# Patient Record
Sex: MALE | Race: WHITE | NOT HISPANIC OR LATINO | Employment: OTHER | URBAN - NONMETROPOLITAN AREA
[De-identification: names, ages, dates, MRNs, and addresses within clinical notes are randomized per-mention and may not be internally consistent; named-entity substitution may affect disease eponyms.]

---

## 2017-01-27 ENCOUNTER — HOSPITAL ENCOUNTER (EMERGENCY)
Facility: HOSPITAL | Age: 77
Discharge: ANOTHER HOSPITAL | End: 2017-01-27
Attending: EMERGENCY MEDICINE | Admitting: EMERGENCY MEDICINE
Payer: OTHER GOVERNMENT

## 2017-01-27 ENCOUNTER — APPOINTMENT (EMERGENCY)
Dept: RADIOLOGY | Facility: HOSPITAL | Age: 77
End: 2017-01-27
Payer: OTHER GOVERNMENT

## 2017-01-27 VITALS
BODY MASS INDEX: 37.27 KG/M2 | RESPIRATION RATE: 20 BRPM | HEIGHT: 72 IN | WEIGHT: 275.13 LBS | SYSTOLIC BLOOD PRESSURE: 129 MMHG | TEMPERATURE: 97.8 F | HEART RATE: 74 BPM | DIASTOLIC BLOOD PRESSURE: 64 MMHG | OXYGEN SATURATION: 95 %

## 2017-01-27 DIAGNOSIS — G89.29 CHRONIC BACK PAIN: ICD-10-CM

## 2017-01-27 DIAGNOSIS — R06.00 DYSPNEA: Primary | ICD-10-CM

## 2017-01-27 DIAGNOSIS — Z95.1 HX OF CABG: ICD-10-CM

## 2017-01-27 DIAGNOSIS — M54.9 CHRONIC BACK PAIN: ICD-10-CM

## 2017-01-27 LAB
ANION GAP SERPL CALCULATED.3IONS-SCNC: 9 MMOL/L (ref 4–13)
BASOPHILS # BLD AUTO: 0.06 THOUSANDS/ΜL (ref 0–0.1)
BASOPHILS NFR BLD AUTO: 1 % (ref 0–1)
BUN SERPL-MCNC: 32 MG/DL (ref 5–25)
CALCIUM SERPL-MCNC: 8.6 MG/DL (ref 8.3–10.1)
CHLORIDE SERPL-SCNC: 101 MMOL/L (ref 100–108)
CO2 SERPL-SCNC: 32 MMOL/L (ref 21–32)
CREAT SERPL-MCNC: 1.25 MG/DL (ref 0.6–1.3)
EOSINOPHIL # BLD AUTO: 0.25 THOUSAND/ΜL (ref 0–0.61)
EOSINOPHIL NFR BLD AUTO: 2 % (ref 0–6)
ERYTHROCYTE [DISTWIDTH] IN BLOOD BY AUTOMATED COUNT: 14.4 % (ref 11.6–15.1)
GFR SERPL CREATININE-BSD FRML MDRD: 56.2 ML/MIN/1.73SQ M
GLUCOSE SERPL-MCNC: 131 MG/DL (ref 65–140)
HCT VFR BLD AUTO: 42.8 % (ref 36.5–49.3)
HGB BLD-MCNC: 13.7 G/DL (ref 12–17)
INR PPP: 1.11 (ref 0.86–1.16)
LYMPHOCYTES # BLD AUTO: 1.58 THOUSANDS/ΜL (ref 0.6–4.47)
LYMPHOCYTES NFR BLD AUTO: 13 % (ref 14–44)
MCH RBC QN AUTO: 28.5 PG (ref 26.8–34.3)
MCHC RBC AUTO-ENTMCNC: 32 G/DL (ref 31.4–37.4)
MCV RBC AUTO: 89 FL (ref 82–98)
MONOCYTES # BLD AUTO: 1.28 THOUSAND/ΜL (ref 0.17–1.22)
MONOCYTES NFR BLD AUTO: 11 % (ref 4–12)
NEUTROPHILS # BLD AUTO: 8.71 THOUSANDS/ΜL (ref 1.85–7.62)
NEUTS SEG NFR BLD AUTO: 73 % (ref 43–75)
PLATELET # BLD AUTO: 220 THOUSANDS/UL (ref 149–390)
PMV BLD AUTO: 11.5 FL (ref 8.9–12.7)
POTASSIUM SERPL-SCNC: 3.3 MMOL/L (ref 3.5–5.3)
PROTHROMBIN TIME: 14 SECONDS (ref 12–14.3)
RBC # BLD AUTO: 4.8 MILLION/UL (ref 3.88–5.62)
SODIUM SERPL-SCNC: 142 MMOL/L (ref 136–145)
TROPONIN I SERPL-MCNC: 0.03 NG/ML
WBC # BLD AUTO: 11.88 THOUSAND/UL (ref 4.31–10.16)

## 2017-01-27 PROCEDURE — 80048 BASIC METABOLIC PNL TOTAL CA: CPT | Performed by: EMERGENCY MEDICINE

## 2017-01-27 PROCEDURE — 71020 HB CHEST X-RAY 2VW FRONTAL&LATL: CPT

## 2017-01-27 PROCEDURE — 96374 THER/PROPH/DIAG INJ IV PUSH: CPT

## 2017-01-27 PROCEDURE — 85610 PROTHROMBIN TIME: CPT | Performed by: EMERGENCY MEDICINE

## 2017-01-27 PROCEDURE — 93005 ELECTROCARDIOGRAM TRACING: CPT | Performed by: EMERGENCY MEDICINE

## 2017-01-27 PROCEDURE — 36415 COLL VENOUS BLD VENIPUNCTURE: CPT | Performed by: EMERGENCY MEDICINE

## 2017-01-27 PROCEDURE — 85025 COMPLETE CBC W/AUTO DIFF WBC: CPT | Performed by: EMERGENCY MEDICINE

## 2017-01-27 PROCEDURE — 99285 EMERGENCY DEPT VISIT HI MDM: CPT

## 2017-01-27 PROCEDURE — 84484 ASSAY OF TROPONIN QUANT: CPT | Performed by: EMERGENCY MEDICINE

## 2017-01-27 PROCEDURE — 96375 TX/PRO/DX INJ NEW DRUG ADDON: CPT

## 2017-01-27 RX ORDER — FUROSEMIDE 10 MG/ML
40 INJECTION INTRAMUSCULAR; INTRAVENOUS ONCE
Status: COMPLETED | OUTPATIENT
Start: 2017-01-27 | End: 2017-01-27

## 2017-01-27 RX ORDER — INSULIN GLARGINE 100 [IU]/ML
40 INJECTION, SOLUTION SUBCUTANEOUS
COMMUNITY
End: 2017-08-18 | Stop reason: HOSPADM

## 2017-01-27 RX ORDER — CLOPIDOGREL BISULFATE 75 MG/1
75 TABLET ORAL DAILY
COMMUNITY
End: 2017-06-27 | Stop reason: ALTCHOICE

## 2017-01-27 RX ORDER — NITROGLYCERIN 0.4 MG/1
0.4 TABLET SUBLINGUAL ONCE
Status: COMPLETED | OUTPATIENT
Start: 2017-01-27 | End: 2017-01-27

## 2017-01-27 RX ADMIN — HYDROMORPHONE HYDROCHLORIDE 1 MG: 1 INJECTION, SOLUTION INTRAMUSCULAR; INTRAVENOUS; SUBCUTANEOUS at 15:20

## 2017-01-27 RX ADMIN — NITROGLYCERIN 0.4 MG: 0.4 TABLET SUBLINGUAL at 15:20

## 2017-01-27 RX ADMIN — HYDROMORPHONE HYDROCHLORIDE 1 MG: 1 INJECTION, SOLUTION INTRAMUSCULAR; INTRAVENOUS; SUBCUTANEOUS at 21:22

## 2017-01-27 RX ADMIN — FUROSEMIDE 40 MG: 10 INJECTION, SOLUTION INTRAMUSCULAR; INTRAVENOUS at 15:20

## 2017-01-30 LAB
ATRIAL RATE: 82 BPM
P AXIS: 59 DEGREES
PR INTERVAL: 176 MS
QRS AXIS: -59 DEGREES
QRSD INTERVAL: 120 MS
QT INTERVAL: 410 MS
QTC INTERVAL: 479 MS
T WAVE AXIS: 85 DEGREES
VENTRICULAR RATE: 82 BPM

## 2017-06-27 ENCOUNTER — HOSPITAL ENCOUNTER (INPATIENT)
Facility: HOSPITAL | Age: 77
LOS: 3 days | Discharge: NURSING, REHAB OR LONG-TERM CARE | DRG: 683 | End: 2017-07-01
Attending: EMERGENCY MEDICINE | Admitting: INTERNAL MEDICINE
Payer: MEDICARE

## 2017-06-27 DIAGNOSIS — N28.9 ACUTE RENAL INSUFFICIENCY: Primary | ICD-10-CM

## 2017-06-27 DIAGNOSIS — N17.9 ACUTE KIDNEY INJURY (HCC): ICD-10-CM

## 2017-06-27 DIAGNOSIS — R30.0 DYSURIA: ICD-10-CM

## 2017-06-27 DIAGNOSIS — R33.9 URINARY RETENTION: ICD-10-CM

## 2017-06-27 LAB
ACETONE SERPL-MCNC: NEGATIVE MG/DL
ALBUMIN SERPL BCP-MCNC: 3.1 G/DL (ref 3.5–5)
ALP SERPL-CCNC: 108 U/L (ref 46–116)
ALT SERPL W P-5'-P-CCNC: 19 U/L (ref 12–78)
ANION GAP SERPL CALCULATED.3IONS-SCNC: 8 MMOL/L (ref 4–13)
AST SERPL W P-5'-P-CCNC: 12 U/L (ref 5–45)
BILIRUB SERPL-MCNC: 0.3 MG/DL (ref 0.2–1)
BILIRUB UR QL STRIP: NEGATIVE
BUN SERPL-MCNC: 45 MG/DL (ref 5–25)
CALCIUM SERPL-MCNC: 8.8 MG/DL (ref 8.3–10.1)
CHLORIDE SERPL-SCNC: 101 MMOL/L (ref 100–108)
CLARITY UR: CLEAR
CO2 SERPL-SCNC: 29 MMOL/L (ref 21–32)
COLOR UR: YELLOW
CREAT SERPL-MCNC: 1.63 MG/DL (ref 0.6–1.3)
GFR SERPL CREATININE-BSD FRML MDRD: 41.2 ML/MIN/1.73SQ M
GLUCOSE SERPL-MCNC: 271 MG/DL (ref 65–140)
GLUCOSE UR STRIP-MCNC: ABNORMAL MG/DL
HGB UR QL STRIP.AUTO: NEGATIVE
KETONES UR STRIP-MCNC: NEGATIVE MG/DL
LEUKOCYTE ESTERASE UR QL STRIP: NEGATIVE
MAGNESIUM SERPL-MCNC: 2.1 MG/DL (ref 1.6–2.6)
NITRITE UR QL STRIP: NEGATIVE
PH BLDV: 7.34 [PH] (ref 7.3–7.4)
PH UR STRIP.AUTO: 5.5 [PH] (ref 4.5–8)
POTASSIUM SERPL-SCNC: 4.5 MMOL/L (ref 3.5–5.3)
PROT SERPL-MCNC: 6.8 G/DL (ref 6.4–8.2)
PROT UR STRIP-MCNC: NEGATIVE MG/DL
SODIUM SERPL-SCNC: 138 MMOL/L (ref 136–145)
SP GR UR STRIP.AUTO: 1.02 (ref 1–1.03)
UROBILINOGEN UR QL STRIP.AUTO: 0.2 E.U./DL

## 2017-06-27 PROCEDURE — 83735 ASSAY OF MAGNESIUM: CPT | Performed by: EMERGENCY MEDICINE

## 2017-06-27 PROCEDURE — 82009 KETONE BODYS QUAL: CPT | Performed by: EMERGENCY MEDICINE

## 2017-06-27 PROCEDURE — 85027 COMPLETE CBC AUTOMATED: CPT | Performed by: EMERGENCY MEDICINE

## 2017-06-27 PROCEDURE — 96360 HYDRATION IV INFUSION INIT: CPT

## 2017-06-27 PROCEDURE — 87086 URINE CULTURE/COLONY COUNT: CPT | Performed by: EMERGENCY MEDICINE

## 2017-06-27 PROCEDURE — 80053 COMPREHEN METABOLIC PANEL: CPT | Performed by: EMERGENCY MEDICINE

## 2017-06-27 PROCEDURE — 36415 COLL VENOUS BLD VENIPUNCTURE: CPT | Performed by: EMERGENCY MEDICINE

## 2017-06-27 PROCEDURE — 81003 URINALYSIS AUTO W/O SCOPE: CPT | Performed by: EMERGENCY MEDICINE

## 2017-06-27 PROCEDURE — 82800 BLOOD PH: CPT | Performed by: EMERGENCY MEDICINE

## 2017-06-27 PROCEDURE — 85007 BL SMEAR W/DIFF WBC COUNT: CPT | Performed by: EMERGENCY MEDICINE

## 2017-06-27 RX ORDER — HYDROCHLOROTHIAZIDE 12.5 MG/1
12.5 CAPSULE, GELATIN COATED ORAL DAILY
COMMUNITY
End: 2017-07-01 | Stop reason: HOSPADM

## 2017-06-27 RX ORDER — POTASSIUM CHLORIDE 750 MG/1
10 TABLET, EXTENDED RELEASE ORAL 2 TIMES DAILY
COMMUNITY
End: 2017-07-01 | Stop reason: HOSPADM

## 2017-06-27 RX ORDER — FERROUS SULFATE 325(65) MG
325 TABLET ORAL
COMMUNITY
End: 2017-07-01 | Stop reason: HOSPADM

## 2017-06-27 RX ORDER — SIMVASTATIN 10 MG
10 TABLET ORAL
Status: ON HOLD | COMMUNITY
End: 2017-08-09 | Stop reason: CLARIF

## 2017-06-27 RX ADMIN — SODIUM CHLORIDE 1000 ML: 0.9 INJECTION, SOLUTION INTRAVENOUS at 20:17

## 2017-06-28 ENCOUNTER — APPOINTMENT (INPATIENT)
Dept: PHYSICAL THERAPY | Facility: HOSPITAL | Age: 77
DRG: 683 | End: 2017-06-28
Payer: MEDICARE

## 2017-06-28 ENCOUNTER — APPOINTMENT (INPATIENT)
Dept: ULTRASOUND IMAGING | Facility: HOSPITAL | Age: 77
DRG: 683 | End: 2017-06-28
Payer: MEDICARE

## 2017-06-28 ENCOUNTER — APPOINTMENT (OUTPATIENT)
Dept: OCCUPATIONAL THERAPY | Facility: HOSPITAL | Age: 77
DRG: 683 | End: 2017-06-28
Payer: MEDICARE

## 2017-06-28 PROBLEM — J44.9 COPD (CHRONIC OBSTRUCTIVE PULMONARY DISEASE) (HCC): Status: ACTIVE | Noted: 2017-06-28

## 2017-06-28 PROBLEM — I10 HYPERTENSION: Status: ACTIVE | Noted: 2017-06-28

## 2017-06-28 PROBLEM — R30.9 PAINFUL URINATION: Status: ACTIVE | Noted: 2017-06-28

## 2017-06-28 PROBLEM — N28.9 ACUTE RENAL INSUFFICIENCY: Status: ACTIVE | Noted: 2017-06-28

## 2017-06-28 PROBLEM — R30.0 DYSURIA: Status: ACTIVE | Noted: 2017-06-28

## 2017-06-28 PROBLEM — N17.9 ACUTE KIDNEY INJURY (HCC): Status: ACTIVE | Noted: 2017-06-28

## 2017-06-28 PROBLEM — E11.65 TYPE 2 DIABETES MELLITUS WITH HYPERGLYCEMIA (HCC): Status: ACTIVE | Noted: 2017-06-28

## 2017-06-28 PROBLEM — Z87.438 HISTORY OF BPH: Status: ACTIVE | Noted: 2017-06-28

## 2017-06-28 PROBLEM — E78.5 HYPERLIPIDEMIA: Status: ACTIVE | Noted: 2017-06-28

## 2017-06-28 PROBLEM — Z86.19 HISTORY OF ESBL E. COLI INFECTION: Status: ACTIVE | Noted: 2017-06-28

## 2017-06-28 PROBLEM — E11.9 DIABETES MELLITUS (HCC): Status: ACTIVE | Noted: 2017-06-28

## 2017-06-28 PROBLEM — R33.9 URINARY RETENTION: Status: ACTIVE | Noted: 2017-06-28

## 2017-06-28 LAB
ANION GAP SERPL CALCULATED.3IONS-SCNC: 7 MMOL/L (ref 4–13)
BASOPHILS # BLD MANUAL: 0 THOUSAND/UL (ref 0–0.1)
BASOPHILS NFR MAR MANUAL: 0 % (ref 0–1)
BUN SERPL-MCNC: 38 MG/DL (ref 5–25)
CALCIUM SERPL-MCNC: 8.4 MG/DL (ref 8.3–10.1)
CHLORIDE SERPL-SCNC: 106 MMOL/L (ref 100–108)
CO2 SERPL-SCNC: 28 MMOL/L (ref 21–32)
CREAT SERPL-MCNC: 1.35 MG/DL (ref 0.6–1.3)
CREAT UR-MCNC: 43.2 MG/DL
EOSINOPHIL # BLD MANUAL: 0.37 THOUSAND/UL (ref 0–0.4)
EOSINOPHIL NFR BLD MANUAL: 3 % (ref 0–6)
ERYTHROCYTE [DISTWIDTH] IN BLOOD BY AUTOMATED COUNT: 13.3 % (ref 11.6–15.1)
ERYTHROCYTE [DISTWIDTH] IN BLOOD BY AUTOMATED COUNT: 13.4 % (ref 11.6–15.1)
EST. AVERAGE GLUCOSE BLD GHB EST-MCNC: 298 MG/DL
GFR SERPL CREATININE-BSD FRML MDRD: 51.2 ML/MIN/1.73SQ M
GLUCOSE SERPL-MCNC: 279 MG/DL (ref 65–140)
GLUCOSE SERPL-MCNC: 302 MG/DL (ref 65–140)
GLUCOSE SERPL-MCNC: 340 MG/DL (ref 65–140)
GLUCOSE SERPL-MCNC: 345 MG/DL (ref 65–140)
GLUCOSE SERPL-MCNC: 350 MG/DL (ref 65–140)
HBA1C MFR BLD: 12 % (ref 4.2–6.3)
HCT VFR BLD AUTO: 39.6 % (ref 36.5–49.3)
HCT VFR BLD AUTO: 40.2 % (ref 36.5–49.3)
HGB BLD-MCNC: 13.1 G/DL (ref 12–17)
HGB BLD-MCNC: 13.3 G/DL (ref 12–17)
LYMPHOCYTES # BLD AUTO: 2.93 THOUSAND/UL (ref 0.6–4.47)
LYMPHOCYTES # BLD AUTO: 24 % (ref 14–44)
MCH RBC QN AUTO: 28.7 PG (ref 26.8–34.3)
MCH RBC QN AUTO: 28.9 PG (ref 26.8–34.3)
MCHC RBC AUTO-ENTMCNC: 33.1 G/DL (ref 31.4–37.4)
MCHC RBC AUTO-ENTMCNC: 33.1 G/DL (ref 31.4–37.4)
MCV RBC AUTO: 87 FL (ref 82–98)
MCV RBC AUTO: 87 FL (ref 82–98)
MONOCYTES # BLD AUTO: 0.73 THOUSAND/UL (ref 0–1.22)
MONOCYTES NFR BLD: 6 % (ref 4–12)
NEUTROPHILS # BLD MANUAL: 8.17 THOUSAND/UL (ref 1.85–7.62)
NEUTS SEG NFR BLD AUTO: 67 % (ref 43–75)
PLATELET # BLD AUTO: 195 THOUSANDS/UL (ref 149–390)
PLATELET # BLD AUTO: 200 THOUSANDS/UL (ref 149–390)
PLATELET BLD QL SMEAR: ADEQUATE
PMV BLD AUTO: 11.5 FL (ref 8.9–12.7)
PMV BLD AUTO: 12 FL (ref 8.9–12.7)
POTASSIUM SERPL-SCNC: 5.2 MMOL/L (ref 3.5–5.3)
PROT UR-MCNC: 14 MG/DL
PROT/CREAT UR: 0.32 MG/G{CREAT} (ref 0–0.1)
RBC # BLD AUTO: 4.53 MILLION/UL (ref 3.88–5.62)
RBC # BLD AUTO: 4.63 MILLION/UL (ref 3.88–5.62)
SODIUM 24H UR-SCNC: 18 MOL/L
SODIUM SERPL-SCNC: 141 MMOL/L (ref 136–145)
TOTAL CELLS COUNTED SPEC: 100
WBC # BLD AUTO: 12.2 THOUSAND/UL (ref 4.31–10.16)
WBC # BLD AUTO: 9.96 THOUSAND/UL (ref 4.31–10.16)

## 2017-06-28 PROCEDURE — 83036 HEMOGLOBIN GLYCOSYLATED A1C: CPT | Performed by: PHYSICIAN ASSISTANT

## 2017-06-28 PROCEDURE — 80048 BASIC METABOLIC PNL TOTAL CA: CPT | Performed by: PHYSICIAN ASSISTANT

## 2017-06-28 PROCEDURE — 97530 THERAPEUTIC ACTIVITIES: CPT

## 2017-06-28 PROCEDURE — 94760 N-INVAS EAR/PLS OXIMETRY 1: CPT

## 2017-06-28 PROCEDURE — G8987 SELF CARE CURRENT STATUS: HCPCS

## 2017-06-28 PROCEDURE — G8978 MOBILITY CURRENT STATUS: HCPCS | Performed by: PHYSICAL THERAPIST

## 2017-06-28 PROCEDURE — 84300 ASSAY OF URINE SODIUM: CPT | Performed by: INTERNAL MEDICINE

## 2017-06-28 PROCEDURE — 82948 REAGENT STRIP/BLOOD GLUCOSE: CPT

## 2017-06-28 PROCEDURE — 99285 EMERGENCY DEPT VISIT HI MDM: CPT

## 2017-06-28 PROCEDURE — 84156 ASSAY OF PROTEIN URINE: CPT | Performed by: INTERNAL MEDICINE

## 2017-06-28 PROCEDURE — 85027 COMPLETE CBC AUTOMATED: CPT | Performed by: PHYSICIAN ASSISTANT

## 2017-06-28 PROCEDURE — 97116 GAIT TRAINING THERAPY: CPT | Performed by: PHYSICAL THERAPIST

## 2017-06-28 PROCEDURE — 97163 PT EVAL HIGH COMPLEX 45 MIN: CPT | Performed by: PHYSICAL THERAPIST

## 2017-06-28 PROCEDURE — G8988 SELF CARE GOAL STATUS: HCPCS

## 2017-06-28 PROCEDURE — 97167 OT EVAL HIGH COMPLEX 60 MIN: CPT

## 2017-06-28 PROCEDURE — 76770 US EXAM ABDO BACK WALL COMP: CPT

## 2017-06-28 PROCEDURE — 51798 US URINE CAPACITY MEASURE: CPT

## 2017-06-28 PROCEDURE — 84166 PROTEIN E-PHORESIS/URINE/CSF: CPT | Performed by: INTERNAL MEDICINE

## 2017-06-28 PROCEDURE — G8979 MOBILITY GOAL STATUS: HCPCS | Performed by: PHYSICAL THERAPIST

## 2017-06-28 PROCEDURE — 82570 ASSAY OF URINE CREATININE: CPT | Performed by: INTERNAL MEDICINE

## 2017-06-28 RX ORDER — CARVEDILOL 3.12 MG/1
6.25 TABLET ORAL 2 TIMES DAILY WITH MEALS
Status: DISCONTINUED | OUTPATIENT
Start: 2017-06-28 | End: 2017-07-01 | Stop reason: HOSPADM

## 2017-06-28 RX ORDER — ATORVASTATIN CALCIUM 40 MG/1
40 TABLET, FILM COATED ORAL
Status: DISCONTINUED | OUTPATIENT
Start: 2017-06-28 | End: 2017-07-01 | Stop reason: HOSPADM

## 2017-06-28 RX ORDER — ACETAMINOPHEN 325 MG/1
650 TABLET ORAL EVERY 6 HOURS PRN
Status: DISCONTINUED | OUTPATIENT
Start: 2017-06-28 | End: 2017-06-28

## 2017-06-28 RX ORDER — HEPARIN SODIUM 5000 [USP'U]/ML
5000 INJECTION, SOLUTION INTRAVENOUS; SUBCUTANEOUS EVERY 8 HOURS SCHEDULED
Status: DISCONTINUED | OUTPATIENT
Start: 2017-06-28 | End: 2017-07-01 | Stop reason: HOSPADM

## 2017-06-28 RX ORDER — MAGNESIUM HYDROXIDE/ALUMINUM HYDROXICE/SIMETHICONE 120; 1200; 1200 MG/30ML; MG/30ML; MG/30ML
30 SUSPENSION ORAL EVERY 6 HOURS PRN
Status: DISCONTINUED | OUTPATIENT
Start: 2017-06-28 | End: 2017-07-01 | Stop reason: HOSPADM

## 2017-06-28 RX ORDER — ASPIRIN 81 MG/1
81 TABLET, CHEWABLE ORAL DAILY
Status: DISCONTINUED | OUTPATIENT
Start: 2017-06-28 | End: 2017-07-01 | Stop reason: HOSPADM

## 2017-06-28 RX ORDER — SODIUM CHLORIDE 9 MG/ML
75 INJECTION, SOLUTION INTRAVENOUS CONTINUOUS
Status: DISCONTINUED | OUTPATIENT
Start: 2017-06-28 | End: 2017-06-29

## 2017-06-28 RX ORDER — INSULIN GLARGINE 100 [IU]/ML
20 INJECTION, SOLUTION SUBCUTANEOUS EVERY MORNING
Status: DISCONTINUED | OUTPATIENT
Start: 2017-06-28 | End: 2017-06-29

## 2017-06-28 RX ORDER — ONDANSETRON 2 MG/ML
4 INJECTION INTRAMUSCULAR; INTRAVENOUS EVERY 6 HOURS PRN
Status: DISCONTINUED | OUTPATIENT
Start: 2017-06-28 | End: 2017-07-01 | Stop reason: HOSPADM

## 2017-06-28 RX ORDER — TAMSULOSIN HYDROCHLORIDE 0.4 MG/1
0.4 CAPSULE ORAL
Status: DISCONTINUED | OUTPATIENT
Start: 2017-06-28 | End: 2017-07-01 | Stop reason: HOSPADM

## 2017-06-28 RX ORDER — ACETAMINOPHEN 325 MG/1
650 TABLET ORAL EVERY 6 HOURS PRN
Status: DISCONTINUED | OUTPATIENT
Start: 2017-06-28 | End: 2017-07-01 | Stop reason: HOSPADM

## 2017-06-28 RX ADMIN — HEPARIN SODIUM 5000 UNITS: 5000 INJECTION, SOLUTION INTRAVENOUS; SUBCUTANEOUS at 05:43

## 2017-06-28 RX ADMIN — INSULIN LISPRO 4 UNITS: 100 INJECTION, SOLUTION INTRAVENOUS; SUBCUTANEOUS at 21:33

## 2017-06-28 RX ADMIN — TAMSULOSIN HYDROCHLORIDE 0.4 MG: 0.4 CAPSULE ORAL at 15:32

## 2017-06-28 RX ADMIN — INSULIN LISPRO 16 UNITS: 100 INJECTION, SOLUTION INTRAVENOUS; SUBCUTANEOUS at 08:36

## 2017-06-28 RX ADMIN — INSULIN GLARGINE 20 UNITS: 100 INJECTION, SOLUTION SUBCUTANEOUS at 14:11

## 2017-06-28 RX ADMIN — INSULIN LISPRO 12 UNITS: 100 INJECTION, SOLUTION INTRAVENOUS; SUBCUTANEOUS at 11:48

## 2017-06-28 RX ADMIN — ATORVASTATIN CALCIUM 40 MG: 40 TABLET, FILM COATED ORAL at 15:32

## 2017-06-28 RX ADMIN — CARVEDILOL 6.25 MG: 3.12 TABLET, FILM COATED ORAL at 15:32

## 2017-06-28 RX ADMIN — SODIUM CHLORIDE 75 ML/HR: 0.9 INJECTION, SOLUTION INTRAVENOUS at 04:12

## 2017-06-28 RX ADMIN — HEPARIN SODIUM 5000 UNITS: 5000 INJECTION, SOLUTION INTRAVENOUS; SUBCUTANEOUS at 14:11

## 2017-06-28 RX ADMIN — SODIUM CHLORIDE 75 ML/HR: 0.9 INJECTION, SOLUTION INTRAVENOUS at 16:40

## 2017-06-28 RX ADMIN — ASPIRIN 81 MG 81 MG: 81 TABLET ORAL at 14:11

## 2017-06-28 RX ADMIN — HEPARIN SODIUM 5000 UNITS: 5000 INJECTION, SOLUTION INTRAVENOUS; SUBCUTANEOUS at 21:33

## 2017-06-28 RX ADMIN — INSULIN LISPRO 4 UNITS: 100 INJECTION, SOLUTION INTRAVENOUS; SUBCUTANEOUS at 15:32

## 2017-06-29 ENCOUNTER — APPOINTMENT (INPATIENT)
Dept: PHYSICAL THERAPY | Facility: HOSPITAL | Age: 77
DRG: 683 | End: 2017-06-29
Payer: MEDICARE

## 2017-06-29 PROBLEM — Q61.02 MULTIPLE RENAL CYSTS: Status: ACTIVE | Noted: 2017-06-29

## 2017-06-29 LAB
25(OH)D3 SERPL-MCNC: 23.4 NG/ML (ref 30–100)
ALBUMIN SERPL BCP-MCNC: 2.8 G/DL (ref 3.5–5)
ALP SERPL-CCNC: 124 U/L (ref 46–116)
ALT SERPL W P-5'-P-CCNC: 15 U/L (ref 12–78)
ANION GAP SERPL CALCULATED.3IONS-SCNC: 6 MMOL/L (ref 4–13)
AST SERPL W P-5'-P-CCNC: 14 U/L (ref 5–45)
BACTERIA UR CULT: NORMAL
BASOPHILS # BLD AUTO: 0.07 THOUSANDS/ΜL (ref 0–0.1)
BASOPHILS NFR BLD AUTO: 1 % (ref 0–1)
BILIRUB SERPL-MCNC: 0.2 MG/DL (ref 0.2–1)
BUN SERPL-MCNC: 21 MG/DL (ref 5–25)
CALCIUM SERPL-MCNC: 8.1 MG/DL (ref 8.3–10.1)
CHLORIDE SERPL-SCNC: 109 MMOL/L (ref 100–108)
CO2 SERPL-SCNC: 29 MMOL/L (ref 21–32)
CREAT SERPL-MCNC: 0.89 MG/DL (ref 0.6–1.3)
EOSINOPHIL # BLD AUTO: 0.51 THOUSAND/ΜL (ref 0–0.61)
EOSINOPHIL NFR BLD AUTO: 6 % (ref 0–6)
ERYTHROCYTE [DISTWIDTH] IN BLOOD BY AUTOMATED COUNT: 13.3 % (ref 11.6–15.1)
EST. AVERAGE GLUCOSE BLD GHB EST-MCNC: 298 MG/DL
GFR SERPL CREATININE-BSD FRML MDRD: >60 ML/MIN/1.73SQ M
GLUCOSE SERPL-MCNC: 245 MG/DL (ref 65–140)
GLUCOSE SERPL-MCNC: 281 MG/DL (ref 65–140)
GLUCOSE SERPL-MCNC: 282 MG/DL (ref 65–140)
GLUCOSE SERPL-MCNC: 351 MG/DL (ref 65–140)
GLUCOSE SERPL-MCNC: 363 MG/DL (ref 65–140)
HBA1C MFR BLD: 12 % (ref 4.2–6.3)
HCT VFR BLD AUTO: 40 % (ref 36.5–49.3)
HGB BLD-MCNC: 12.9 G/DL (ref 12–17)
LACTATE SERPL-SCNC: 0.9 MMOL/L (ref 0.5–2)
LYMPHOCYTES # BLD AUTO: 1.16 THOUSANDS/ΜL (ref 0.6–4.47)
LYMPHOCYTES NFR BLD AUTO: 13 % (ref 14–44)
MAGNESIUM SERPL-MCNC: 2.1 MG/DL (ref 1.6–2.6)
MCH RBC QN AUTO: 28.7 PG (ref 26.8–34.3)
MCHC RBC AUTO-ENTMCNC: 32.3 G/DL (ref 31.4–37.4)
MCV RBC AUTO: 89 FL (ref 82–98)
MONOCYTES # BLD AUTO: 0.91 THOUSAND/ΜL (ref 0.17–1.22)
MONOCYTES NFR BLD AUTO: 10 % (ref 4–12)
NEUTROPHILS # BLD AUTO: 6.45 THOUSANDS/ΜL (ref 1.85–7.62)
NEUTS SEG NFR BLD AUTO: 70 % (ref 43–75)
PHOSPHATE SERPL-MCNC: 3.4 MG/DL (ref 2.3–4.1)
PLATELET # BLD AUTO: 169 THOUSANDS/UL (ref 149–390)
PMV BLD AUTO: 11.2 FL (ref 8.9–12.7)
POTASSIUM SERPL-SCNC: 4.6 MMOL/L (ref 3.5–5.3)
PROT SERPL-MCNC: 6.5 G/DL (ref 6.4–8.2)
RBC # BLD AUTO: 4.49 MILLION/UL (ref 3.88–5.62)
SODIUM SERPL-SCNC: 144 MMOL/L (ref 136–145)
TROPONIN I SERPL-MCNC: 0.02 NG/ML
TSH SERPL DL<=0.05 MIU/L-ACNC: 1.15 UIU/ML (ref 0.36–3.74)
WBC # BLD AUTO: 9.1 THOUSAND/UL (ref 4.31–10.16)

## 2017-06-29 PROCEDURE — 85025 COMPLETE CBC W/AUTO DIFF WBC: CPT | Performed by: INTERNAL MEDICINE

## 2017-06-29 PROCEDURE — 82306 VITAMIN D 25 HYDROXY: CPT | Performed by: INTERNAL MEDICINE

## 2017-06-29 PROCEDURE — 83735 ASSAY OF MAGNESIUM: CPT | Performed by: INTERNAL MEDICINE

## 2017-06-29 PROCEDURE — 84484 ASSAY OF TROPONIN QUANT: CPT | Performed by: INTERNAL MEDICINE

## 2017-06-29 PROCEDURE — 80053 COMPREHEN METABOLIC PANEL: CPT | Performed by: INTERNAL MEDICINE

## 2017-06-29 PROCEDURE — 84165 PROTEIN E-PHORESIS SERUM: CPT | Performed by: INTERNAL MEDICINE

## 2017-06-29 PROCEDURE — 83605 ASSAY OF LACTIC ACID: CPT | Performed by: INTERNAL MEDICINE

## 2017-06-29 PROCEDURE — 84443 ASSAY THYROID STIM HORMONE: CPT | Performed by: INTERNAL MEDICINE

## 2017-06-29 PROCEDURE — 83036 HEMOGLOBIN GLYCOSYLATED A1C: CPT | Performed by: INTERNAL MEDICINE

## 2017-06-29 PROCEDURE — 97116 GAIT TRAINING THERAPY: CPT

## 2017-06-29 PROCEDURE — 82948 REAGENT STRIP/BLOOD GLUCOSE: CPT

## 2017-06-29 PROCEDURE — 94640 AIRWAY INHALATION TREATMENT: CPT

## 2017-06-29 PROCEDURE — 97110 THERAPEUTIC EXERCISES: CPT

## 2017-06-29 PROCEDURE — 94760 N-INVAS EAR/PLS OXIMETRY 1: CPT

## 2017-06-29 PROCEDURE — 84100 ASSAY OF PHOSPHORUS: CPT | Performed by: INTERNAL MEDICINE

## 2017-06-29 RX ORDER — ALBUTEROL SULFATE 2.5 MG/3ML
2.5 SOLUTION RESPIRATORY (INHALATION) EVERY 6 HOURS PRN
Status: DISCONTINUED | OUTPATIENT
Start: 2017-06-29 | End: 2017-06-29

## 2017-06-29 RX ORDER — INSULIN GLARGINE 100 [IU]/ML
30 INJECTION, SOLUTION SUBCUTANEOUS EVERY MORNING
Status: DISCONTINUED | OUTPATIENT
Start: 2017-06-30 | End: 2017-06-30

## 2017-06-29 RX ORDER — INSULIN GLARGINE 100 [IU]/ML
10 INJECTION, SOLUTION SUBCUTANEOUS ONCE
Status: COMPLETED | OUTPATIENT
Start: 2017-06-29 | End: 2017-06-29

## 2017-06-29 RX ORDER — SODIUM CHLORIDE FOR INHALATION 0.9 %
3 VIAL, NEBULIZER (ML) INHALATION EVERY 6 HOURS PRN
Status: DISCONTINUED | OUTPATIENT
Start: 2017-06-29 | End: 2017-07-01 | Stop reason: HOSPADM

## 2017-06-29 RX ORDER — LEVALBUTEROL 1.25 MG/.5ML
1.25 SOLUTION, CONCENTRATE RESPIRATORY (INHALATION) EVERY 6 HOURS PRN
Status: DISCONTINUED | OUTPATIENT
Start: 2017-06-29 | End: 2017-07-01 | Stop reason: HOSPADM

## 2017-06-29 RX ORDER — INSULIN GLARGINE 100 [IU]/ML
INJECTION, SOLUTION SUBCUTANEOUS
Status: COMPLETED
Start: 2017-06-29 | End: 2017-06-29

## 2017-06-29 RX ORDER — SODIUM CHLORIDE FOR INHALATION 0.9 %
3 VIAL, NEBULIZER (ML) INHALATION
Status: DISCONTINUED | OUTPATIENT
Start: 2017-06-29 | End: 2017-06-29

## 2017-06-29 RX ORDER — LEVALBUTEROL 1.25 MG/.5ML
1.25 SOLUTION, CONCENTRATE RESPIRATORY (INHALATION)
Status: DISCONTINUED | OUTPATIENT
Start: 2017-06-29 | End: 2017-06-29

## 2017-06-29 RX ADMIN — CARVEDILOL 6.25 MG: 3.12 TABLET, FILM COATED ORAL at 15:53

## 2017-06-29 RX ADMIN — INSULIN GLARGINE 20 UNITS: 100 INJECTION, SOLUTION SUBCUTANEOUS at 15:01

## 2017-06-29 RX ADMIN — ATORVASTATIN CALCIUM 40 MG: 40 TABLET, FILM COATED ORAL at 15:53

## 2017-06-29 RX ADMIN — HEPARIN SODIUM 5000 UNITS: 5000 INJECTION, SOLUTION INTRAVENOUS; SUBCUTANEOUS at 15:06

## 2017-06-29 RX ADMIN — INSULIN LISPRO 5 UNITS: 100 INJECTION, SOLUTION INTRAVENOUS; SUBCUTANEOUS at 12:31

## 2017-06-29 RX ADMIN — INSULIN LISPRO 4 UNITS: 100 INJECTION, SOLUTION INTRAVENOUS; SUBCUTANEOUS at 21:43

## 2017-06-29 RX ADMIN — ISODIUM CHLORIDE 3 ML: 0.03 SOLUTION RESPIRATORY (INHALATION) at 20:37

## 2017-06-29 RX ADMIN — INSULIN GLARGINE 10 UNITS: 100 INJECTION, SOLUTION SUBCUTANEOUS at 12:31

## 2017-06-29 RX ADMIN — INSULIN LISPRO 5 UNITS: 100 INJECTION, SOLUTION INTRAVENOUS; SUBCUTANEOUS at 15:53

## 2017-06-29 RX ADMIN — INSULIN GLARGINE 20 UNITS: 100 INJECTION, SOLUTION SUBCUTANEOUS at 09:44

## 2017-06-29 RX ADMIN — INSULIN LISPRO 4 UNITS: 100 INJECTION, SOLUTION INTRAVENOUS; SUBCUTANEOUS at 15:53

## 2017-06-29 RX ADMIN — CARVEDILOL 6.25 MG: 3.12 TABLET, FILM COATED ORAL at 09:44

## 2017-06-29 RX ADMIN — LEVALBUTEROL 1.25 MG: 1.25 SOLUTION, CONCENTRATE RESPIRATORY (INHALATION) at 20:37

## 2017-06-29 RX ADMIN — ISODIUM CHLORIDE 3 ML: 0.03 SOLUTION RESPIRATORY (INHALATION) at 08:36

## 2017-06-29 RX ADMIN — SODIUM CHLORIDE 75 ML/HR: 0.9 INJECTION, SOLUTION INTRAVENOUS at 03:30

## 2017-06-29 RX ADMIN — HEPARIN SODIUM 5000 UNITS: 5000 INJECTION, SOLUTION INTRAVENOUS; SUBCUTANEOUS at 06:08

## 2017-06-29 RX ADMIN — LEVALBUTEROL 1.25 MG: 1.25 SOLUTION, CONCENTRATE RESPIRATORY (INHALATION) at 08:36

## 2017-06-29 RX ADMIN — TAMSULOSIN HYDROCHLORIDE 0.4 MG: 0.4 CAPSULE ORAL at 15:53

## 2017-06-29 RX ADMIN — ISODIUM CHLORIDE 3 ML: 0.03 SOLUTION RESPIRATORY (INHALATION) at 14:21

## 2017-06-29 RX ADMIN — ASPIRIN 81 MG 81 MG: 81 TABLET ORAL at 09:44

## 2017-06-29 RX ADMIN — LEVALBUTEROL 1.25 MG: 1.25 SOLUTION, CONCENTRATE RESPIRATORY (INHALATION) at 14:21

## 2017-06-29 RX ADMIN — INSULIN LISPRO 6 UNITS: 100 INJECTION, SOLUTION INTRAVENOUS; SUBCUTANEOUS at 12:31

## 2017-06-29 RX ADMIN — HEPARIN SODIUM 5000 UNITS: 5000 INJECTION, SOLUTION INTRAVENOUS; SUBCUTANEOUS at 21:42

## 2017-06-29 RX ADMIN — INSULIN LISPRO 3 UNITS: 100 INJECTION, SOLUTION INTRAVENOUS; SUBCUTANEOUS at 09:40

## 2017-06-30 ENCOUNTER — APPOINTMENT (INPATIENT)
Dept: PHYSICAL THERAPY | Facility: HOSPITAL | Age: 77
DRG: 683 | End: 2017-06-30
Payer: MEDICARE

## 2017-06-30 ENCOUNTER — APPOINTMENT (INPATIENT)
Dept: OCCUPATIONAL THERAPY | Facility: HOSPITAL | Age: 77
DRG: 683 | End: 2017-06-30
Payer: MEDICARE

## 2017-06-30 PROBLEM — K59.00 CONSTIPATION: Status: ACTIVE | Noted: 2017-06-30

## 2017-06-30 PROBLEM — E55.9 VITAMIN D INSUFFICIENCY: Status: ACTIVE | Noted: 2017-06-30

## 2017-06-30 LAB
ALBUMIN SERPL ELPH-MCNC: 3.41 G/DL (ref 3.5–5)
ALBUMIN SERPL ELPH-MCNC: 52.5 % (ref 52–65)
ALBUMIN UR ELPH-MCNC: 28 %
ALPHA1 GLOB MFR UR ELPH: 15.5 %
ALPHA1 GLOB SERPL ELPH-MCNC: 0.25 G/DL (ref 0.1–0.4)
ALPHA1 GLOB SERPL ELPH-MCNC: 3.8 % (ref 2.5–5)
ALPHA2 GLOB MFR UR ELPH: 11 %
ALPHA2 GLOB SERPL ELPH-MCNC: 0.74 G/DL (ref 0.4–1.2)
ALPHA2 GLOB SERPL ELPH-MCNC: 11.4 % (ref 7–13)
ANION GAP SERPL CALCULATED.3IONS-SCNC: 4 MMOL/L (ref 4–13)
B-GLOBULIN MFR UR ELPH: 16.6 %
BACTERIA UR QL AUTO: ABNORMAL /HPF
BETA GLOB ABNORMAL SERPL ELPH-MCNC: 0.42 G/DL (ref 0.4–0.8)
BETA1 GLOB SERPL ELPH-MCNC: 6.4 % (ref 5–13)
BETA2 GLOB SERPL ELPH-MCNC: 6.7 % (ref 2–8)
BETA2+GAMMA GLOB SERPL ELPH-MCNC: 0.44 G/DL (ref 0.2–0.5)
BILIRUB UR QL STRIP: NEGATIVE
BUN SERPL-MCNC: 13 MG/DL (ref 5–25)
CALCIUM SERPL-MCNC: 8.5 MG/DL (ref 8.3–10.1)
CHLORIDE SERPL-SCNC: 106 MMOL/L (ref 100–108)
CLARITY UR: CLEAR
CO2 SERPL-SCNC: 30 MMOL/L (ref 21–32)
COLOR UR: YELLOW
CREAT SERPL-MCNC: 0.79 MG/DL (ref 0.6–1.3)
GAMMA GLOB ABNORMAL SERPL ELPH-MCNC: 1.25 G/DL (ref 0.5–1.6)
GAMMA GLOB MFR UR ELPH: 28.9 %
GAMMA GLOB SERPL ELPH-MCNC: 19.2 % (ref 12–22)
GFR SERPL CREATININE-BSD FRML MDRD: >60 ML/MIN/1.73SQ M
GLUCOSE SERPL-MCNC: 276 MG/DL (ref 65–140)
GLUCOSE SERPL-MCNC: 290 MG/DL (ref 65–140)
GLUCOSE SERPL-MCNC: 293 MG/DL (ref 65–140)
GLUCOSE SERPL-MCNC: 360 MG/DL (ref 65–140)
GLUCOSE SERPL-MCNC: 373 MG/DL (ref 65–140)
GLUCOSE UR STRIP-MCNC: ABNORMAL MG/DL
HGB UR QL STRIP.AUTO: ABNORMAL
IGG/ALB SER: 1.11 {RATIO} (ref 1.1–1.8)
KETONES UR STRIP-MCNC: NEGATIVE MG/DL
LEUKOCYTE ESTERASE UR QL STRIP: ABNORMAL
NITRITE UR QL STRIP: NEGATIVE
NON-SQ EPI CELLS URNS QL MICRO: ABNORMAL /HPF
PH UR STRIP.AUTO: 6 [PH] (ref 4.5–8)
POTASSIUM SERPL-SCNC: 4.3 MMOL/L (ref 3.5–5.3)
PROT PATTERN SERPL ELPH-IMP: ABNORMAL
PROT PATTERN UR ELPH-IMP: NORMAL
PROT SERPL-MCNC: 6.5 G/DL (ref 6.4–8.2)
PROT UR STRIP-MCNC: NEGATIVE MG/DL
PROT UR-MCNC: 13 MG/DL
RBC #/AREA URNS AUTO: ABNORMAL /HPF
SODIUM SERPL-SCNC: 140 MMOL/L (ref 136–145)
SP GR UR STRIP.AUTO: 1.01 (ref 1–1.03)
UROBILINOGEN UR QL STRIP.AUTO: 0.2 E.U./DL
WBC #/AREA URNS AUTO: ABNORMAL /HPF

## 2017-06-30 PROCEDURE — 87086 URINE CULTURE/COLONY COUNT: CPT | Performed by: INTERNAL MEDICINE

## 2017-06-30 PROCEDURE — 97110 THERAPEUTIC EXERCISES: CPT

## 2017-06-30 PROCEDURE — 82948 REAGENT STRIP/BLOOD GLUCOSE: CPT

## 2017-06-30 PROCEDURE — 81001 URINALYSIS AUTO W/SCOPE: CPT | Performed by: INTERNAL MEDICINE

## 2017-06-30 PROCEDURE — 97116 GAIT TRAINING THERAPY: CPT

## 2017-06-30 PROCEDURE — 87077 CULTURE AEROBIC IDENTIFY: CPT | Performed by: INTERNAL MEDICINE

## 2017-06-30 PROCEDURE — 80048 BASIC METABOLIC PNL TOTAL CA: CPT | Performed by: INTERNAL MEDICINE

## 2017-06-30 PROCEDURE — 87186 SC STD MICRODIL/AGAR DIL: CPT | Performed by: INTERNAL MEDICINE

## 2017-06-30 PROCEDURE — 97535 SELF CARE MNGMENT TRAINING: CPT

## 2017-06-30 PROCEDURE — 97530 THERAPEUTIC ACTIVITIES: CPT

## 2017-06-30 PROCEDURE — 87147 CULTURE TYPE IMMUNOLOGIC: CPT | Performed by: INTERNAL MEDICINE

## 2017-06-30 RX ORDER — DOCUSATE SODIUM 100 MG/1
100 CAPSULE, LIQUID FILLED ORAL 2 TIMES DAILY
Status: DISCONTINUED | OUTPATIENT
Start: 2017-06-30 | End: 2017-07-01 | Stop reason: HOSPADM

## 2017-06-30 RX ORDER — ERGOCALCIFEROL 1.25 MG/1
50000 CAPSULE ORAL WEEKLY
Status: DISCONTINUED | OUTPATIENT
Start: 2017-06-30 | End: 2017-07-01 | Stop reason: HOSPADM

## 2017-06-30 RX ORDER — INSULIN GLARGINE 100 [IU]/ML
40 INJECTION, SOLUTION SUBCUTANEOUS EVERY MORNING
Status: DISCONTINUED | OUTPATIENT
Start: 2017-07-01 | End: 2017-07-01 | Stop reason: HOSPADM

## 2017-06-30 RX ORDER — AMOXICILLIN 250 MG
1 CAPSULE ORAL
Status: DISCONTINUED | OUTPATIENT
Start: 2017-06-30 | End: 2017-06-30

## 2017-06-30 RX ORDER — NYSTATIN 100000 [USP'U]/G
1 POWDER TOPICAL 2 TIMES DAILY
Status: DISCONTINUED | OUTPATIENT
Start: 2017-06-30 | End: 2017-07-01 | Stop reason: HOSPADM

## 2017-06-30 RX ORDER — PHENAZOPYRIDINE HYDROCHLORIDE 100 MG/1
100 TABLET, FILM COATED ORAL
Status: DISCONTINUED | OUTPATIENT
Start: 2017-06-30 | End: 2017-07-01 | Stop reason: HOSPADM

## 2017-06-30 RX ORDER — POLYETHYLENE GLYCOL 3350 17 G/17G
17 POWDER, FOR SOLUTION ORAL ONCE
Status: COMPLETED | OUTPATIENT
Start: 2017-06-30 | End: 2017-06-30

## 2017-06-30 RX ORDER — INSULIN GLARGINE 100 [IU]/ML
10 INJECTION, SOLUTION SUBCUTANEOUS ONCE
Status: COMPLETED | OUTPATIENT
Start: 2017-06-30 | End: 2017-06-30

## 2017-06-30 RX ORDER — INSULIN GLARGINE 100 [IU]/ML
INJECTION, SOLUTION SUBCUTANEOUS
Status: COMPLETED
Start: 2017-06-30 | End: 2017-06-30

## 2017-06-30 RX ORDER — POLYETHYLENE GLYCOL 3350 17 G/17G
17 POWDER, FOR SOLUTION ORAL DAILY PRN
Status: DISCONTINUED | OUTPATIENT
Start: 2017-06-30 | End: 2017-07-01 | Stop reason: HOSPADM

## 2017-06-30 RX ADMIN — NYSTATIN 1 APPLICATION: 100000 POWDER TOPICAL at 12:46

## 2017-06-30 RX ADMIN — HEPARIN SODIUM 5000 UNITS: 5000 INJECTION, SOLUTION INTRAVENOUS; SUBCUTANEOUS at 21:34

## 2017-06-30 RX ADMIN — HEPARIN SODIUM 5000 UNITS: 5000 INJECTION, SOLUTION INTRAVENOUS; SUBCUTANEOUS at 05:23

## 2017-06-30 RX ADMIN — INSULIN LISPRO 8 UNITS: 100 INJECTION, SOLUTION INTRAVENOUS; SUBCUTANEOUS at 12:47

## 2017-06-30 RX ADMIN — ATORVASTATIN CALCIUM 40 MG: 40 TABLET, FILM COATED ORAL at 17:41

## 2017-06-30 RX ADMIN — CARVEDILOL 6.25 MG: 3.12 TABLET, FILM COATED ORAL at 08:57

## 2017-06-30 RX ADMIN — INSULIN GLARGINE 30 UNITS: 100 INJECTION, SOLUTION SUBCUTANEOUS at 08:55

## 2017-06-30 RX ADMIN — POLYETHYLENE GLYCOL 3350 17 G: 17 POWDER, FOR SOLUTION ORAL at 08:57

## 2017-06-30 RX ADMIN — INSULIN LISPRO 6 UNITS: 100 INJECTION, SOLUTION INTRAVENOUS; SUBCUTANEOUS at 08:47

## 2017-06-30 RX ADMIN — INSULIN LISPRO 8 UNITS: 100 INJECTION, SOLUTION INTRAVENOUS; SUBCUTANEOUS at 17:45

## 2017-06-30 RX ADMIN — INSULIN LISPRO 3 UNITS: 100 INJECTION, SOLUTION INTRAVENOUS; SUBCUTANEOUS at 21:39

## 2017-06-30 RX ADMIN — DOCUSATE SODIUM 100 MG: 100 CAPSULE, LIQUID FILLED ORAL at 08:57

## 2017-06-30 RX ADMIN — ERGOCALCIFEROL 50000 UNITS: 1.25 CAPSULE ORAL at 08:57

## 2017-06-30 RX ADMIN — INSULIN GLARGINE 10 UNITS: 100 INJECTION, SOLUTION SUBCUTANEOUS at 12:48

## 2017-06-30 RX ADMIN — TAMSULOSIN HYDROCHLORIDE 0.4 MG: 0.4 CAPSULE ORAL at 17:42

## 2017-06-30 RX ADMIN — INSULIN LISPRO 4 UNITS: 100 INJECTION, SOLUTION INTRAVENOUS; SUBCUTANEOUS at 17:45

## 2017-06-30 RX ADMIN — NYSTATIN 1 APPLICATION: 100000 POWDER TOPICAL at 17:43

## 2017-06-30 RX ADMIN — INSULIN GLARGINE 10 UNITS: 100 INJECTION, SOLUTION SUBCUTANEOUS at 12:52

## 2017-06-30 RX ADMIN — INSULIN LISPRO 5 UNITS: 100 INJECTION, SOLUTION INTRAVENOUS; SUBCUTANEOUS at 08:46

## 2017-06-30 RX ADMIN — ASPIRIN 81 MG 81 MG: 81 TABLET ORAL at 08:57

## 2017-06-30 RX ADMIN — HEPARIN SODIUM 5000 UNITS: 5000 INJECTION, SOLUTION INTRAVENOUS; SUBCUTANEOUS at 15:00

## 2017-06-30 RX ADMIN — DOCUSATE SODIUM 100 MG: 100 CAPSULE, LIQUID FILLED ORAL at 17:42

## 2017-06-30 RX ADMIN — ACETAMINOPHEN 650 MG: 325 TABLET, FILM COATED ORAL at 05:23

## 2017-06-30 RX ADMIN — PHENAZOPYRIDINE HYDROCHLORIDE 100 MG: 100 TABLET ORAL at 12:52

## 2017-06-30 RX ADMIN — PHENAZOPYRIDINE HYDROCHLORIDE 100 MG: 100 TABLET ORAL at 17:42

## 2017-06-30 RX ADMIN — INSULIN LISPRO 6 UNITS: 100 INJECTION, SOLUTION INTRAVENOUS; SUBCUTANEOUS at 12:47

## 2017-07-01 ENCOUNTER — HOSPITAL ENCOUNTER (INPATIENT)
Facility: HOSPITAL | Age: 77
LOS: 20 days | Discharge: HOME | DRG: 092 | End: 2017-07-21
Attending: INTERNAL MEDICINE | Admitting: INTERNAL MEDICINE
Payer: MEDICARE

## 2017-07-01 VITALS
SYSTOLIC BLOOD PRESSURE: 158 MMHG | WEIGHT: 250.44 LBS | HEART RATE: 66 BPM | RESPIRATION RATE: 18 BRPM | BODY MASS INDEX: 35.06 KG/M2 | TEMPERATURE: 99 F | DIASTOLIC BLOOD PRESSURE: 69 MMHG | HEIGHT: 71 IN | OXYGEN SATURATION: 94 %

## 2017-07-01 DIAGNOSIS — I50.42 SYSTOLIC AND DIASTOLIC CHF, CHRONIC (HCC): Primary | ICD-10-CM

## 2017-07-01 PROBLEM — Z86.19 HISTORY OF ESBL E. COLI INFECTION: Status: RESOLVED | Noted: 2017-06-28 | Resolved: 2017-07-01

## 2017-07-01 PROBLEM — K59.00 CONSTIPATION: Status: RESOLVED | Noted: 2017-06-30 | Resolved: 2017-07-01

## 2017-07-01 PROBLEM — R30.0 DYSURIA: Status: RESOLVED | Noted: 2017-06-28 | Resolved: 2017-07-01

## 2017-07-01 PROBLEM — R33.9 URINARY RETENTION: Status: RESOLVED | Noted: 2017-06-28 | Resolved: 2017-07-01

## 2017-07-01 PROBLEM — N17.9 ACUTE KIDNEY INJURY (HCC): Status: RESOLVED | Noted: 2017-06-28 | Resolved: 2017-07-01

## 2017-07-01 PROBLEM — Z87.438 HISTORY OF BPH: Status: RESOLVED | Noted: 2017-06-28 | Resolved: 2017-07-01

## 2017-07-01 LAB
ALBUMIN SERPL BCP-MCNC: 3 G/DL (ref 3.5–5)
ALP SERPL-CCNC: 112 U/L (ref 46–116)
ALT SERPL W P-5'-P-CCNC: 16 U/L (ref 12–78)
ANION GAP SERPL CALCULATED.3IONS-SCNC: 4 MMOL/L (ref 4–13)
AST SERPL W P-5'-P-CCNC: 16 U/L (ref 5–45)
BASOPHILS # BLD AUTO: 0.09 THOUSANDS/ΜL (ref 0–0.1)
BASOPHILS NFR BLD AUTO: 1 % (ref 0–1)
BILIRUB SERPL-MCNC: 0.3 MG/DL (ref 0.2–1)
BUN SERPL-MCNC: 11 MG/DL (ref 5–25)
CALCIUM SERPL-MCNC: 8.6 MG/DL (ref 8.3–10.1)
CHLORIDE SERPL-SCNC: 104 MMOL/L (ref 100–108)
CO2 SERPL-SCNC: 31 MMOL/L (ref 21–32)
CREAT SERPL-MCNC: 0.85 MG/DL (ref 0.6–1.3)
EOSINOPHIL # BLD AUTO: 0.5 THOUSAND/ΜL (ref 0–0.61)
EOSINOPHIL NFR BLD AUTO: 5 % (ref 0–6)
ERYTHROCYTE [DISTWIDTH] IN BLOOD BY AUTOMATED COUNT: 12.9 % (ref 11.6–15.1)
GFR SERPL CREATININE-BSD FRML MDRD: >60 ML/MIN/1.73SQ M
GLUCOSE SERPL-MCNC: 260 MG/DL (ref 65–140)
GLUCOSE SERPL-MCNC: 288 MG/DL (ref 65–140)
GLUCOSE SERPL-MCNC: 309 MG/DL (ref 65–140)
GLUCOSE SERPL-MCNC: 347 MG/DL (ref 65–140)
HCT VFR BLD AUTO: 39.5 % (ref 36.5–49.3)
HGB BLD-MCNC: 12.9 G/DL (ref 12–17)
LACTATE SERPL-SCNC: 1.4 MMOL/L (ref 0.5–2)
LYMPHOCYTES # BLD AUTO: 1.77 THOUSANDS/ΜL (ref 0.6–4.47)
LYMPHOCYTES NFR BLD AUTO: 17 % (ref 14–44)
MAGNESIUM SERPL-MCNC: 2 MG/DL (ref 1.6–2.6)
MCH RBC QN AUTO: 28.7 PG (ref 26.8–34.3)
MCHC RBC AUTO-ENTMCNC: 32.7 G/DL (ref 31.4–37.4)
MCV RBC AUTO: 88 FL (ref 82–98)
MONOCYTES # BLD AUTO: 0.91 THOUSAND/ΜL (ref 0.17–1.22)
MONOCYTES NFR BLD AUTO: 9 % (ref 4–12)
NEUTROPHILS # BLD AUTO: 7.1 THOUSANDS/ΜL (ref 1.85–7.62)
NEUTS SEG NFR BLD AUTO: 68 % (ref 43–75)
PHOSPHATE SERPL-MCNC: 3.2 MG/DL (ref 2.3–4.1)
PLATELET # BLD AUTO: 197 THOUSANDS/UL (ref 149–390)
PMV BLD AUTO: 11.6 FL (ref 8.9–12.7)
POTASSIUM SERPL-SCNC: 4.1 MMOL/L (ref 3.5–5.3)
PROT SERPL-MCNC: 7.1 G/DL (ref 6.4–8.2)
RBC # BLD AUTO: 4.5 MILLION/UL (ref 3.88–5.62)
SODIUM SERPL-SCNC: 139 MMOL/L (ref 136–145)
WBC # BLD AUTO: 10.37 THOUSAND/UL (ref 4.31–10.16)

## 2017-07-01 PROCEDURE — 83605 ASSAY OF LACTIC ACID: CPT | Performed by: INTERNAL MEDICINE

## 2017-07-01 PROCEDURE — 80053 COMPREHEN METABOLIC PANEL: CPT | Performed by: INTERNAL MEDICINE

## 2017-07-01 PROCEDURE — 82948 REAGENT STRIP/BLOOD GLUCOSE: CPT

## 2017-07-01 PROCEDURE — 84100 ASSAY OF PHOSPHORUS: CPT | Performed by: INTERNAL MEDICINE

## 2017-07-01 PROCEDURE — 83735 ASSAY OF MAGNESIUM: CPT | Performed by: INTERNAL MEDICINE

## 2017-07-01 PROCEDURE — 85025 COMPLETE CBC W/AUTO DIFF WBC: CPT | Performed by: INTERNAL MEDICINE

## 2017-07-01 RX ORDER — DOCUSATE SODIUM 100 MG/1
100 CAPSULE, LIQUID FILLED ORAL 2 TIMES DAILY
Qty: 10 CAPSULE | Refills: 0 | Status: ON HOLD | OUTPATIENT
Start: 2017-07-01 | End: 2019-01-01 | Stop reason: SDUPTHER

## 2017-07-01 RX ORDER — ERGOCALCIFEROL 1.25 MG/1
50000 CAPSULE ORAL WEEKLY
Qty: 4 CAPSULE | Refills: 0 | Status: ON HOLD | OUTPATIENT
Start: 2017-07-01 | End: 2017-08-09 | Stop reason: CLARIF

## 2017-07-01 RX ORDER — ASPIRIN 81 MG/1
81 TABLET, CHEWABLE ORAL DAILY
Qty: 30 TABLET | Refills: 0 | Status: ON HOLD | OUTPATIENT
Start: 2017-07-01 | End: 2017-08-09 | Stop reason: CLARIF

## 2017-07-01 RX ORDER — PHENAZOPYRIDINE HYDROCHLORIDE 100 MG/1
100 TABLET, FILM COATED ORAL
Qty: 10 TABLET | Refills: 0 | Status: ON HOLD | OUTPATIENT
Start: 2017-07-01 | End: 2017-08-09 | Stop reason: CLARIF

## 2017-07-01 RX ORDER — POLYETHYLENE GLYCOL 3350 17 G/17G
17 POWDER, FOR SOLUTION ORAL DAILY PRN
Qty: 14 EACH | Refills: 0 | Status: ON HOLD | OUTPATIENT
Start: 2017-07-01 | End: 2017-08-09 | Stop reason: CLARIF

## 2017-07-01 RX ORDER — ATORVASTATIN CALCIUM 40 MG/1
40 TABLET, FILM COATED ORAL
Qty: 30 TABLET | Refills: 0 | Status: SHIPPED | OUTPATIENT
Start: 2017-07-01 | End: 2018-01-01 | Stop reason: ALTCHOICE

## 2017-07-01 RX ORDER — CARVEDILOL 6.25 MG/1
6.25 TABLET ORAL 2 TIMES DAILY WITH MEALS
Qty: 60 TABLET | Refills: 0 | Status: ON HOLD | OUTPATIENT
Start: 2017-07-01 | End: 2017-08-09 | Stop reason: CLARIF

## 2017-07-01 RX ORDER — TAMSULOSIN HYDROCHLORIDE 0.4 MG/1
0.4 CAPSULE ORAL
Refills: 0
Start: 2017-07-01 | End: 2018-02-13 | Stop reason: SDUPTHER

## 2017-07-01 RX ORDER — NYSTATIN 100000 [USP'U]/G
1 POWDER TOPICAL 2 TIMES DAILY
Refills: 0 | Status: ON HOLD
Start: 2017-07-01 | End: 2017-08-09 | Stop reason: CLARIF

## 2017-07-01 RX ADMIN — INSULIN GLARGINE 40 UNITS: 100 INJECTION, SOLUTION SUBCUTANEOUS at 08:15

## 2017-07-01 RX ADMIN — CARVEDILOL 6.25 MG: 3.12 TABLET, FILM COATED ORAL at 08:10

## 2017-07-01 RX ADMIN — ASPIRIN 81 MG 81 MG: 81 TABLET ORAL at 08:10

## 2017-07-01 RX ADMIN — PHENAZOPYRIDINE HYDROCHLORIDE 100 MG: 100 TABLET ORAL at 08:16

## 2017-07-01 RX ADMIN — NYSTATIN 1 APPLICATION: 100000 POWDER TOPICAL at 08:16

## 2017-07-01 RX ADMIN — INSULIN LISPRO 3 UNITS: 100 INJECTION, SOLUTION INTRAVENOUS; SUBCUTANEOUS at 08:15

## 2017-07-01 RX ADMIN — INSULIN LISPRO 8 UNITS: 100 INJECTION, SOLUTION INTRAVENOUS; SUBCUTANEOUS at 08:15

## 2017-07-01 RX ADMIN — HEPARIN SODIUM 5000 UNITS: 5000 INJECTION, SOLUTION INTRAVENOUS; SUBCUTANEOUS at 06:10

## 2017-07-01 RX ADMIN — DOCUSATE SODIUM 100 MG: 100 CAPSULE, LIQUID FILLED ORAL at 08:15

## 2017-07-02 LAB
GLUCOSE SERPL-MCNC: 264 MG/DL (ref 65–140)
GLUCOSE SERPL-MCNC: 296 MG/DL (ref 65–140)
GLUCOSE SERPL-MCNC: 309 MG/DL (ref 65–140)
GLUCOSE SERPL-MCNC: 345 MG/DL (ref 65–140)

## 2017-07-02 PROCEDURE — 97530 THERAPEUTIC ACTIVITIES: CPT

## 2017-07-02 PROCEDURE — 97166 OT EVAL MOD COMPLEX 45 MIN: CPT

## 2017-07-02 PROCEDURE — 82948 REAGENT STRIP/BLOOD GLUCOSE: CPT

## 2017-07-02 PROCEDURE — 97110 THERAPEUTIC EXERCISES: CPT

## 2017-07-03 LAB
BACTERIA UR CULT: ABNORMAL
BACTERIA UR CULT: ABNORMAL
GLUCOSE SERPL-MCNC: 190 MG/DL (ref 65–140)
GLUCOSE SERPL-MCNC: 236 MG/DL (ref 65–140)
GLUCOSE SERPL-MCNC: 241 MG/DL (ref 65–140)
GLUCOSE SERPL-MCNC: 304 MG/DL (ref 65–140)

## 2017-07-03 PROCEDURE — 97530 THERAPEUTIC ACTIVITIES: CPT

## 2017-07-03 PROCEDURE — 97110 THERAPEUTIC EXERCISES: CPT

## 2017-07-03 PROCEDURE — 82948 REAGENT STRIP/BLOOD GLUCOSE: CPT

## 2017-07-04 LAB
GLUCOSE SERPL-MCNC: 176 MG/DL (ref 65–140)
GLUCOSE SERPL-MCNC: 217 MG/DL (ref 65–140)
GLUCOSE SERPL-MCNC: 259 MG/DL (ref 65–140)
GLUCOSE SERPL-MCNC: 355 MG/DL (ref 65–140)

## 2017-07-04 PROCEDURE — 97535 SELF CARE MNGMENT TRAINING: CPT

## 2017-07-04 PROCEDURE — 82948 REAGENT STRIP/BLOOD GLUCOSE: CPT

## 2017-07-04 PROCEDURE — 97530 THERAPEUTIC ACTIVITIES: CPT

## 2017-07-04 PROCEDURE — 97110 THERAPEUTIC EXERCISES: CPT

## 2017-07-05 LAB
GLUCOSE SERPL-MCNC: 224 MG/DL (ref 65–140)
GLUCOSE SERPL-MCNC: 251 MG/DL (ref 65–140)
GLUCOSE SERPL-MCNC: 267 MG/DL (ref 65–140)
GLUCOSE SERPL-MCNC: 351 MG/DL (ref 65–140)

## 2017-07-05 PROCEDURE — 97110 THERAPEUTIC EXERCISES: CPT

## 2017-07-05 PROCEDURE — 82948 REAGENT STRIP/BLOOD GLUCOSE: CPT

## 2017-07-06 LAB
GLUCOSE SERPL-MCNC: 238 MG/DL (ref 65–140)
GLUCOSE SERPL-MCNC: 313 MG/DL (ref 65–140)
GLUCOSE SERPL-MCNC: 315 MG/DL (ref 65–140)
GLUCOSE SERPL-MCNC: 342 MG/DL (ref 65–140)

## 2017-07-06 PROCEDURE — 82948 REAGENT STRIP/BLOOD GLUCOSE: CPT

## 2017-07-06 PROCEDURE — 97530 THERAPEUTIC ACTIVITIES: CPT

## 2017-07-06 PROCEDURE — 97110 THERAPEUTIC EXERCISES: CPT

## 2017-07-07 LAB
ANION GAP SERPL CALCULATED.3IONS-SCNC: 3 MMOL/L (ref 4–13)
BUN SERPL-MCNC: 14 MG/DL (ref 5–25)
CALCIUM SERPL-MCNC: 8.5 MG/DL (ref 8.3–10.1)
CHLORIDE SERPL-SCNC: 103 MMOL/L (ref 100–108)
CO2 SERPL-SCNC: 31 MMOL/L (ref 21–32)
CREAT SERPL-MCNC: 0.85 MG/DL (ref 0.6–1.3)
ERYTHROCYTE [DISTWIDTH] IN BLOOD BY AUTOMATED COUNT: 13.3 % (ref 11.6–15.1)
GFR SERPL CREATININE-BSD FRML MDRD: >60 ML/MIN/1.73SQ M
GLUCOSE SERPL-MCNC: 217 MG/DL (ref 65–140)
GLUCOSE SERPL-MCNC: 247 MG/DL (ref 65–140)
GLUCOSE SERPL-MCNC: 275 MG/DL (ref 65–140)
GLUCOSE SERPL-MCNC: 282 MG/DL (ref 65–140)
GLUCOSE SERPL-MCNC: 301 MG/DL (ref 65–140)
HCT VFR BLD AUTO: 41.2 % (ref 36.5–49.3)
HGB BLD-MCNC: 12.8 G/DL (ref 12–17)
MCH RBC QN AUTO: 28.3 PG (ref 26.8–34.3)
MCHC RBC AUTO-ENTMCNC: 31.1 G/DL (ref 31.4–37.4)
MCV RBC AUTO: 91 FL (ref 82–98)
PLATELET # BLD AUTO: 183 THOUSANDS/UL (ref 149–390)
PMV BLD AUTO: 11.8 FL (ref 8.9–12.7)
POTASSIUM SERPL-SCNC: 4.1 MMOL/L (ref 3.5–5.3)
RBC # BLD AUTO: 4.53 MILLION/UL (ref 3.88–5.62)
SODIUM SERPL-SCNC: 137 MMOL/L (ref 136–145)
WBC # BLD AUTO: 8.78 THOUSAND/UL (ref 4.31–10.16)

## 2017-07-07 PROCEDURE — 80048 BASIC METABOLIC PNL TOTAL CA: CPT | Performed by: INTERNAL MEDICINE

## 2017-07-07 PROCEDURE — 97530 THERAPEUTIC ACTIVITIES: CPT

## 2017-07-07 PROCEDURE — 97110 THERAPEUTIC EXERCISES: CPT

## 2017-07-07 PROCEDURE — 97535 SELF CARE MNGMENT TRAINING: CPT

## 2017-07-07 PROCEDURE — 85027 COMPLETE CBC AUTOMATED: CPT | Performed by: INTERNAL MEDICINE

## 2017-07-07 PROCEDURE — 82948 REAGENT STRIP/BLOOD GLUCOSE: CPT

## 2017-07-08 LAB
GLUCOSE SERPL-MCNC: 166 MG/DL (ref 65–140)
GLUCOSE SERPL-MCNC: 236 MG/DL (ref 65–140)
GLUCOSE SERPL-MCNC: 260 MG/DL (ref 65–140)
GLUCOSE SERPL-MCNC: 274 MG/DL (ref 65–140)

## 2017-07-08 PROCEDURE — 82948 REAGENT STRIP/BLOOD GLUCOSE: CPT

## 2017-07-08 PROCEDURE — 97530 THERAPEUTIC ACTIVITIES: CPT

## 2017-07-08 PROCEDURE — 97140 MANUAL THERAPY 1/> REGIONS: CPT

## 2017-07-08 PROCEDURE — 97162 PT EVAL MOD COMPLEX 30 MIN: CPT

## 2017-07-09 LAB
GLUCOSE SERPL-MCNC: 243 MG/DL (ref 65–140)
GLUCOSE SERPL-MCNC: 246 MG/DL (ref 65–140)
GLUCOSE SERPL-MCNC: 287 MG/DL (ref 65–140)
GLUCOSE SERPL-MCNC: 299 MG/DL (ref 65–140)

## 2017-07-09 PROCEDURE — 82948 REAGENT STRIP/BLOOD GLUCOSE: CPT

## 2017-07-10 LAB
GLUCOSE SERPL-MCNC: 136 MG/DL (ref 65–140)
GLUCOSE SERPL-MCNC: 175 MG/DL (ref 65–140)
GLUCOSE SERPL-MCNC: 243 MG/DL (ref 65–140)
GLUCOSE SERPL-MCNC: 304 MG/DL (ref 65–140)

## 2017-07-10 PROCEDURE — 97110 THERAPEUTIC EXERCISES: CPT

## 2017-07-10 PROCEDURE — 82948 REAGENT STRIP/BLOOD GLUCOSE: CPT

## 2017-07-10 PROCEDURE — 97116 GAIT TRAINING THERAPY: CPT

## 2017-07-11 LAB
GLUCOSE SERPL-MCNC: 185 MG/DL (ref 65–140)
GLUCOSE SERPL-MCNC: 202 MG/DL (ref 65–140)
GLUCOSE SERPL-MCNC: 264 MG/DL (ref 65–140)

## 2017-07-11 PROCEDURE — 97112 NEUROMUSCULAR REEDUCATION: CPT

## 2017-07-11 PROCEDURE — 82948 REAGENT STRIP/BLOOD GLUCOSE: CPT

## 2017-07-11 PROCEDURE — 97116 GAIT TRAINING THERAPY: CPT

## 2017-07-11 PROCEDURE — 97110 THERAPEUTIC EXERCISES: CPT

## 2017-07-12 LAB
GLUCOSE SERPL-MCNC: 218 MG/DL (ref 65–140)
GLUCOSE SERPL-MCNC: 233 MG/DL (ref 65–140)
GLUCOSE SERPL-MCNC: 329 MG/DL (ref 65–140)
GLUCOSE SERPL-MCNC: 341 MG/DL (ref 65–140)

## 2017-07-12 PROCEDURE — 82948 REAGENT STRIP/BLOOD GLUCOSE: CPT

## 2017-07-12 PROCEDURE — 97535 SELF CARE MNGMENT TRAINING: CPT

## 2017-07-12 PROCEDURE — 97116 GAIT TRAINING THERAPY: CPT

## 2017-07-12 PROCEDURE — 97110 THERAPEUTIC EXERCISES: CPT

## 2017-07-13 LAB
GLUCOSE SERPL-MCNC: 204 MG/DL (ref 65–140)
GLUCOSE SERPL-MCNC: 218 MG/DL (ref 65–140)
GLUCOSE SERPL-MCNC: 280 MG/DL (ref 65–140)
GLUCOSE SERPL-MCNC: 301 MG/DL (ref 65–140)

## 2017-07-13 PROCEDURE — 82948 REAGENT STRIP/BLOOD GLUCOSE: CPT

## 2017-07-13 PROCEDURE — 97116 GAIT TRAINING THERAPY: CPT

## 2017-07-13 PROCEDURE — 97110 THERAPEUTIC EXERCISES: CPT

## 2017-07-13 PROCEDURE — 97535 SELF CARE MNGMENT TRAINING: CPT

## 2017-07-14 LAB
GLUCOSE SERPL-MCNC: 151 MG/DL (ref 65–140)
GLUCOSE SERPL-MCNC: 204 MG/DL (ref 65–140)
GLUCOSE SERPL-MCNC: 276 MG/DL (ref 65–140)
GLUCOSE SERPL-MCNC: 305 MG/DL (ref 65–140)

## 2017-07-14 PROCEDURE — 97530 THERAPEUTIC ACTIVITIES: CPT

## 2017-07-14 PROCEDURE — 97110 THERAPEUTIC EXERCISES: CPT

## 2017-07-14 PROCEDURE — 97535 SELF CARE MNGMENT TRAINING: CPT

## 2017-07-14 PROCEDURE — 97116 GAIT TRAINING THERAPY: CPT

## 2017-07-14 PROCEDURE — 82948 REAGENT STRIP/BLOOD GLUCOSE: CPT

## 2017-07-15 LAB
GLUCOSE SERPL-MCNC: 120 MG/DL (ref 65–140)
GLUCOSE SERPL-MCNC: 198 MG/DL (ref 65–140)
GLUCOSE SERPL-MCNC: 212 MG/DL (ref 65–140)
GLUCOSE SERPL-MCNC: 86 MG/DL (ref 65–140)

## 2017-07-15 PROCEDURE — 82948 REAGENT STRIP/BLOOD GLUCOSE: CPT

## 2017-07-16 LAB
GLUCOSE SERPL-MCNC: 178 MG/DL (ref 65–140)
GLUCOSE SERPL-MCNC: 190 MG/DL (ref 65–140)
GLUCOSE SERPL-MCNC: 206 MG/DL (ref 65–140)

## 2017-07-16 PROCEDURE — 82948 REAGENT STRIP/BLOOD GLUCOSE: CPT

## 2017-07-16 PROCEDURE — 97110 THERAPEUTIC EXERCISES: CPT

## 2017-07-17 LAB
GLUCOSE SERPL-MCNC: 161 MG/DL (ref 65–140)
GLUCOSE SERPL-MCNC: 236 MG/DL (ref 65–140)
GLUCOSE SERPL-MCNC: 279 MG/DL (ref 65–140)
GLUCOSE SERPL-MCNC: 333 MG/DL (ref 65–140)

## 2017-07-17 PROCEDURE — 97110 THERAPEUTIC EXERCISES: CPT

## 2017-07-17 PROCEDURE — 97116 GAIT TRAINING THERAPY: CPT

## 2017-07-17 PROCEDURE — 82948 REAGENT STRIP/BLOOD GLUCOSE: CPT

## 2017-07-18 LAB
GLUCOSE SERPL-MCNC: 161 MG/DL (ref 65–140)
GLUCOSE SERPL-MCNC: 165 MG/DL (ref 65–140)
GLUCOSE SERPL-MCNC: 185 MG/DL (ref 65–140)
GLUCOSE SERPL-MCNC: 246 MG/DL (ref 65–140)

## 2017-07-18 PROCEDURE — 97110 THERAPEUTIC EXERCISES: CPT

## 2017-07-18 PROCEDURE — 82948 REAGENT STRIP/BLOOD GLUCOSE: CPT

## 2017-07-18 PROCEDURE — 97535 SELF CARE MNGMENT TRAINING: CPT

## 2017-07-18 PROCEDURE — 97116 GAIT TRAINING THERAPY: CPT

## 2017-07-19 LAB
GLUCOSE SERPL-MCNC: 175 MG/DL (ref 65–140)
GLUCOSE SERPL-MCNC: 207 MG/DL (ref 65–140)
GLUCOSE SERPL-MCNC: 244 MG/DL (ref 65–140)
GLUCOSE SERPL-MCNC: 97 MG/DL (ref 65–140)
GLUCOSE SERPL-MCNC: 99 MG/DL (ref 65–140)

## 2017-07-19 PROCEDURE — 82948 REAGENT STRIP/BLOOD GLUCOSE: CPT

## 2017-07-19 PROCEDURE — 97110 THERAPEUTIC EXERCISES: CPT

## 2017-07-19 PROCEDURE — 97535 SELF CARE MNGMENT TRAINING: CPT

## 2017-07-19 PROCEDURE — 97116 GAIT TRAINING THERAPY: CPT

## 2017-07-20 LAB
GLUCOSE SERPL-MCNC: 119 MG/DL (ref 65–140)
GLUCOSE SERPL-MCNC: 119 MG/DL (ref 65–140)
GLUCOSE SERPL-MCNC: 166 MG/DL (ref 65–140)
GLUCOSE SERPL-MCNC: 176 MG/DL (ref 65–140)
GLUCOSE SERPL-MCNC: 95 MG/DL (ref 65–140)

## 2017-07-20 PROCEDURE — 82948 REAGENT STRIP/BLOOD GLUCOSE: CPT

## 2017-07-20 PROCEDURE — 97110 THERAPEUTIC EXERCISES: CPT

## 2017-07-20 PROCEDURE — 97116 GAIT TRAINING THERAPY: CPT

## 2017-07-20 PROCEDURE — 97530 THERAPEUTIC ACTIVITIES: CPT

## 2017-07-21 LAB — GLUCOSE SERPL-MCNC: 98 MG/DL (ref 65–140)

## 2017-07-21 PROCEDURE — 82948 REAGENT STRIP/BLOOD GLUCOSE: CPT

## 2017-08-03 ENCOUNTER — HOSPITAL ENCOUNTER (EMERGENCY)
Facility: HOSPITAL | Age: 77
Discharge: HOME | End: 2017-08-03
Attending: EMERGENCY MEDICINE | Admitting: EMERGENCY MEDICINE
Payer: OTHER GOVERNMENT

## 2017-08-03 VITALS
OXYGEN SATURATION: 94 % | RESPIRATION RATE: 18 BRPM | TEMPERATURE: 99.6 F | WEIGHT: 263.01 LBS | BODY MASS INDEX: 36.68 KG/M2 | DIASTOLIC BLOOD PRESSURE: 71 MMHG | SYSTOLIC BLOOD PRESSURE: 160 MMHG | HEART RATE: 72 BPM

## 2017-08-03 DIAGNOSIS — B35.6 FUNGAL INFECTION OF THE GROIN: ICD-10-CM

## 2017-08-03 DIAGNOSIS — R30.0 DYSURIA: ICD-10-CM

## 2017-08-03 DIAGNOSIS — N39.0 CHRONIC URINARY TRACT INFECTION: Primary | ICD-10-CM

## 2017-08-03 LAB
ANION GAP SERPL CALCULATED.3IONS-SCNC: 6 MMOL/L (ref 4–13)
BACTERIA UR QL AUTO: ABNORMAL /HPF
BASOPHILS # BLD AUTO: 0.07 THOUSANDS/ΜL (ref 0–0.1)
BASOPHILS NFR BLD AUTO: 1 % (ref 0–1)
BILIRUB UR QL STRIP: NEGATIVE
BUN SERPL-MCNC: 16 MG/DL (ref 5–25)
CALCIUM SERPL-MCNC: 8.5 MG/DL (ref 8.3–10.1)
CHLORIDE SERPL-SCNC: 102 MMOL/L (ref 100–108)
CLARITY UR: ABNORMAL
CO2 SERPL-SCNC: 31 MMOL/L (ref 21–32)
COLOR UR: YELLOW
CREAT SERPL-MCNC: 1.09 MG/DL (ref 0.6–1.3)
EOSINOPHIL # BLD AUTO: 0.47 THOUSAND/ΜL (ref 0–0.61)
EOSINOPHIL NFR BLD AUTO: 5 % (ref 0–6)
ERYTHROCYTE [DISTWIDTH] IN BLOOD BY AUTOMATED COUNT: 13.5 % (ref 11.6–15.1)
GFR SERPL CREATININE-BSD FRML MDRD: 65 ML/MIN/1.73SQ M
GLUCOSE SERPL-MCNC: 258 MG/DL (ref 65–140)
GLUCOSE UR STRIP-MCNC: ABNORMAL MG/DL
HCT VFR BLD AUTO: 41.7 % (ref 36.5–49.3)
HGB BLD-MCNC: 13.4 G/DL (ref 12–17)
HGB UR QL STRIP.AUTO: ABNORMAL
KETONES UR STRIP-MCNC: NEGATIVE MG/DL
LEUKOCYTE ESTERASE UR QL STRIP: ABNORMAL
LYMPHOCYTES # BLD AUTO: 1.39 THOUSANDS/ΜL (ref 0.6–4.47)
LYMPHOCYTES NFR BLD AUTO: 13 % (ref 14–44)
MCH RBC QN AUTO: 29 PG (ref 26.8–34.3)
MCHC RBC AUTO-ENTMCNC: 32.1 G/DL (ref 31.4–37.4)
MCV RBC AUTO: 90 FL (ref 82–98)
MONOCYTES # BLD AUTO: 1.17 THOUSAND/ΜL (ref 0.17–1.22)
MONOCYTES NFR BLD AUTO: 11 % (ref 4–12)
NEUTROPHILS # BLD AUTO: 7.28 THOUSANDS/ΜL (ref 1.85–7.62)
NEUTS SEG NFR BLD AUTO: 70 % (ref 43–75)
NITRITE UR QL STRIP: NEGATIVE
NON-SQ EPI CELLS URNS QL MICRO: ABNORMAL /HPF
OTHER STN SPEC: ABNORMAL
PH UR STRIP.AUTO: 6 [PH] (ref 4.5–8)
PLATELET # BLD AUTO: 209 THOUSANDS/UL (ref 149–390)
PMV BLD AUTO: 11.6 FL (ref 8.9–12.7)
POTASSIUM SERPL-SCNC: 4.1 MMOL/L (ref 3.5–5.3)
PROT UR STRIP-MCNC: ABNORMAL MG/DL
RBC # BLD AUTO: 4.62 MILLION/UL (ref 3.88–5.62)
RBC #/AREA URNS AUTO: ABNORMAL /HPF
SODIUM SERPL-SCNC: 139 MMOL/L (ref 136–145)
SP GR UR STRIP.AUTO: 1.01 (ref 1–1.03)
UROBILINOGEN UR QL STRIP.AUTO: 0.2 E.U./DL
WBC # BLD AUTO: 10.38 THOUSAND/UL (ref 4.31–10.16)
WBC #/AREA URNS AUTO: ABNORMAL /HPF

## 2017-08-03 PROCEDURE — 85025 COMPLETE CBC W/AUTO DIFF WBC: CPT | Performed by: EMERGENCY MEDICINE

## 2017-08-03 PROCEDURE — 87077 CULTURE AEROBIC IDENTIFY: CPT | Performed by: EMERGENCY MEDICINE

## 2017-08-03 PROCEDURE — 80048 BASIC METABOLIC PNL TOTAL CA: CPT | Performed by: EMERGENCY MEDICINE

## 2017-08-03 PROCEDURE — 81001 URINALYSIS AUTO W/SCOPE: CPT | Performed by: EMERGENCY MEDICINE

## 2017-08-03 PROCEDURE — 87186 SC STD MICRODIL/AGAR DIL: CPT | Performed by: EMERGENCY MEDICINE

## 2017-08-03 PROCEDURE — 99284 EMERGENCY DEPT VISIT MOD MDM: CPT

## 2017-08-03 PROCEDURE — 87086 URINE CULTURE/COLONY COUNT: CPT | Performed by: EMERGENCY MEDICINE

## 2017-08-03 RX ORDER — CHLORHEXIDINE GLUCONATE 4 G/100ML
1 SOLUTION TOPICAL DAILY
Qty: 120 ML | Refills: 0 | Status: ON HOLD | OUTPATIENT
Start: 2017-08-03 | End: 2017-08-09 | Stop reason: CLARIF

## 2017-08-03 RX ORDER — CEPHALEXIN 500 MG/1
500 CAPSULE ORAL 2 TIMES DAILY
Qty: 14 CAPSULE | Refills: 0 | Status: SHIPPED | OUTPATIENT
Start: 2017-08-03 | End: 2017-08-18 | Stop reason: HOSPADM

## 2017-08-03 RX ORDER — CEPHALEXIN 250 MG/1
500 CAPSULE ORAL ONCE
Status: COMPLETED | OUTPATIENT
Start: 2017-08-03 | End: 2017-08-03

## 2017-08-03 RX ORDER — NYSTATIN 100000 [USP'U]/G
POWDER TOPICAL 3 TIMES DAILY
Qty: 15 G | Refills: 0 | Status: ON HOLD | OUTPATIENT
Start: 2017-08-03 | End: 2017-08-09 | Stop reason: CLARIF

## 2017-08-03 RX ADMIN — CEPHALEXIN 500 MG: 250 CAPSULE ORAL at 15:58

## 2017-08-05 LAB — BACTERIA UR CULT: NORMAL

## 2017-08-08 ENCOUNTER — HOSPITAL ENCOUNTER (INPATIENT)
Facility: HOSPITAL | Age: 77
LOS: 10 days | Discharge: NURSING, REHAB OR LONG-TERM CARE | DRG: 690 | End: 2017-08-18
Attending: INTERNAL MEDICINE | Admitting: INTERNAL MEDICINE
Payer: MEDICARE

## 2017-08-08 ENCOUNTER — APPOINTMENT (INPATIENT)
Dept: CT IMAGING | Facility: HOSPITAL | Age: 77
DRG: 690 | End: 2017-08-08
Payer: MEDICARE

## 2017-08-08 ENCOUNTER — APPOINTMENT (INPATIENT)
Dept: ULTRASOUND IMAGING | Facility: HOSPITAL | Age: 77
DRG: 690 | End: 2017-08-08
Payer: MEDICARE

## 2017-08-08 DIAGNOSIS — N32.89 BLADDER MASS: ICD-10-CM

## 2017-08-08 DIAGNOSIS — N39.0 URINARY TRACT INFECTION: Primary | ICD-10-CM

## 2017-08-08 PROBLEM — R79.89 ELEVATED LACTIC ACID LEVEL: Status: ACTIVE | Noted: 2017-08-08

## 2017-08-08 PROBLEM — N18.30 KIDNEY DISEASE, CHRONIC, STAGE III (GFR 30-59 ML/MIN) (HCC): Chronic | Status: ACTIVE | Noted: 2017-08-08

## 2017-08-08 LAB
ALBUMIN SERPL BCP-MCNC: 3.2 G/DL (ref 3.5–5)
ALP SERPL-CCNC: 86 U/L (ref 46–116)
ALT SERPL W P-5'-P-CCNC: 18 U/L (ref 12–78)
ANION GAP SERPL CALCULATED.3IONS-SCNC: 9 MMOL/L (ref 4–13)
AST SERPL W P-5'-P-CCNC: 17 U/L (ref 5–45)
BACTERIA UR QL AUTO: ABNORMAL /HPF
BASOPHILS # BLD AUTO: 0.06 THOUSANDS/ΜL (ref 0–0.1)
BASOPHILS NFR BLD AUTO: 1 % (ref 0–1)
BILIRUB SERPL-MCNC: 0.4 MG/DL (ref 0.2–1)
BILIRUB UR QL STRIP: NEGATIVE
BUN SERPL-MCNC: 16 MG/DL (ref 5–25)
CALCIUM SERPL-MCNC: 8.5 MG/DL (ref 8.3–10.1)
CHLORIDE SERPL-SCNC: 99 MMOL/L (ref 100–108)
CLARITY UR: ABNORMAL
CO2 SERPL-SCNC: 28 MMOL/L (ref 21–32)
COLOR UR: YELLOW
CREAT SERPL-MCNC: 1.3 MG/DL (ref 0.6–1.3)
EOSINOPHIL # BLD AUTO: 0.32 THOUSAND/ΜL (ref 0–0.61)
EOSINOPHIL NFR BLD AUTO: 3 % (ref 0–6)
ERYTHROCYTE [DISTWIDTH] IN BLOOD BY AUTOMATED COUNT: 13.4 % (ref 11.6–15.1)
GFR SERPL CREATININE-BSD FRML MDRD: 53 ML/MIN/1.73SQ M
GLUCOSE SERPL-MCNC: 254 MG/DL (ref 65–140)
GLUCOSE SERPL-MCNC: 263 MG/DL (ref 65–140)
GLUCOSE SERPL-MCNC: 302 MG/DL (ref 65–140)
GLUCOSE UR STRIP-MCNC: ABNORMAL MG/DL
HCT VFR BLD AUTO: 43.1 % (ref 36.5–49.3)
HGB BLD-MCNC: 13.9 G/DL (ref 12–17)
HGB UR QL STRIP.AUTO: ABNORMAL
KETONES UR STRIP-MCNC: ABNORMAL MG/DL
LACTATE SERPL-SCNC: 1.2 MMOL/L (ref 0.5–2)
LACTATE SERPL-SCNC: 2.3 MMOL/L (ref 0.5–2)
LEUKOCYTE ESTERASE UR QL STRIP: ABNORMAL
LYMPHOCYTES # BLD AUTO: 1.08 THOUSANDS/ΜL (ref 0.6–4.47)
LYMPHOCYTES NFR BLD AUTO: 11 % (ref 14–44)
MCH RBC QN AUTO: 28.6 PG (ref 26.8–34.3)
MCHC RBC AUTO-ENTMCNC: 32.3 G/DL (ref 31.4–37.4)
MCV RBC AUTO: 89 FL (ref 82–98)
MONOCYTES # BLD AUTO: 0.82 THOUSAND/ΜL (ref 0.17–1.22)
MONOCYTES NFR BLD AUTO: 8 % (ref 4–12)
NEUTROPHILS # BLD AUTO: 7.58 THOUSANDS/ΜL (ref 1.85–7.62)
NEUTS SEG NFR BLD AUTO: 77 % (ref 43–75)
NITRITE UR QL STRIP: NEGATIVE
NON-SQ EPI CELLS URNS QL MICRO: ABNORMAL /HPF
OTHER STN SPEC: ABNORMAL
PH UR STRIP.AUTO: 5.5 [PH] (ref 4.5–8)
PLATELET # BLD AUTO: 199 THOUSANDS/UL (ref 149–390)
PMV BLD AUTO: 12.1 FL (ref 8.9–12.7)
POTASSIUM SERPL-SCNC: 4 MMOL/L (ref 3.5–5.3)
PROT SERPL-MCNC: 7.4 G/DL (ref 6.4–8.2)
PROT UR STRIP-MCNC: ABNORMAL MG/DL
RBC # BLD AUTO: 4.86 MILLION/UL (ref 3.88–5.62)
RBC #/AREA URNS AUTO: ABNORMAL /HPF
SODIUM SERPL-SCNC: 136 MMOL/L (ref 136–145)
SP GR UR STRIP.AUTO: >=1.03 (ref 1–1.03)
URINE COMMENT: ABNORMAL
UROBILINOGEN UR QL STRIP.AUTO: 1 E.U./DL
WBC # BLD AUTO: 9.86 THOUSAND/UL (ref 4.31–10.16)
WBC #/AREA URNS AUTO: ABNORMAL /HPF

## 2017-08-08 PROCEDURE — 99284 EMERGENCY DEPT VISIT MOD MDM: CPT

## 2017-08-08 PROCEDURE — 96361 HYDRATE IV INFUSION ADD-ON: CPT

## 2017-08-08 PROCEDURE — 87040 BLOOD CULTURE FOR BACTERIA: CPT | Performed by: PHYSICIAN ASSISTANT

## 2017-08-08 PROCEDURE — 51798 US URINE CAPACITY MEASURE: CPT

## 2017-08-08 PROCEDURE — 83605 ASSAY OF LACTIC ACID: CPT | Performed by: PHYSICIAN ASSISTANT

## 2017-08-08 PROCEDURE — 83605 ASSAY OF LACTIC ACID: CPT | Performed by: INTERNAL MEDICINE

## 2017-08-08 PROCEDURE — 74176 CT ABD & PELVIS W/O CONTRAST: CPT

## 2017-08-08 PROCEDURE — 96365 THER/PROPH/DIAG IV INF INIT: CPT

## 2017-08-08 PROCEDURE — 87086 URINE CULTURE/COLONY COUNT: CPT | Performed by: PHYSICIAN ASSISTANT

## 2017-08-08 PROCEDURE — 83036 HEMOGLOBIN GLYCOSYLATED A1C: CPT | Performed by: INTERNAL MEDICINE

## 2017-08-08 PROCEDURE — 81001 URINALYSIS AUTO W/SCOPE: CPT | Performed by: PHYSICIAN ASSISTANT

## 2017-08-08 PROCEDURE — 82948 REAGENT STRIP/BLOOD GLUCOSE: CPT

## 2017-08-08 PROCEDURE — 94640 AIRWAY INHALATION TREATMENT: CPT

## 2017-08-08 PROCEDURE — 94760 N-INVAS EAR/PLS OXIMETRY 1: CPT

## 2017-08-08 PROCEDURE — 36415 COLL VENOUS BLD VENIPUNCTURE: CPT | Performed by: PHYSICIAN ASSISTANT

## 2017-08-08 PROCEDURE — 85025 COMPLETE CBC W/AUTO DIFF WBC: CPT | Performed by: PHYSICIAN ASSISTANT

## 2017-08-08 PROCEDURE — 80053 COMPREHEN METABOLIC PANEL: CPT | Performed by: PHYSICIAN ASSISTANT

## 2017-08-08 RX ORDER — POTASSIUM CHLORIDE 20 MEQ/1
20 TABLET, EXTENDED RELEASE ORAL DAILY
COMMUNITY
End: 2017-09-28 | Stop reason: HOSPADM

## 2017-08-08 RX ORDER — ATORVASTATIN CALCIUM 40 MG/1
40 TABLET, FILM COATED ORAL
Status: DISCONTINUED | OUTPATIENT
Start: 2017-08-08 | End: 2017-08-18 | Stop reason: HOSPADM

## 2017-08-08 RX ORDER — ASPIRIN 81 MG/1
81 TABLET, CHEWABLE ORAL DAILY
Status: DISCONTINUED | OUTPATIENT
Start: 2017-08-09 | End: 2017-08-18 | Stop reason: HOSPADM

## 2017-08-08 RX ORDER — AMLODIPINE BESYLATE 10 MG/1
10 TABLET ORAL DAILY
COMMUNITY
End: 2017-08-18 | Stop reason: HOSPADM

## 2017-08-08 RX ORDER — BUMETANIDE 1 MG/1
1 TABLET ORAL 2 TIMES DAILY
Status: ON HOLD | COMMUNITY
End: 2017-08-09 | Stop reason: CLARIF

## 2017-08-08 RX ORDER — FLUCONAZOLE 200 MG/1
200 TABLET ORAL DAILY
COMMUNITY
End: 2017-08-18 | Stop reason: HOSPADM

## 2017-08-08 RX ORDER — TAMSULOSIN HYDROCHLORIDE 0.4 MG/1
0.4 CAPSULE ORAL
Status: DISCONTINUED | OUTPATIENT
Start: 2017-08-08 | End: 2017-08-18 | Stop reason: HOSPADM

## 2017-08-08 RX ORDER — MELOXICAM 15 MG/1
15 TABLET ORAL DAILY
Status: ON HOLD | COMMUNITY
End: 2017-08-09 | Stop reason: CLARIF

## 2017-08-08 RX ORDER — LEVALBUTEROL 1.25 MG/.5ML
1.25 SOLUTION, CONCENTRATE RESPIRATORY (INHALATION)
Status: DISCONTINUED | OUTPATIENT
Start: 2017-08-08 | End: 2017-08-13

## 2017-08-08 RX ORDER — MULTIVITAMIN
1 TABLET ORAL DAILY
Status: ON HOLD | COMMUNITY
End: 2017-08-09 | Stop reason: CLARIF

## 2017-08-08 RX ORDER — LEVALBUTEROL 1.25 MG/.5ML
1.25 SOLUTION, CONCENTRATE RESPIRATORY (INHALATION) EVERY 6 HOURS PRN
Status: DISCONTINUED | OUTPATIENT
Start: 2017-08-08 | End: 2017-08-13

## 2017-08-08 RX ORDER — POLYETHYLENE GLYCOL 3350 17 G/17G
17 POWDER, FOR SOLUTION ORAL DAILY PRN
Status: DISCONTINUED | OUTPATIENT
Start: 2017-08-08 | End: 2017-08-18 | Stop reason: HOSPADM

## 2017-08-08 RX ORDER — FAMOTIDINE 20 MG/1
20 TABLET, FILM COATED ORAL DAILY
COMMUNITY
End: 2018-01-05 | Stop reason: ALTCHOICE

## 2017-08-08 RX ORDER — PHENAZOPYRIDINE HYDROCHLORIDE 100 MG/1
100 TABLET, FILM COATED ORAL
Status: DISCONTINUED | OUTPATIENT
Start: 2017-08-08 | End: 2017-08-13

## 2017-08-08 RX ORDER — OXYCODONE HYDROCHLORIDE 5 MG/1
5 TABLET ORAL EVERY 6 HOURS PRN
Status: DISCONTINUED | OUTPATIENT
Start: 2017-08-08 | End: 2017-08-18 | Stop reason: HOSPADM

## 2017-08-08 RX ORDER — DOCUSATE SODIUM 100 MG/1
100 CAPSULE, LIQUID FILLED ORAL 2 TIMES DAILY
Status: DISCONTINUED | OUTPATIENT
Start: 2017-08-08 | End: 2017-08-18 | Stop reason: HOSPADM

## 2017-08-08 RX ORDER — ERGOCALCIFEROL 1.25 MG/1
50000 CAPSULE ORAL WEEKLY
Status: DISCONTINUED | OUTPATIENT
Start: 2017-08-11 | End: 2017-08-18 | Stop reason: HOSPADM

## 2017-08-08 RX ORDER — BUDESONIDE AND FORMOTEROL FUMARATE DIHYDRATE 160; 4.5 UG/1; UG/1
2 AEROSOL RESPIRATORY (INHALATION) 2 TIMES DAILY
Status: ON HOLD | COMMUNITY
End: 2017-08-09 | Stop reason: CLARIF

## 2017-08-08 RX ORDER — FERROUS SULFATE 325(65) MG
325 TABLET ORAL
Status: ON HOLD | COMMUNITY
End: 2019-01-01

## 2017-08-08 RX ORDER — METOPROLOL TARTRATE 50 MG/1
50 TABLET, FILM COATED ORAL EVERY 12 HOURS SCHEDULED
COMMUNITY
End: 2017-09-28 | Stop reason: HOSPADM

## 2017-08-08 RX ORDER — SULFAMETHOXAZOLE AND TRIMETHOPRIM 800; 160 MG/1; MG/1
1 TABLET ORAL EVERY 12 HOURS SCHEDULED
COMMUNITY
End: 2017-08-18 | Stop reason: HOSPADM

## 2017-08-08 RX ORDER — INSULIN GLARGINE 100 [IU]/ML
60 INJECTION, SOLUTION SUBCUTANEOUS
Status: DISCONTINUED | OUTPATIENT
Start: 2017-08-08 | End: 2017-08-10

## 2017-08-08 RX ORDER — SODIUM CHLORIDE 9 MG/ML
125 INJECTION, SOLUTION INTRAVENOUS CONTINUOUS
Status: DISCONTINUED | OUTPATIENT
Start: 2017-08-08 | End: 2017-08-10

## 2017-08-08 RX ORDER — CARVEDILOL 3.12 MG/1
6.25 TABLET ORAL 2 TIMES DAILY WITH MEALS
Status: DISCONTINUED | OUTPATIENT
Start: 2017-08-08 | End: 2017-08-18 | Stop reason: HOSPADM

## 2017-08-08 RX ADMIN — DOCUSATE SODIUM 100 MG: 100 CAPSULE, LIQUID FILLED ORAL at 18:57

## 2017-08-08 RX ADMIN — SODIUM CHLORIDE 125 ML/HR: 0.9 INJECTION, SOLUTION INTRAVENOUS at 19:03

## 2017-08-08 RX ADMIN — AZTREONAM 1000 MG: 1 INJECTION, POWDER, LYOPHILIZED, FOR SOLUTION INTRAMUSCULAR; INTRAVENOUS at 20:33

## 2017-08-08 RX ADMIN — DESMOPRESSIN ACETATE 40 MG: 0.2 TABLET ORAL at 18:56

## 2017-08-08 RX ADMIN — INSULIN LISPRO 8 UNITS: 100 INJECTION, SOLUTION INTRAVENOUS; SUBCUTANEOUS at 18:57

## 2017-08-08 RX ADMIN — INSULIN LISPRO 2 UNITS: 100 INJECTION, SOLUTION INTRAVENOUS; SUBCUTANEOUS at 22:13

## 2017-08-08 RX ADMIN — AZTREONAM 1000 MG: 1 INJECTION, POWDER, LYOPHILIZED, FOR SOLUTION INTRAMUSCULAR; INTRAVENOUS at 12:59

## 2017-08-08 RX ADMIN — IPRATROPIUM BROMIDE 0.5 MG: 0.5 SOLUTION RESPIRATORY (INHALATION) at 22:54

## 2017-08-08 RX ADMIN — LEVALBUTEROL HYDROCHLORIDE 1.25 MG: 1.25 SOLUTION, CONCENTRATE RESPIRATORY (INHALATION) at 22:54

## 2017-08-08 RX ADMIN — INSULIN LISPRO 12 UNITS: 100 INJECTION, SOLUTION INTRAVENOUS; SUBCUTANEOUS at 18:57

## 2017-08-08 RX ADMIN — SODIUM CHLORIDE 125 ML/HR: 0.9 INJECTION, SOLUTION INTRAVENOUS at 12:50

## 2017-08-08 RX ADMIN — INSULIN GLARGINE 60 UNITS: 100 INJECTION, SOLUTION SUBCUTANEOUS at 22:12

## 2017-08-08 RX ADMIN — CARVEDILOL 6.25 MG: 3.12 TABLET, FILM COATED ORAL at 18:56

## 2017-08-08 RX ADMIN — PHENAZOPYRIDINE HYDROCHLORIDE 100 MG: 100 TABLET ORAL at 18:56

## 2017-08-08 RX ADMIN — TAMSULOSIN HYDROCHLORIDE 0.4 MG: 0.4 CAPSULE ORAL at 18:55

## 2017-08-09 ENCOUNTER — APPOINTMENT (INPATIENT)
Dept: PHYSICAL THERAPY | Facility: HOSPITAL | Age: 77
DRG: 690 | End: 2017-08-09
Payer: MEDICARE

## 2017-08-09 ENCOUNTER — APPOINTMENT (INPATIENT)
Dept: OCCUPATIONAL THERAPY | Facility: HOSPITAL | Age: 77
DRG: 690 | End: 2017-08-09
Payer: MEDICARE

## 2017-08-09 LAB
ALBUMIN SERPL BCP-MCNC: 3 G/DL (ref 3.5–5)
ALP SERPL-CCNC: 92 U/L (ref 46–116)
ALT SERPL W P-5'-P-CCNC: 15 U/L (ref 12–78)
ANION GAP SERPL CALCULATED.3IONS-SCNC: 6 MMOL/L (ref 4–13)
AST SERPL W P-5'-P-CCNC: 10 U/L (ref 5–45)
BILIRUB SERPL-MCNC: 0.3 MG/DL (ref 0.2–1)
BUN SERPL-MCNC: 17 MG/DL (ref 5–25)
CALCIUM SERPL-MCNC: 8.3 MG/DL (ref 8.3–10.1)
CHLORIDE SERPL-SCNC: 102 MMOL/L (ref 100–108)
CO2 SERPL-SCNC: 30 MMOL/L (ref 21–32)
CREAT SERPL-MCNC: 1 MG/DL (ref 0.6–1.3)
ERYTHROCYTE [DISTWIDTH] IN BLOOD BY AUTOMATED COUNT: 13.3 % (ref 11.6–15.1)
EST. AVERAGE GLUCOSE BLD GHB EST-MCNC: 229 MG/DL
GFR SERPL CREATININE-BSD FRML MDRD: 72 ML/MIN/1.73SQ M
GLUCOSE SERPL-MCNC: 189 MG/DL (ref 65–140)
GLUCOSE SERPL-MCNC: 200 MG/DL (ref 65–140)
GLUCOSE SERPL-MCNC: 227 MG/DL (ref 65–140)
GLUCOSE SERPL-MCNC: 240 MG/DL (ref 65–140)
GLUCOSE SERPL-MCNC: 254 MG/DL (ref 65–140)
HBA1C MFR BLD: 9.6 % (ref 4.2–6.3)
HCT VFR BLD AUTO: 40.8 % (ref 36.5–49.3)
HGB BLD-MCNC: 13.2 G/DL (ref 12–17)
INR PPP: 0.95 (ref 0.86–1.16)
MAGNESIUM SERPL-MCNC: 2.1 MG/DL (ref 1.6–2.6)
MCH RBC QN AUTO: 28.8 PG (ref 26.8–34.3)
MCHC RBC AUTO-ENTMCNC: 32.4 G/DL (ref 31.4–37.4)
MCV RBC AUTO: 89 FL (ref 82–98)
PHOSPHATE SERPL-MCNC: 3.6 MG/DL (ref 2.3–4.1)
PLATELET # BLD AUTO: 195 THOUSANDS/UL (ref 149–390)
PMV BLD AUTO: 12 FL (ref 8.9–12.7)
POTASSIUM SERPL-SCNC: 4.2 MMOL/L (ref 3.5–5.3)
PROT SERPL-MCNC: 6.9 G/DL (ref 6.4–8.2)
PROTHROMBIN TIME: 12.6 SECONDS (ref 12.1–14.4)
RBC # BLD AUTO: 4.58 MILLION/UL (ref 3.88–5.62)
SODIUM SERPL-SCNC: 138 MMOL/L (ref 136–145)
WBC # BLD AUTO: 9.83 THOUSAND/UL (ref 4.31–10.16)

## 2017-08-09 PROCEDURE — G8979 MOBILITY GOAL STATUS: HCPCS | Performed by: PHYSICAL THERAPIST

## 2017-08-09 PROCEDURE — 85027 COMPLETE CBC AUTOMATED: CPT | Performed by: INTERNAL MEDICINE

## 2017-08-09 PROCEDURE — 97163 PT EVAL HIGH COMPLEX 45 MIN: CPT | Performed by: PHYSICAL THERAPIST

## 2017-08-09 PROCEDURE — 97530 THERAPEUTIC ACTIVITIES: CPT

## 2017-08-09 PROCEDURE — 97167 OT EVAL HIGH COMPLEX 60 MIN: CPT

## 2017-08-09 PROCEDURE — 94760 N-INVAS EAR/PLS OXIMETRY 1: CPT

## 2017-08-09 PROCEDURE — 94640 AIRWAY INHALATION TREATMENT: CPT

## 2017-08-09 PROCEDURE — 97116 GAIT TRAINING THERAPY: CPT | Performed by: PHYSICAL THERAPIST

## 2017-08-09 PROCEDURE — 94664 DEMO&/EVAL PT USE INHALER: CPT

## 2017-08-09 PROCEDURE — G8987 SELF CARE CURRENT STATUS: HCPCS

## 2017-08-09 PROCEDURE — 82948 REAGENT STRIP/BLOOD GLUCOSE: CPT

## 2017-08-09 PROCEDURE — G8978 MOBILITY CURRENT STATUS: HCPCS | Performed by: PHYSICAL THERAPIST

## 2017-08-09 PROCEDURE — G8988 SELF CARE GOAL STATUS: HCPCS

## 2017-08-09 PROCEDURE — 83735 ASSAY OF MAGNESIUM: CPT | Performed by: INTERNAL MEDICINE

## 2017-08-09 PROCEDURE — 80053 COMPREHEN METABOLIC PANEL: CPT | Performed by: INTERNAL MEDICINE

## 2017-08-09 PROCEDURE — 85610 PROTHROMBIN TIME: CPT | Performed by: FAMILY MEDICINE

## 2017-08-09 PROCEDURE — 84100 ASSAY OF PHOSPHORUS: CPT | Performed by: INTERNAL MEDICINE

## 2017-08-09 RX ORDER — TIZANIDINE 2 MG/1
2 TABLET ORAL EVERY 8 HOURS
COMMUNITY
End: 2017-08-18 | Stop reason: HOSPADM

## 2017-08-09 RX ORDER — FLUCONAZOLE 2 MG/ML
400 INJECTION, SOLUTION INTRAVENOUS EVERY 24 HOURS
Status: DISCONTINUED | OUTPATIENT
Start: 2017-08-09 | End: 2017-08-13

## 2017-08-09 RX ORDER — ACETAMINOPHEN 325 MG/1
650 TABLET ORAL EVERY 6 HOURS PRN
Status: DISCONTINUED | OUTPATIENT
Start: 2017-08-09 | End: 2017-08-18 | Stop reason: HOSPADM

## 2017-08-09 RX ORDER — FINASTERIDE 5 MG/1
5 TABLET, FILM COATED ORAL DAILY
Status: DISCONTINUED | OUTPATIENT
Start: 2017-08-09 | End: 2017-08-18 | Stop reason: HOSPADM

## 2017-08-09 RX ADMIN — CARVEDILOL 6.25 MG: 3.12 TABLET, FILM COATED ORAL at 17:21

## 2017-08-09 RX ADMIN — INSULIN GLARGINE 60 UNITS: 100 INJECTION, SOLUTION SUBCUTANEOUS at 21:33

## 2017-08-09 RX ADMIN — INSULIN LISPRO 6 UNITS: 100 INJECTION, SOLUTION INTRAVENOUS; SUBCUTANEOUS at 17:20

## 2017-08-09 RX ADMIN — IPRATROPIUM BROMIDE 0.5 MG: 0.5 SOLUTION RESPIRATORY (INHALATION) at 23:17

## 2017-08-09 RX ADMIN — PHENAZOPYRIDINE HYDROCHLORIDE 100 MG: 100 TABLET ORAL at 17:21

## 2017-08-09 RX ADMIN — DOCUSATE SODIUM 100 MG: 100 CAPSULE, LIQUID FILLED ORAL at 17:21

## 2017-08-09 RX ADMIN — LEVALBUTEROL HYDROCHLORIDE 1.25 MG: 1.25 SOLUTION, CONCENTRATE RESPIRATORY (INHALATION) at 23:18

## 2017-08-09 RX ADMIN — INSULIN LISPRO 2 UNITS: 100 INJECTION, SOLUTION INTRAVENOUS; SUBCUTANEOUS at 12:58

## 2017-08-09 RX ADMIN — TAMSULOSIN HYDROCHLORIDE 0.4 MG: 0.4 CAPSULE ORAL at 17:21

## 2017-08-09 RX ADMIN — DESMOPRESSIN ACETATE 40 MG: 0.2 TABLET ORAL at 17:21

## 2017-08-09 RX ADMIN — IPRATROPIUM BROMIDE 0.5 MG: 0.5 SOLUTION RESPIRATORY (INHALATION) at 08:09

## 2017-08-09 RX ADMIN — DOCUSATE SODIUM 100 MG: 100 CAPSULE, LIQUID FILLED ORAL at 08:32

## 2017-08-09 RX ADMIN — ENOXAPARIN SODIUM 40 MG: 40 INJECTION SUBCUTANEOUS at 08:33

## 2017-08-09 RX ADMIN — INSULIN LISPRO 12 UNITS: 100 INJECTION, SOLUTION INTRAVENOUS; SUBCUTANEOUS at 12:58

## 2017-08-09 RX ADMIN — INSULIN LISPRO 2 UNITS: 100 INJECTION, SOLUTION INTRAVENOUS; SUBCUTANEOUS at 21:34

## 2017-08-09 RX ADMIN — SODIUM CHLORIDE 125 ML/HR: 0.9 INJECTION, SOLUTION INTRAVENOUS at 12:53

## 2017-08-09 RX ADMIN — AZTREONAM 1000 MG: 1 INJECTION, POWDER, LYOPHILIZED, FOR SOLUTION INTRAMUSCULAR; INTRAVENOUS at 21:34

## 2017-08-09 RX ADMIN — AZTREONAM 1000 MG: 1 INJECTION, POWDER, LYOPHILIZED, FOR SOLUTION INTRAMUSCULAR; INTRAVENOUS at 05:08

## 2017-08-09 RX ADMIN — FINASTERIDE 5 MG: 5 TABLET, FILM COATED ORAL at 12:53

## 2017-08-09 RX ADMIN — INSULIN LISPRO 12 UNITS: 100 INJECTION, SOLUTION INTRAVENOUS; SUBCUTANEOUS at 07:55

## 2017-08-09 RX ADMIN — PHENAZOPYRIDINE HYDROCHLORIDE 100 MG: 100 TABLET ORAL at 12:53

## 2017-08-09 RX ADMIN — FLUCONAZOLE 400 MG: 2 INJECTION INTRAVENOUS at 14:56

## 2017-08-09 RX ADMIN — SODIUM CHLORIDE 125 ML/HR: 0.9 INJECTION, SOLUTION INTRAVENOUS at 04:00

## 2017-08-09 RX ADMIN — INSULIN LISPRO 12 UNITS: 100 INJECTION, SOLUTION INTRAVENOUS; SUBCUTANEOUS at 17:20

## 2017-08-09 RX ADMIN — LEVALBUTEROL HYDROCHLORIDE 1.25 MG: 1.25 SOLUTION, CONCENTRATE RESPIRATORY (INHALATION) at 08:09

## 2017-08-09 RX ADMIN — CARVEDILOL 6.25 MG: 3.12 TABLET, FILM COATED ORAL at 08:32

## 2017-08-09 RX ADMIN — ACETAMINOPHEN 650 MG: 325 TABLET, FILM COATED ORAL at 00:27

## 2017-08-09 RX ADMIN — PHENAZOPYRIDINE HYDROCHLORIDE 100 MG: 100 TABLET ORAL at 08:33

## 2017-08-09 RX ADMIN — AZTREONAM 1000 MG: 1 INJECTION, POWDER, LYOPHILIZED, FOR SOLUTION INTRAMUSCULAR; INTRAVENOUS at 13:02

## 2017-08-09 RX ADMIN — ASPIRIN 81 MG 81 MG: 81 TABLET ORAL at 08:32

## 2017-08-09 RX ADMIN — INSULIN LISPRO 4 UNITS: 100 INJECTION, SOLUTION INTRAVENOUS; SUBCUTANEOUS at 07:54

## 2017-08-10 ENCOUNTER — APPOINTMENT (INPATIENT)
Dept: PHYSICAL THERAPY | Facility: HOSPITAL | Age: 77
DRG: 690 | End: 2017-08-10
Payer: MEDICARE

## 2017-08-10 LAB
GLUCOSE SERPL-MCNC: 143 MG/DL (ref 65–140)
GLUCOSE SERPL-MCNC: 167 MG/DL (ref 65–140)
GLUCOSE SERPL-MCNC: 172 MG/DL (ref 65–140)
GLUCOSE SERPL-MCNC: 189 MG/DL (ref 65–140)

## 2017-08-10 PROCEDURE — 97110 THERAPEUTIC EXERCISES: CPT

## 2017-08-10 PROCEDURE — 94760 N-INVAS EAR/PLS OXIMETRY 1: CPT

## 2017-08-10 PROCEDURE — 94640 AIRWAY INHALATION TREATMENT: CPT

## 2017-08-10 PROCEDURE — 97116 GAIT TRAINING THERAPY: CPT

## 2017-08-10 PROCEDURE — 82948 REAGENT STRIP/BLOOD GLUCOSE: CPT

## 2017-08-10 RX ORDER — INSULIN GLARGINE 100 [IU]/ML
35 INJECTION, SOLUTION SUBCUTANEOUS EVERY 12 HOURS SCHEDULED
Status: DISCONTINUED | OUTPATIENT
Start: 2017-08-10 | End: 2017-08-18 | Stop reason: HOSPADM

## 2017-08-10 RX ORDER — AMLODIPINE BESYLATE 5 MG/1
5 TABLET ORAL DAILY
Status: DISCONTINUED | OUTPATIENT
Start: 2017-08-11 | End: 2017-08-18 | Stop reason: HOSPADM

## 2017-08-10 RX ADMIN — DOCUSATE SODIUM 100 MG: 100 CAPSULE, LIQUID FILLED ORAL at 17:02

## 2017-08-10 RX ADMIN — ACETAMINOPHEN 650 MG: 325 TABLET, FILM COATED ORAL at 21:19

## 2017-08-10 RX ADMIN — PHENAZOPYRIDINE HYDROCHLORIDE 100 MG: 100 TABLET ORAL at 12:36

## 2017-08-10 RX ADMIN — INSULIN LISPRO 12 UNITS: 100 INJECTION, SOLUTION INTRAVENOUS; SUBCUTANEOUS at 12:40

## 2017-08-10 RX ADMIN — AZTREONAM 1000 MG: 1 INJECTION, POWDER, LYOPHILIZED, FOR SOLUTION INTRAMUSCULAR; INTRAVENOUS at 05:58

## 2017-08-10 RX ADMIN — PHENAZOPYRIDINE HYDROCHLORIDE 100 MG: 100 TABLET ORAL at 08:06

## 2017-08-10 RX ADMIN — INSULIN LISPRO 2 UNITS: 100 INJECTION, SOLUTION INTRAVENOUS; SUBCUTANEOUS at 12:39

## 2017-08-10 RX ADMIN — AZTREONAM 1000 MG: 1 INJECTION, POWDER, LYOPHILIZED, FOR SOLUTION INTRAMUSCULAR; INTRAVENOUS at 21:23

## 2017-08-10 RX ADMIN — CARVEDILOL 6.25 MG: 3.12 TABLET, FILM COATED ORAL at 16:54

## 2017-08-10 RX ADMIN — AZTREONAM 1000 MG: 1 INJECTION, POWDER, LYOPHILIZED, FOR SOLUTION INTRAMUSCULAR; INTRAVENOUS at 12:36

## 2017-08-10 RX ADMIN — ACETAMINOPHEN 650 MG: 325 TABLET, FILM COATED ORAL at 11:55

## 2017-08-10 RX ADMIN — CARVEDILOL 6.25 MG: 3.12 TABLET, FILM COATED ORAL at 08:06

## 2017-08-10 RX ADMIN — TAMSULOSIN HYDROCHLORIDE 0.4 MG: 0.4 CAPSULE ORAL at 16:55

## 2017-08-10 RX ADMIN — INSULIN LISPRO 12 UNITS: 100 INJECTION, SOLUTION INTRAVENOUS; SUBCUTANEOUS at 08:05

## 2017-08-10 RX ADMIN — SODIUM CHLORIDE 125 ML/HR: 0.9 INJECTION, SOLUTION INTRAVENOUS at 14:59

## 2017-08-10 RX ADMIN — IPRATROPIUM BROMIDE 0.5 MG: 0.5 SOLUTION RESPIRATORY (INHALATION) at 20:45

## 2017-08-10 RX ADMIN — ASPIRIN 81 MG 81 MG: 81 TABLET ORAL at 08:51

## 2017-08-10 RX ADMIN — INSULIN GLARGINE 35 UNITS: 100 INJECTION, SOLUTION SUBCUTANEOUS at 21:19

## 2017-08-10 RX ADMIN — ENOXAPARIN SODIUM 40 MG: 40 INJECTION SUBCUTANEOUS at 08:51

## 2017-08-10 RX ADMIN — PHENAZOPYRIDINE HYDROCHLORIDE 100 MG: 100 TABLET ORAL at 16:55

## 2017-08-10 RX ADMIN — DOCUSATE SODIUM 100 MG: 100 CAPSULE, LIQUID FILLED ORAL at 08:51

## 2017-08-10 RX ADMIN — DESMOPRESSIN ACETATE 40 MG: 0.2 TABLET ORAL at 16:54

## 2017-08-10 RX ADMIN — LEVALBUTEROL HYDROCHLORIDE 1.25 MG: 1.25 SOLUTION, CONCENTRATE RESPIRATORY (INHALATION) at 08:40

## 2017-08-10 RX ADMIN — IPRATROPIUM BROMIDE 0.5 MG: 0.5 SOLUTION RESPIRATORY (INHALATION) at 08:40

## 2017-08-10 RX ADMIN — INSULIN LISPRO 2 UNITS: 100 INJECTION, SOLUTION INTRAVENOUS; SUBCUTANEOUS at 08:04

## 2017-08-10 RX ADMIN — LEVALBUTEROL HYDROCHLORIDE 1.25 MG: 1.25 SOLUTION, CONCENTRATE RESPIRATORY (INHALATION) at 20:45

## 2017-08-10 RX ADMIN — FLUCONAZOLE 400 MG: 2 INJECTION INTRAVENOUS at 15:00

## 2017-08-10 RX ADMIN — INSULIN LISPRO 1 UNITS: 100 INJECTION, SOLUTION INTRAVENOUS; SUBCUTANEOUS at 21:21

## 2017-08-10 RX ADMIN — FINASTERIDE 5 MG: 5 TABLET, FILM COATED ORAL at 08:52

## 2017-08-11 ENCOUNTER — APPOINTMENT (INPATIENT)
Dept: PHYSICAL THERAPY | Facility: HOSPITAL | Age: 77
DRG: 690 | End: 2017-08-11
Payer: MEDICARE

## 2017-08-11 ENCOUNTER — APPOINTMENT (INPATIENT)
Dept: OCCUPATIONAL THERAPY | Facility: HOSPITAL | Age: 77
DRG: 690 | End: 2017-08-11
Payer: MEDICARE

## 2017-08-11 LAB
ANION GAP SERPL CALCULATED.3IONS-SCNC: 5 MMOL/L (ref 4–13)
BACTERIA UR CULT: NORMAL
BACTERIA UR CULT: NORMAL
BASOPHILS # BLD AUTO: 0.1 THOUSANDS/ΜL (ref 0–0.1)
BASOPHILS NFR BLD AUTO: 1 % (ref 0–1)
BUN SERPL-MCNC: 15 MG/DL (ref 5–25)
CALCIUM SERPL-MCNC: 8.3 MG/DL (ref 8.3–10.1)
CHLORIDE SERPL-SCNC: 104 MMOL/L (ref 100–108)
CO2 SERPL-SCNC: 31 MMOL/L (ref 21–32)
CREAT SERPL-MCNC: 0.91 MG/DL (ref 0.6–1.3)
EOSINOPHIL # BLD AUTO: 1.05 THOUSAND/ΜL (ref 0–0.61)
EOSINOPHIL NFR BLD AUTO: 10 % (ref 0–6)
ERYTHROCYTE [DISTWIDTH] IN BLOOD BY AUTOMATED COUNT: 13.5 % (ref 11.6–15.1)
GFR SERPL CREATININE-BSD FRML MDRD: 81 ML/MIN/1.73SQ M
GLUCOSE SERPL-MCNC: 111 MG/DL (ref 65–140)
GLUCOSE SERPL-MCNC: 122 MG/DL (ref 65–140)
GLUCOSE SERPL-MCNC: 156 MG/DL (ref 65–140)
GLUCOSE SERPL-MCNC: 172 MG/DL (ref 65–140)
GLUCOSE SERPL-MCNC: 286 MG/DL (ref 65–140)
HCT VFR BLD AUTO: 40.3 % (ref 36.5–49.3)
HGB BLD-MCNC: 13 G/DL (ref 12–17)
LYMPHOCYTES # BLD AUTO: 1.92 THOUSANDS/ΜL (ref 0.6–4.47)
LYMPHOCYTES NFR BLD AUTO: 19 % (ref 14–44)
MCH RBC QN AUTO: 29.1 PG (ref 26.8–34.3)
MCHC RBC AUTO-ENTMCNC: 32.3 G/DL (ref 31.4–37.4)
MCV RBC AUTO: 90 FL (ref 82–98)
MONOCYTES # BLD AUTO: 1.03 THOUSAND/ΜL (ref 0.17–1.22)
MONOCYTES NFR BLD AUTO: 10 % (ref 4–12)
NEUTROPHILS # BLD AUTO: 6.01 THOUSANDS/ΜL (ref 1.85–7.62)
NEUTS SEG NFR BLD AUTO: 60 % (ref 43–75)
PLATELET # BLD AUTO: 193 THOUSANDS/UL (ref 149–390)
PMV BLD AUTO: 11.8 FL (ref 8.9–12.7)
POTASSIUM SERPL-SCNC: 4.2 MMOL/L (ref 3.5–5.3)
RBC # BLD AUTO: 4.46 MILLION/UL (ref 3.88–5.62)
SODIUM SERPL-SCNC: 140 MMOL/L (ref 136–145)
WBC # BLD AUTO: 10.11 THOUSAND/UL (ref 4.31–10.16)

## 2017-08-11 PROCEDURE — 94760 N-INVAS EAR/PLS OXIMETRY 1: CPT

## 2017-08-11 PROCEDURE — 80048 BASIC METABOLIC PNL TOTAL CA: CPT | Performed by: PHYSICIAN ASSISTANT

## 2017-08-11 PROCEDURE — 97116 GAIT TRAINING THERAPY: CPT

## 2017-08-11 PROCEDURE — 85025 COMPLETE CBC W/AUTO DIFF WBC: CPT | Performed by: PHYSICIAN ASSISTANT

## 2017-08-11 PROCEDURE — 97110 THERAPEUTIC EXERCISES: CPT

## 2017-08-11 PROCEDURE — 82948 REAGENT STRIP/BLOOD GLUCOSE: CPT

## 2017-08-11 PROCEDURE — 94640 AIRWAY INHALATION TREATMENT: CPT

## 2017-08-11 PROCEDURE — 97530 THERAPEUTIC ACTIVITIES: CPT

## 2017-08-11 PROCEDURE — 97535 SELF CARE MNGMENT TRAINING: CPT

## 2017-08-11 RX ADMIN — AZTREONAM 1000 MG: 1 INJECTION, POWDER, LYOPHILIZED, FOR SOLUTION INTRAMUSCULAR; INTRAVENOUS at 12:11

## 2017-08-11 RX ADMIN — DOCUSATE SODIUM 100 MG: 100 CAPSULE, LIQUID FILLED ORAL at 08:20

## 2017-08-11 RX ADMIN — DESMOPRESSIN ACETATE 40 MG: 0.2 TABLET ORAL at 17:13

## 2017-08-11 RX ADMIN — AMLODIPINE BESYLATE 5 MG: 5 TABLET ORAL at 08:20

## 2017-08-11 RX ADMIN — ENOXAPARIN SODIUM 40 MG: 40 INJECTION SUBCUTANEOUS at 08:20

## 2017-08-11 RX ADMIN — IPRATROPIUM BROMIDE 0.5 MG: 0.5 SOLUTION RESPIRATORY (INHALATION) at 08:39

## 2017-08-11 RX ADMIN — PHENAZOPYRIDINE HYDROCHLORIDE 100 MG: 100 TABLET ORAL at 08:20

## 2017-08-11 RX ADMIN — CARVEDILOL 6.25 MG: 3.12 TABLET, FILM COATED ORAL at 08:20

## 2017-08-11 RX ADMIN — ASPIRIN 81 MG 81 MG: 81 TABLET ORAL at 08:19

## 2017-08-11 RX ADMIN — INSULIN GLARGINE 35 UNITS: 100 INJECTION, SOLUTION SUBCUTANEOUS at 08:18

## 2017-08-11 RX ADMIN — INSULIN LISPRO 3 UNITS: 100 INJECTION, SOLUTION INTRAVENOUS; SUBCUTANEOUS at 21:45

## 2017-08-11 RX ADMIN — LEVALBUTEROL HYDROCHLORIDE 1.25 MG: 1.25 SOLUTION, CONCENTRATE RESPIRATORY (INHALATION) at 08:39

## 2017-08-11 RX ADMIN — FLUCONAZOLE 400 MG: 2 INJECTION INTRAVENOUS at 13:09

## 2017-08-11 RX ADMIN — PHENAZOPYRIDINE HYDROCHLORIDE 100 MG: 100 TABLET ORAL at 17:13

## 2017-08-11 RX ADMIN — PHENAZOPYRIDINE HYDROCHLORIDE 100 MG: 100 TABLET ORAL at 12:11

## 2017-08-11 RX ADMIN — IPRATROPIUM BROMIDE 0.5 MG: 0.5 SOLUTION RESPIRATORY (INHALATION) at 19:56

## 2017-08-11 RX ADMIN — DOCUSATE SODIUM 100 MG: 100 CAPSULE, LIQUID FILLED ORAL at 17:13

## 2017-08-11 RX ADMIN — FINASTERIDE 5 MG: 5 TABLET, FILM COATED ORAL at 08:20

## 2017-08-11 RX ADMIN — INSULIN LISPRO 2 UNITS: 100 INJECTION, SOLUTION INTRAVENOUS; SUBCUTANEOUS at 08:18

## 2017-08-11 RX ADMIN — AZTREONAM 1000 MG: 1 INJECTION, POWDER, LYOPHILIZED, FOR SOLUTION INTRAMUSCULAR; INTRAVENOUS at 05:34

## 2017-08-11 RX ADMIN — LEVALBUTEROL HYDROCHLORIDE 1.25 MG: 1.25 SOLUTION, CONCENTRATE RESPIRATORY (INHALATION) at 19:56

## 2017-08-11 RX ADMIN — ERGOCALCIFEROL 50000 UNITS: 1.25 CAPSULE ORAL at 08:20

## 2017-08-11 RX ADMIN — TAMSULOSIN HYDROCHLORIDE 0.4 MG: 0.4 CAPSULE ORAL at 17:13

## 2017-08-11 RX ADMIN — AZTREONAM 1000 MG: 1 INJECTION, POWDER, LYOPHILIZED, FOR SOLUTION INTRAMUSCULAR; INTRAVENOUS at 21:46

## 2017-08-11 RX ADMIN — INSULIN GLARGINE 35 UNITS: 100 INJECTION, SOLUTION SUBCUTANEOUS at 21:45

## 2017-08-11 RX ADMIN — CARVEDILOL 6.25 MG: 3.12 TABLET, FILM COATED ORAL at 17:13

## 2017-08-12 LAB
GLUCOSE SERPL-MCNC: 100 MG/DL (ref 65–140)
GLUCOSE SERPL-MCNC: 153 MG/DL (ref 65–140)
GLUCOSE SERPL-MCNC: 165 MG/DL (ref 65–140)
GLUCOSE SERPL-MCNC: 237 MG/DL (ref 65–140)
GLUCOSE SERPL-MCNC: 73 MG/DL (ref 65–140)

## 2017-08-12 PROCEDURE — 97110 THERAPEUTIC EXERCISES: CPT

## 2017-08-12 PROCEDURE — 82948 REAGENT STRIP/BLOOD GLUCOSE: CPT

## 2017-08-12 PROCEDURE — 94640 AIRWAY INHALATION TREATMENT: CPT

## 2017-08-12 PROCEDURE — 97116 GAIT TRAINING THERAPY: CPT

## 2017-08-12 PROCEDURE — 94760 N-INVAS EAR/PLS OXIMETRY 1: CPT

## 2017-08-12 RX ADMIN — AZTREONAM 1000 MG: 1 INJECTION, POWDER, LYOPHILIZED, FOR SOLUTION INTRAMUSCULAR; INTRAVENOUS at 04:50

## 2017-08-12 RX ADMIN — PHENAZOPYRIDINE HYDROCHLORIDE 100 MG: 100 TABLET ORAL at 11:24

## 2017-08-12 RX ADMIN — AMLODIPINE BESYLATE 5 MG: 5 TABLET ORAL at 08:32

## 2017-08-12 RX ADMIN — LEVALBUTEROL HYDROCHLORIDE 1.25 MG: 1.25 SOLUTION, CONCENTRATE RESPIRATORY (INHALATION) at 21:38

## 2017-08-12 RX ADMIN — CARVEDILOL 6.25 MG: 3.12 TABLET, FILM COATED ORAL at 08:32

## 2017-08-12 RX ADMIN — CARVEDILOL 6.25 MG: 3.12 TABLET, FILM COATED ORAL at 17:10

## 2017-08-12 RX ADMIN — ENOXAPARIN SODIUM 40 MG: 40 INJECTION SUBCUTANEOUS at 08:31

## 2017-08-12 RX ADMIN — DESMOPRESSIN ACETATE 40 MG: 0.2 TABLET ORAL at 17:10

## 2017-08-12 RX ADMIN — AZTREONAM 1000 MG: 1 INJECTION, POWDER, LYOPHILIZED, FOR SOLUTION INTRAMUSCULAR; INTRAVENOUS at 12:41

## 2017-08-12 RX ADMIN — ASPIRIN 81 MG 81 MG: 81 TABLET ORAL at 08:31

## 2017-08-12 RX ADMIN — INSULIN GLARGINE 35 UNITS: 100 INJECTION, SOLUTION SUBCUTANEOUS at 08:31

## 2017-08-12 RX ADMIN — INSULIN LISPRO 2 UNITS: 100 INJECTION, SOLUTION INTRAVENOUS; SUBCUTANEOUS at 21:49

## 2017-08-12 RX ADMIN — PHENAZOPYRIDINE HYDROCHLORIDE 100 MG: 100 TABLET ORAL at 17:10

## 2017-08-12 RX ADMIN — INSULIN LISPRO 2 UNITS: 100 INJECTION, SOLUTION INTRAVENOUS; SUBCUTANEOUS at 08:32

## 2017-08-12 RX ADMIN — FLUCONAZOLE 400 MG: 2 INJECTION INTRAVENOUS at 13:38

## 2017-08-12 RX ADMIN — FINASTERIDE 5 MG: 5 TABLET, FILM COATED ORAL at 08:31

## 2017-08-12 RX ADMIN — INSULIN LISPRO 2 UNITS: 100 INJECTION, SOLUTION INTRAVENOUS; SUBCUTANEOUS at 11:24

## 2017-08-12 RX ADMIN — INSULIN GLARGINE 35 UNITS: 100 INJECTION, SOLUTION SUBCUTANEOUS at 21:49

## 2017-08-12 RX ADMIN — IPRATROPIUM BROMIDE 0.5 MG: 0.5 SOLUTION RESPIRATORY (INHALATION) at 21:38

## 2017-08-12 RX ADMIN — PHENAZOPYRIDINE HYDROCHLORIDE 100 MG: 100 TABLET ORAL at 08:31

## 2017-08-12 RX ADMIN — DOCUSATE SODIUM 100 MG: 100 CAPSULE, LIQUID FILLED ORAL at 17:10

## 2017-08-12 RX ADMIN — AZTREONAM 1000 MG: 1 INJECTION, POWDER, LYOPHILIZED, FOR SOLUTION INTRAMUSCULAR; INTRAVENOUS at 21:49

## 2017-08-12 RX ADMIN — DOCUSATE SODIUM 100 MG: 100 CAPSULE, LIQUID FILLED ORAL at 08:31

## 2017-08-12 RX ADMIN — TAMSULOSIN HYDROCHLORIDE 0.4 MG: 0.4 CAPSULE ORAL at 17:10

## 2017-08-13 LAB
ALBUMIN SERPL BCP-MCNC: 2.8 G/DL (ref 3.5–5)
ALP SERPL-CCNC: 92 U/L (ref 46–116)
ALT SERPL W P-5'-P-CCNC: 15 U/L (ref 12–78)
ANION GAP SERPL CALCULATED.3IONS-SCNC: 6 MMOL/L (ref 4–13)
AST SERPL W P-5'-P-CCNC: 14 U/L (ref 5–45)
BACTERIA BLD CULT: NORMAL
BASOPHILS # BLD AUTO: 0.11 THOUSANDS/ΜL (ref 0–0.1)
BASOPHILS NFR BLD AUTO: 1 % (ref 0–1)
BILIRUB SERPL-MCNC: 0.2 MG/DL (ref 0.2–1)
BUN SERPL-MCNC: 17 MG/DL (ref 5–25)
CALCIUM SERPL-MCNC: 8.5 MG/DL (ref 8.3–10.1)
CHLORIDE SERPL-SCNC: 103 MMOL/L (ref 100–108)
CO2 SERPL-SCNC: 32 MMOL/L (ref 21–32)
CREAT SERPL-MCNC: 0.76 MG/DL (ref 0.6–1.3)
EOSINOPHIL # BLD AUTO: 0.9 THOUSAND/ΜL (ref 0–0.61)
EOSINOPHIL NFR BLD AUTO: 9 % (ref 0–6)
ERYTHROCYTE [DISTWIDTH] IN BLOOD BY AUTOMATED COUNT: 13.5 % (ref 11.6–15.1)
GFR SERPL CREATININE-BSD FRML MDRD: 88 ML/MIN/1.73SQ M
GLUCOSE SERPL-MCNC: 119 MG/DL (ref 65–140)
GLUCOSE SERPL-MCNC: 158 MG/DL (ref 65–140)
GLUCOSE SERPL-MCNC: 240 MG/DL (ref 65–140)
GLUCOSE SERPL-MCNC: 64 MG/DL (ref 65–140)
HCT VFR BLD AUTO: 41.1 % (ref 36.5–49.3)
HGB BLD-MCNC: 13 G/DL (ref 12–17)
LYMPHOCYTES # BLD AUTO: 1.91 THOUSANDS/ΜL (ref 0.6–4.47)
LYMPHOCYTES NFR BLD AUTO: 20 % (ref 14–44)
MAGNESIUM SERPL-MCNC: 2.2 MG/DL (ref 1.6–2.6)
MCH RBC QN AUTO: 28.7 PG (ref 26.8–34.3)
MCHC RBC AUTO-ENTMCNC: 31.6 G/DL (ref 31.4–37.4)
MCV RBC AUTO: 91 FL (ref 82–98)
MONOCYTES # BLD AUTO: 1.16 THOUSAND/ΜL (ref 0.17–1.22)
MONOCYTES NFR BLD AUTO: 12 % (ref 4–12)
NEUTROPHILS # BLD AUTO: 5.49 THOUSANDS/ΜL (ref 1.85–7.62)
NEUTS SEG NFR BLD AUTO: 58 % (ref 43–75)
PLATELET # BLD AUTO: 195 THOUSANDS/UL (ref 149–390)
PMV BLD AUTO: 11.7 FL (ref 8.9–12.7)
POTASSIUM SERPL-SCNC: 4 MMOL/L (ref 3.5–5.3)
PROT SERPL-MCNC: 6.8 G/DL (ref 6.4–8.2)
RBC # BLD AUTO: 4.53 MILLION/UL (ref 3.88–5.62)
SODIUM SERPL-SCNC: 141 MMOL/L (ref 136–145)
WBC # BLD AUTO: 9.57 THOUSAND/UL (ref 4.31–10.16)

## 2017-08-13 PROCEDURE — 82948 REAGENT STRIP/BLOOD GLUCOSE: CPT

## 2017-08-13 PROCEDURE — 83735 ASSAY OF MAGNESIUM: CPT | Performed by: INTERNAL MEDICINE

## 2017-08-13 PROCEDURE — 94640 AIRWAY INHALATION TREATMENT: CPT

## 2017-08-13 PROCEDURE — 85025 COMPLETE CBC W/AUTO DIFF WBC: CPT | Performed by: INTERNAL MEDICINE

## 2017-08-13 PROCEDURE — 80053 COMPREHEN METABOLIC PANEL: CPT | Performed by: INTERNAL MEDICINE

## 2017-08-13 PROCEDURE — 94760 N-INVAS EAR/PLS OXIMETRY 1: CPT

## 2017-08-13 RX ORDER — LEVALBUTEROL 1.25 MG/.5ML
1.25 SOLUTION, CONCENTRATE RESPIRATORY (INHALATION) EVERY 8 HOURS PRN
Status: DISCONTINUED | OUTPATIENT
Start: 2017-08-14 | End: 2017-08-18 | Stop reason: HOSPADM

## 2017-08-13 RX ORDER — FLUCONAZOLE 100 MG/1
200 TABLET ORAL DAILY
Status: DISCONTINUED | OUTPATIENT
Start: 2017-08-13 | End: 2017-08-18 | Stop reason: HOSPADM

## 2017-08-13 RX ADMIN — ENOXAPARIN SODIUM 40 MG: 40 INJECTION SUBCUTANEOUS at 08:13

## 2017-08-13 RX ADMIN — IPRATROPIUM BROMIDE 0.5 MG: 0.5 SOLUTION RESPIRATORY (INHALATION) at 20:48

## 2017-08-13 RX ADMIN — INSULIN GLARGINE 35 UNITS: 100 INJECTION, SOLUTION SUBCUTANEOUS at 21:54

## 2017-08-13 RX ADMIN — FLUCONAZOLE 200 MG: 100 TABLET ORAL at 12:51

## 2017-08-13 RX ADMIN — IPRATROPIUM BROMIDE 0.5 MG: 0.5 SOLUTION RESPIRATORY (INHALATION) at 08:48

## 2017-08-13 RX ADMIN — DOCUSATE SODIUM 100 MG: 100 CAPSULE, LIQUID FILLED ORAL at 08:14

## 2017-08-13 RX ADMIN — CARVEDILOL 6.25 MG: 3.12 TABLET, FILM COATED ORAL at 17:27

## 2017-08-13 RX ADMIN — ASPIRIN 81 MG 81 MG: 81 TABLET ORAL at 08:14

## 2017-08-13 RX ADMIN — DOCUSATE SODIUM 100 MG: 100 CAPSULE, LIQUID FILLED ORAL at 17:27

## 2017-08-13 RX ADMIN — LEVALBUTEROL HYDROCHLORIDE 1.25 MG: 1.25 SOLUTION, CONCENTRATE RESPIRATORY (INHALATION) at 08:48

## 2017-08-13 RX ADMIN — CARVEDILOL 6.25 MG: 3.12 TABLET, FILM COATED ORAL at 08:14

## 2017-08-13 RX ADMIN — TAMSULOSIN HYDROCHLORIDE 0.4 MG: 0.4 CAPSULE ORAL at 17:27

## 2017-08-13 RX ADMIN — AMLODIPINE BESYLATE 5 MG: 5 TABLET ORAL at 08:13

## 2017-08-13 RX ADMIN — INSULIN LISPRO 4 UNITS: 100 INJECTION, SOLUTION INTRAVENOUS; SUBCUTANEOUS at 16:25

## 2017-08-13 RX ADMIN — AZTREONAM 1000 MG: 1 INJECTION, POWDER, LYOPHILIZED, FOR SOLUTION INTRAMUSCULAR; INTRAVENOUS at 12:54

## 2017-08-13 RX ADMIN — FINASTERIDE 5 MG: 5 TABLET, FILM COATED ORAL at 08:14

## 2017-08-13 RX ADMIN — AZTREONAM 1000 MG: 1 INJECTION, POWDER, LYOPHILIZED, FOR SOLUTION INTRAMUSCULAR; INTRAVENOUS at 05:27

## 2017-08-13 RX ADMIN — DESMOPRESSIN ACETATE 40 MG: 0.2 TABLET ORAL at 17:27

## 2017-08-13 RX ADMIN — INSULIN GLARGINE 35 UNITS: 100 INJECTION, SOLUTION SUBCUTANEOUS at 08:13

## 2017-08-13 RX ADMIN — AZTREONAM 1000 MG: 1 INJECTION, POWDER, LYOPHILIZED, FOR SOLUTION INTRAMUSCULAR; INTRAVENOUS at 21:54

## 2017-08-13 RX ADMIN — INSULIN LISPRO 1 UNITS: 100 INJECTION, SOLUTION INTRAVENOUS; SUBCUTANEOUS at 21:54

## 2017-08-13 RX ADMIN — LEVALBUTEROL HYDROCHLORIDE 1.25 MG: 1.25 SOLUTION, CONCENTRATE RESPIRATORY (INHALATION) at 20:48

## 2017-08-14 ENCOUNTER — APPOINTMENT (INPATIENT)
Dept: PHYSICAL THERAPY | Facility: HOSPITAL | Age: 77
DRG: 690 | End: 2017-08-14
Payer: MEDICARE

## 2017-08-14 PROBLEM — R78.81 GRAM-NEGATIVE BACTEREMIA: Status: ACTIVE | Noted: 2017-08-14

## 2017-08-14 LAB
GLUCOSE SERPL-MCNC: 121 MG/DL (ref 65–140)
GLUCOSE SERPL-MCNC: 165 MG/DL (ref 65–140)
GLUCOSE SERPL-MCNC: 207 MG/DL (ref 65–140)
GLUCOSE SERPL-MCNC: 255 MG/DL (ref 65–140)
GLUCOSE SERPL-MCNC: 326 MG/DL (ref 65–140)
GLUCOSE SERPL-MCNC: 88 MG/DL (ref 65–140)

## 2017-08-14 PROCEDURE — 97116 GAIT TRAINING THERAPY: CPT

## 2017-08-14 PROCEDURE — 97110 THERAPEUTIC EXERCISES: CPT

## 2017-08-14 PROCEDURE — 97530 THERAPEUTIC ACTIVITIES: CPT

## 2017-08-14 PROCEDURE — 82948 REAGENT STRIP/BLOOD GLUCOSE: CPT

## 2017-08-14 RX ADMIN — INSULIN LISPRO 2 UNITS: 100 INJECTION, SOLUTION INTRAVENOUS; SUBCUTANEOUS at 15:59

## 2017-08-14 RX ADMIN — CARVEDILOL 6.25 MG: 3.12 TABLET, FILM COATED ORAL at 08:16

## 2017-08-14 RX ADMIN — DOCUSATE SODIUM 100 MG: 100 CAPSULE, LIQUID FILLED ORAL at 18:11

## 2017-08-14 RX ADMIN — AZTREONAM 1000 MG: 1 INJECTION, POWDER, LYOPHILIZED, FOR SOLUTION INTRAMUSCULAR; INTRAVENOUS at 05:11

## 2017-08-14 RX ADMIN — CARVEDILOL 6.25 MG: 3.12 TABLET, FILM COATED ORAL at 15:58

## 2017-08-14 RX ADMIN — DOCUSATE SODIUM 100 MG: 100 CAPSULE, LIQUID FILLED ORAL at 10:49

## 2017-08-14 RX ADMIN — OXYCODONE HYDROCHLORIDE 5 MG: 5 TABLET ORAL at 08:15

## 2017-08-14 RX ADMIN — AZTREONAM 1000 MG: 1 INJECTION, POWDER, LYOPHILIZED, FOR SOLUTION INTRAMUSCULAR; INTRAVENOUS at 12:13

## 2017-08-14 RX ADMIN — ENOXAPARIN SODIUM 40 MG: 40 INJECTION SUBCUTANEOUS at 10:48

## 2017-08-14 RX ADMIN — FINASTERIDE 5 MG: 5 TABLET, FILM COATED ORAL at 10:49

## 2017-08-14 RX ADMIN — ASPIRIN 81 MG 81 MG: 81 TABLET ORAL at 10:48

## 2017-08-14 RX ADMIN — AZTREONAM 1000 MG: 1 INJECTION, POWDER, LYOPHILIZED, FOR SOLUTION INTRAMUSCULAR; INTRAVENOUS at 21:54

## 2017-08-14 RX ADMIN — INSULIN GLARGINE 35 UNITS: 100 INJECTION, SOLUTION SUBCUTANEOUS at 21:55

## 2017-08-14 RX ADMIN — INSULIN GLARGINE 35 UNITS: 100 INJECTION, SOLUTION SUBCUTANEOUS at 10:47

## 2017-08-14 RX ADMIN — INSULIN LISPRO 3 UNITS: 100 INJECTION, SOLUTION INTRAVENOUS; SUBCUTANEOUS at 21:56

## 2017-08-14 RX ADMIN — OXYCODONE HYDROCHLORIDE 5 MG: 5 TABLET ORAL at 14:32

## 2017-08-14 RX ADMIN — FLUCONAZOLE 200 MG: 100 TABLET ORAL at 10:49

## 2017-08-14 RX ADMIN — DESMOPRESSIN ACETATE 40 MG: 0.2 TABLET ORAL at 15:58

## 2017-08-14 RX ADMIN — AMLODIPINE BESYLATE 5 MG: 5 TABLET ORAL at 10:49

## 2017-08-14 RX ADMIN — TAMSULOSIN HYDROCHLORIDE 0.4 MG: 0.4 CAPSULE ORAL at 15:58

## 2017-08-15 ENCOUNTER — APPOINTMENT (INPATIENT)
Dept: PHYSICAL THERAPY | Facility: HOSPITAL | Age: 77
DRG: 690 | End: 2017-08-15
Payer: MEDICARE

## 2017-08-15 ENCOUNTER — APPOINTMENT (INPATIENT)
Dept: OCCUPATIONAL THERAPY | Facility: HOSPITAL | Age: 77
DRG: 690 | End: 2017-08-15
Payer: MEDICARE

## 2017-08-15 LAB
GLUCOSE SERPL-MCNC: 124 MG/DL (ref 65–140)
GLUCOSE SERPL-MCNC: 152 MG/DL (ref 65–140)
GLUCOSE SERPL-MCNC: 172 MG/DL (ref 65–140)
GLUCOSE SERPL-MCNC: 243 MG/DL (ref 65–140)

## 2017-08-15 PROCEDURE — 97116 GAIT TRAINING THERAPY: CPT

## 2017-08-15 PROCEDURE — 97110 THERAPEUTIC EXERCISES: CPT

## 2017-08-15 PROCEDURE — 82948 REAGENT STRIP/BLOOD GLUCOSE: CPT

## 2017-08-15 PROCEDURE — 87040 BLOOD CULTURE FOR BACTERIA: CPT | Performed by: INTERNAL MEDICINE

## 2017-08-15 PROCEDURE — 97530 THERAPEUTIC ACTIVITIES: CPT

## 2017-08-15 PROCEDURE — 97535 SELF CARE MNGMENT TRAINING: CPT

## 2017-08-15 RX ORDER — SULFAMETHOXAZOLE AND TRIMETHOPRIM 800; 160 MG/1; MG/1
1 TABLET ORAL EVERY 12 HOURS SCHEDULED
Status: DISCONTINUED | OUTPATIENT
Start: 2017-08-15 | End: 2017-08-18 | Stop reason: HOSPADM

## 2017-08-15 RX ADMIN — ASPIRIN 81 MG 81 MG: 81 TABLET ORAL at 08:25

## 2017-08-15 RX ADMIN — FLUCONAZOLE 200 MG: 100 TABLET ORAL at 08:25

## 2017-08-15 RX ADMIN — FINASTERIDE 5 MG: 5 TABLET, FILM COATED ORAL at 08:25

## 2017-08-15 RX ADMIN — INSULIN GLARGINE 35 UNITS: 100 INJECTION, SOLUTION SUBCUTANEOUS at 21:43

## 2017-08-15 RX ADMIN — INSULIN LISPRO 2 UNITS: 100 INJECTION, SOLUTION INTRAVENOUS; SUBCUTANEOUS at 21:43

## 2017-08-15 RX ADMIN — DOCUSATE SODIUM 100 MG: 100 CAPSULE, LIQUID FILLED ORAL at 08:25

## 2017-08-15 RX ADMIN — OXYCODONE HYDROCHLORIDE 5 MG: 5 TABLET ORAL at 14:37

## 2017-08-15 RX ADMIN — INSULIN LISPRO 2 UNITS: 100 INJECTION, SOLUTION INTRAVENOUS; SUBCUTANEOUS at 08:31

## 2017-08-15 RX ADMIN — INSULIN GLARGINE 35 UNITS: 100 INJECTION, SOLUTION SUBCUTANEOUS at 08:24

## 2017-08-15 RX ADMIN — ENOXAPARIN SODIUM 40 MG: 40 INJECTION SUBCUTANEOUS at 08:25

## 2017-08-15 RX ADMIN — SULFAMETHOXAZOLE AND TRIMETHOPRIM 1 TABLET: 800; 160 TABLET ORAL at 21:43

## 2017-08-15 RX ADMIN — OXYCODONE HYDROCHLORIDE 5 MG: 5 TABLET ORAL at 08:26

## 2017-08-15 RX ADMIN — DESMOPRESSIN ACETATE 40 MG: 0.2 TABLET ORAL at 15:58

## 2017-08-15 RX ADMIN — CARVEDILOL 6.25 MG: 3.12 TABLET, FILM COATED ORAL at 08:25

## 2017-08-15 RX ADMIN — INSULIN LISPRO 2 UNITS: 100 INJECTION, SOLUTION INTRAVENOUS; SUBCUTANEOUS at 11:58

## 2017-08-15 RX ADMIN — SULFAMETHOXAZOLE AND TRIMETHOPRIM 1 TABLET: 800; 160 TABLET ORAL at 14:37

## 2017-08-15 RX ADMIN — AZTREONAM 1000 MG: 1 INJECTION, POWDER, LYOPHILIZED, FOR SOLUTION INTRAMUSCULAR; INTRAVENOUS at 05:38

## 2017-08-15 RX ADMIN — TAMSULOSIN HYDROCHLORIDE 0.4 MG: 0.4 CAPSULE ORAL at 15:57

## 2017-08-15 RX ADMIN — AMLODIPINE BESYLATE 5 MG: 5 TABLET ORAL at 08:25

## 2017-08-15 RX ADMIN — CARVEDILOL 6.25 MG: 3.12 TABLET, FILM COATED ORAL at 15:57

## 2017-08-16 ENCOUNTER — APPOINTMENT (INPATIENT)
Dept: OCCUPATIONAL THERAPY | Facility: HOSPITAL | Age: 77
DRG: 690 | End: 2017-08-16
Payer: MEDICARE

## 2017-08-16 ENCOUNTER — APPOINTMENT (INPATIENT)
Dept: PHYSICAL THERAPY | Facility: HOSPITAL | Age: 77
DRG: 690 | End: 2017-08-16
Payer: MEDICARE

## 2017-08-16 LAB
ALBUMIN SERPL BCP-MCNC: 2.9 G/DL (ref 3.5–5)
ALP SERPL-CCNC: 89 U/L (ref 46–116)
ALT SERPL W P-5'-P-CCNC: 25 U/L (ref 12–78)
ANION GAP SERPL CALCULATED.3IONS-SCNC: 5 MMOL/L (ref 4–13)
AST SERPL W P-5'-P-CCNC: 24 U/L (ref 5–45)
BASOPHILS # BLD AUTO: 0.09 THOUSANDS/ΜL (ref 0–0.1)
BASOPHILS NFR BLD AUTO: 1 % (ref 0–1)
BILIRUB SERPL-MCNC: 0.2 MG/DL (ref 0.2–1)
BUN SERPL-MCNC: 19 MG/DL (ref 5–25)
CALCIUM SERPL-MCNC: 8.4 MG/DL (ref 8.3–10.1)
CHLORIDE SERPL-SCNC: 102 MMOL/L (ref 100–108)
CO2 SERPL-SCNC: 32 MMOL/L (ref 21–32)
CREAT SERPL-MCNC: 0.88 MG/DL (ref 0.6–1.3)
EOSINOPHIL # BLD AUTO: 0.77 THOUSAND/ΜL (ref 0–0.61)
EOSINOPHIL NFR BLD AUTO: 9 % (ref 0–6)
ERYTHROCYTE [DISTWIDTH] IN BLOOD BY AUTOMATED COUNT: 13.5 % (ref 11.6–15.1)
GFR SERPL CREATININE-BSD FRML MDRD: 83 ML/MIN/1.73SQ M
GLUCOSE SERPL-MCNC: 131 MG/DL (ref 65–140)
GLUCOSE SERPL-MCNC: 158 MG/DL (ref 65–140)
GLUCOSE SERPL-MCNC: 238 MG/DL (ref 65–140)
GLUCOSE SERPL-MCNC: 97 MG/DL (ref 65–140)
GLUCOSE SERPL-MCNC: 97 MG/DL (ref 65–140)
HCT VFR BLD AUTO: 42.4 % (ref 36.5–49.3)
HGB BLD-MCNC: 13.4 G/DL (ref 12–17)
LYMPHOCYTES # BLD AUTO: 1.83 THOUSANDS/ΜL (ref 0.6–4.47)
LYMPHOCYTES NFR BLD AUTO: 21 % (ref 14–44)
MAGNESIUM SERPL-MCNC: 2.3 MG/DL (ref 1.6–2.6)
MCH RBC QN AUTO: 28.9 PG (ref 26.8–34.3)
MCHC RBC AUTO-ENTMCNC: 31.6 G/DL (ref 31.4–37.4)
MCV RBC AUTO: 91 FL (ref 82–98)
MONOCYTES # BLD AUTO: 1.03 THOUSAND/ΜL (ref 0.17–1.22)
MONOCYTES NFR BLD AUTO: 12 % (ref 4–12)
NEUTROPHILS # BLD AUTO: 5.05 THOUSANDS/ΜL (ref 1.85–7.62)
NEUTS SEG NFR BLD AUTO: 57 % (ref 43–75)
PLATELET # BLD AUTO: 227 THOUSANDS/UL (ref 149–390)
PMV BLD AUTO: 11.7 FL (ref 8.9–12.7)
POTASSIUM SERPL-SCNC: 4.2 MMOL/L (ref 3.5–5.3)
PROT SERPL-MCNC: 7.1 G/DL (ref 6.4–8.2)
RBC # BLD AUTO: 4.64 MILLION/UL (ref 3.88–5.62)
SODIUM SERPL-SCNC: 139 MMOL/L (ref 136–145)
WBC # BLD AUTO: 8.77 THOUSAND/UL (ref 4.31–10.16)

## 2017-08-16 PROCEDURE — 97110 THERAPEUTIC EXERCISES: CPT

## 2017-08-16 PROCEDURE — 83735 ASSAY OF MAGNESIUM: CPT | Performed by: INTERNAL MEDICINE

## 2017-08-16 PROCEDURE — 85025 COMPLETE CBC W/AUTO DIFF WBC: CPT | Performed by: INTERNAL MEDICINE

## 2017-08-16 PROCEDURE — 80053 COMPREHEN METABOLIC PANEL: CPT | Performed by: INTERNAL MEDICINE

## 2017-08-16 PROCEDURE — 82948 REAGENT STRIP/BLOOD GLUCOSE: CPT

## 2017-08-16 PROCEDURE — 97530 THERAPEUTIC ACTIVITIES: CPT

## 2017-08-16 RX ADMIN — ASPIRIN 81 MG 81 MG: 81 TABLET ORAL at 10:17

## 2017-08-16 RX ADMIN — SULFAMETHOXAZOLE AND TRIMETHOPRIM 1 TABLET: 800; 160 TABLET ORAL at 21:16

## 2017-08-16 RX ADMIN — FLUCONAZOLE 200 MG: 100 TABLET ORAL at 10:16

## 2017-08-16 RX ADMIN — SULFAMETHOXAZOLE AND TRIMETHOPRIM 1 TABLET: 800; 160 TABLET ORAL at 10:17

## 2017-08-16 RX ADMIN — CARVEDILOL 6.25 MG: 3.12 TABLET, FILM COATED ORAL at 10:16

## 2017-08-16 RX ADMIN — INSULIN LISPRO 2 UNITS: 100 INJECTION, SOLUTION INTRAVENOUS; SUBCUTANEOUS at 21:16

## 2017-08-16 RX ADMIN — TAMSULOSIN HYDROCHLORIDE 0.4 MG: 0.4 CAPSULE ORAL at 15:44

## 2017-08-16 RX ADMIN — DOCUSATE SODIUM 100 MG: 100 CAPSULE, LIQUID FILLED ORAL at 10:17

## 2017-08-16 RX ADMIN — FINASTERIDE 5 MG: 5 TABLET, FILM COATED ORAL at 10:16

## 2017-08-16 RX ADMIN — ACETAMINOPHEN 650 MG: 325 TABLET, FILM COATED ORAL at 14:29

## 2017-08-16 RX ADMIN — AMLODIPINE BESYLATE 5 MG: 5 TABLET ORAL at 10:16

## 2017-08-16 RX ADMIN — DESMOPRESSIN ACETATE 40 MG: 0.2 TABLET ORAL at 15:45

## 2017-08-16 RX ADMIN — INSULIN GLARGINE 35 UNITS: 100 INJECTION, SOLUTION SUBCUTANEOUS at 10:14

## 2017-08-16 RX ADMIN — INSULIN GLARGINE 35 UNITS: 100 INJECTION, SOLUTION SUBCUTANEOUS at 21:16

## 2017-08-16 RX ADMIN — ENOXAPARIN SODIUM 40 MG: 40 INJECTION SUBCUTANEOUS at 10:14

## 2017-08-16 RX ADMIN — CARVEDILOL 6.25 MG: 3.12 TABLET, FILM COATED ORAL at 15:44

## 2017-08-16 RX ADMIN — OXYCODONE HYDROCHLORIDE 5 MG: 5 TABLET ORAL at 07:22

## 2017-08-17 ENCOUNTER — APPOINTMENT (INPATIENT)
Dept: OCCUPATIONAL THERAPY | Facility: HOSPITAL | Age: 77
DRG: 690 | End: 2017-08-17
Payer: MEDICARE

## 2017-08-17 ENCOUNTER — APPOINTMENT (INPATIENT)
Dept: PHYSICAL THERAPY | Facility: HOSPITAL | Age: 77
DRG: 690 | End: 2017-08-17
Payer: MEDICARE

## 2017-08-17 LAB
ALBUMIN SERPL BCP-MCNC: 2.9 G/DL (ref 3.5–5)
ALP SERPL-CCNC: 80 U/L (ref 46–116)
ALT SERPL W P-5'-P-CCNC: 35 U/L (ref 12–78)
ANION GAP SERPL CALCULATED.3IONS-SCNC: 3 MMOL/L (ref 4–13)
AST SERPL W P-5'-P-CCNC: 18 U/L (ref 5–45)
BASOPHILS # BLD AUTO: 0.11 THOUSANDS/ΜL (ref 0–0.1)
BASOPHILS NFR BLD AUTO: 1 % (ref 0–1)
BILIRUB SERPL-MCNC: 0.2 MG/DL (ref 0.2–1)
BUN SERPL-MCNC: 19 MG/DL (ref 5–25)
CALCIUM SERPL-MCNC: 8.3 MG/DL (ref 8.3–10.1)
CHLORIDE SERPL-SCNC: 103 MMOL/L (ref 100–108)
CO2 SERPL-SCNC: 34 MMOL/L (ref 21–32)
CREAT SERPL-MCNC: 1 MG/DL (ref 0.6–1.3)
EOSINOPHIL # BLD AUTO: 0.8 THOUSAND/ΜL (ref 0–0.61)
EOSINOPHIL NFR BLD AUTO: 9 % (ref 0–6)
ERYTHROCYTE [DISTWIDTH] IN BLOOD BY AUTOMATED COUNT: 13.4 % (ref 11.6–15.1)
GFR SERPL CREATININE-BSD FRML MDRD: 72 ML/MIN/1.73SQ M
GLUCOSE SERPL-MCNC: 107 MG/DL (ref 65–140)
GLUCOSE SERPL-MCNC: 110 MG/DL (ref 65–140)
GLUCOSE SERPL-MCNC: 213 MG/DL (ref 65–140)
GLUCOSE SERPL-MCNC: 236 MG/DL (ref 65–140)
GLUCOSE SERPL-MCNC: 89 MG/DL (ref 65–140)
HCT VFR BLD AUTO: 40.6 % (ref 36.5–49.3)
HGB BLD-MCNC: 12.8 G/DL (ref 12–17)
INR PPP: 0.97 (ref 0.86–1.16)
LYMPHOCYTES # BLD AUTO: 1.72 THOUSANDS/ΜL (ref 0.6–4.47)
LYMPHOCYTES NFR BLD AUTO: 20 % (ref 14–44)
MAGNESIUM SERPL-MCNC: 2.2 MG/DL (ref 1.6–2.6)
MCH RBC QN AUTO: 28.8 PG (ref 26.8–34.3)
MCHC RBC AUTO-ENTMCNC: 31.5 G/DL (ref 31.4–37.4)
MCV RBC AUTO: 91 FL (ref 82–98)
MONOCYTES # BLD AUTO: 1.03 THOUSAND/ΜL (ref 0.17–1.22)
MONOCYTES NFR BLD AUTO: 12 % (ref 4–12)
NEUTROPHILS # BLD AUTO: 5 THOUSANDS/ΜL (ref 1.85–7.62)
NEUTS SEG NFR BLD AUTO: 58 % (ref 43–75)
PLATELET # BLD AUTO: 217 THOUSANDS/UL (ref 149–390)
PMV BLD AUTO: 11.1 FL (ref 8.9–12.7)
POTASSIUM SERPL-SCNC: 4.3 MMOL/L (ref 3.5–5.3)
PROT SERPL-MCNC: 6.6 G/DL (ref 6.4–8.2)
PROTHROMBIN TIME: 12.8 SECONDS (ref 12.1–14.4)
RBC # BLD AUTO: 4.45 MILLION/UL (ref 3.88–5.62)
SODIUM SERPL-SCNC: 140 MMOL/L (ref 136–145)
WBC # BLD AUTO: 8.66 THOUSAND/UL (ref 4.31–10.16)

## 2017-08-17 PROCEDURE — 82948 REAGENT STRIP/BLOOD GLUCOSE: CPT

## 2017-08-17 PROCEDURE — 80053 COMPREHEN METABOLIC PANEL: CPT | Performed by: INTERNAL MEDICINE

## 2017-08-17 PROCEDURE — 97116 GAIT TRAINING THERAPY: CPT

## 2017-08-17 PROCEDURE — 97110 THERAPEUTIC EXERCISES: CPT

## 2017-08-17 PROCEDURE — 97535 SELF CARE MNGMENT TRAINING: CPT

## 2017-08-17 PROCEDURE — 83735 ASSAY OF MAGNESIUM: CPT | Performed by: INTERNAL MEDICINE

## 2017-08-17 PROCEDURE — 97530 THERAPEUTIC ACTIVITIES: CPT

## 2017-08-17 PROCEDURE — 85610 PROTHROMBIN TIME: CPT | Performed by: INTERNAL MEDICINE

## 2017-08-17 PROCEDURE — 85025 COMPLETE CBC W/AUTO DIFF WBC: CPT | Performed by: INTERNAL MEDICINE

## 2017-08-17 RX ADMIN — DOCUSATE SODIUM 100 MG: 100 CAPSULE, LIQUID FILLED ORAL at 18:15

## 2017-08-17 RX ADMIN — SULFAMETHOXAZOLE AND TRIMETHOPRIM 1 TABLET: 800; 160 TABLET ORAL at 21:51

## 2017-08-17 RX ADMIN — INSULIN LISPRO 2 UNITS: 100 INJECTION, SOLUTION INTRAVENOUS; SUBCUTANEOUS at 21:50

## 2017-08-17 RX ADMIN — SULFAMETHOXAZOLE AND TRIMETHOPRIM 1 TABLET: 800; 160 TABLET ORAL at 08:12

## 2017-08-17 RX ADMIN — ACETAMINOPHEN 650 MG: 325 TABLET, FILM COATED ORAL at 08:12

## 2017-08-17 RX ADMIN — INSULIN GLARGINE 35 UNITS: 100 INJECTION, SOLUTION SUBCUTANEOUS at 08:12

## 2017-08-17 RX ADMIN — DOCUSATE SODIUM 100 MG: 100 CAPSULE, LIQUID FILLED ORAL at 08:12

## 2017-08-17 RX ADMIN — INSULIN LISPRO 4 UNITS: 100 INJECTION, SOLUTION INTRAVENOUS; SUBCUTANEOUS at 16:12

## 2017-08-17 RX ADMIN — CARVEDILOL 6.25 MG: 3.12 TABLET, FILM COATED ORAL at 08:02

## 2017-08-17 RX ADMIN — TAMSULOSIN HYDROCHLORIDE 0.4 MG: 0.4 CAPSULE ORAL at 16:00

## 2017-08-17 RX ADMIN — DESMOPRESSIN ACETATE 40 MG: 0.2 TABLET ORAL at 16:00

## 2017-08-17 RX ADMIN — CARVEDILOL 6.25 MG: 3.12 TABLET, FILM COATED ORAL at 16:00

## 2017-08-17 RX ADMIN — FINASTERIDE 5 MG: 5 TABLET, FILM COATED ORAL at 08:12

## 2017-08-17 RX ADMIN — INSULIN GLARGINE 35 UNITS: 100 INJECTION, SOLUTION SUBCUTANEOUS at 21:51

## 2017-08-17 RX ADMIN — ASPIRIN 81 MG 81 MG: 81 TABLET ORAL at 08:12

## 2017-08-17 RX ADMIN — FLUCONAZOLE 200 MG: 100 TABLET ORAL at 08:12

## 2017-08-17 RX ADMIN — ENOXAPARIN SODIUM 40 MG: 40 INJECTION SUBCUTANEOUS at 08:12

## 2017-08-17 RX ADMIN — AMLODIPINE BESYLATE 5 MG: 5 TABLET ORAL at 08:12

## 2017-08-18 ENCOUNTER — APPOINTMENT (INPATIENT)
Dept: PHYSICAL THERAPY | Facility: HOSPITAL | Age: 77
DRG: 690 | End: 2017-08-18
Payer: MEDICARE

## 2017-08-18 ENCOUNTER — APPOINTMENT (INPATIENT)
Dept: OCCUPATIONAL THERAPY | Facility: HOSPITAL | Age: 77
DRG: 690 | End: 2017-08-18
Payer: MEDICARE

## 2017-08-18 VITALS
WEIGHT: 256.84 LBS | SYSTOLIC BLOOD PRESSURE: 173 MMHG | DIASTOLIC BLOOD PRESSURE: 74 MMHG | HEART RATE: 76 BPM | TEMPERATURE: 98.3 F | HEIGHT: 71 IN | OXYGEN SATURATION: 93 % | RESPIRATION RATE: 18 BRPM | BODY MASS INDEX: 35.96 KG/M2

## 2017-08-18 LAB
ALBUMIN SERPL BCP-MCNC: 2.7 G/DL (ref 3.5–5)
ALP SERPL-CCNC: 84 U/L (ref 46–116)
ALT SERPL W P-5'-P-CCNC: 26 U/L (ref 12–78)
ANION GAP SERPL CALCULATED.3IONS-SCNC: 5 MMOL/L (ref 4–13)
AST SERPL W P-5'-P-CCNC: 16 U/L (ref 5–45)
BASOPHILS # BLD AUTO: 0.1 THOUSANDS/ΜL (ref 0–0.1)
BASOPHILS NFR BLD AUTO: 1 % (ref 0–1)
BILIRUB SERPL-MCNC: 0.2 MG/DL (ref 0.2–1)
BUN SERPL-MCNC: 25 MG/DL (ref 5–25)
CALCIUM SERPL-MCNC: 8.5 MG/DL (ref 8.3–10.1)
CHLORIDE SERPL-SCNC: 103 MMOL/L (ref 100–108)
CO2 SERPL-SCNC: 31 MMOL/L (ref 21–32)
CREAT SERPL-MCNC: 0.99 MG/DL (ref 0.6–1.3)
EOSINOPHIL # BLD AUTO: 0.69 THOUSAND/ΜL (ref 0–0.61)
EOSINOPHIL NFR BLD AUTO: 8 % (ref 0–6)
ERYTHROCYTE [DISTWIDTH] IN BLOOD BY AUTOMATED COUNT: 13.4 % (ref 11.6–15.1)
GFR SERPL CREATININE-BSD FRML MDRD: 73 ML/MIN/1.73SQ M
GLUCOSE SERPL-MCNC: 145 MG/DL (ref 65–140)
GLUCOSE SERPL-MCNC: 146 MG/DL (ref 65–140)
GLUCOSE SERPL-MCNC: 159 MG/DL (ref 65–140)
HCT VFR BLD AUTO: 40 % (ref 36.5–49.3)
HGB BLD-MCNC: 12.4 G/DL (ref 12–17)
LYMPHOCYTES # BLD AUTO: 1.8 THOUSANDS/ΜL (ref 0.6–4.47)
LYMPHOCYTES NFR BLD AUTO: 21 % (ref 14–44)
MAGNESIUM SERPL-MCNC: 2.4 MG/DL (ref 1.6–2.6)
MCH RBC QN AUTO: 28.4 PG (ref 26.8–34.3)
MCHC RBC AUTO-ENTMCNC: 31 G/DL (ref 31.4–37.4)
MCV RBC AUTO: 92 FL (ref 82–98)
MONOCYTES # BLD AUTO: 1.09 THOUSAND/ΜL (ref 0.17–1.22)
MONOCYTES NFR BLD AUTO: 13 % (ref 4–12)
NEUTROPHILS # BLD AUTO: 4.87 THOUSANDS/ΜL (ref 1.85–7.62)
NEUTS SEG NFR BLD AUTO: 57 % (ref 43–75)
PLATELET # BLD AUTO: 208 THOUSANDS/UL (ref 149–390)
PMV BLD AUTO: 11.5 FL (ref 8.9–12.7)
POTASSIUM SERPL-SCNC: 4.7 MMOL/L (ref 3.5–5.3)
PROT SERPL-MCNC: 6.7 G/DL (ref 6.4–8.2)
RBC # BLD AUTO: 4.36 MILLION/UL (ref 3.88–5.62)
SODIUM SERPL-SCNC: 139 MMOL/L (ref 136–145)
WBC # BLD AUTO: 8.55 THOUSAND/UL (ref 4.31–10.16)

## 2017-08-18 PROCEDURE — 82948 REAGENT STRIP/BLOOD GLUCOSE: CPT

## 2017-08-18 PROCEDURE — 97110 THERAPEUTIC EXERCISES: CPT

## 2017-08-18 PROCEDURE — 80053 COMPREHEN METABOLIC PANEL: CPT | Performed by: INTERNAL MEDICINE

## 2017-08-18 PROCEDURE — 97116 GAIT TRAINING THERAPY: CPT

## 2017-08-18 PROCEDURE — 85025 COMPLETE CBC W/AUTO DIFF WBC: CPT | Performed by: INTERNAL MEDICINE

## 2017-08-18 PROCEDURE — 83735 ASSAY OF MAGNESIUM: CPT | Performed by: INTERNAL MEDICINE

## 2017-08-18 RX ORDER — CARVEDILOL 6.25 MG/1
6.25 TABLET ORAL 2 TIMES DAILY WITH MEALS
Qty: 60 TABLET | Refills: 0 | Status: SHIPPED | OUTPATIENT
Start: 2017-08-18 | End: 2018-06-06

## 2017-08-18 RX ORDER — POLYETHYLENE GLYCOL 3350 17 G/17G
17 POWDER, FOR SOLUTION ORAL DAILY PRN
Qty: 14 EACH | Refills: 0
Start: 2017-08-18 | End: 2018-01-05 | Stop reason: ALTCHOICE

## 2017-08-18 RX ORDER — ASPIRIN 81 MG/1
81 TABLET, CHEWABLE ORAL DAILY
Refills: 0
Start: 2017-08-18 | End: 2018-01-05 | Stop reason: DRUGHIGH

## 2017-08-18 RX ORDER — SULFAMETHOXAZOLE AND TRIMETHOPRIM 800; 160 MG/1; MG/1
1 TABLET ORAL EVERY 12 HOURS SCHEDULED
Qty: 6 TABLET | Refills: 0 | Status: SHIPPED | OUTPATIENT
Start: 2017-08-18 | End: 2017-08-21

## 2017-08-18 RX ORDER — FINASTERIDE 5 MG/1
5 TABLET, FILM COATED ORAL DAILY
Refills: 0
Start: 2017-08-18 | End: 2018-02-13 | Stop reason: SDUPTHER

## 2017-08-18 RX ORDER — ACETAMINOPHEN 500 MG
TABLET ORAL
Start: 2017-08-18 | End: 2018-06-06

## 2017-08-18 RX ORDER — INSULIN GLARGINE 100 [IU]/ML
35 INJECTION, SOLUTION SUBCUTANEOUS EVERY 12 HOURS SCHEDULED
Qty: 10 ML | Refills: 0 | Status: SHIPPED | OUTPATIENT
Start: 2017-08-18 | End: 2017-09-28 | Stop reason: HOSPADM

## 2017-08-18 RX ORDER — AMLODIPINE BESYLATE 5 MG/1
5 TABLET ORAL DAILY
Refills: 0
Start: 2017-08-18 | End: 2018-06-06

## 2017-08-18 RX ADMIN — ENOXAPARIN SODIUM 40 MG: 40 INJECTION SUBCUTANEOUS at 08:58

## 2017-08-18 RX ADMIN — INSULIN GLARGINE 35 UNITS: 100 INJECTION, SOLUTION SUBCUTANEOUS at 08:56

## 2017-08-18 RX ADMIN — DOCUSATE SODIUM 100 MG: 100 CAPSULE, LIQUID FILLED ORAL at 09:01

## 2017-08-18 RX ADMIN — FINASTERIDE 5 MG: 5 TABLET, FILM COATED ORAL at 09:01

## 2017-08-18 RX ADMIN — FLUCONAZOLE 200 MG: 100 TABLET ORAL at 09:01

## 2017-08-18 RX ADMIN — AMLODIPINE BESYLATE 5 MG: 5 TABLET ORAL at 09:01

## 2017-08-18 RX ADMIN — ERGOCALCIFEROL 50000 UNITS: 1.25 CAPSULE ORAL at 09:09

## 2017-08-18 RX ADMIN — ASPIRIN 81 MG 81 MG: 81 TABLET ORAL at 09:01

## 2017-08-18 RX ADMIN — CARVEDILOL 6.25 MG: 3.12 TABLET, FILM COATED ORAL at 09:01

## 2017-08-18 RX ADMIN — ACETAMINOPHEN 650 MG: 325 TABLET, FILM COATED ORAL at 05:55

## 2017-08-18 RX ADMIN — SULFAMETHOXAZOLE AND TRIMETHOPRIM 1 TABLET: 800; 160 TABLET ORAL at 09:01

## 2017-08-20 LAB
BACTERIA BLD CULT: NORMAL
BACTERIA BLD CULT: NORMAL

## 2017-09-12 ENCOUNTER — ALLSCRIPTS OFFICE VISIT (OUTPATIENT)
Dept: OTHER | Facility: OTHER | Age: 77
End: 2017-09-12

## 2017-09-12 LAB
BACTERIA BLD CULT: NORMAL
GRAM STN SPEC: NORMAL

## 2017-09-21 ENCOUNTER — APPOINTMENT (EMERGENCY)
Dept: CT IMAGING | Facility: HOSPITAL | Age: 77
End: 2017-09-21
Payer: OTHER GOVERNMENT

## 2017-09-21 ENCOUNTER — HOSPITAL ENCOUNTER (OUTPATIENT)
Facility: HOSPITAL | Age: 77
Setting detail: OBSERVATION
End: 2017-09-22
Attending: FAMILY MEDICINE | Admitting: FAMILY MEDICINE
Payer: OTHER GOVERNMENT

## 2017-09-21 DIAGNOSIS — R33.9 URINARY RETENTION: ICD-10-CM

## 2017-09-21 DIAGNOSIS — D72.829 LEUKOCYTOSIS: Primary | ICD-10-CM

## 2017-09-21 LAB
ALBUMIN SERPL BCP-MCNC: 2.7 G/DL (ref 3.5–5)
ALP SERPL-CCNC: 93 U/L (ref 46–116)
ALT SERPL W P-5'-P-CCNC: 29 U/L (ref 12–78)
ANION GAP SERPL CALCULATED.3IONS-SCNC: 4 MMOL/L (ref 4–13)
ANISOCYTOSIS BLD QL SMEAR: PRESENT
AST SERPL W P-5'-P-CCNC: 27 U/L (ref 5–45)
BASOPHILS # BLD MANUAL: 0 THOUSAND/UL (ref 0–0.1)
BASOPHILS NFR MAR MANUAL: 0 % (ref 0–1)
BILIRUB SERPL-MCNC: 0.6 MG/DL (ref 0.2–1)
BILIRUB UR QL STRIP: NEGATIVE
BUN SERPL-MCNC: 30 MG/DL (ref 5–25)
CALCIUM SERPL-MCNC: 7.5 MG/DL (ref 8.3–10.1)
CHLORIDE SERPL-SCNC: 100 MMOL/L (ref 100–108)
CLARITY UR: CLEAR
CO2 SERPL-SCNC: 35 MMOL/L (ref 21–32)
COLOR UR: ABNORMAL
CREAT SERPL-MCNC: 0.92 MG/DL (ref 0.6–1.3)
EOSINOPHIL # BLD MANUAL: 0.61 THOUSAND/UL (ref 0–0.4)
EOSINOPHIL NFR BLD MANUAL: 3 % (ref 0–6)
ERYTHROCYTE [DISTWIDTH] IN BLOOD BY AUTOMATED COUNT: 13.7 % (ref 11.6–15.1)
GFR SERPL CREATININE-BSD FRML MDRD: 80 ML/MIN/1.73SQ M
GLUCOSE SERPL-MCNC: 286 MG/DL (ref 65–140)
GLUCOSE UR STRIP-MCNC: ABNORMAL MG/DL
HCT VFR BLD AUTO: 41.5 % (ref 36.5–49.3)
HGB BLD-MCNC: 13.3 G/DL (ref 12–17)
HGB UR QL STRIP.AUTO: NEGATIVE
KETONES UR STRIP-MCNC: NEGATIVE MG/DL
LACTATE SERPL-SCNC: 2.2 MMOL/L (ref 0.5–2)
LEUKOCYTE ESTERASE UR QL STRIP: NEGATIVE
LYMPHOCYTES # BLD AUTO: 1.41 THOUSAND/UL (ref 0.6–4.47)
LYMPHOCYTES # BLD AUTO: 7 % (ref 14–44)
MCH RBC QN AUTO: 28.5 PG (ref 26.8–34.3)
MCHC RBC AUTO-ENTMCNC: 32 G/DL (ref 31.4–37.4)
MCV RBC AUTO: 89 FL (ref 82–98)
MONOCYTES # BLD AUTO: 2.62 THOUSAND/UL (ref 0–1.22)
MONOCYTES NFR BLD: 13 % (ref 4–12)
NEUTROPHILS # BLD MANUAL: 15.53 THOUSAND/UL (ref 1.85–7.62)
NEUTS SEG NFR BLD AUTO: 77 % (ref 43–75)
NITRITE UR QL STRIP: NEGATIVE
PH UR STRIP.AUTO: 7 [PH] (ref 4.5–8)
PLATELET # BLD AUTO: 205 THOUSANDS/UL (ref 149–390)
PLATELET BLD QL SMEAR: ADEQUATE
PMV BLD AUTO: 11.6 FL (ref 8.9–12.7)
POTASSIUM SERPL-SCNC: 4.5 MMOL/L (ref 3.5–5.3)
PROT SERPL-MCNC: 6.2 G/DL (ref 6.4–8.2)
PROT UR STRIP-MCNC: NEGATIVE MG/DL
RBC # BLD AUTO: 4.67 MILLION/UL (ref 3.88–5.62)
SODIUM SERPL-SCNC: 139 MMOL/L (ref 136–145)
SP GR UR STRIP.AUTO: 1.01 (ref 1–1.03)
TOTAL CELLS COUNTED SPEC: 100
UROBILINOGEN UR QL STRIP.AUTO: 0.2 E.U./DL
WBC # BLD AUTO: 20.17 THOUSAND/UL (ref 4.31–10.16)

## 2017-09-21 PROCEDURE — 74177 CT ABD & PELVIS W/CONTRAST: CPT

## 2017-09-21 PROCEDURE — 83605 ASSAY OF LACTIC ACID: CPT | Performed by: PHYSICIAN ASSISTANT

## 2017-09-21 PROCEDURE — 36415 COLL VENOUS BLD VENIPUNCTURE: CPT | Performed by: PHYSICIAN ASSISTANT

## 2017-09-21 PROCEDURE — 93005 ELECTROCARDIOGRAM TRACING: CPT | Performed by: PHYSICIAN ASSISTANT

## 2017-09-21 PROCEDURE — 81003 URINALYSIS AUTO W/O SCOPE: CPT | Performed by: PHYSICIAN ASSISTANT

## 2017-09-21 PROCEDURE — 85027 COMPLETE CBC AUTOMATED: CPT | Performed by: PHYSICIAN ASSISTANT

## 2017-09-21 PROCEDURE — 85007 BL SMEAR W/DIFF WBC COUNT: CPT | Performed by: PHYSICIAN ASSISTANT

## 2017-09-21 PROCEDURE — 80053 COMPREHEN METABOLIC PANEL: CPT | Performed by: PHYSICIAN ASSISTANT

## 2017-09-21 RX ADMIN — IOHEXOL 100 ML: 350 INJECTION, SOLUTION INTRAVENOUS at 18:34

## 2017-09-21 RX ADMIN — SODIUM CHLORIDE 1000 ML: 0.9 INJECTION, SOLUTION INTRAVENOUS at 21:30

## 2017-09-22 ENCOUNTER — HOSPITAL ENCOUNTER (INPATIENT)
Facility: HOSPITAL | Age: 77
LOS: 6 days | Discharge: NURSING, REHAB OR LONG-TERM CARE | DRG: 726 | End: 2017-09-28
Attending: INTERNAL MEDICINE | Admitting: INTERNAL MEDICINE
Payer: MEDICARE

## 2017-09-22 VITALS
HEIGHT: 71 IN | TEMPERATURE: 98.4 F | SYSTOLIC BLOOD PRESSURE: 160 MMHG | OXYGEN SATURATION: 95 % | HEART RATE: 60 BPM | RESPIRATION RATE: 19 BRPM | DIASTOLIC BLOOD PRESSURE: 75 MMHG | BODY MASS INDEX: 37.9 KG/M2 | WEIGHT: 270.73 LBS

## 2017-09-22 DIAGNOSIS — T83.511A URINARY TRACT INFECTION ASSOCIATED WITH INDWELLING URETHRAL CATHETER, INITIAL ENCOUNTER (HCC): Primary | ICD-10-CM

## 2017-09-22 DIAGNOSIS — N39.0 URINARY TRACT INFECTION ASSOCIATED WITH INDWELLING URETHRAL CATHETER, INITIAL ENCOUNTER (HCC): Primary | ICD-10-CM

## 2017-09-22 DIAGNOSIS — R33.9 URINARY RETENTION: ICD-10-CM

## 2017-09-22 PROBLEM — R33.8 ACUTE URINARY RETENTION: Status: ACTIVE | Noted: 2017-06-28

## 2017-09-22 PROBLEM — R79.89 ELEVATED LACTIC ACID LEVEL: Status: RESOLVED | Noted: 2017-08-08 | Resolved: 2017-09-22

## 2017-09-22 PROBLEM — R26.2 AMBULATORY DYSFUNCTION: Status: ACTIVE | Noted: 2017-09-22

## 2017-09-22 PROBLEM — N40.1 URINARY RETENTION DUE TO BENIGN PROSTATIC HYPERPLASIA: Status: ACTIVE | Noted: 2017-06-28

## 2017-09-22 PROBLEM — R78.81 GRAM-NEGATIVE BACTEREMIA: Status: RESOLVED | Noted: 2017-08-14 | Resolved: 2017-09-22

## 2017-09-22 LAB
ANION GAP SERPL CALCULATED.3IONS-SCNC: 3 MMOL/L (ref 4–13)
ATRIAL RATE: 76 BPM
BUN SERPL-MCNC: 29 MG/DL (ref 5–25)
CALCIUM SERPL-MCNC: 7.6 MG/DL (ref 8.3–10.1)
CHLORIDE SERPL-SCNC: 104 MMOL/L (ref 100–108)
CO2 SERPL-SCNC: 34 MMOL/L (ref 21–32)
CREAT SERPL-MCNC: 0.74 MG/DL (ref 0.6–1.3)
ERYTHROCYTE [DISTWIDTH] IN BLOOD BY AUTOMATED COUNT: 13.7 % (ref 11.6–15.1)
GFR SERPL CREATININE-BSD FRML MDRD: 89 ML/MIN/1.73SQ M
GLUCOSE SERPL-MCNC: 223 MG/DL (ref 65–140)
GLUCOSE SERPL-MCNC: 241 MG/DL (ref 65–140)
GLUCOSE SERPL-MCNC: 251 MG/DL (ref 65–140)
GLUCOSE SERPL-MCNC: 253 MG/DL (ref 65–140)
GLUCOSE SERPL-MCNC: 283 MG/DL (ref 65–140)
HCT VFR BLD AUTO: 39.7 % (ref 36.5–49.3)
HGB BLD-MCNC: 12.8 G/DL (ref 12–17)
LACTATE SERPL-SCNC: 1.4 MMOL/L (ref 0.5–2)
MAGNESIUM SERPL-MCNC: 2.2 MG/DL (ref 1.6–2.6)
MCH RBC QN AUTO: 28.8 PG (ref 26.8–34.3)
MCHC RBC AUTO-ENTMCNC: 32.2 G/DL (ref 31.4–37.4)
MCV RBC AUTO: 89 FL (ref 82–98)
P AXIS: 71 DEGREES
PHOSPHATE SERPL-MCNC: 3.2 MG/DL (ref 2.3–4.1)
PLATELET # BLD AUTO: 169 THOUSANDS/UL (ref 149–390)
PMV BLD AUTO: 11.8 FL (ref 8.9–12.7)
POTASSIUM SERPL-SCNC: 3.8 MMOL/L (ref 3.5–5.3)
PR INTERVAL: 164 MS
QRS AXIS: -70 DEGREES
QRSD INTERVAL: 124 MS
QT INTERVAL: 444 MS
QTC INTERVAL: 499 MS
RBC # BLD AUTO: 4.44 MILLION/UL (ref 3.88–5.62)
SODIUM SERPL-SCNC: 141 MMOL/L (ref 136–145)
T WAVE AXIS: 47 DEGREES
VENTRICULAR RATE: 76 BPM
WBC # BLD AUTO: 15.22 THOUSAND/UL (ref 4.31–10.16)

## 2017-09-22 PROCEDURE — 80048 BASIC METABOLIC PNL TOTAL CA: CPT | Performed by: FAMILY MEDICINE

## 2017-09-22 PROCEDURE — 97162 PT EVAL MOD COMPLEX 30 MIN: CPT

## 2017-09-22 PROCEDURE — 82948 REAGENT STRIP/BLOOD GLUCOSE: CPT

## 2017-09-22 PROCEDURE — G8988 SELF CARE GOAL STATUS: HCPCS

## 2017-09-22 PROCEDURE — 99285 EMERGENCY DEPT VISIT HI MDM: CPT

## 2017-09-22 PROCEDURE — G8987 SELF CARE CURRENT STATUS: HCPCS

## 2017-09-22 PROCEDURE — 85027 COMPLETE CBC AUTOMATED: CPT | Performed by: FAMILY MEDICINE

## 2017-09-22 PROCEDURE — 87086 URINE CULTURE/COLONY COUNT: CPT | Performed by: INTERNAL MEDICINE

## 2017-09-22 PROCEDURE — 97110 THERAPEUTIC EXERCISES: CPT

## 2017-09-22 PROCEDURE — G8978 MOBILITY CURRENT STATUS: HCPCS

## 2017-09-22 PROCEDURE — 97167 OT EVAL HIGH COMPLEX 60 MIN: CPT

## 2017-09-22 PROCEDURE — 84100 ASSAY OF PHOSPHORUS: CPT | Performed by: FAMILY MEDICINE

## 2017-09-22 PROCEDURE — 83605 ASSAY OF LACTIC ACID: CPT | Performed by: FAMILY MEDICINE

## 2017-09-22 PROCEDURE — G8979 MOBILITY GOAL STATUS: HCPCS

## 2017-09-22 PROCEDURE — 83735 ASSAY OF MAGNESIUM: CPT | Performed by: FAMILY MEDICINE

## 2017-09-22 RX ORDER — ATORVASTATIN CALCIUM 40 MG/1
40 TABLET, FILM COATED ORAL
Status: DISCONTINUED | OUTPATIENT
Start: 2017-09-22 | End: 2017-09-22 | Stop reason: HOSPADM

## 2017-09-22 RX ORDER — FAMOTIDINE 20 MG/1
20 TABLET, FILM COATED ORAL DAILY
Status: CANCELLED | OUTPATIENT
Start: 2017-09-23

## 2017-09-22 RX ORDER — TAMSULOSIN HYDROCHLORIDE 0.4 MG/1
0.4 CAPSULE ORAL
Status: DISCONTINUED | OUTPATIENT
Start: 2017-09-22 | End: 2017-09-28 | Stop reason: HOSPADM

## 2017-09-22 RX ORDER — INSULIN GLARGINE 100 [IU]/ML
20 INJECTION, SOLUTION SUBCUTANEOUS EVERY 12 HOURS SCHEDULED
Status: DISCONTINUED | OUTPATIENT
Start: 2017-09-22 | End: 2017-09-22 | Stop reason: HOSPADM

## 2017-09-22 RX ORDER — ATORVASTATIN CALCIUM 40 MG/1
40 TABLET, FILM COATED ORAL
Status: CANCELLED | OUTPATIENT
Start: 2017-09-22

## 2017-09-22 RX ORDER — POLYETHYLENE GLYCOL 3350 17 G/17G
17 POWDER, FOR SOLUTION ORAL DAILY PRN
Status: DISCONTINUED | OUTPATIENT
Start: 2017-09-22 | End: 2017-09-28 | Stop reason: HOSPADM

## 2017-09-22 RX ORDER — AMLODIPINE BESYLATE 5 MG/1
5 TABLET ORAL DAILY
Status: DISCONTINUED | OUTPATIENT
Start: 2017-09-22 | End: 2017-09-22 | Stop reason: HOSPADM

## 2017-09-22 RX ORDER — INSULIN GLARGINE 100 [IU]/ML
20 INJECTION, SOLUTION SUBCUTANEOUS EVERY 12 HOURS SCHEDULED
Status: DISCONTINUED | OUTPATIENT
Start: 2017-09-22 | End: 2017-09-22

## 2017-09-22 RX ORDER — SODIUM CHLORIDE 450 MG/100ML
50 INJECTION, SOLUTION INTRAVENOUS CONTINUOUS
Status: DISCONTINUED | OUTPATIENT
Start: 2017-09-22 | End: 2017-09-23

## 2017-09-22 RX ORDER — FINASTERIDE 5 MG/1
5 TABLET, FILM COATED ORAL DAILY
Status: CANCELLED | OUTPATIENT
Start: 2017-09-23

## 2017-09-22 RX ORDER — TAMSULOSIN HYDROCHLORIDE 0.4 MG/1
0.4 CAPSULE ORAL
Status: CANCELLED | OUTPATIENT
Start: 2017-09-22

## 2017-09-22 RX ORDER — CARVEDILOL 3.12 MG/1
6.25 TABLET ORAL 2 TIMES DAILY WITH MEALS
Status: DISCONTINUED | OUTPATIENT
Start: 2017-09-22 | End: 2017-09-22 | Stop reason: HOSPADM

## 2017-09-22 RX ORDER — FINASTERIDE 5 MG/1
5 TABLET, FILM COATED ORAL DAILY
Status: DISCONTINUED | OUTPATIENT
Start: 2017-09-22 | End: 2017-09-22 | Stop reason: HOSPADM

## 2017-09-22 RX ORDER — TAMSULOSIN HYDROCHLORIDE 0.4 MG/1
0.4 CAPSULE ORAL
Status: DISCONTINUED | OUTPATIENT
Start: 2017-09-22 | End: 2017-09-22 | Stop reason: HOSPADM

## 2017-09-22 RX ORDER — FINASTERIDE 5 MG/1
5 TABLET, FILM COATED ORAL DAILY
Status: DISCONTINUED | OUTPATIENT
Start: 2017-09-23 | End: 2017-09-28 | Stop reason: HOSPADM

## 2017-09-22 RX ORDER — HYDROCODONE BITARTRATE AND ACETAMINOPHEN 5; 325 MG/1; MG/1
2 TABLET ORAL EVERY 6 HOURS PRN
Qty: 40 TABLET | Refills: 0 | Status: SHIPPED | OUTPATIENT
Start: 2017-09-22 | End: 2017-09-22 | Stop reason: HOSPADM

## 2017-09-22 RX ORDER — FAMOTIDINE 20 MG/1
20 TABLET, FILM COATED ORAL DAILY
Status: DISCONTINUED | OUTPATIENT
Start: 2017-09-23 | End: 2017-09-28 | Stop reason: HOSPADM

## 2017-09-22 RX ORDER — CARVEDILOL 3.12 MG/1
6.25 TABLET ORAL 2 TIMES DAILY WITH MEALS
Status: CANCELLED | OUTPATIENT
Start: 2017-09-22

## 2017-09-22 RX ORDER — FAMOTIDINE 20 MG/1
20 TABLET, FILM COATED ORAL DAILY
Status: DISCONTINUED | OUTPATIENT
Start: 2017-09-22 | End: 2017-09-22 | Stop reason: HOSPADM

## 2017-09-22 RX ORDER — ATORVASTATIN CALCIUM 40 MG/1
40 TABLET, FILM COATED ORAL
Status: DISCONTINUED | OUTPATIENT
Start: 2017-09-22 | End: 2017-09-28 | Stop reason: HOSPADM

## 2017-09-22 RX ORDER — AMLODIPINE BESYLATE 5 MG/1
5 TABLET ORAL DAILY
Status: CANCELLED | OUTPATIENT
Start: 2017-09-23

## 2017-09-22 RX ORDER — POLYETHYLENE GLYCOL 3350 17 G/17G
17 POWDER, FOR SOLUTION ORAL DAILY PRN
Status: CANCELLED | OUTPATIENT
Start: 2017-09-22

## 2017-09-22 RX ORDER — POLYETHYLENE GLYCOL 3350 17 G/17G
17 POWDER, FOR SOLUTION ORAL DAILY PRN
Status: DISCONTINUED | OUTPATIENT
Start: 2017-09-22 | End: 2017-09-22 | Stop reason: HOSPADM

## 2017-09-22 RX ORDER — AMLODIPINE BESYLATE 5 MG/1
5 TABLET ORAL DAILY
Status: DISCONTINUED | OUTPATIENT
Start: 2017-09-23 | End: 2017-09-28 | Stop reason: HOSPADM

## 2017-09-22 RX ORDER — ASPIRIN 81 MG/1
81 TABLET, CHEWABLE ORAL DAILY
Status: DISCONTINUED | OUTPATIENT
Start: 2017-09-22 | End: 2017-09-22 | Stop reason: HOSPADM

## 2017-09-22 RX ORDER — HYDROCODONE BITARTRATE AND ACETAMINOPHEN 5; 325 MG/1; MG/1
2 TABLET ORAL EVERY 6 HOURS PRN
Status: CANCELLED | OUTPATIENT
Start: 2017-09-22

## 2017-09-22 RX ORDER — ASPIRIN 81 MG/1
81 TABLET, CHEWABLE ORAL DAILY
Status: DISCONTINUED | OUTPATIENT
Start: 2017-09-23 | End: 2017-09-28 | Stop reason: HOSPADM

## 2017-09-22 RX ORDER — INSULIN GLARGINE 100 [IU]/ML
10 INJECTION, SOLUTION SUBCUTANEOUS EVERY 12 HOURS SCHEDULED
Status: DISCONTINUED | OUTPATIENT
Start: 2017-09-22 | End: 2017-09-25

## 2017-09-22 RX ORDER — HYDROCODONE BITARTRATE AND ACETAMINOPHEN 5; 325 MG/1; MG/1
2 TABLET ORAL EVERY 6 HOURS PRN
Status: DISCONTINUED | OUTPATIENT
Start: 2017-09-22 | End: 2017-09-28 | Stop reason: HOSPADM

## 2017-09-22 RX ORDER — ASPIRIN 81 MG/1
81 TABLET, CHEWABLE ORAL DAILY
Status: CANCELLED | OUTPATIENT
Start: 2017-09-23

## 2017-09-22 RX ORDER — SODIUM CHLORIDE 9 MG/ML
100 INJECTION, SOLUTION INTRAVENOUS ONCE
Status: COMPLETED | OUTPATIENT
Start: 2017-09-22 | End: 2017-09-22

## 2017-09-22 RX ORDER — CIPROFLOXACIN 2 MG/ML
400 INJECTION, SOLUTION INTRAVENOUS EVERY 12 HOURS
Status: DISCONTINUED | OUTPATIENT
Start: 2017-09-22 | End: 2017-09-22

## 2017-09-22 RX ORDER — INSULIN GLARGINE 100 [IU]/ML
20 INJECTION, SOLUTION SUBCUTANEOUS EVERY 12 HOURS SCHEDULED
Status: CANCELLED | OUTPATIENT
Start: 2017-09-22

## 2017-09-22 RX ORDER — ACETAMINOPHEN 325 MG/1
650 TABLET ORAL EVERY 6 HOURS PRN
Status: DISCONTINUED | OUTPATIENT
Start: 2017-09-22 | End: 2017-09-28 | Stop reason: HOSPADM

## 2017-09-22 RX ORDER — HYDROCODONE BITARTRATE AND ACETAMINOPHEN 5; 325 MG/1; MG/1
2 TABLET ORAL EVERY 6 HOURS PRN
Status: DISCONTINUED | OUTPATIENT
Start: 2017-09-22 | End: 2017-09-22 | Stop reason: HOSPADM

## 2017-09-22 RX ORDER — CARVEDILOL 6.25 MG/1
6.25 TABLET ORAL 2 TIMES DAILY WITH MEALS
Status: DISCONTINUED | OUTPATIENT
Start: 2017-09-22 | End: 2017-09-28 | Stop reason: HOSPADM

## 2017-09-22 RX ADMIN — TAMSULOSIN HYDROCHLORIDE 0.4 MG: 0.4 CAPSULE ORAL at 17:56

## 2017-09-22 RX ADMIN — SODIUM CHLORIDE 50 ML/HR: 0.45 INJECTION, SOLUTION INTRAVENOUS at 17:57

## 2017-09-22 RX ADMIN — CEFTRIAXONE SODIUM 1000 MG: 10 INJECTION, POWDER, FOR SOLUTION INTRAVENOUS at 20:16

## 2017-09-22 RX ADMIN — INSULIN LISPRO 2 UNITS: 100 INJECTION, SOLUTION INTRAVENOUS; SUBCUTANEOUS at 10:20

## 2017-09-22 RX ADMIN — ATORVASTATIN CALCIUM 40 MG: 40 TABLET, FILM COATED ORAL at 17:56

## 2017-09-22 RX ADMIN — AZTREONAM 1000 MG: 1 INJECTION, POWDER, LYOPHILIZED, FOR SOLUTION INTRAMUSCULAR; INTRAVENOUS at 18:13

## 2017-09-22 RX ADMIN — INSULIN GLARGINE 20 UNITS: 100 INJECTION, SOLUTION SUBCUTANEOUS at 10:17

## 2017-09-22 RX ADMIN — FAMOTIDINE 20 MG: 20 TABLET ORAL at 10:18

## 2017-09-22 RX ADMIN — INSULIN GLARGINE 20 UNITS: 100 INJECTION, SOLUTION SUBCUTANEOUS at 02:29

## 2017-09-22 RX ADMIN — ENOXAPARIN SODIUM 40 MG: 40 INJECTION SUBCUTANEOUS at 10:19

## 2017-09-22 RX ADMIN — SODIUM CHLORIDE 100 ML/HR: 0.9 INJECTION, SOLUTION INTRAVENOUS at 02:29

## 2017-09-22 RX ADMIN — INSULIN LISPRO 4 UNITS: 100 INJECTION, SOLUTION INTRAVENOUS; SUBCUTANEOUS at 11:49

## 2017-09-22 RX ADMIN — CARVEDILOL 6.25 MG: 3.12 TABLET, FILM COATED ORAL at 10:17

## 2017-09-22 RX ADMIN — FINASTERIDE 5 MG: 5 TABLET, FILM COATED ORAL at 10:19

## 2017-09-22 RX ADMIN — AMLODIPINE BESYLATE 5 MG: 5 TABLET ORAL at 10:19

## 2017-09-22 RX ADMIN — CARVEDILOL 6.25 MG: 6.25 TABLET, FILM COATED ORAL at 17:56

## 2017-09-22 RX ADMIN — INSULIN LISPRO 3 UNITS: 100 INJECTION, SOLUTION INTRAVENOUS; SUBCUTANEOUS at 18:02

## 2017-09-22 RX ADMIN — ASPIRIN 81 MG 81 MG: 81 TABLET ORAL at 10:18

## 2017-09-22 NOTE — H&P
History and Physical - [facility] Internal Medicine    Patient Information: Fe Calero 68 y o  male MRN: [id number]  Unit/Bed#: [facility] 834-01 Encounter: [id number]  Admitting Physician: Yuliya Tripathi MD  PCP: Harriet Soto MD  Date of Admission:  09/22/17    Assessment/Plan:    Hospital Problem List:     Principal Problem:    Urinary retention due to benign prostatic hyperplasia  Active Problems:    Spinal stenosis of lumbar region    Essential hypertension    Hyperlipidemia    Type 2 diabetes mellitus with hyperglycemia    COPD (chronic obstructive pulmonary disease)    Kidney disease, chronic, stage III (GFR 30-59 ml/min)    Ambulatory dysfunction    Morbid obesity    #1 UTI recurrent/urine retention  Consultation was urology will be obtained  Patient does have a history of ESBL infections  Continue aztreonam for now  He is afebrile hemodynamically stable and asymptomatic  Continue Ozuna catheter    #2History of HTN:       We will continue patient home medications  Although initially his blood pressure was higher in emergency department, it later stabilized  Well also initiate IV hydralazine and IV metoprolol for SBP greater than 1 60 mmHg  We will advise patient to follow-up with next PCP in regards to further better management of ongoing HTN  #3DM Type II:    Begin no concentrated carbohydrate diet  Cover with Low dose Aspart SSI as needed   Will order HgbA1C to assess status of recent glycemic control  Well initiate home medications once Medication Reconciliation is completed and confirmed by pharmacy  #4Hyperlipidemia:    Currently on statin therapy for elevated lipids  Previously not at goal of LDL < 100 as indicated in PMHx  Will order fasting lipid panel to assess status of hyperlipidemia  Will restart atorvastatin 40 mg po QHS  Patient was counseled in regards to Appropriate nutritional and lifestyle modifications      #5 ambulatory dysfunction  PT/OT eval and treatment    #6 history of COPD  Continue all current management  No acute exacerbation    #7 chronic kidney disease  Creatinine at baseline             Plan for the Primary Problem(s):    VTE Prophylaxis: Heparin  / sequential compression device   Code Status: full code  POLST: There is no POLST form on file for this patient (pre-hospital)    Anticipated Length of Stay:  Patient will be admitted on an Inpatient basis with an anticipated length of stay of  Greater  2 midnights  Justification for Hospital Stay:      Total Time for Visit, including Counseling / Coordination of Care: 30 minutes  Greater than 50% of this total time spent on direct patient counseling and coordination of care  Chief Complaint:        History of Present Illness: This is a very pleasant 42-year-old male with extensive past medical history that includes BPH , history of PE, history of urinary retention,systolic and diastolic chronic congestive heart failure, peripheral vascular disease, essential hypertension, diabetes mellitus, COPD, multiple recent urinary tract infections, multiple renal cysts who initially was admitted with multiple bouts of urinary retention not amenable to medical management; as well as multiple urinary catheterizations and failed trials  Patient presented to the emergency room at [facility] with refractory urinary retention  Patient has required recent admissions for ESBL producing UTI  Patient has had difficulty adhering to follow-up  Urologic consultation had requested patient to be transferred to [place] for suprapubic catheter placement  Patient has remained metabolically stable throughout his hospital course  Currently has no complaints including no fever no chills no dysuria no polyuria or any other complaints        Review of Systems:    Review of Systems  As per HPI  Past Medical and Surgical History:     Past Medical History:   Diagnosis Date    CHF (congestive heart failure)     COPD (chronic obstructive pulmonary disease)     Diabetes mellitus     ESBL (extended spectrum beta-lactamase) producing bacteria infection     History of BPH     Hyperlipidemia     Hypertension     PE (pulmonary thromboembolism)     PVD (peripheral vascular disease)     Spinal stenosis of lumbar region     Stroke     Systolic and diastolic CHF, chronic     Urinary retention     UTI due to extended-spectrum beta lactamase (ESBL) producing Escherichia coli        Past Surgical History:   Procedure Laterality Date    CARDIAC SURGERY      cabg    HIP SURGERY Right     plate and pin       Meds/Allergies:    Prior to Admission medications    Medication Sig Start Date End Date Taking?  Authorizing Provider   acetaminophen (TYLENOL) 500 mg tablet One tablet every 6 hours as needed for moderate pain 8/18/17 Catarino Shears, DO   amLODIPine (NORVASC) 5 mg tablet Take 1 tablet by mouth daily 8/18/17 Catarino Shears, DO   aspirin 81 mg chewable tablet Chew 1 tablet daily 8/18/17 Catarino Shears, DO   atorvastatin (LIPITOR) 40 mg tablet Take 1 tablet by mouth daily with dinner 7/1/17 Catarino Shears, DO   carvedilol (COREG) 6 25 mg tablet Take 1 tablet by mouth 2 (two) times a day with meals 8/18/17 Catarino Shears, DO   docusate sodium (COLACE) 100 mg capsule Take 1 capsule by mouth 2 (two) times a day  Patient taking differently: Take 100 mg by mouth 2 (two) times a day as needed   7/1/17 Catarino Shears, DO   famotidine (PEPCID) 20 mg tablet Take 20 mg by mouth daily    Historical Provider, MD   ferrous sulfate 325 (65 Fe) mg tablet Take 325 mg by mouth daily with breakfast Take one hour before meal       Historical Provider, MD   finasteride (PROSCAR) 5 mg tablet Take 1 tablet by mouth daily 8/18/17 Catarino Shears, DO   insulin glargine (LANTUS) 100 units/mL subcutaneous injection Inject 35 Units under the skin every 12 (twelve) hours 8/18/17 Catarino Shears, DO   insulin lispro (HumaLOG) 100 units/mL injection Inject 1-6 Units under the skin 3 (three) times a day before meals  Patient taking differently: Inject 1 Units under the skin 3 (three) times a day before meals 1 unit for every 5 gm carbohydrates per endocrine instructions  7/1/17   Maryjane Dawson, DO   insulin lispro (HumaLOG) 100 units/mL injection Inject 15 Units under the skin 3 (three) times a day with meals 8/18/17   Maryjane Dawson, DO   LACTOBACILLUS RHAMNOSUS, GG, PO Take 1 capsule by mouth 2 (two) times a day    Historical Provider, MD   metoprolol tartrate (LOPRESSOR) 50 mg tablet Take 50 mg by mouth every 12 (twelve) hours Take one and one-half tablets twice a day  Hold for SBP <110 or HR <55  Historical Provider, MD   polyethylene glycol (MIRALAX) 17 g packet Take 17 g by mouth daily as needed (constipation) 8/18/17   Maryjane Dawson, DO   potassium chloride (K-DUR,KLOR-CON) 20 mEq tablet Take 20 mEq by mouth daily Take 2 tablets daily  Historical Provider, MD   tamsulosin [facility]) 0 4 mg Take 1 capsule by mouth daily with dinner 7/1/17   Maryjane Dawson, DO   HYDROcodone-acetaminophen [facility]) 5-325 mg per tablet Take 2 tablets by mouth every 6 (six) hours as needed for pain for up to 10 days Max Daily Amount: 8 tablets 9/22/17 9/22/17  ALAYNA Bautista     I have reviewed home medications with patient personally  Allergies:    Allergies   Allergen Reactions    Penicillins      States he is unsure, does not believe he is allergic anymore       Social History:     Marital Status: /Civil Union   Occupation: retired  Patient Pre-hospital Living Situation: home  Patient Pre-hospital Level of Mobility: Baseline bladder dysfunction  Patient Pre-hospital Diet Restrictions: diabetic diet  Substance Use History:   History   Alcohol Use No     History   Smoking Status    Former Smoker   Smokeless Tobacco    Never Used     History   Drug Use No       Family History:    non-contributory    Physical Exam:     Vitals:   Blood Pressure: 162/77 (09/22/17 1400)  Pulse: 71 (09/22/17 1400)  Temperature: 98 8 °F (37 1 °C) (09/22/17 1400)  Temp Source: Oral (09/22/17 1400)  Respirations: 18 (09/22/17 1400)  Height: 5' 11 5" (181 6 cm) (09/22/17 1400)  Weight - Scale: 122 kg (269 lb 9 6 oz) (09/22/17 1400)  SpO2: 97 % (09/22/17 1400)    Physical Exam    GENERAL APPEARANCE: WD/WN in NAD pleasant, morbidly obese  SKIN: no rash  HEENT: NC/AT, PERRLA (B), moist MM, no epistaxis  NECK: Supple, no JVD    LUNGS: CTA (B) mildly prolonged expiratory phase,   no use of accessory muscles  HEART:          S1S 2, RRR  , PMI is not displaced  ABDOMEN: Soft, nontender, nondistended, +BS  Rectal exam:Ozuna catheter in place  EXTREMITIES: no edema   PERIPHERAL VASCULAR: palpable pulses   NEURO:  AAO x 3, CN 2-12: non focal  MUSCLE STRENGHT: 5/5 (B), SENSATION: nonfocal  DTR: ++, CEREBELLAR: non focal  Additional Data:     Lab Results: I have personally reviewed pertinent reports  Results from last 7 days  Lab Units 09/22/17  0649 09/21/17  1604   WBC Thousand/uL 15 22* 20 17*   HEMOGLOBIN g/dL 12 8 13 3   HEMATOCRIT % 39 7 41 5   PLATELETS Thousands/uL 169 205   LYMPHO PCT %  --  7*   MONO PCT MAN %  --  13*   EOSINO PCT MANUAL %  --  3       Results from last 7 days  Lab Units 09/22/17  0649 09/21/17  1604   SODIUM mmol/L 141 139   POTASSIUM mmol/L 3 8 4 5   CHLORIDE mmol/L 104 100   CO2 mmol/L 34* 35*   BUN mg/dL 29* 30*   CREATININE mg/dL 0 74 0 92   CALCIUM mg/dL 7 6* 7 5*   TOTAL PROTEIN g/dL  --  6 2*   BILIRUBIN TOTAL mg/dL  --  0 60   ALK PHOS U/L  --  93   ALT U/L  --  29   AST U/L  --  27   GLUCOSE RANDOM mg/dL 241* 286*           Imaging: I have personally reviewed pertinent reports  Ct Abdomen Pelvis With Contrast    Result Date: 9/21/2017  Narrative: CT ABDOMEN AND PELVIS WITH IV CONTRAST INDICATION:  Abdominal pain  Leukocytosis  COMPARISON: 8/8/2017 TECHNIQUE:  CT examination of the abdomen and pelvis was performed       Reformatted images were created in axial, sagittal, and coronal planes  Radiation dose length product (DLP) for this visit:  1881 18 mGy-cm   This examination, like all CT scans performed in the [facility], was performed utilizing techniques to minimize radiation dose exposure, including the use of iterative reconstruction and automated exposure control  IV Contrast:  100 mL of iohexol (OMNIPAQUE)     Enteric Contrast:  Enteric contrast was not administered  FINDINGS: ABDOMEN LOWER CHEST:  Stable left pleural effusion  Lungs are clear  LIVER/BILIARY TREE:  Unremarkable  GALLBLADDER:  Stable layering gallstones/sludge  SPLEEN:  Unremarkable  PANCREAS:  Unremarkable  ADRENAL GLANDS:  Unremarkable  KIDNEYS/URETERS:  Stable bilateral cysts  Stable parenchymal calcification right lower pole    No hydronephrosis  STOMACH AND BOWEL:  Unremarkable  APPENDIX:  No findings to suggest appendicitis  ABDOMINOPELVIC CAVITY:  No ascites or free intraperitoneal air  No lymphadenopathy  VESSELS:  Unremarkable for patient's age  PELVIS REPRODUCTIVE ORGANS:  Unremarkable for patient's age  URINARY BLADDER:  Ozuna catheter in bladder  ABDOMINAL WALL/INGUINAL REGIONS:  Unremarkable  OSSEOUS STRUCTURES:  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  No acute fracture or destructive osseous lesion  Impression: No acute inflammatory process  Small left pleural effusion  Stable layering gallstones/sludge  No surrounding inflammation  Workstation performed: [id number]       EKG, Pathology, and Other Studies Reviewed on Admission:   · EKG:  noncontributory    Allscripts / Epic Records Reviewed: Yes     ** Please Note: This note has been constructed using a voice recognition system   **

## 2017-09-22 NOTE — CONSULTS
Consultation - Infectious Disease   Frankie Alonso 68 y o  male MRN: [id number]  Unit/Bed#: [facility] 834-01 Encounter: [id number]      IMPRESSION & RECOMMENDATIONS:   Impression/Recommendations: This is a 68 y o  male, with history of urine retention, presented to [address] yesterday with lower abdominal pain  Pain resolve with insertion of Ozuna catheterization  Patient was transferred here for suprapubic catheterization  He has no fever but has leukocytosis  1   Possible UTI  Patient has very little clinical symptoms consistent of UTI  However, he does have unexplained leukocytosis  Pending urine culture, it is reasonable to treat him with a course of antibiotic through his anticipated suprapubic catheterization early next week  Most recently, patient had a urine culture in August which grew Providencia  Back in June of this year, he had a urine culture growing ESBL producing Proteus and Enterococcus  With patient clinically and systemically well, I would treat him based on the most urine culture grown Providencia  Will follow up on urine culture and adjust antibiotic accordingly  Change antibiotic to IV ceftriaxone  Follow-up on urine culture results  Monitor temperature/WBC  Monitor for recurrent abdominal pain  2   Urinary retention  Patient has Ozuna catheter in place  Agree with Urology plan for suprapubic catheterization long-term  3   Leukocytosis  On H&P, it was stated the patient had outpatient rx for prednisone  Patient does not recall taking this  If patient does indeed take prednisone, this would explain his leukocytosis  However, if he does not take prednisone, leukocytosis may be a site of UTI, as in above  Antibiotic will be started, as in above  Will monitor WBC  Monitor WBC with treatment of UTI above  4   PCN allergy  This is unclear  Regardless, reaction was not severe  Patient should be able to tolerate cephalosporin    Monitor for reaction on ceftriaxone  [address] [place] [address] hospitalization records reviewed in detail  Discussed with the patient in detail regarding the above plan  Thank you for this consultation  We will follow along with you  HISTORY OF PRESENT ILLNESS:  Reason for Consult: UTI     HPI: Sandy Rubalcava is a 68 y o  male, with multiple medical problems including DM, urinary tension, came to the ER at [address] [place] [address] yesterday with lower abdominal pain  Patient has history of chronic and intermittent Ozuna catheterization Patient had a recent hospitalization at [facility] where his Ozuna catheter was removed  Patient was noted to have distended urinary bladder  Ozuna catheter was inserted with resolution of abdominal pain  Urology was consulted  Patient was transferred to [facility] for treatment of UTI and plan for placement of suprapubic catheter  Aztreonam was started for presumptive UTI  For these reasons, we asked to evaluate the patient  At present, patient states that he feels comfortable  He has no further abdominal pain  He has no chills or sweats  Ozuna catheter is in place  Patient has PCN allergy listed  Patient does not recall reaction  However, he states that it was not severe  In fact, patient is not even sure whether he has PCN allergy  REVIEW OF SYSTEMS:  A complete 12 point system-based review of systems is otherwise negative      PAST MEDICAL HISTORY:  Past Medical History:   Diagnosis Date    CHF (congestive heart failure)     COPD (chronic obstructive pulmonary disease)     Diabetes mellitus     ESBL (extended spectrum beta-lactamase) producing bacteria infection     History of BPH     Hyperlipidemia     Hypertension     PE (pulmonary thromboembolism)     PVD (peripheral vascular disease)     Spinal stenosis of lumbar region     Stroke     Systolic and diastolic CHF, chronic     Urinary retention     UTI due to extended-spectrum beta lactamase (ESBL) producing Escherichia coli      Past Surgical History:   Procedure Laterality Date    CARDIAC SURGERY      cabg    HIP SURGERY Right     plate and pin     Problem list reviewed  FAMILY HISTORY:  Non-contributory    SOCIAL HISTORY:  History   Alcohol Use No     History   Drug Use No     History   Smoking Status    Former Smoker   Smokeless Tobacco    Never Used       ALLERGIES:  Allergies   Allergen Reactions    Penicillins      States he is unsure, does not believe he is allergic anymore       MEDICATIONS:  All current active medications have been reviewed  Patient is currently on IV aztreonam     PHYSICAL EXAM:  Vitals:  HR:  [49-78] 55  Resp:  [16-27] 20  BP: (138-188)/(63-77) 147/67  SpO2:  [94 %-99 %] 98 %  Temp (24hrs), Av 2 °F (36 8 °C), Min:97 5 °F (36 4 °C), Max:98 8 °F (37 1 °C)  Current: Temperature: 97 5 °F (36 4 °C)     Physical Exam:  General:  Chronically ill-appearing, nontoxic, in no acute distress  Awake, alert and oriented x 3  Eyes:  Conjunctive clear with no hemorrhages or effusions  Oropharynx:  No ulcers, no lesions, pharynx benign, no tonsillitis  Neck:  Supple, no lymphadenopathy, no mass, nontender  Lungs:  Expansion symmetric, no rales, no wheezing, no accessory muscle use  Cardiac:  Regular rate and rhythm, normal S1, normal S2, no murmurs  Abdomen:  Soft, nondistended, non-tender, no HSM  Extremities:  Trace edema, no erythema, nontender  No ulcers  Skin:  No rashes, no ulcers  Neurological:  Moves all four extremities spontaneously, sensation grossly intact    LABS, IMAGING, & OTHER STUDIES:  Lab Results:  I have personally reviewed pertinent labs      Results from last 7 days  Lab Units 17  0649 17  1604   SODIUM mmol/L 141 139   POTASSIUM mmol/L 3 8 4 5   CHLORIDE mmol/L 104 100   CO2 mmol/L 34* 35*   ANION GAP mmol/L 3* 4   BUN mg/dL 29* 30*   CREATININE mg/dL 0 74 0 92   EGFR ml/min/1 73sq m 89 80   GLUCOSE RANDOM mg/dL 241* 286*   CALCIUM mg/dL 7 6* 7 5* AST U/L  --  27   ALT U/L  --  29   ALK PHOS U/L  --  93   TOTAL PROTEIN g/dL  --  6 2*   ALBUMIN g/dL  --  2 7*   BILIRUBIN TOTAL mg/dL  --  0 60       Results from last 7 days  Lab Units 09/22/17  0649 09/21/17  1604   WBC Thousand/uL 15 22* 20 17*   HEMOGLOBIN g/dL 12 8 13 3   PLATELETS Thousands/uL 169 205           Imaging Studies:   I have personally reviewed pertinent imaging study reports and images in PACS  Abdomen/pelvis CT reviewed personally  No acute pathologies  EKG, Pathology, and Other Studies:   I have personally reviewed pertinent reports

## 2017-09-22 NOTE — CONSULTS
CONSULT    Patient Name: Gin Aguirre  Patient MRN: [id number]  Date of Service: 9/22/2017   Date / Time Note Created: 9/22/2017 3:38 PM   Referring Provider: Dr Matt Gill   Provider Creating Note: ALAYNA Sidhu    PCP: Cara Bowen  Attending Provider:  Charlie Casiano MD    Reason for Consult: Bladder Outlet Obstruction & Hydronephrosis     HPI--Mr Thalia Alford some medically complex 51-year-old male with multiple bouts of urinary retention not amenable to medical management; as well as multiple urinary catheterizations and failed trials  Patient presented to the emergency room at [facility] with refractory urinary retention  Patient has required recent admissions for ESBL producing UTI  Patient has had difficulty adhering to follow-up  Mr Thalai Alford is admitted by the medical team to the [address] [place] [address],[address]  Urologic consultation is requested for possible surgical management  Source:the patient & chart       Active Problems:    Patient Active Problem List   Diagnosis    Systolic and diastolic CHF, chronic    Spinal stenosis of lumbar region    PVD (peripheral vascular disease)    Essential hypertension    Hyperlipidemia    Type 2 diabetes mellitus with hyperglycemia    COPD (chronic obstructive pulmonary disease)    Urinary retention due to benign prostatic hyperplasia    Multiple renal cysts    Vitamin D insufficiency    Kidney disease, chronic, stage III (GFR 30-59 ml/min)    Ambulatory dysfunction    Gait difficulty    Morbid obesity    S/P CABG x 3             Impressions  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  Severe Urinary Retention secondary to above  Recurrent ESBL UTI      Recommendations  Continue medical optimization of sterilization  Will plan for IR placement of suprapubic catheter later during admission  In the interim maintain Ozuna catheter to straight drainage  Do not remove        Past Medical History:   Diagnosis Date    CHF (congestive heart failure)     COPD (chronic obstructive pulmonary disease)     Diabetes mellitus     ESBL (extended spectrum beta-lactamase) producing bacteria infection     History of BPH     Hyperlipidemia     Hypertension     PE (pulmonary thromboembolism)     PVD (peripheral vascular disease)     Spinal stenosis of lumbar region     Stroke     Systolic and diastolic CHF, chronic     Urinary retention     UTI due to extended-spectrum beta lactamase (ESBL) producing Escherichia coli        Past Surgical History:   Procedure Laterality Date    CARDIAC SURGERY      cabg    HIP SURGERY Right     plate and pin       No family history on file  Social History     Social History    Marital status: /Civil Union     Spouse name: N/A    Number of children: N/A    Years of education: N/A     Occupational History    Not on file       Social History Main Topics    Smoking status: Former Smoker    Smokeless tobacco: Never Used    Alcohol use No    Drug use: No    Sexual activity: No     Other Topics Concern    Not on file     Social History Narrative    No narrative on file       Allergies   Allergen Reactions    Penicillins      States he is unsure, does not believe he is allergic anymore       Review of Systems  10 point review of systems negative except as noted in HPI  Constitutional:   negative for - chills or fever  Hematological and Lymphatic:   negative  Cardiovascular:   no chest pain or dyspnea on exertion  Gastrointestinal:   no abdominal pain, change in bowel habits, or black or bloody stools  Genito-Urinary:   no dysuria, trouble voiding, or hematuria  Neurological:   no TIA or stroke symptoms     Chart Review   Allergies, current medications, history, problem list    Vital Signs  /77   Pulse 71   Temp 98 8 °F (37 1 °C) (Oral)   Resp 18   Ht 5' 11 5" (1 816 m)   Wt 122 kg (269 lb 9 6 oz)   SpO2 97%   BMI 37 08 kg/m²     Physical Exam  General appearance: alert, appears stated age, cooperative and no distress  Head: Normocephalic, without obvious abnormality, atraumatic  Neck: no adenopathy, no carotid bruit, no JVD, supple, symmetrical, trachea midline and thyroid not enlarged, symmetric, no tenderness/mass/nodules  Lungs: clear to auscultation bilaterally  Heart: regular rate and rhythm, S1, S2 normal, no murmur, click, rub or gallop  Abdomen: soft, non-tender; bowel sounds normal; no masses,  no organomegaly  Extremities: extremities normal, atraumatic, no cyanosis or edema  Pulses: 2+ and symmetric  Neurologic: Grossly normal  Ozuna catheter draining meg cloudy urine     Laboratory Studies  Lab Results   Component Value Date    HGBA1C 9 6 (H) 08/08/2017     09/22/2017    K 3 8 09/22/2017     09/22/2017    CO2 34 (H) 09/22/2017    GLUCOSE 241 (H) 09/22/2017    CREATININE 0 74 09/22/2017    BUN 29 (H) 09/22/2017    MG 2 2 09/22/2017    PHOS 3 2 09/22/2017     Lab Results   Component Value Date    WBC 15 22 (H) 09/22/2017    RBC 4 44 09/22/2017    HGB 12 8 09/22/2017    HCT 39 7 09/22/2017    MCV 89 09/22/2017    MCH 28 8 09/22/2017    RDW 13 7 09/22/2017     09/22/2017       Imaging and Other Studies  )  Ct Abdomen Pelvis With Contrast    Result Date: 9/21/2017  Narrative: CT ABDOMEN AND PELVIS WITH IV CONTRAST INDICATION:  Abdominal pain  Leukocytosis  COMPARISON: 8/8/2017 TECHNIQUE:  CT examination of the abdomen and pelvis was performed  Reformatted images were created in axial, sagittal, and coronal planes  Radiation dose length product (DLP) for this visit:  1881 18 mGy-cm   This examination, like all CT scans performed in the [facility], was performed utilizing techniques to minimize radiation dose exposure, including the use of iterative reconstruction and automated exposure control  IV Contrast:  100 mL of iohexol (OMNIPAQUE)     Enteric Contrast:  Enteric contrast was not administered  FINDINGS: ABDOMEN LOWER CHEST:  Stable left pleural effusion    Lungs are clear  LIVER/BILIARY TREE:  Unremarkable  GALLBLADDER:  Stable layering gallstones/sludge  SPLEEN:  Unremarkable  PANCREAS:  Unremarkable  ADRENAL GLANDS:  Unremarkable  KIDNEYS/URETERS:  Stable bilateral cysts  Stable parenchymal calcification right lower pole    No hydronephrosis  STOMACH AND BOWEL:  Unremarkable  APPENDIX:  No findings to suggest appendicitis  ABDOMINOPELVIC CAVITY:  No ascites or free intraperitoneal air  No lymphadenopathy  VESSELS:  Unremarkable for patient's age  PELVIS REPRODUCTIVE ORGANS:  Unremarkable for patient's age  URINARY BLADDER:  Ozuna catheter in bladder  ABDOMINAL WALL/INGUINAL REGIONS:  Unremarkable  OSSEOUS STRUCTURES:  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  No acute fracture or destructive osseous lesion  Impression: No acute inflammatory process  Small left pleural effusion  Stable layering gallstones/sludge  No surrounding inflammation  Workstation performed: [id number]       Medications   Scheduled Meds:  [START ON 9/23/2017] amLODIPine 5 mg Oral Daily   [START ON 9/23/2017] aspirin 81 mg Oral Daily   atorvastatin 40 mg Oral Daily With Dinner   carvedilol 6 25 mg Oral BID With Meals   [START ON 9/23/2017] enoxaparin 40 mg Subcutaneous Daily   [START ON 9/23/2017] famotidine 20 mg Oral Daily   [START ON 9/23/2017] finasteride 5 mg Oral Daily   insulin glargine 20 Units Subcutaneous Q12H [facility]   insulin lispro 1-6 Units Subcutaneous TID AC   tamsulosin 0 4 mg Oral Daily With Dinner     Continuous Infusions:   PRN Meds:  HYDROcodone-acetaminophen    polyethylene glycol      Total time spent with patient 30 minutes, >50% spent counseling and/or coordination of care           ALAYNA Orta

## 2017-09-22 NOTE — PLAN OF CARE
Problem: DISCHARGE PLANNING - CARE MANAGEMENT  Goal: Discharge to post-acute care or home with appropriate resources  INTERVENTIONS:  - Conduct assessment to determine patient/family and health care team treatment goals, and need for post-acute services based on payer coverage, community resources, and patient preferences, and barriers to discharge  - Address psychosocial, clinical, and financial barriers to discharge as identified in assessment in conjunction with the patient/family and health care team  - Arrange appropriate level of post-acute services according to patient's   needs and preference and payer coverage in collaboration with the physician and health care team  - Communicate with and update the patient/family, physician, and health care team regarding progress on the discharge plan  - Arrange appropriate transportation to post-acute venues  - When medically stable for d/c, plan for home with aides through [facility] vs SNF    Outcome: Progressing

## 2017-09-22 NOTE — SOCIAL WORK
Pt is a new admit to the floor  Transfer from [facility] this morning  As per [facility] documentation from this morning, Pt resides at home with his wife  Recent hx orf SNF at [facility] from 8/28- until 9/13, where he was sent to [facility]  Pt was in his copay days at 20% and reportedly refuses to apply for Ma  According to documentation, when [facility] tried to have pt go back to [facility] from there, the SNF declined due to pt's refusal to cooperate with the medical assistance process  Pt has aides through the [place] 5 days a week for 4 hours a day 9am to 1pm   Pt has home 02, BSC; tub bench and a Rw  Wife drives  [place]  As per [facility] Ad Labs PT/OT, SNF recommended  CM to f/u for choices through the [place]

## 2017-09-22 NOTE — PLAN OF CARE
DISCHARGE PLANNING     Discharge to home or other facility with appropriate resources Progressing        DISCHARGE PLANNING - CARE MANAGEMENT     Discharge to post-acute care or home with appropriate resources Progressing        INFECTION - ADULT     Absence or prevention of progression during hospitalization Progressing        Knowledge Deficit     Patient/family/caregiver demonstrates understanding of disease process, treatment plan, medications, and discharge instructions Progressing        PAIN - ADULT     Verbalizes/displays adequate comfort level or baseline comfort level Progressing        Potential for Falls     Patient will remain free of falls Progressing        Prexisting or High Potential for Compromised Skin Integrity     Skin integrity is maintained or improved Progressing        SAFETY ADULT     Maintain or return to baseline ADL function Progressing     Maintain or return mobility status to optimal level Progressing

## 2017-09-23 LAB
ALBUMIN SERPL BCP-MCNC: 2.2 G/DL (ref 3.5–5)
ALP SERPL-CCNC: 87 U/L (ref 46–116)
ALT SERPL W P-5'-P-CCNC: 22 U/L (ref 12–78)
ANION GAP SERPL CALCULATED.3IONS-SCNC: 3 MMOL/L (ref 4–13)
AST SERPL W P-5'-P-CCNC: 11 U/L (ref 5–45)
BACTERIA UR CULT: NORMAL
BASOPHILS # BLD AUTO: 0 THOUSANDS/ΜL (ref 0–0.1)
BASOPHILS NFR BLD AUTO: 0 % (ref 0–1)
BILIRUB SERPL-MCNC: 0.51 MG/DL (ref 0.2–1)
BUN SERPL-MCNC: 27 MG/DL (ref 5–25)
CALCIUM SERPL-MCNC: 7.5 MG/DL (ref 8.3–10.1)
CHLORIDE SERPL-SCNC: 105 MMOL/L (ref 100–108)
CO2 SERPL-SCNC: 32 MMOL/L (ref 21–32)
CREAT SERPL-MCNC: 0.64 MG/DL (ref 0.6–1.3)
EOSINOPHIL # BLD AUTO: 1.23 THOUSAND/ΜL (ref 0–0.61)
EOSINOPHIL NFR BLD AUTO: 10 % (ref 0–6)
ERYTHROCYTE [DISTWIDTH] IN BLOOD BY AUTOMATED COUNT: 13.9 % (ref 11.6–15.1)
GFR SERPL CREATININE-BSD FRML MDRD: 94 ML/MIN/1.73SQ M
GLUCOSE SERPL-MCNC: 170 MG/DL (ref 65–140)
GLUCOSE SERPL-MCNC: 181 MG/DL (ref 65–140)
GLUCOSE SERPL-MCNC: 229 MG/DL (ref 65–140)
GLUCOSE SERPL-MCNC: 245 MG/DL (ref 65–140)
GLUCOSE SERPL-MCNC: 272 MG/DL (ref 65–140)
HCT VFR BLD AUTO: 36.1 % (ref 36.5–49.3)
HGB BLD-MCNC: 11.5 G/DL (ref 12–17)
LYMPHOCYTES # BLD AUTO: 1.47 THOUSANDS/ΜL (ref 0.6–4.47)
LYMPHOCYTES NFR BLD AUTO: 11 % (ref 14–44)
MAGNESIUM SERPL-MCNC: 2.6 MG/DL (ref 1.6–2.6)
MCH RBC QN AUTO: 28.4 PG (ref 26.8–34.3)
MCHC RBC AUTO-ENTMCNC: 31.9 G/DL (ref 31.4–37.4)
MCV RBC AUTO: 89 FL (ref 82–98)
MONOCYTES # BLD AUTO: 1.98 THOUSAND/ΜL (ref 0.17–1.22)
MONOCYTES NFR BLD AUTO: 15 % (ref 4–12)
NEUTROPHILS # BLD AUTO: 8.29 THOUSANDS/ΜL (ref 1.85–7.62)
NEUTS SEG NFR BLD AUTO: 64 % (ref 43–75)
NRBC BLD AUTO-RTO: 0 /100 WBCS
PHOSPHATE SERPL-MCNC: 3.2 MG/DL (ref 2.3–4.1)
PLATELET # BLD AUTO: 162 THOUSANDS/UL (ref 149–390)
PMV BLD AUTO: 11.8 FL (ref 8.9–12.7)
POTASSIUM SERPL-SCNC: 4 MMOL/L (ref 3.5–5.3)
PROT SERPL-MCNC: 5.2 G/DL (ref 6.4–8.2)
RBC # BLD AUTO: 4.05 MILLION/UL (ref 3.88–5.62)
SODIUM SERPL-SCNC: 140 MMOL/L (ref 136–145)
WBC # BLD AUTO: 13 THOUSAND/UL (ref 4.31–10.16)

## 2017-09-23 PROCEDURE — 84100 ASSAY OF PHOSPHORUS: CPT | Performed by: INTERNAL MEDICINE

## 2017-09-23 PROCEDURE — 82948 REAGENT STRIP/BLOOD GLUCOSE: CPT

## 2017-09-23 PROCEDURE — 83735 ASSAY OF MAGNESIUM: CPT | Performed by: INTERNAL MEDICINE

## 2017-09-23 PROCEDURE — 80053 COMPREHEN METABOLIC PANEL: CPT | Performed by: INTERNAL MEDICINE

## 2017-09-23 PROCEDURE — 85025 COMPLETE CBC W/AUTO DIFF WBC: CPT | Performed by: INTERNAL MEDICINE

## 2017-09-23 RX ADMIN — INSULIN LISPRO 3 UNITS: 100 INJECTION, SOLUTION INTRAVENOUS; SUBCUTANEOUS at 00:36

## 2017-09-23 RX ADMIN — FINASTERIDE 5 MG: 5 TABLET, FILM COATED ORAL at 09:20

## 2017-09-23 RX ADMIN — TAMSULOSIN HYDROCHLORIDE 0.4 MG: 0.4 CAPSULE ORAL at 17:42

## 2017-09-23 RX ADMIN — ATORVASTATIN CALCIUM 40 MG: 40 TABLET, FILM COATED ORAL at 17:42

## 2017-09-23 RX ADMIN — INSULIN LISPRO 1 UNITS: 100 INJECTION, SOLUTION INTRAVENOUS; SUBCUTANEOUS at 09:17

## 2017-09-23 RX ADMIN — CARVEDILOL 6.25 MG: 6.25 TABLET, FILM COATED ORAL at 09:20

## 2017-09-23 RX ADMIN — INSULIN GLARGINE 10 UNITS: 100 INJECTION, SOLUTION SUBCUTANEOUS at 00:36

## 2017-09-23 RX ADMIN — CARVEDILOL 6.25 MG: 6.25 TABLET, FILM COATED ORAL at 17:42

## 2017-09-23 RX ADMIN — INSULIN GLARGINE 10 UNITS: 100 INJECTION, SOLUTION SUBCUTANEOUS at 09:19

## 2017-09-23 RX ADMIN — CEFTRIAXONE SODIUM 1000 MG: 10 INJECTION, POWDER, FOR SOLUTION INTRAVENOUS at 18:25

## 2017-09-23 RX ADMIN — ENOXAPARIN SODIUM 40 MG: 40 INJECTION SUBCUTANEOUS at 09:19

## 2017-09-23 RX ADMIN — AMLODIPINE BESYLATE 5 MG: 5 TABLET ORAL at 09:20

## 2017-09-23 RX ADMIN — INSULIN LISPRO 4 UNITS: 100 INJECTION, SOLUTION INTRAVENOUS; SUBCUTANEOUS at 12:09

## 2017-09-23 RX ADMIN — INSULIN LISPRO 3 UNITS: 100 INJECTION, SOLUTION INTRAVENOUS; SUBCUTANEOUS at 17:43

## 2017-09-23 RX ADMIN — FAMOTIDINE 20 MG: 20 TABLET ORAL at 09:21

## 2017-09-23 RX ADMIN — ASPIRIN 81 MG 81 MG: 81 TABLET ORAL at 09:20

## 2017-09-23 NOTE — OCCUPATIONAL THERAPY NOTE
Occupational Therapy Cancellation Notice     OT orders received and chart review completed-Attempted to see pt for OT evaluation today however pt currently declining OOB activity/ mobility at this time   Will continue to follow and attempt to see pt at another time    Iglesia Heaton MS, OTR/L

## 2017-09-23 NOTE — PROGRESS NOTES
UROLOGY PROGRESS NOTE   Patient Identifiers: Felisha Downs (MRN [id number])  Date of Service: 9/23/2017        Assessment:   67 yo male with bladder outlet obstruction and chronic resistant urinary tract infections    Plan:     Continue to monitor and await admission urine cultures  Will continue antibiotics, on ceftriaxone currently  Plan for IR suprapubic tube placement on Monday if antibiotic coverage appropriate  Discussed plan with patient at bedside and wife, Jeffery Patle, by phone  Subjective:     24 HR EVENTS: No acute events overnight       Objective:     VITALS:    Vitals:    09/23/17 0700   BP: 110/64   Pulse: 77   Resp: 20   Temp: 98 °F (36 7 °C)   SpO2: 97%       INS & OUTS:  [unfilled]    LABS:  Lab Results   Component Value Date    HGB 11 5 (L) 09/23/2017    HCT 36 1 (L) 09/23/2017    WBC 13 00 (H) 09/23/2017     09/23/2017   ]    Lab Results   Component Value Date     09/23/2017    K 4 0 09/23/2017     09/23/2017    CO2 32 09/23/2017    BUN 27 (H) 09/23/2017    CREATININE 0 64 09/23/2017    CALCIUM 7 5 (L) 09/23/2017    GLUCOSE 170 (H) 09/23/2017   ]    INPATIENT MEDS:    Current Facility-Administered Medications:     acetaminophen (TYLENOL) tablet 650 mg, 650 mg, Oral, Q6H PRN, Sonia Thompson MD    amLODIPine (NORVASC) tablet 5 mg, 5 mg, Oral, Daily, Jeramy Salinas MD, 5 mg at 09/23/17 0920    aspirin chewable tablet 81 mg, 81 mg, Oral, Daily, Jeramy Salinas MD, 81 mg at 09/23/17 0920    atorvastatin (LIPITOR) tablet 40 mg, 40 mg, Oral, Daily With Keke Salmeron MD, 40 mg at 09/22/17 1756    carvedilol (COREG) tablet 6 25 mg, 6 25 mg, Oral, BID With Meals, Jeramy Salinas MD, 6 25 mg at 09/23/17 0920    cefTRIAXone (ROCEPHIN) 1,000 mg in dextrose 5 % 50 mL IVPB, 1,000 mg, Intravenous, Q24H, Denice Winn MD, Stopped at 09/22/17 2115    enoxaparin (LOVENOX) subcutaneous injection 40 mg, 40 mg, Subcutaneous, Daily, Jeramy Salinas MD, 40 mg at 09/23/17 0919   famotidine (PEPCID) tablet 20 mg, 20 mg, Oral, Daily, Charlie Sanchez MD, 20 mg at 09/23/17 1209    finasteride (PROSCAR) tablet 5 mg, 5 mg, Oral, Daily, Charlie Sanchez MD, 5 mg at 09/23/17 0920    HYDROcodone-acetaminophen (NORCO) 5-325 mg per tablet 2 tablet, 2 tablet, Oral, Q6H PRN, Charlie Sanchez MD    insulin glargine (LANTUS) subcutaneous injection 10 Units, 10 Units, Subcutaneous, Q12H [facility], Nadeen Lagunas MD, 10 Units at 09/23/17 0919    insulin lispro (HumaLOG) 100 units/mL subcutaneous injection 1-6 Units, 1-6 Units, Subcutaneous, TID AC, 1 Units at 09/23/17 0917 **AND** Fingerstick Glucose (POCT), , , TID AC, Charlie Sanchez MD    insulin lispro (HumaLOG) 100 units/mL subcutaneous injection 1-6 Units, 1-6 Units, Subcutaneous, HS, Allyn Mercado PA-C, 3 Units at 09/23/17 0036    polyethylene glycol (MIRALAX) packet 17 g, 17 g, Oral, Daily PRN, Charlie Sanchez MD    [facility]) capsule 0 4 mg, 0 4 mg, Oral, Daily With Dinner, Charlie Sanchez MD, 0 4 mg at 09/22/17 1756      Physical Exam:   /64   Pulse 77   Temp 98 °F (36 7 °C) (Oral)   Resp 20   Ht 5' 11 5" (1 816 m)   Wt 122 kg (269 lb 9 6 oz)   SpO2 97%   BMI 37 08 kg/m²   GEN: no acute distress    RESP: breathing comfortably with no accessory muscle use    ABD: soft, non-tender, non-distended   EXT: bilateral peripheral edema   KUMAR: in place draining clear yellow urine

## 2017-09-23 NOTE — PROGRESS NOTES
[facility] Internal Medicine Progress Note  Patient: Sharee Grant 68 y o  male   MRN: [id number]  PCP: Donato Moreno MD  Unit/Bed#: [facility] 834-01 Encounter: [id number]  Date Of Visit: 17    Assessment/Plan:  Urinary retention  Suspected UTI in patient with indwelling siddiqui catheter  - c/w rocephin, ID following  - for suprapubic cath via IR on Monday  - urology following  - siddiqui functioning  - patient with failed voiding trial as OP    COPD w/o exacerbation  - c/w current care and monitoring    HTN  - fair readings  - c/w current care    DM 2  - POC and ISS    VTE Pharmacologic Prophylaxis: yes  Pharmacologic: Heparin  Mechanical VTE Prophylaxis in Place: Yes    Patient Centered Rounds: I have performed bedside rounds with nursing staff today  Discussions with Specialists or Other Care Team Provider: none    Education and Discussions with Family / Patient: patient, declined call home    Time Spent for Care: 45 minutes  More than 50% of total time spent on counseling and coordination of care as described above  Current Length of Stay: 1 day(s)    Current Patient Status: Inpatient   Certification Statement: The patient will continue to require additional inpatient hospital stay due to plaese see plan as above    Discharge Plan: TBD    Code Status: Level 1 - Full Code      Subjective:   Patient denies complains    Objective:     Vitals:   Temp (24hrs), Av °F (36 7 °C), Min:97 6 °F (36 4 °C), Max:98 4 °F (36 9 °C)    HR:  [58-77] 58  Resp:  [18-20] 18  BP: (110-141)/(63-69) 141/63  SpO2:  [97 %] 97 %  Body mass index is 37 08 kg/m²  Input and Output Summary (last 24 hours): Intake/Output Summary (Last 24 hours) at 17 1737  Last data filed at 17 1605   Gross per 24 hour   Intake          1679 17 ml   Output             2550 ml   Net          -870 83 ml       Physical Exam:     Physical Exam   Constitutional: He is oriented to person, place, and time  He appears well-developed  Cardiovascular: Normal rate, regular rhythm and normal heart sounds  Exam reveals no friction rub  No murmur heard  Pulmonary/Chest: Effort normal and breath sounds normal  No respiratory distress  He has no wheezes  He has no rales  Abdominal: Soft  Bowel sounds are normal  He exhibits no distension  There is no tenderness  There is no rebound  Musculoskeletal: He exhibits no edema  Neurological: He is alert and oriented to person, place, and time  Additional Data:     Labs:      Results from last 7 days  Lab Units 09/23/17  0558   WBC Thousand/uL 13 00*   HEMOGLOBIN g/dL 11 5*   HEMATOCRIT % 36 1*   PLATELETS Thousands/uL 162   NEUTROS PCT % 64   LYMPHS PCT % 11*   MONOS PCT % 15*   EOS PCT % 10*       Results from last 7 days  Lab Units 09/23/17  0558   SODIUM mmol/L 140   POTASSIUM mmol/L 4 0   CHLORIDE mmol/L 105   CO2 mmol/L 32   BUN mg/dL 27*   CREATININE mg/dL 0 64   CALCIUM mg/dL 7 5*   TOTAL PROTEIN g/dL 5 2*   BILIRUBIN TOTAL mg/dL 0 51   ALK PHOS U/L 87   ALT U/L 22   AST U/L 11   GLUCOSE RANDOM mg/dL 170*           * I Have Reviewed All Lab Data Listed Above  * Additional Pertinent Lab Tests Reviewed:  All Labs Within Last 24 Hours Reviewed    Imaging:    Imaging Reports Reviewed Today Include: none  Imaging Personally Reviewed by Myself Includes:  none    Recent Cultures (last 7 days):       Results from last 7 days  Lab Units 09/22/17  0845   URINE CULTURE  No Growth <1000 cfu/mL       Last 24 Hours Medication List:     amLODIPine 5 mg Oral Daily   aspirin 81 mg Oral Daily   atorvastatin 40 mg Oral Daily With Dinner   carvedilol 6 25 mg Oral BID With Meals   cefTRIAXone 1,000 mg Intravenous Q24H   enoxaparin 40 mg Subcutaneous Daily   famotidine 20 mg Oral Daily   finasteride 5 mg Oral Daily   insulin glargine 10 Units Subcutaneous Q12H [address]   insulin lispro 1-6 Units Subcutaneous TID AC   insulin lispro 1-6 Units Subcutaneous HS   tamsulosin 0 4 mg Oral Daily With Keldelice Today, Patient Was Seen By: Katherin Warren MD    ** Please Note: Dragon 360 Dictation voice to text software may have been used in the creation of this document   **

## 2017-09-23 NOTE — PROGRESS NOTES
Progress Note - Infectious Disease   Clifford Mike 68 y o  male MRN: [id number]  Unit/Bed#: [facility] 834-01 Encounter: [id number]      Impression/Recommendations:  1  Possible UTI  Patient has very little clinical symptoms consistent of UTI  However, he does have unexplained leukocytosis  Pending urine culture, it is reasonable to treat him with a course of antibiotic through his anticipated suprapubic catheterization early next week  Patient remains clinically stable on IV ceftriaxone  Will follow up on urine culture and adjust antibiotic accordingly  Continue IV ceftriaxone  Follow-up on urine culture results  Monitor temperature/WBC  Monitor for recurrent abdominal pain      2   Urinary retention  Patient has Ozuna catheter in place  Agree with Urology plan for suprapubic catheterization long-term      3   Leukocytosis  WBC decreasing  Monitor WBC with treatment of UTI above      4  PCN allergy  This is unclear  Regardless, reaction was not severe  patient tolerated ceftriaxone well  Monitor for reaction on ceftriaxone      Discussed with the patient in detail regarding the above plan  Antibiotics:  Ceftriaxone  Antibiotic # 2    Subjective:  Patient is comfortable  No abdominal or flank pain  Temperature stays down  No chills  He is tolerating antibiotic well  No nausea, vomiting or diarrhea  No pruritus or rash  Objective:  Vitals:  HR:  [55-77] 77  Resp:  [18-20] 20  BP: (110-162)/(64-77) 110/64  SpO2:  [97 %-98 %] 97 %  Temp (24hrs), Av °F (36 7 °C), Min:97 5 °F (36 4 °C), Max:98 8 °F (37 1 °C)  Current: Temperature: 98 °F (36 7 °C)    Physical Exam:     General: Awake, alert, cooperative, no distress  Lungs: Expansion symmetric, no rales, no wheezing, respirations unlabored  Heart[de-identified]  Regular rate and rhythm, S1 and S2 normal, no murmur  Abdomen: Soft, nondistended, non-tender, bowel sounds active all four quadrants,        no masses, no organomegaly     Extremities: Stable mild edema  No erythema/warmth  No ulcer  Nontender to palpation  Skin:  No rash  Invasive Devices     Peripheral Intravenous Line            Peripheral IV 09/21/17 Left Antecubital 1 day          Drain            Urethral Catheter Non-latex 1 day                Labs studies:   I have personally reviewed pertinent labs  Results from last 7 days  Lab Units 09/23/17  0558 09/22/17  0649 09/21/17  1604   SODIUM mmol/L 140 141 139   POTASSIUM mmol/L 4 0 3 8 4 5   CHLORIDE mmol/L 105 104 100   CO2 mmol/L 32 34* 35*   ANION GAP mmol/L 3* 3* 4   BUN mg/dL 27* 29* 30*   CREATININE mg/dL 0 64 0 74 0 92   EGFR ml/min/1 73sq m 94 89 80   GLUCOSE RANDOM mg/dL 170* 241* 286*   CALCIUM mg/dL 7 5* 7 6* 7 5*   AST U/L 11  --  27   ALT U/L 22  --  29   ALK PHOS U/L 87  --  93   TOTAL PROTEIN g/dL 5 2*  --  6 2*   ALBUMIN g/dL 2 2*  --  2 7*   BILIRUBIN TOTAL mg/dL 0 51  --  0 60       Results from last 7 days  Lab Units 09/23/17  0558 09/22/17  0649 09/21/17  1604   WBC Thousand/uL 13 00* 15 22* 20 17*   HEMOGLOBIN g/dL 11 5* 12 8 13 3   PLATELETS Thousands/uL 162 169 205           Imaging Studies:   I have personally reviewed pertinent imaging study reports and images in PACS  EKG, Pathology, and Other Studies:   I have personally reviewed pertinent reports

## 2017-09-24 LAB
ANION GAP SERPL CALCULATED.3IONS-SCNC: 5 MMOL/L (ref 4–13)
BASOPHILS # BLD AUTO: 0.01 THOUSANDS/ΜL (ref 0–0.1)
BASOPHILS NFR BLD AUTO: 0 % (ref 0–1)
BUN SERPL-MCNC: 23 MG/DL (ref 5–25)
CALCIUM SERPL-MCNC: 7.6 MG/DL (ref 8.3–10.1)
CHLORIDE SERPL-SCNC: 102 MMOL/L (ref 100–108)
CO2 SERPL-SCNC: 31 MMOL/L (ref 21–32)
CREAT SERPL-MCNC: 0.6 MG/DL (ref 0.6–1.3)
EOSINOPHIL # BLD AUTO: 1.12 THOUSAND/ΜL (ref 0–0.61)
EOSINOPHIL NFR BLD AUTO: 9 % (ref 0–6)
ERYTHROCYTE [DISTWIDTH] IN BLOOD BY AUTOMATED COUNT: 13.7 % (ref 11.6–15.1)
GFR SERPL CREATININE-BSD FRML MDRD: 97 ML/MIN/1.73SQ M
GLUCOSE SERPL-MCNC: 202 MG/DL (ref 65–140)
GLUCOSE SERPL-MCNC: 218 MG/DL (ref 65–140)
GLUCOSE SERPL-MCNC: 262 MG/DL (ref 65–140)
GLUCOSE SERPL-MCNC: 262 MG/DL (ref 65–140)
GLUCOSE SERPL-MCNC: 284 MG/DL (ref 65–140)
HCT VFR BLD AUTO: 36.9 % (ref 36.5–49.3)
HGB BLD-MCNC: 11.9 G/DL (ref 12–17)
LYMPHOCYTES # BLD AUTO: 1.54 THOUSANDS/ΜL (ref 0.6–4.47)
LYMPHOCYTES NFR BLD AUTO: 12 % (ref 14–44)
MAGNESIUM SERPL-MCNC: 2.5 MG/DL (ref 1.6–2.6)
MCH RBC QN AUTO: 28.7 PG (ref 26.8–34.3)
MCHC RBC AUTO-ENTMCNC: 32.2 G/DL (ref 31.4–37.4)
MCV RBC AUTO: 89 FL (ref 82–98)
MONOCYTES # BLD AUTO: 1.46 THOUSAND/ΜL (ref 0.17–1.22)
MONOCYTES NFR BLD AUTO: 12 % (ref 4–12)
NEUTROPHILS # BLD AUTO: 8.24 THOUSANDS/ΜL (ref 1.85–7.62)
NEUTS SEG NFR BLD AUTO: 67 % (ref 43–75)
NRBC BLD AUTO-RTO: 0 /100 WBCS
PHOSPHATE SERPL-MCNC: 3.1 MG/DL (ref 2.3–4.1)
PLATELET # BLD AUTO: 165 THOUSANDS/UL (ref 149–390)
PMV BLD AUTO: 11.8 FL (ref 8.9–12.7)
POTASSIUM SERPL-SCNC: 3.9 MMOL/L (ref 3.5–5.3)
RBC # BLD AUTO: 4.14 MILLION/UL (ref 3.88–5.62)
SODIUM SERPL-SCNC: 138 MMOL/L (ref 136–145)
WBC # BLD AUTO: 12.42 THOUSAND/UL (ref 4.31–10.16)

## 2017-09-24 PROCEDURE — 85025 COMPLETE CBC W/AUTO DIFF WBC: CPT | Performed by: INTERNAL MEDICINE

## 2017-09-24 PROCEDURE — 97167 OT EVAL HIGH COMPLEX 60 MIN: CPT

## 2017-09-24 PROCEDURE — 80048 BASIC METABOLIC PNL TOTAL CA: CPT | Performed by: INTERNAL MEDICINE

## 2017-09-24 PROCEDURE — 84100 ASSAY OF PHOSPHORUS: CPT | Performed by: INTERNAL MEDICINE

## 2017-09-24 PROCEDURE — G8987 SELF CARE CURRENT STATUS: HCPCS

## 2017-09-24 PROCEDURE — 82948 REAGENT STRIP/BLOOD GLUCOSE: CPT

## 2017-09-24 PROCEDURE — 83735 ASSAY OF MAGNESIUM: CPT | Performed by: INTERNAL MEDICINE

## 2017-09-24 PROCEDURE — G8988 SELF CARE GOAL STATUS: HCPCS

## 2017-09-24 RX ORDER — ALPRAZOLAM 0.25 MG/1
0.25 TABLET ORAL 3 TIMES DAILY PRN
Status: DISCONTINUED | OUTPATIENT
Start: 2017-09-24 | End: 2017-09-28 | Stop reason: HOSPADM

## 2017-09-24 RX ADMIN — CARVEDILOL 6.25 MG: 6.25 TABLET, FILM COATED ORAL at 09:09

## 2017-09-24 RX ADMIN — FINASTERIDE 5 MG: 5 TABLET, FILM COATED ORAL at 09:10

## 2017-09-24 RX ADMIN — INSULIN LISPRO 4 UNITS: 100 INJECTION, SOLUTION INTRAVENOUS; SUBCUTANEOUS at 23:00

## 2017-09-24 RX ADMIN — ASPIRIN 81 MG 81 MG: 81 TABLET ORAL at 09:09

## 2017-09-24 RX ADMIN — INSULIN GLARGINE 10 UNITS: 100 INJECTION, SOLUTION SUBCUTANEOUS at 00:10

## 2017-09-24 RX ADMIN — INSULIN LISPRO 3 UNITS: 100 INJECTION, SOLUTION INTRAVENOUS; SUBCUTANEOUS at 12:03

## 2017-09-24 RX ADMIN — ENOXAPARIN SODIUM 40 MG: 40 INJECTION SUBCUTANEOUS at 09:09

## 2017-09-24 RX ADMIN — INSULIN GLARGINE 10 UNITS: 100 INJECTION, SOLUTION SUBCUTANEOUS at 23:01

## 2017-09-24 RX ADMIN — CARVEDILOL 6.25 MG: 6.25 TABLET, FILM COATED ORAL at 17:41

## 2017-09-24 RX ADMIN — INSULIN LISPRO 2 UNITS: 100 INJECTION, SOLUTION INTRAVENOUS; SUBCUTANEOUS at 00:10

## 2017-09-24 RX ADMIN — ALPRAZOLAM 0.25 MG: 0.25 TABLET ORAL at 10:53

## 2017-09-24 RX ADMIN — FAMOTIDINE 20 MG: 20 TABLET ORAL at 09:10

## 2017-09-24 RX ADMIN — ATORVASTATIN CALCIUM 40 MG: 40 TABLET, FILM COATED ORAL at 17:41

## 2017-09-24 RX ADMIN — AMLODIPINE BESYLATE 5 MG: 5 TABLET ORAL at 09:10

## 2017-09-24 RX ADMIN — INSULIN LISPRO 2 UNITS: 100 INJECTION, SOLUTION INTRAVENOUS; SUBCUTANEOUS at 09:10

## 2017-09-24 RX ADMIN — TAMSULOSIN HYDROCHLORIDE 0.4 MG: 0.4 CAPSULE ORAL at 17:41

## 2017-09-24 RX ADMIN — INSULIN LISPRO 3 UNITS: 100 INJECTION, SOLUTION INTRAVENOUS; SUBCUTANEOUS at 17:42

## 2017-09-24 RX ADMIN — INSULIN GLARGINE 10 UNITS: 100 INJECTION, SOLUTION SUBCUTANEOUS at 09:10

## 2017-09-24 RX ADMIN — CEFTRIAXONE SODIUM 1000 MG: 10 INJECTION, POWDER, FOR SOLUTION INTRAVENOUS at 18:53

## 2017-09-24 NOTE — PROGRESS NOTES
[facility] Internal [facility] Progress Note  Patient: Alice Edgar 68 y o  male   MRN: [id number]  PCP: Danis Bryant MD  Unit/Bed#: [facility] 834-01 Encounter: [id number]  Date Of Visit: 17    Assessment/Plan:  Urinary retention  Suspected UTI in patient with indwelling siddiqui catheter  - c/w rocephin, ID following  - for suprapubic cath via IR tomorrow----> will follow  - urology following  - siddiqui functioning  - patient with failed voiding trial as OP     COPD w/o exacerbation  - c/w current care and monitoring     HTN  - fair readings  - c/w current care     DM 2  - POC and ISS  - GLC fair     VTE Pharmacologic Prophylaxis: yes  Pharmacologic: Heparin  Mechanical VTE Prophylaxis in Place: Yes     Patient Centered Rounds: I have performed bedside rounds with nursing staff today      Discussions with Specialists or Other Care Team Provider: none     Education and Discussions with Family / Patient: patient, declined call home     Time Spent for Care: 45 minutes  More than 50% of total time spent on counseling and coordination of care as described above      Current Length of Stay: 2 day(s)     Current Patient Status: Inpatient   Certification Statement: The patient will continue to require additional inpatient hospital stay due to plaese see plan as above     Discharge Plan: post-acute rehab as per OT     Code Status: Level 1 - Full Code    Subjective:   Patient feeling anxious for procedure in am    Objective:     Vitals:   Temp (24hrs), Av 3 °F (36 8 °C), Min:98 °F (36 7 °C), Max:98 5 °F (36 9 °C)    HR:  [56-71] 56  Resp:  [16-18] 18  BP: (128-141)/(63-84) 128/74  SpO2:  [97 %-98 %] 98 %  Body mass index is 37 08 kg/m²  Input and Output Summary (last 24 hours):        Intake/Output Summary (Last 24 hours) at 17 1600  Last data filed at 17 1400   Gross per 24 hour   Intake             1220 ml   Output             3475 ml   Net            -2255 ml       Physical Exam:     Physical Exam Constitutional: He is oriented to person, place, and time  He appears well-developed  Cardiovascular: Normal rate, regular rhythm and normal heart sounds  Exam reveals no friction rub  No murmur heard  Pulmonary/Chest: Effort normal and breath sounds normal  No respiratory distress  He has no wheezes  He has no rales  Abdominal: Soft  Bowel sounds are normal  He exhibits no distension  There is no tenderness  There is no rebound  Musculoskeletal: He exhibits no edema  Neurological: He is alert and oriented to person, place, and time  He exhibits normal muscle tone  Skin: Skin is warm  Psychiatric: He has a normal mood and affect  Additional Data:     Labs:      Results from last 7 days  Lab Units 09/24/17  0505   WBC Thousand/uL 12 42*   HEMOGLOBIN g/dL 11 9*   HEMATOCRIT % 36 9   PLATELETS Thousands/uL 165   NEUTROS PCT % 67   LYMPHS PCT % 12*   MONOS PCT % 12   EOS PCT % 9*       Results from last 7 days  Lab Units 09/24/17  0505 09/23/17  0558   SODIUM mmol/L 138 140   POTASSIUM mmol/L 3 9 4 0   CHLORIDE mmol/L 102 105   CO2 mmol/L 31 32   BUN mg/dL 23 27*   CREATININE mg/dL 0 60 0 64   CALCIUM mg/dL 7 6* 7 5*   TOTAL PROTEIN g/dL  --  5 2*   BILIRUBIN TOTAL mg/dL  --  0 51   ALK PHOS U/L  --  87   ALT U/L  --  22   AST U/L  --  11   GLUCOSE RANDOM mg/dL 218* 170*           * I Have Reviewed All Lab Data Listed Above  * Additional Pertinent Lab Tests Reviewed:  All Labs Within Last 24 Hours Reviewed    Imaging:    Imaging Reports Reviewed Today Include: none  Imaging Personally Reviewed by Myself Includes:  none    Recent Cultures (last 7 days):       Results from last 7 days  Lab Units 09/22/17  0845   URINE CULTURE  No Growth <1000 cfu/mL       Last 24 Hours Medication List:     amLODIPine 5 mg Oral Daily   aspirin 81 mg Oral Daily   atorvastatin 40 mg Oral Daily With Dinner   carvedilol 6 25 mg Oral BID With Meals   cefTRIAXone 1,000 mg Intravenous Q24H   [START ON 9/26/2017] enoxaparin 40 mg Subcutaneous Daily   famotidine 20 mg Oral Daily   finasteride 5 mg Oral Daily   insulin glargine 10 Units Subcutaneous Q12H DOMO   insulin lispro 1-6 Units Subcutaneous TID AC   insulin lispro 1-6 Units Subcutaneous HS   tamsulosin 0 4 mg Oral Daily With Dinner        Today, Patient Was Seen By: Bertha Paredes MD    ** Please Note: Dragon 360 Dictation voice to text software may have been used in the creation of this document   **

## 2017-09-24 NOTE — PROGRESS NOTES
Patient doing well  Admission urine culture shows no growth  Prior culture demonstrated susceptibility to ceftriaxone, which the patient remains on  Patient will be kept NPO after midnight  PT/PTT to be ordered  Prophylactic lovenox to be held tomorrow AM  IR suprapubic tube placement is ordered  Urology will follow

## 2017-09-24 NOTE — OCCUPATIONAL THERAPY NOTE
[address] [facility] Evaluation     Patient Name: Fe Calero  Today's Date: 9/24/2017  Problem List  Patient Active Problem List   Diagnosis    Systolic and diastolic CHF, chronic    Spinal stenosis of lumbar region    PVD (peripheral vascular disease)    Essential hypertension    Hyperlipidemia    Type 2 diabetes mellitus with hyperglycemia    COPD (chronic obstructive pulmonary disease)    Urinary retention due to benign prostatic hyperplasia    Multiple renal cysts    Vitamin D insufficiency    Kidney disease, chronic, stage III (GFR 30-59 ml/min)    Ambulatory dysfunction    Gait difficulty    Morbid obesity    S/P CABG x 3     Past Medical History  Past Medical History:   Diagnosis Date    CHF (congestive heart failure)     COPD (chronic obstructive pulmonary disease)     Diabetes mellitus     ESBL (extended spectrum beta-lactamase) producing bacteria infection     History of BPH     Hyperlipidemia     Hypertension     PE (pulmonary thromboembolism)     PVD (peripheral vascular disease)     Spinal stenosis of lumbar region     Stroke     Systolic and diastolic CHF, chronic     Urinary retention     UTI due to extended-spectrum beta lactamase (ESBL) producing Escherichia coli      Past Surgical History  Past Surgical History:   Procedure Laterality Date    CARDIAC SURGERY      cabg    HIP SURGERY Right     plate and pin         09/24/17 0910   Note Type   Note type Eval/Treat   Restrictions/Precautions   Weight Bearing Precautions Per Order No   Other Precautions Chair Alarm; Bed Alarm; Fall Risk;O2  (Chair alarm on at end of therapy session )   Pain Assessment   Pain Assessment No/denies pain   Pain Score No Pain   Home Living   Type of [address] [place] [address] One level   Bathroom Shower/Tub Tub/shower unit   Bathroom Toilet Standard   Bathroom Equipment Tub transfer bench   [address] [facility]  (home O2)   Additional Comments Pt lives with his wife in a single story home with 1+1 CHARLES  Prior Function   Level of [place] Needs assistance with ADLs and functional mobility; Needs assistance with IADLs   Lives With Spouse   Receives Help From Home health   ADL Assistance Needs assistance   IADLs Needs assistance   Falls in the last 6 months 0   Vocational Retired   Comments Pt required A for ADLS and IADLS, pt does not drive & required use of rw PTA  Pt reports an aid who comes to perform ADLS with him 5x a week  Lifestyle   Autonomy Pt required A with ADLS/IADLS PTA    Reciprocal Relationships Pt lives with his wife and receives A from [facility] 5x a week    Service to Others Pt is a retired    Intrinsic Gratification Pt anjoys watching tv  Pt mostly sedentary PTA    Psychosocial   Psychosocial (WDL) X   Patient Behaviors/Mood Anxious   Needs Expressed Physical   Ability to Express Feelings Able to express   Ability to Express Needs Able to express   Ability to Express Thoughts Able to express   Ability to Understand Others Understands   ADL   Eating Assistance 5  Supervision/Setup   Grooming Assistance 5  Supervision/Setup   UB Bathing Assistance 5  Supervision/Setup   LB Bathing Assistance 2  Maximal Assistance   UB Dressing Assistance 4  Minimal Assistance   LB Dressing Assistance 2  Maximal [address]  4  Minimal Assistance   Additional Comments Overall pt requires S-min A for UB ADLS and max A for LB ADLS   Bed Mobility   Supine to Sit 5  Supervision   Additional items HOB elevated; Increased time required; Bedrails   Sit to Supine Unable to assess  (Pt OOB to chair at end of session w/ call bell in reach )   Transfers   Sit to Stand 4  Minimal assistance  (CGA)   Additional items Assist x 1   Stand to Sit 4  Minimal assistance  (CGA)   Additional items Assist x 1   Additional Comments Pt performs functional transfers with CGA for steadying/balance     Functional Mobility   Functional Mobility 4  Minimal assistance Additional Comments Pt performs short functional mobility (from bed to bedside recliner ~3 feet) using rw with min A for steadying/balance  Pt with significant anxiety and declining to perform further mobility at this time    Additional items Rolling walker   Balance   Static Sitting Fair +   Dynamic Sitting Fair   Static Standing Fair   Dynamic Standing Fair -   Ambulatory Fair -   Activity Tolerance   Activity Tolerance Patient limited by fatigue; Other (Comment)  (anxiety)   Nurse Made Aware DEL Germain    RUE Assessment   RUE Assessment WFL   LUE Assessment   LUE Assessment WFL   Hand Function   Gross Motor Coordination Functional   Fine Motor Coordination Functional   Cognition   Arousal/Participation Alert; Responsive; Cooperative   Attention Attends with cues to redirect   Orientation Level Oriented to place;Oriented to situation;Oriented to person   Memory Within functional limits   Following Commands Follows one step commands with increased time or repetition   Comments Pt presents with significant anxiety and requires increased time to complete tasks and follow commands  Pt easily distacted and responds well to 1 step directions  Assessment   Limitation Decreased ADL status; Decreased UE strength;Decreased endurance;Decreased self-care trans;Decreased high-level ADLs   Prognosis Fair   Assessment Pt is a 69 y/o male seen for OT eval s/p adm to [facility] w/ urinary retention transferred to [facility] for suprapubic catheter placement  dx'd w/  Comorbidities include a h/o BPH, PE, urinary retention, systolic and diastolic congestive heart failure, PVD, HTN, DM, COPD, UTIs, renal cysts ESBL, spinal stenosis, stroke, and anxiety  Pertinent past sx include CABG and R hip plate and pin  Pt with active OT orders and contact precautions  Pt is on 2L O2 at home  Pt lives with his wife in a single story home with 1+1 CHARLES  Pt required A for ADLS and IADLS, pt does not drive & required use of rw PTA   Pt reports an aid who comes to perform ADLS with him 5x a week  Pt is currently demonstrating the following occupational deficits: min A UB ADLS, max A LB ADLS, min A functional transfers and min A functional mobility using rw for short distances  Pt is limited 2* significant anxiety, discomfort with catheter, decreased endurance/activity tolerance, self-limiting behaviors, decreased functional forward reach, decreased ADL status, decreased transfer status and decreased mobility status  The following Occupational Performance Areas to address include: bathing/shower, toilet hygiene, dressing, functional mobility, community mobility and clothing management  Pt scored overall 45/100 on the Barthel Index  Based on the aforementioned OT evaluation, functional performance deficits, and assessments, pt has been identified as a high complexity evaluation  Recommend short term rehab upon D/C to increase functional skills  Pt to continue to benefit from acute immediate OT services to address the following goals 3-5x/wk to  w/in 7-10 days:   Goals   Patient Goals to have his procedure done    Plan   Treatment Interventions ADL retraining;Functional transfer training;UE strengthening/ROM; Endurance training;Patient/family training;Equipment evaluation/education; Compensatory technique education;Continued evaluation; Energy conservation; Activityengagement   Goal Expiration Date 10/04/17   OT Frequency 3-5x/wk   Recommendation   Discharge Recommendation Short Term Rehab   Barthel Index   Feeding 10   Bathing 0   Grooming Score 5   Dressing Score 5   Bladder Score 0  (siddiqui)   Bowels Score 10   Toilet Use Score 5   Transfers (Bed/Chair) Score 10   Mobility (Level Surface) Score 0   Stairs Score 0   Barthel Index Score 45       GOALS:  1) Pt will improve activity tolerance to G for min 30 min txment sessions  2) Pt will complete ADLs/self care w/ S using adaptive equipment as needed w/ G hyiene/thoroughness w/ min cues fro cog support  3) Pt will complete toileting w/ mod I w/ G hygiene/thoroughness using DME as needed  4) Pt will improve functional transfers on/off all surfaces using DME as needed w/ G balance/safety including toileting w/ mod I  5) Pt will improve functional mobility during ADL/IADL/leisure tasks using DME as needed w/ g balance/safety w/ mod I  6) Pt will engage in ongoing cognitive assessment w/ G participation w/ mod I to assist w/ safe d/c planning/recommendations  7) Pt will demonstrate G carryover of pt/caregiver education and training as appropriate w/ mod I w/o cues w/ G tolerance  8) Pt will demonstrate 100% carryover of learned E C  techniques s/p skilled education w/o cues t/o functional ADL/ IADL/leisure interest tasks w/ mod I  9) Pt will engage in depression screen/leisure interest checklist w/ mod I and G participation to monitor s/s depression and ID 3 positive coping strategies to A w/ emotional regulation and management      Willem Hayes MS, OTR/L

## 2017-09-24 NOTE — PLAN OF CARE
Problem: OCCUPATIONAL THERAPY ADULT  Goal: Performs self-care activities at highest level of function for planned discharge setting  See evaluation for individualized goals  Treatment Interventions: ADL retraining, Functional transfer training, UE strengthening/ROM, Endurance training, Patient/family training, Equipment evaluation/education, Compensatory technique education, Continued evaluation, Energy conservation, Activityengagement          See flowsheet documentation for full assessment, interventions and recommendations  Limitation: Decreased ADL status, Decreased UE strength, Decreased endurance, Decreased self-care trans, Decreased high-level ADLs  Prognosis: Fair  Assessment: Pt is a 67 y/o male seen for OT eval s/p adm to [facility] w/ urinary retention transferred [facility] for suprapubic catheter placement  dx'd w/  Comorbidities include a h/o BPH, PE, urinary retention, systolic and diastolic congestive heart failure, PVD, HTN, DM, COPD, UTIs, renal cysts ESBL, spinal stenosis, stroke, and anxiety  Pertinent past sx include CABG and R hip plate and pin  Pt with active OT orders and contact precautions  Pt is on 2L O2 at home  Pt lives with his wife in a single story home with 1+1 CHARLES  Pt required A for ADLS and IADLS, pt does not drive & required use of rw PTA  Pt reports an aid who comes to perform ADLS with him 5x a week  Pt is currently demonstrating the following occupational deficits: min A UB ADLS, max A LB ADLS, min A functional transfers and min A functional mobility using rw for short distances  Pt is limited 2* significant anxiety, discomfort with catheter, decreased endurance/activity tolerance, self-limiting behaviors, decreased functional forward reach, decreased ADL status, decreased transfer status and decreased mobility status   The following Occupational Performance Areas to address include: bathing/shower, toilet hygiene, dressing, functional mobility, community mobility and clothing management  Pt scored overall 45/100 on the Barthel Index  Based on the aforementioned OT evaluation, functional performance deficits, and assessments, pt has been identified as a high complexity evaluation  Recommend short term rehab upon D/C to increase functional skills   Pt to continue to benefit from acute immediate OT services to address the following goals 3-5x/wk to  w/in 7-10 days:     Discharge Recommendation: Short Term Rehab     Nile Reason, MS, OTR/L

## 2017-09-24 NOTE — PROGRESS NOTES
Progress Note - Infectious Disease   Lj Busch 68 y o  male MRN: [id number]  Unit/Bed#: [facility] 834-01 Encounter: [id number]      Impression/Recommendations:  1   Possible UTI   Patient has very little clinical symptoms consistent of UTI   However, he does have unexplained leukocytosis  Patient is clinically stable on IV ceftriaxone  Urine culture this admission is negative  Will continue antibiotic through [facility] insertion, then stop  Continue IV ceftriaxone  Treat through [facility] insertion  Monitor temperature/WBC  Monitor for recurrent abdominal pain      2  Diane Klinefelter retention  Dorlene Postin has Ozuna catheter in place   Patient should be fine for suprapubic catheterization insertion tomorrow      3   Leukocytosis    WBC decreasing  Monitor WBC with treatment of UTI above      4   PCN allergy   This is unclear   Regardless, reaction was not severe    patient tolerated ceftriaxone well  Monitor for reaction on ceftriaxone      Discussed with the patient in detail regarding the above plan      Antibiotics:  Ceftriaxone  Antibiotic # 3     Subjective:  Patient is comfortable  No abdominal or flank pain  Temperature stays down  No chills  He is tolerating antibiotic well  No nausea, vomiting or diarrhea  No pruritus or rash  Objective:  Vitals:  HR:  [56-71] 71  Resp:  [16-18] 18  BP: (128-138)/(61-84) 130/61  SpO2:  [97 %-98 %] 97 %  Temp (24hrs), Av 3 °F (36 8 °C), Min:98 °F (36 7 °C), Max:98 5 °F (36 9 °C)  Current: Temperature: 98 5 °F (36 9 °C)    Physical Exam:     General: Awake, alert, cooperative, no distress  Lungs: Expansion symmetric, no rales, no wheezing, respirations unlabored  Heart[de-identified]  Regular rate and rhythm, S1 and S2 normal, no murmur  Abdomen: Soft, nondistended, non-tender, bowel sounds active all four quadrants,        no masses, no organomegaly  Extremities: No edema  No erythema/warmth  No ulcer  Nontender to palpation  Skin:  No rash       Invasive Devices     Peripheral Intravenous Line            Peripheral IV 09/21/17 Left Antecubital 3 days          Drain            Urethral Catheter Non-latex 3 days                Labs studies:   I have personally reviewed pertinent labs  Results from last 7 days  Lab Units 09/24/17  0505 09/23/17  0558 09/22/17  0649 09/21/17  1604   SODIUM mmol/L 138 140 141 139   POTASSIUM mmol/L 3 9 4 0 3 8 4 5   CHLORIDE mmol/L 102 105 104 100   CO2 mmol/L 31 32 34* 35*   ANION GAP mmol/L 5 3* 3* 4   BUN mg/dL 23 27* 29* 30*   CREATININE mg/dL 0 60 0 64 0 74 0 92   EGFR ml/min/1 73sq m 97 94 89 80   GLUCOSE RANDOM mg/dL 218* 170* 241* 286*   CALCIUM mg/dL 7 6* 7 5* 7 6* 7 5*   AST U/L  --  11  --  27   ALT U/L  --  22  --  29   ALK PHOS U/L  --  87  --  93   TOTAL PROTEIN g/dL  --  5 2*  --  6 2*   ALBUMIN g/dL  --  2 2*  --  2 7*   BILIRUBIN TOTAL mg/dL  --  0 51  --  0 60       Results from last 7 days  Lab Units 09/24/17  0505 09/23/17  0558 09/22/17  0649   WBC Thousand/uL 12 42* 13 00* 15 22*   HEMOGLOBIN g/dL 11 9* 11 5* 12 8   PLATELETS Thousands/uL 165 162 169       Results from last 7 days  Lab Units 09/22/17  0845   URINE CULTURE  No Growth <1000 cfu/mL       Imaging Studies:   I have personally reviewed pertinent imaging study reports and images in PACS  EKG, Pathology, and Other Studies:   I have personally reviewed pertinent reports

## 2017-09-25 LAB
GLUCOSE SERPL-MCNC: 176 MG/DL (ref 65–140)
GLUCOSE SERPL-MCNC: 195 MG/DL (ref 65–140)
GLUCOSE SERPL-MCNC: 264 MG/DL (ref 65–140)
GLUCOSE SERPL-MCNC: 297 MG/DL (ref 65–140)

## 2017-09-25 PROCEDURE — 82948 REAGENT STRIP/BLOOD GLUCOSE: CPT

## 2017-09-25 PROCEDURE — 97163 PT EVAL HIGH COMPLEX 45 MIN: CPT

## 2017-09-25 PROCEDURE — G8978 MOBILITY CURRENT STATUS: HCPCS

## 2017-09-25 PROCEDURE — G8979 MOBILITY GOAL STATUS: HCPCS

## 2017-09-25 RX ORDER — INSULIN GLARGINE 100 [IU]/ML
6 INJECTION, SOLUTION SUBCUTANEOUS ONCE
Status: COMPLETED | OUTPATIENT
Start: 2017-09-25 | End: 2017-09-25

## 2017-09-25 RX ORDER — INSULIN GLARGINE 100 [IU]/ML
14 INJECTION, SOLUTION SUBCUTANEOUS EVERY MORNING
Status: DISCONTINUED | OUTPATIENT
Start: 2017-09-26 | End: 2017-09-25

## 2017-09-25 RX ORDER — INSULIN GLARGINE 100 [IU]/ML
7 INJECTION, SOLUTION SUBCUTANEOUS ONCE
Status: COMPLETED | OUTPATIENT
Start: 2017-09-26 | End: 2017-09-26

## 2017-09-25 RX ORDER — INSULIN GLARGINE 100 [IU]/ML
10 INJECTION, SOLUTION SUBCUTANEOUS
Status: DISCONTINUED | OUTPATIENT
Start: 2017-09-25 | End: 2017-09-25

## 2017-09-25 RX ORDER — INSULIN GLARGINE 100 [IU]/ML
10 INJECTION, SOLUTION SUBCUTANEOUS
Status: DISCONTINUED | OUTPATIENT
Start: 2017-09-26 | End: 2017-09-28 | Stop reason: HOSPADM

## 2017-09-25 RX ORDER — INSULIN GLARGINE 100 [IU]/ML
14 INJECTION, SOLUTION SUBCUTANEOUS EVERY MORNING
Status: DISCONTINUED | OUTPATIENT
Start: 2017-09-27 | End: 2017-09-28 | Stop reason: HOSPADM

## 2017-09-25 RX ORDER — INSULIN GLARGINE 100 [IU]/ML
14 INJECTION, SOLUTION SUBCUTANEOUS EVERY MORNING
Status: DISCONTINUED | OUTPATIENT
Start: 2017-09-25 | End: 2017-09-25

## 2017-09-25 RX ORDER — INSULIN GLARGINE 100 [IU]/ML
7 INJECTION, SOLUTION SUBCUTANEOUS ONCE
Status: COMPLETED | OUTPATIENT
Start: 2017-09-25 | End: 2017-09-25

## 2017-09-25 RX ADMIN — TAMSULOSIN HYDROCHLORIDE 0.4 MG: 0.4 CAPSULE ORAL at 16:40

## 2017-09-25 RX ADMIN — INSULIN LISPRO 4 UNITS: 100 INJECTION, SOLUTION INTRAVENOUS; SUBCUTANEOUS at 16:41

## 2017-09-25 RX ADMIN — FINASTERIDE 5 MG: 5 TABLET, FILM COATED ORAL at 11:55

## 2017-09-25 RX ADMIN — ASPIRIN 81 MG 81 MG: 81 TABLET ORAL at 11:55

## 2017-09-25 RX ADMIN — FAMOTIDINE 20 MG: 20 TABLET ORAL at 11:55

## 2017-09-25 RX ADMIN — INSULIN GLARGINE 6 UNITS: 100 INJECTION, SOLUTION SUBCUTANEOUS at 22:20

## 2017-09-25 RX ADMIN — ACETAMINOPHEN 650 MG: 325 TABLET, FILM COATED ORAL at 09:10

## 2017-09-25 RX ADMIN — INSULIN LISPRO 2 UNITS: 100 INJECTION, SOLUTION INTRAVENOUS; SUBCUTANEOUS at 11:45

## 2017-09-25 RX ADMIN — CARVEDILOL 6.25 MG: 6.25 TABLET, FILM COATED ORAL at 09:10

## 2017-09-25 RX ADMIN — CEFTRIAXONE SODIUM 1000 MG: 10 INJECTION, POWDER, FOR SOLUTION INTRAVENOUS at 18:48

## 2017-09-25 RX ADMIN — AMLODIPINE BESYLATE 5 MG: 5 TABLET ORAL at 09:10

## 2017-09-25 RX ADMIN — INSULIN GLARGINE 7 UNITS: 100 INJECTION, SOLUTION SUBCUTANEOUS at 09:14

## 2017-09-25 RX ADMIN — INSULIN LISPRO 5 UNITS: 100 INJECTION, SOLUTION INTRAVENOUS; SUBCUTANEOUS at 16:41

## 2017-09-25 RX ADMIN — CARVEDILOL 6.25 MG: 6.25 TABLET, FILM COATED ORAL at 16:40

## 2017-09-25 RX ADMIN — ATORVASTATIN CALCIUM 40 MG: 40 TABLET, FILM COATED ORAL at 16:40

## 2017-09-25 RX ADMIN — INSULIN LISPRO 3 UNITS: 100 INJECTION, SOLUTION INTRAVENOUS; SUBCUTANEOUS at 22:21

## 2017-09-25 NOTE — PHYSICAL THERAPY NOTE
Physical Therapy Evaluation    Patient's Name: Alice Edgar    Admitting Diagnosis  Urinary retention [R33 9]    Problem List  Patient Active Problem List   Diagnosis    Systolic and diastolic CHF, chronic    Spinal stenosis of lumbar region    PVD (peripheral vascular disease)    Essential hypertension    Hyperlipidemia    Type 2 diabetes mellitus with hyperglycemia    COPD (chronic obstructive pulmonary disease)    Urinary retention due to benign prostatic hyperplasia    Multiple renal cysts    Vitamin D insufficiency    Kidney disease, chronic, stage III (GFR 30-59 ml/min)    Ambulatory dysfunction    Gait difficulty    Morbid obesity    S/P CABG x 3       Past Medical History  Past Medical History:   Diagnosis Date    CHF (congestive heart failure)     COPD (chronic obstructive pulmonary disease)     Diabetes mellitus     ESBL (extended spectrum beta-lactamase) producing bacteria infection     History of BPH     Hyperlipidemia     Hypertension     PE (pulmonary thromboembolism)     PVD (peripheral vascular disease)     Spinal stenosis of lumbar region     Stroke     Systolic and diastolic CHF, chronic     Urinary retention     UTI due to extended-spectrum beta lactamase (ESBL) producing Escherichia coli        Past Surgical History  Past Surgical History:   Procedure Laterality Date    CARDIAC SURGERY      cabg    HIP SURGERY Right     plate and pin          09/25/17 0840   Note Type   Note type Eval only   Pain Assessment   Pain Assessment 0-10   Pain Score 6   Pain Type Chronic pain  (per pt back pain)   Pain Location Back   Pain Orientation Lower;Bilateral   Hospital Pain Intervention(s) Repositioned; Ambulation/increased activity; Emotional support; Environmental changes; Rest  (NSG aware and pt requesting pain meds)   Home Living   Type of [address] [place] [address] One level;Stairs to enter without rails  (1 +1 CHARLES)   Home Equipment (use of Rw for mobility PTA,owns SPC) Additional Comments pt reports receiving rehab services at [facility] and returned home recently;pt reports having home health aides 5xs/week unable to recall number of hours per day,pt lives with wife and uses personal DME PTA   Prior Function   Level of [place] Needs assistance with ADLs and functional mobility; Needs assistance with IADLs   Lives With Spouse   Receives Help From Family;Home health  (home health aide 5xs/week)   ADL Assistance Needs assistance   IADLs Needs assistance   Falls in the last 6 months 0  (pt reports no recent falls)   Restrictions/Precautions   Other Precautions Chair Alarm;Pain; Fall Risk;O2;Multiple lines  (use of 2 L NC O2 PTA at home)   General   Additional Pertinent History transfer from [facility] for unine retention and s/p suprapubic cath placement   Family/Caregiver Present No   Cognition   Overall Cognitive Status Impaired   Arousal/Participation Alert   Attention Attends with cues to redirect   Orientation Level Oriented to person;Oriented to place   Following Commands Follows one step commands with increased time or repetition  (2* slow mobility and pain)   RLE Assessment   RLE Assessment (3+/5 grossly throughout)   LLE Assessment   LLE Assessment (3+/5 grossly throughout)   Coordination   Movements are Fluid and Coordinated 0   Coordination and Movement Description ataxic,slow,unsteady,pain   Sensation WFL   Light Touch   RLE Light Touch Grossly intact   LLE Light Touch Grossly intact   Bed Mobility   Supine to Sit 5  Supervision   Additional items Assist x 1;HOB elevated; Bedrails; Increased time required;Verbal cues  (pt reports use of regular standard bed at home PTA)   Transfers   Sit to Stand 4  Minimal assistance   Additional items Assist x 1;Bedrails; Increased time required;Verbal cues   Stand to Sit 4  Minimal assistance   Additional items Assist x 1; Armrests; Increased time required;Verbal cues  (for safety,education and control descent)   Ambulation/Elevation Gait pattern Poor UE support; Improper Weight shift; Antalgic;Narrow WERNER; Decreased foot clearance; Inconsistent patrice; Short stride; Ataxia; Excessively slow   Gait Assistance 4  Minimal assist   Additional items Assist x 1;Verbal cues; Tactile cues   Assistive Device Rolling walker  (wide RW)   Distance 10 feet with use of wide RW on tile surface;pt declined further ambulation distance 2* inc back pain and fatigue;pt reports SOB during and following mobility;O2 NC line on floor upon arrival->education and instruction for pt to use O2 at all times->pt agreed and independently donned NC O2 in nostrils   Balance   Static Sitting (chair alarm intact prior to leaving pt;s room;fair EOB)   Dynamic Sitting Poor +   Static Standing Poor +   Dynamic Standing Poor +   Ambulatory Poor +   Endurance Deficit   Endurance Deficit Yes   Endurance Deficit Description use of 2 L NC O2,fatigue,reports of SOB,hospital deconditioning   Activity Tolerance   Activity Tolerance Patient limited by fatigue;Patient limited by pain;Treatment limited secondary to medical complications (Comment)  (fair)   Nurse Made Aware yes Bruce Song)   Assessment   Prognosis Guarded   Problem List Decreased strength;Decreased endurance; Impaired balance;Decreased mobility; Decreased cognition;Decreased safety awareness; Impaired hearing;Obesity; Decreased skin integrity;Pain   Assessment Pt is a 69 y/o male admitted to [facility] 2* urine retention and transferred from [facility] to [facility] for s/p suprapubic cath placement  Pt lives with spouse in 1 story home,2 CHARLES (1+1) and use of personal DME PTA  Pt reports recently receiving inpt rehab services at [facility]  Pt currently uses 2 L NC O2 in SLB and at home  Pt demonstrates minimal deficits during functional mobility and gait including dec endurance,dec balance,inc chronic low to mid back pain,cont use of DME and needs minAx1 for transfers and gait with wide RW and S for BM   Pt reports 5xs/weel home health aides to A with needs upon D/C  Pt currently is not at functional mobility baseline with inc fatigue and SOB during and following mobility,dec safety awareness,limited mobility 2* pain and fatigue and needs Ax1 for all modes of mobility  Pt would cont to benefit from skilled inpt PT services to maximize functional independence   Barriers to Discharge Inaccessible home environment  (CHARLES)   Goals   Patient Goals to go home   STG Expiration Date 10/05/17   Short Term Goal #1 in 10 days:pt will be able to ambulate short household distances on various surfaces with use of RW S->mod I,activity tolerance:45mins/45mins,inc balance 1 grade,BM and transfers I to and from various surfaces consistently,perform stair training 1 +1 steps with use of RW minAx1   Treatment Day 0   Plan   Treatment/Interventions Functional transfer training; Endurance training;Patient/family training;Equipment eval/education; Bed mobility;Gait training;Spoke to nursing   PT Frequency 5x/wk   Recommendation   Recommendation (inpt rhb vs home with family support and inc home services)   Equipment Recommended Walker  (cont use of RW for mobility)   Barthel Index   Feeding 10   Bathing 0   Grooming Score 0   Dressing Score 5   Bladder Score 0   Bowels Score 10   Toilet Use Score 5   Transfers (Bed/Chair) Score 10   Mobility (Level Surface) Score 0   Stairs Score 0   Barthel Index Score 40     Skilled PT recommended while in hospital and upon DC to progress pt toward treatment goals     Marito Espinosa PT

## 2017-09-25 NOTE — SOCIAL WORK
CM met with pt and aware PT recommendation if for [facility]   Pt aware and agreeable pt requested referral to [facility] and [place] referrals in [facility] as such   Pt also agreeable to start MA process  Cm left voice mail for liba to see pt   Cm will follow

## 2017-09-25 NOTE — PROGRESS NOTES
Progress Note - Infectious Disease   Gin Aguirre 68 y o  male MRN: [id number]  Unit/Bed#: [facility] 834-01 Encounter: [id number]      Impression/Recommendations:  1   Possible UTI   Patient has very little clinical symptoms consistent of UTI   However, he does have unexplained leukocytosis  Patient is clinically stable on IV ceftriaxone  Urine culture this admission is negative  Will continue antibiotic through [facility] insertion, then transition to p o  Continue IV ceftriaxone  Monitor temperature/WBC  Monitor for recurrent abdominal pain  Transition to p  o  cefdinir after SPC placement  Treat x 7 days total, another 3 days      2  Erling Lion retention  Mirian Benjamin has Ozuna catheter in place   Patient should be fine for suprapubic catheterization insertion tomorrow      3   Leukocytosis    WBC decreasing  Monitor WBC with treatment of UTI above      4   PCN allergy   This is unclear   Regardless, reaction was not severe    patient tolerated ceftriaxone well  Monitor for reaction on ceftriaxone      Discussed with the patient in detail regarding the above plan      Antibiotics:  Ceftriaxone  Antibiotic # 4     Subjective:  Patient is comfortable   No abdominal or flank pain  Temperature stays down   No chills  He is tolerating antibiotic well   No nausea, vomiting or diarrhea   No pruritus or rash  Objective:  Vitals:  HR:  [58-71] 63  Resp:  [18] 18  BP: (130-144)/(61-64) 144/64  SpO2:  [93 %-97 %] 93 %  Temp (24hrs), Av 3 °F (36 8 °C), Min:98 1 °F (36 7 °C), Max:98 5 °F (36 9 °C)  Current: Temperature: 98 1 °F (36 7 °C)    Physical Exam:     General: Awake, alert, cooperative, no distress  Lungs: Expansion symmetric, no rales, no wheezing, respirations unlabored  Heart[de-identified]  Regular rate and rhythm, S1 and S2 normal, no murmur  Abdomen: Soft, nondistended, non-tender, bowel sounds active all four quadrants,        no masses, no organomegaly  Extremities: Trace edema  No erythema/warmth  No ulcer  Nontender to palpation  Skin:  No rash  Invasive Devices     Peripheral Intravenous Line            Peripheral IV 09/21/17 Left Antecubital 3 days          Drain            Urethral Catheter Non-latex 3 days                Labs studies:   I have personally reviewed pertinent labs  Results from last 7 days  Lab Units 09/24/17  0505 09/23/17  0558 09/22/17  0649 09/21/17  1604   SODIUM mmol/L 138 140 141 139   POTASSIUM mmol/L 3 9 4 0 3 8 4 5   CHLORIDE mmol/L 102 105 104 100   CO2 mmol/L 31 32 34* 35*   ANION GAP mmol/L 5 3* 3* 4   BUN mg/dL 23 27* 29* 30*   CREATININE mg/dL 0 60 0 64 0 74 0 92   EGFR ml/min/1 73sq m 97 94 89 80   GLUCOSE RANDOM mg/dL 218* 170* 241* 286*   CALCIUM mg/dL 7 6* 7 5* 7 6* 7 5*   AST U/L  --  11  --  27   ALT U/L  --  22  --  29   ALK PHOS U/L  --  87  --  93   TOTAL PROTEIN g/dL  --  5 2*  --  6 2*   ALBUMIN g/dL  --  2 2*  --  2 7*   BILIRUBIN TOTAL mg/dL  --  0 51  --  0 60       Results from last 7 days  Lab Units 09/24/17  0505 09/23/17  0558 09/22/17  0649   WBC Thousand/uL 12 42* 13 00* 15 22*   HEMOGLOBIN g/dL 11 9* 11 5* 12 8   PLATELETS Thousands/uL 165 162 169       Results from last 7 days  Lab Units 09/22/17  0845   URINE CULTURE  No Growth <1000 cfu/mL       Imaging Studies:   I have personally reviewed pertinent imaging study reports and images in PACS  EKG, Pathology, and Other Studies:   I have personally reviewed pertinent reports

## 2017-09-25 NOTE — PROGRESS NOTES
Progress Note - Mariposa Boucher 68 y o  male MRN: [id number]    Unit/Bed#: [facility] 834-01 Encounter: [id number]      Assessment:  Mr Martha Bernal is a 51-year-old medically complex male with a history of chronic urinary retention not amenable to medical management; as well as, multiple multidrug resistant UTI  Patient is admitted for preprocedure sterilization  Patient is NPO after midnight for IR placement of suprapubic catheter to today  Plan:    Although patient is afebrile, recommend continuation of antimicrobials secondary to  surgical instrumentation  May remove urinary catheter per once suprapubic is placed  Will arrange for follow-up with Dr Anu Alamnzar in several weeks to discuss upsizing and/or catheter exchange   Subjective:   Denies fever, chills, flank, testicular quite pain  Objective:     Vitals: Blood pressure 144/64, pulse 63, temperature 98 1 °F (36 7 °C), temperature source Oral, resp  rate 18, height 5' 11 5" (1 816 m), weight 122 kg (269 lb 9 6 oz), SpO2 93 %  ,Body mass index is 37 08 kg/m²        Intake/Output Summary (Last 24 hours) at 09/25/17 1130  Last data filed at 09/25/17 0910   Gross per 24 hour   Intake              540 ml   Output              800 ml   Net             -260 ml       Physical Exam: /64   Pulse 63   Temp 98 1 °F (36 7 °C) (Oral)   Resp 18   Ht 5' 11 5" (1 816 m)   Wt 122 kg (269 lb 9 6 oz)   SpO2 93%   BMI 37 08 kg/m²     General Appearance:    Alert, cooperative, no distress, appears stated age   Head:    Normocephalic, without obvious abnormality, atraumatic   Eyes:    PERRL, conjunctiva/corneas clear, EOM's intact, fundi     benign, both eyes        Ears:    Normal TM's and external ear canals, both ears   Nose:   Nares normal, septum midline, mucosa normal, no drainage    or sinus tenderness   Throat:   Lips, mucosa, and tongue normal; teeth and gums normal   Neck:   Supple, symmetrical, trachea midline, no adenopathy;        thyroid:  No enlargement/tenderness/nodules; no carotid    bruit or JVD   Back:     Symmetric, no curvature, ROM normal, no CVA tenderness   Lungs:     Clear to auscultation bilaterally, respirations unlabored   Chest wall:    No tenderness or deformity   Heart:    Regular rate and rhythm, S1 and S2 normal, no murmur, rub   or gallop   Abdomen:     Soft, non-tender, bowel sounds active all four quadrants,     no masses, no organomegaly   Genitalia:    Normal male without lesion, discharge or tenderness   Rectal:    Normal tone, normal prostate, no masses or tenderness;    guaiac negative stool   Extremities:   Extremities normal, atraumatic, no cyanosis or edema   Pulses:   2+ and symmetric all extremities   Skin:   Skin color, texture, turgor normal, no rashes or lesions   Lymph nodes:   Cervical, supraclavicular, and axillary nodes normal   Neurologic:   CNII-XII intact  Normal strength, sensation and reflexes       throughout      Ozuna secure and patent for clear yellow urine  Invasive Devices     Peripheral Intravenous Line            Peripheral IV 09/21/17 Left Antecubital 3 days          Drain            Urethral Catheter Non-latex 3 days                Lab, Imaging and other studies: I have personally reviewed pertinent reports      VTE Pharmacologic Prophylaxis: Enoxaparin (Lovenox)  VTE Mechanical Prophylaxis: sequential compression device

## 2017-09-25 NOTE — CASE MANAGEMENT
Initial Clinical Review    Admission: Date/Time/Statement: 9/22/17 @ 1411     Orders Placed This Encounter   Procedures    Inpatient Admission     Standing Status:   Standing     Number of Occurrences:   1     Order Specific Question:   Admitting Physician     Answer:   Claudia Mccloud [[id number]]     Order Specific Question:   Level of Care     Answer:   Med Surg [16]     Order Specific Question:   Estimated length of stay     Answer:   More than 2 Midnights     Order Specific Question:   Certification     Answer:   I certify that inpatient services are medically necessary for this patient for a duration of greater than two midnights  See H&P and MD Progress Notes for additional information about the patient's course of treatment  ED: Date/Time/Mode of Arrival:   ED Arrival Information     Patient not seen in ED                       Chief Complaint: No chief complaint on file  History of Illness: 70-year-old male with extensive past medical history that includes BPH , history of PE, history of urinary retention,systolic and diastolic chronic congestive heart failure, peripheral vascular disease, essential hypertension, diabetes mellitus, COPD, multiple recent urinary tract infections, multiple renal cysts who initially was admitted with multiple bouts of urinary retention not amenable to medical management; as well as multiple urinary catheterizations and failed trials  Patient presented to the emergency room at [facility] with refractory urinary retention  Patient has required recent admissions for ESBL producing UTI  Patient has had difficulty adhering to follow-up  Urologic consultation had requested patient to be transferred to [place] for suprapubic catheter placement  Patient has remained metabolically stable throughout his hospital course  Currently has no complaints including no fever no chills no dysuria no polyuria or any other complaints      ED Vital Signs:   ED Triage Vitals Temperature Pulse Respirations Blood Pressure SpO2   09/22/17 1400 09/22/17 1400 09/22/17 1400 09/22/17 1400 09/22/17 1400   98 8 °F (37 1 °C) 71 18 162/77 97 %      Temp Source Heart Rate Source Patient Position - Orthostatic VS BP Location FiO2 (%)   09/22/17 1400 09/23/17 1601 09/22/17 1400 09/22/17 1400 --   Oral Monitor Lying Right arm       Pain Score       09/22/17 1430       No Pain        Wt Readings from Last 1 Encounters:   09/22/17 122 kg (269 lb 9 6 oz)       Vital Signs (abnormal): Abnormal Labs/Diagnostic Test Results: wbc  Wbc 13 00-- 12 42---hgb 11 9--bs 218--ca 7 6    ED Treatment:   Medication Administration - No Administrations Displayed (No Start Event Found)     None          Past Medical/Surgical History:    Active Ambulatory Problems     Diagnosis Date Noted    Systolic and diastolic CHF, chronic     Spinal stenosis of lumbar region     PVD (peripheral vascular disease)     Essential hypertension 06/28/2017    Hyperlipidemia 06/28/2017    Type 2 diabetes mellitus with hyperglycemia 06/28/2017    COPD (chronic obstructive pulmonary disease) 06/28/2017    Urinary retention due to benign prostatic hyperplasia 06/28/2017    Multiple renal cysts 06/29/2017    Vitamin D insufficiency 06/30/2017    Kidney disease, chronic, stage III (GFR 30-59 ml/min) 08/08/2017    Ambulatory dysfunction 09/22/2017    Gait difficulty 10/13/2016    Morbid obesity 10/13/2016    S/P CABG x 3 11/24/2016     Resolved Ambulatory Problems     Diagnosis Date Noted    UTI due to extended-spectrum beta lactamase (ESBL) producing Escherichia coli     Dysuria 06/28/2017    History of ESBL E  coli infection 06/28/2017    History of BPH 06/28/2017    Constipation 06/30/2017    Elevated lactic acid level 08/08/2017    Gram-negative bacteremia 08/14/2017     Past Medical History:   Diagnosis Date    CHF (congestive heart failure)     COPD (chronic obstructive pulmonary disease)     Diabetes mellitus  ESBL (extended spectrum beta-lactamase) producing bacteria infection     History of BPH     Hyperlipidemia     Hypertension     PE (pulmonary thromboembolism)     PVD (peripheral vascular disease)     Spinal stenosis of lumbar region     Stroke     Systolic and diastolic CHF, chronic     Urinary retention     UTI due to extended-spectrum beta lactamase (ESBL) producing Escherichia coli        Admitting Diagnosis: Urinary retention [R33 9]    Age/Sex: 68 y o  male    Assessment/Plan: Principal Problem:    Urinary retention due to benign prostatic hyperplasia  Active Problems:    Spinal stenosis of lumbar region    Essential hypertension    Hyperlipidemia    Type 2 diabetes mellitus with hyperglycemia    COPD (chronic obstructive pulmonary disease)    Kidney disease, chronic, stage III (GFR 30-59 ml/min)    Ambulatory dysfunction    Morbid obesity     #1 UTI recurrent/urine retention  Consultation was urology will be obtained  Patient does have a history of ESBL infections  Continue aztreonam for now  He is afebrile hemodynamically stable and asymptomatic  Continue Ozuna catheter     #2History of HTN:       We will continue patient home medications  Although initially his blood pressure was higher in emergency department, it later stabilized  Well also initiate IV hydralazine and IV metoprolol for SBP greater than 1 60 mmHg  We will advise patient to follow-up with next PCP in regards to further better management of ongoing HTN       #3DM Type II:    Begin no concentrated carbohydrate diet  Cover with Low dose Aspart SSI as needed   Will order HgbA1C to assess status of recent glycemic control  Well initiate home medications once Medication Reconciliation is completed and confirmed by pharmacy      #4Hyperlipidemia:    Currently on statin therapy for elevated lipids  Previously not at goal of LDL < 100 as indicated in PMHx  Will order fasting lipid panel to assess status of hyperlipidemia  Will restart atorvastatin 40 mg po QHS  Patient was counseled in regards to Appropriate nutritional and lifestyle modifications      #5 ambulatory dysfunction  PT/OT eval and treatment     #6 history of COPD  Continue all current management  No acute exacerbation     #7 chronic kidney disease  Creatinine at baseline              Admission Orders:  Scheduled Meds:   amLODIPine 5 mg Oral Daily   aspirin 81 mg Oral Daily   atorvastatin 40 mg Oral Daily With Dinner   carvedilol 6 25 mg Oral BID With Meals   cefTRIAXone 1,000 mg Intravenous Q24H   [START ON 9/26/2017] enoxaparin 40 mg Subcutaneous Daily   famotidine 20 mg Oral Daily   finasteride 5 mg Oral Daily   [START ON 9/26/2017] insulin glargine 10 Units Subcutaneous HS   [START ON 9/27/2017] insulin glargine 14 Units Subcutaneous QAM   insulin glargine 6 Units Subcutaneous Once   [START ON 9/26/2017] insulin glargine 7 Units Subcutaneous Once   insulin lispro 1-6 Units Subcutaneous TID AC   insulin lispro 1-6 Units Subcutaneous HS   insulin lispro 5 Units Subcutaneous TID With Meals   tamsulosin 0 4 mg Oral Daily With Dinner     Continuous Infusions:    PRN Meds:   acetaminophenx1    ALPRAZolamx1    HYDROcodone-acetaminophen    polyethylene glycol     Consult id--Impression/Recommendations: This is a 68 y o  male, with history of urine retention, presented to [facility] yesterday with lower abdominal pain  Pain resolve with insertion of Ozuna catheterization  Patient was transferred here for suprapubic catheterization  He has no fever but has leukocytosis      1  Possible UTI  Patient has very little clinical symptoms consistent of UTI  However, he does have unexplained leukocytosis  Pending urine culture, it is reasonable to treat him with a course of antibiotic through his anticipated suprapubic catheterization early next week  Most recently, patient had a urine culture in August which grew Providencia    Back in June of this year, he had a urine culture growing ESBL producing Proteus and Enterococcus  With patient clinically and systemically well, I would treat him based on the most urine culture grown Providencia  Will follow up on urine culture and adjust antibiotic accordingly  Change antibiotic to IV ceftriaxone  Follow-up on urine culture results  Monitor temperature/WBC  Monitor for recurrent abdominal pain      2   Urinary retention  Patient has Ozuna catheter in place  Agree with Urology plan for suprapubic catheterization long-term      3   Leukocytosis  On H&P, it was stated the patient had outpatient rx for prednisone  Patient does not recall taking this  If patient does indeed take prednisone, this would explain his leukocytosis  However, if he does not take prednisone, leukocytosis may be a site of UTI, as in above  Antibiotic will be started, as in above  Will monitor WBC  Monitor WBC with treatment of UTI above      4  PCN allergy  This is unclear  Regardless, reaction was not severe  Patient should be able to tolerate cephalosporin  Monitor for reaction on ceftriaxone      [facility] hospitalization records reviewed in detail  Discussed with the patient in detail regarding the above plan      Fingerstick bg 4x daily    IR-- place suprapubic cath    Consult urologypt/ot

## 2017-09-25 NOTE — PLAN OF CARE
Problem: PHYSICAL THERAPY ADULT  Goal: Performs mobility at highest level of function for planned discharge setting  See evaluation for individualized goals  Treatment/Interventions: Functional transfer training, Endurance training, Patient/family training, Equipment eval/education, Bed mobility, Gait training, Spoke to nursing  Equipment Recommended: Walker (cont use of RW for mobility)       See flowsheet documentation for full assessment, interventions and recommendations  Prognosis: Guarded  Problem List: Decreased strength, Decreased endurance, Impaired balance, Decreased mobility, Decreased cognition, Decreased safety awareness, Impaired hearing, Obesity, Decreased skin integrity, Pain  Assessment: Pt is a 69 y/o male admitted to [facility] 2* urine retention and transferred from [facility] to [facility] for s/p suprapubic cath placement  Pt lives with spouse in 1 story home,2 CHARLES (1+1) and use of personal DME PTA  Pt reports recently receiving inpt rehab services at [facility]  Pt currently uses 2 L NC O2 in B and at home  Pt demonstrates minimal deficits during functional mobility and gait including dec endurance,dec balance,inc chronic low to mid back pain,cont use of DME and needs minAx1 for transfers and gait with wide RW and S for BM  Pt reports 5xs/weel home health aides to A with needs upon D/C  Pt currently is not at functional mobility baseline with inc fatigue and SOB during and following mobility,dec safety awareness,limited mobility 2* pain and fatigue and needs Ax1 for all modes of mobility  Pt would cont to benefit from skilled inpt PT services to maximize functional independence  Barriers to Discharge: Inaccessible home environment (CHARLES)     Recommendation:  (inpt rhb vs home with family support and inc home services)          See flowsheet documentation for full assessment

## 2017-09-25 NOTE — PROGRESS NOTES
Anat [facility] Internal Medicine Progress Note  Patient: Juan Jonas 68 y o  male   MRN: [id number]  PCP: Phyllis Espana MD  Unit/Bed#: [facility] 834-01 Encounter: [id number]  Date Of Visit: 17    Assessment/Plan:  Urinary retention  Suspected UTI in patient with indwelling siddiqui catheter  - patient with failed voiding trial as OP  - c/w rocephin until IR procedure completed, ID following  - for suprapubic cath via IR tomorrow----> cancelled today, follow outcome  - urology following  - siddiqui functioning w/o issues so far     COPD w/o exacerbation  - c/w current care and monitoring     HTN  - fair readings  - c/w current care     DM 2  - increase lantus am dose to 14 to start on    - on  am as he is NPO tonight give 7  - lantus PM at 10 units   - tonight as NPO lantus 6 units  - humalog 5 TIDWM when diet resumes  - POC and ISS       VTE Pharmacologic Prophylaxis: yes  Pharmacologic: Heparin  Mechanical VTE Prophylaxis in Place: Yes     Patient Centered Rounds: I have performed bedside rounds with nursing staff today      Discussions with Specialists or Other Care Team Provider: none     Education and Discussions with Family / Vanesa Saucedo, sharda call home     Time Spent for Care: 45 minutes   More than 50% of total time spent on counseling and coordination of care as described above      Current Length of Stay: 3 day(s)     Current Patient Status: Inpatient   Certification Statement: The patient will continue to require additional inpatient hospital stay due to plaese see plan as above     Discharge Plan: post-acute rehab as per OT     Code Status: Level 1 - Full Code    Subjective:   Patient is feeling well today    Objective:     Vitals:   Temp (24hrs), Av °F (36 7 °C), Min:97 6 °F (36 4 °C), Max:98 4 °F (36 9 °C)    HR:  [58-90] 90  Resp:  [16-18] 16  BP: (132-144)/(63-78) 132/78  SpO2:  [93 %] 93 %  Body mass index is 37 08 kg/m²       Input and Output Summary (last 24 hours): Intake/Output Summary (Last 24 hours) at 09/25/17 1729  Last data filed at 09/25/17 1445   Gross per 24 hour   Intake              480 ml   Output             2600 ml   Net            -2120 ml       Physical Exam:     Physical Exam   Constitutional: He is oriented to person, place, and time  He appears well-developed  Cardiovascular: Normal rate, regular rhythm and normal heart sounds  Exam reveals no friction rub  No murmur heard  Pulmonary/Chest: Effort normal and breath sounds normal  No respiratory distress  He has no wheezes  He has no rales  Abdominal: Soft  Bowel sounds are normal  He exhibits no distension  There is no tenderness  There is no rebound  Musculoskeletal: He exhibits no edema  Neurological: He is alert and oriented to person, place, and time  He exhibits normal muscle tone  Skin: Skin is warm  Psychiatric: He has a normal mood and affect  Additional Data:     Labs:      Results from last 7 days  Lab Units 09/24/17  0505   WBC Thousand/uL 12 42*   HEMOGLOBIN g/dL 11 9*   HEMATOCRIT % 36 9   PLATELETS Thousands/uL 165   NEUTROS PCT % 67   LYMPHS PCT % 12*   MONOS PCT % 12   EOS PCT % 9*       Results from last 7 days  Lab Units 09/24/17  0505 09/23/17  0558   SODIUM mmol/L 138 140   POTASSIUM mmol/L 3 9 4 0   CHLORIDE mmol/L 102 105   CO2 mmol/L 31 32   BUN mg/dL 23 27*   CREATININE mg/dL 0 60 0 64   CALCIUM mg/dL 7 6* 7 5*   TOTAL PROTEIN g/dL  --  5 2*   BILIRUBIN TOTAL mg/dL  --  0 51   ALK PHOS U/L  --  87   ALT U/L  --  22   AST U/L  --  11   GLUCOSE RANDOM mg/dL 218* 170*           * I Have Reviewed All Lab Data Listed Above  * Additional Pertinent Lab Tests Reviewed:  All Labs Within Last 24 Hours Reviewed    Imaging:    Imaging Reports Reviewed Today Include:none  Imaging Personally Reviewed by Myself Includes:  none    Recent Cultures (last 7 days):       Results from last 7 days  Lab Units 09/22/17  0845   URINE CULTURE  No Growth <1000 cfu/mL       Last 24 Hours Medication List:     amLODIPine 5 mg Oral Daily   aspirin 81 mg Oral Daily   atorvastatin 40 mg Oral Daily With Dinner   carvedilol 6 25 mg Oral BID With Meals   cefTRIAXone 1,000 mg Intravenous Q24H   [START ON 9/26/2017] enoxaparin 40 mg Subcutaneous Daily   famotidine 20 mg Oral Daily   finasteride 5 mg Oral Daily   [START ON 9/26/2017] insulin glargine 10 Units Subcutaneous HS   [START ON 9/27/2017] insulin glargine 14 Units Subcutaneous QAM   insulin glargine 6 Units Subcutaneous Once   [START ON 9/26/2017] insulin glargine 7 Units Subcutaneous Once   insulin lispro 1-6 Units Subcutaneous TID AC   insulin lispro 1-6 Units Subcutaneous HS   insulin lispro 5 Units Subcutaneous TID With Meals   tamsulosin 0 4 mg Oral Daily With Dinner        Today, Patient Was Seen By: Sonia Thompson MD    ** Please Note: Dragon 360 Dictation voice to text software may have been used in the creation of this document   **

## 2017-09-26 ENCOUNTER — ANESTHESIA (INPATIENT)
Dept: RADIOLOGY | Facility: HOSPITAL | Age: 77
DRG: 726 | End: 2017-09-26
Payer: MEDICARE

## 2017-09-26 ENCOUNTER — APPOINTMENT (INPATIENT)
Dept: RADIOLOGY | Facility: HOSPITAL | Age: 77
DRG: 726 | End: 2017-09-26
Attending: UROLOGY
Payer: MEDICARE

## 2017-09-26 ENCOUNTER — ANESTHESIA EVENT (INPATIENT)
Dept: RADIOLOGY | Facility: HOSPITAL | Age: 77
DRG: 726 | End: 2017-09-26
Payer: MEDICARE

## 2017-09-26 LAB
ANION GAP SERPL CALCULATED.3IONS-SCNC: 3 MMOL/L (ref 4–13)
APTT PPP: 28 SECONDS (ref 23–35)
BASOPHILS # BLD AUTO: 0.01 THOUSANDS/ΜL (ref 0–0.1)
BASOPHILS NFR BLD AUTO: 0 % (ref 0–1)
BUN SERPL-MCNC: 20 MG/DL (ref 5–25)
CALCIUM SERPL-MCNC: 7.5 MG/DL (ref 8.3–10.1)
CHLORIDE SERPL-SCNC: 103 MMOL/L (ref 100–108)
CO2 SERPL-SCNC: 33 MMOL/L (ref 21–32)
CREAT SERPL-MCNC: 0.69 MG/DL (ref 0.6–1.3)
EOSINOPHIL # BLD AUTO: 0.58 THOUSAND/ΜL (ref 0–0.61)
EOSINOPHIL NFR BLD AUTO: 5 % (ref 0–6)
ERYTHROCYTE [DISTWIDTH] IN BLOOD BY AUTOMATED COUNT: 13.8 % (ref 11.6–15.1)
GFR SERPL CREATININE-BSD FRML MDRD: 92 ML/MIN/1.73SQ M
GLUCOSE SERPL-MCNC: 147 MG/DL (ref 65–140)
GLUCOSE SERPL-MCNC: 189 MG/DL (ref 65–140)
GLUCOSE SERPL-MCNC: 189 MG/DL (ref 65–140)
GLUCOSE SERPL-MCNC: 203 MG/DL (ref 65–140)
GLUCOSE SERPL-MCNC: 218 MG/DL (ref 65–140)
HCT VFR BLD AUTO: 36.1 % (ref 36.5–49.3)
HGB BLD-MCNC: 11.7 G/DL (ref 12–17)
INR PPP: 1.07 (ref 0.86–1.16)
LYMPHOCYTES # BLD AUTO: 1.61 THOUSANDS/ΜL (ref 0.6–4.47)
LYMPHOCYTES NFR BLD AUTO: 13 % (ref 14–44)
MAGNESIUM SERPL-MCNC: 2.4 MG/DL (ref 1.6–2.6)
MCH RBC QN AUTO: 28.9 PG (ref 26.8–34.3)
MCHC RBC AUTO-ENTMCNC: 32.4 G/DL (ref 31.4–37.4)
MCV RBC AUTO: 89 FL (ref 82–98)
MONOCYTES # BLD AUTO: 1.15 THOUSAND/ΜL (ref 0.17–1.22)
MONOCYTES NFR BLD AUTO: 9 % (ref 4–12)
NEUTROPHILS # BLD AUTO: 8.83 THOUSANDS/ΜL (ref 1.85–7.62)
NEUTS SEG NFR BLD AUTO: 73 % (ref 43–75)
NRBC BLD AUTO-RTO: 0 /100 WBCS
PHOSPHATE SERPL-MCNC: 3.1 MG/DL (ref 2.3–4.1)
PLATELET # BLD AUTO: 177 THOUSANDS/UL (ref 149–390)
PMV BLD AUTO: 11.2 FL (ref 8.9–12.7)
POTASSIUM SERPL-SCNC: 4.1 MMOL/L (ref 3.5–5.3)
PROTHROMBIN TIME: 13.9 SECONDS (ref 12.1–14.4)
RBC # BLD AUTO: 4.05 MILLION/UL (ref 3.88–5.62)
SODIUM SERPL-SCNC: 139 MMOL/L (ref 136–145)
WBC # BLD AUTO: 12.22 THOUSAND/UL (ref 4.31–10.16)

## 2017-09-26 PROCEDURE — 82948 REAGENT STRIP/BLOOD GLUCOSE: CPT

## 2017-09-26 PROCEDURE — C1894 INTRO/SHEATH, NON-LASER: HCPCS

## 2017-09-26 PROCEDURE — 85610 PROTHROMBIN TIME: CPT | Performed by: INTERNAL MEDICINE

## 2017-09-26 PROCEDURE — 84100 ASSAY OF PHOSPHORUS: CPT | Performed by: INTERNAL MEDICINE

## 2017-09-26 PROCEDURE — 0T9B70Z DRAINAGE OF BLADDER WITH DRAINAGE DEVICE, VIA NATURAL OR ARTIFICIAL OPENING: ICD-10-PCS | Performed by: RADIOLOGY

## 2017-09-26 PROCEDURE — 51102 DRAIN BL W/CATH INSERTION: CPT

## 2017-09-26 PROCEDURE — C1758 CATHETER, URETERAL: HCPCS

## 2017-09-26 PROCEDURE — 85025 COMPLETE CBC W/AUTO DIFF WBC: CPT | Performed by: INTERNAL MEDICINE

## 2017-09-26 PROCEDURE — 80048 BASIC METABOLIC PNL TOTAL CA: CPT | Performed by: INTERNAL MEDICINE

## 2017-09-26 PROCEDURE — C1769 GUIDE WIRE: HCPCS

## 2017-09-26 PROCEDURE — 83735 ASSAY OF MAGNESIUM: CPT | Performed by: INTERNAL MEDICINE

## 2017-09-26 PROCEDURE — 85730 THROMBOPLASTIN TIME PARTIAL: CPT | Performed by: INTERNAL MEDICINE

## 2017-09-26 RX ORDER — FENTANYL CITRATE/PF 50 MCG/ML
25 SYRINGE (ML) INJECTION
Status: DISCONTINUED | OUTPATIENT
Start: 2017-09-26 | End: 2017-09-26 | Stop reason: HOSPADM

## 2017-09-26 RX ORDER — ONDANSETRON 2 MG/ML
4 INJECTION INTRAMUSCULAR; INTRAVENOUS ONCE AS NEEDED
Status: DISCONTINUED | OUTPATIENT
Start: 2017-09-26 | End: 2017-09-26 | Stop reason: HOSPADM

## 2017-09-26 RX ORDER — SODIUM CHLORIDE 9 MG/ML
INJECTION, SOLUTION INTRAVENOUS CONTINUOUS PRN
Status: DISCONTINUED | OUTPATIENT
Start: 2017-09-26 | End: 2017-09-26 | Stop reason: SURG

## 2017-09-26 RX ORDER — FENTANYL CITRATE 50 UG/ML
INJECTION, SOLUTION INTRAMUSCULAR; INTRAVENOUS AS NEEDED
Status: DISCONTINUED | OUTPATIENT
Start: 2017-09-26 | End: 2017-09-26 | Stop reason: SURG

## 2017-09-26 RX ORDER — MEPERIDINE HYDROCHLORIDE 25 MG/ML
12.5 INJECTION INTRAMUSCULAR; INTRAVENOUS; SUBCUTANEOUS ONCE AS NEEDED
Status: DISCONTINUED | OUTPATIENT
Start: 2017-09-26 | End: 2017-09-26 | Stop reason: HOSPADM

## 2017-09-26 RX ORDER — LIDOCAINE HYDROCHLORIDE 10 MG/ML
INJECTION, SOLUTION INFILTRATION; PERINEURAL AS NEEDED
Status: DISCONTINUED | OUTPATIENT
Start: 2017-09-26 | End: 2017-09-26 | Stop reason: SURG

## 2017-09-26 RX ORDER — METOCLOPRAMIDE HYDROCHLORIDE 5 MG/ML
10 INJECTION INTRAMUSCULAR; INTRAVENOUS ONCE AS NEEDED
Status: DISCONTINUED | OUTPATIENT
Start: 2017-09-26 | End: 2017-09-26 | Stop reason: HOSPADM

## 2017-09-26 RX ORDER — PROPOFOL 10 MG/ML
INJECTION, EMULSION INTRAVENOUS AS NEEDED
Status: DISCONTINUED | OUTPATIENT
Start: 2017-09-26 | End: 2017-09-26 | Stop reason: SURG

## 2017-09-26 RX ORDER — ONDANSETRON 2 MG/ML
INJECTION INTRAMUSCULAR; INTRAVENOUS AS NEEDED
Status: DISCONTINUED | OUTPATIENT
Start: 2017-09-26 | End: 2017-09-26 | Stop reason: SURG

## 2017-09-26 RX ORDER — SODIUM CHLORIDE, SODIUM LACTATE, POTASSIUM CHLORIDE, CALCIUM CHLORIDE 600; 310; 30; 20 MG/100ML; MG/100ML; MG/100ML; MG/100ML
50 INJECTION, SOLUTION INTRAVENOUS CONTINUOUS
Status: DISCONTINUED | OUTPATIENT
Start: 2017-09-26 | End: 2017-09-28

## 2017-09-26 RX ADMIN — INSULIN LISPRO 1 UNITS: 100 INJECTION, SOLUTION INTRAVENOUS; SUBCUTANEOUS at 12:14

## 2017-09-26 RX ADMIN — ATORVASTATIN CALCIUM 40 MG: 40 TABLET, FILM COATED ORAL at 16:50

## 2017-09-26 RX ADMIN — ASPIRIN 81 MG 81 MG: 81 TABLET ORAL at 09:19

## 2017-09-26 RX ADMIN — FAMOTIDINE 20 MG: 20 TABLET ORAL at 09:19

## 2017-09-26 RX ADMIN — PROPOFOL 200 MG: 10 INJECTION, EMULSION INTRAVENOUS at 10:44

## 2017-09-26 RX ADMIN — TAMSULOSIN HYDROCHLORIDE 0.4 MG: 0.4 CAPSULE ORAL at 16:50

## 2017-09-26 RX ADMIN — IOHEXOL 30 ML: 300 INJECTION, SOLUTION INTRAVENOUS at 12:10

## 2017-09-26 RX ADMIN — FENTANYL CITRATE 50 MCG: 50 INJECTION, SOLUTION INTRAMUSCULAR; INTRAVENOUS at 10:44

## 2017-09-26 RX ADMIN — AMLODIPINE BESYLATE 5 MG: 5 TABLET ORAL at 09:19

## 2017-09-26 RX ADMIN — FINASTERIDE 5 MG: 5 TABLET, FILM COATED ORAL at 09:19

## 2017-09-26 RX ADMIN — ONDANSETRON 4 MG: 2 INJECTION INTRAMUSCULAR; INTRAVENOUS at 11:03

## 2017-09-26 RX ADMIN — ENOXAPARIN SODIUM 40 MG: 40 INJECTION SUBCUTANEOUS at 15:47

## 2017-09-26 RX ADMIN — LIDOCAINE HYDROCHLORIDE 40 MG: 10 INJECTION, SOLUTION INFILTRATION; PERINEURAL at 10:44

## 2017-09-26 RX ADMIN — CEFTRIAXONE SODIUM 1000 MG: 10 INJECTION, POWDER, FOR SOLUTION INTRAVENOUS at 19:39

## 2017-09-26 RX ADMIN — INSULIN LISPRO 2 UNITS: 100 INJECTION, SOLUTION INTRAVENOUS; SUBCUTANEOUS at 22:48

## 2017-09-26 RX ADMIN — INSULIN GLARGINE 10 UNITS: 100 INJECTION, SOLUTION SUBCUTANEOUS at 22:48

## 2017-09-26 RX ADMIN — INSULIN LISPRO 5 UNITS: 100 INJECTION, SOLUTION INTRAVENOUS; SUBCUTANEOUS at 18:08

## 2017-09-26 RX ADMIN — CARVEDILOL 6.25 MG: 6.25 TABLET, FILM COATED ORAL at 09:20

## 2017-09-26 RX ADMIN — SODIUM CHLORIDE: 0.9 INJECTION, SOLUTION INTRAVENOUS at 10:37

## 2017-09-26 RX ADMIN — INSULIN GLARGINE 7 UNITS: 100 INJECTION, SOLUTION SUBCUTANEOUS at 09:22

## 2017-09-26 RX ADMIN — CARVEDILOL 6.25 MG: 6.25 TABLET, FILM COATED ORAL at 16:50

## 2017-09-26 RX ADMIN — FENTANYL CITRATE 50 MCG: 50 INJECTION, SOLUTION INTRAMUSCULAR; INTRAVENOUS at 11:31

## 2017-09-26 NOTE — BRIEF OP NOTE (RAD/CATH)
IR SUPRAPUBIC TUBE  Procedure Note    PATIENT NAME: Michelle Saenz  : 1940  MRN: [id number]     Pre-op Diagnosis:   1  Urinary tract infection associated with indwelling urethral catheter, initial encounter    2  Urinary retention      Post-op Diagnosis:   1  Urinary tract infection associated with indwelling urethral catheter, initial encounter    2   Urinary retention        Surgeon:   Karen Jaime MD    Estimated Blood Loss:  Minimal  Findings:  18 [place] suprapubic [place]una tube placed using image guidance    Specimens:  None    Complications:  None    Anesthesia: Conscious sedation    Karen Jaime MD     Date: 2017  Time: 11:44 AM

## 2017-09-26 NOTE — PHYSICAL THERAPY NOTE
Physical Therapy Cancellation Note    PT ATTEMPTED TO SEE PATIENT FOR TREATMENT SESSION  PATIENT OFF FLOOR AT IR  PT WILL CONTINUE TO FOLLOW AS APPROPRIATE       Preeti Mhoan, PT

## 2017-09-26 NOTE — PROGRESS NOTES
H and P from admission reviewed  There have been no interval changes  Prior imaging was reviewed  72-year-old man with recurrent urinary tract infections, urinary retention with chronic Siddiqui  Plan for percutaneous suprapubic cystostomy tube placement with anesthesia with plan for standard Siddiqui placement  If siddqiui cannot be placed, 15 [place] APD will be placed with plan for future conversion which was discussed with the patient  Informed written consent was obtained  Questions answered      Lanette Burger MD  09/26/17  10:51 AM

## 2017-09-26 NOTE — SOCIAL WORK
CM received a call from [address] from [place] and they have a  Bed for Domenic Roldan but he owes them $ [phone]   If he is willing to pay bill they can accept him   Pt off unit cm will discuss when pt returns to the floor

## 2017-09-26 NOTE — PROGRESS NOTES
Progress Note - Lj Busch 68 y o  male MRN: [id number]    Unit/Bed#: [facility] 834-01 Encounter: [id number]      Assessment:  Mr Adarsh Smith is a 51-year-old comorbid male with chronic bladder outlet obstruction, urinary retention and recurrent UTI  Postop day 0 IR placed suprapubic catheter  Plan:  Remove chronic indwelling urethral catheter  Maintain suprapubic catheter to straight drainage  Will arrange for office follow-up with Dr  prescribed also be in 3-4 weeks  No other  intervention is warranted during this hospital stay  Continue antibiotics for full course per Infectious Disease recommendation; appreciated  Will sign off  Please do not hesitate to contact our office with any questions or new  concerns  Subjective:   Denies fever, chills, flank, testicular or groin pain  Denies hematuria  Objective:     Vitals: Blood pressure 144/70, pulse 64, temperature 98 °F (36 7 °C), temperature source Oral, resp  rate 20, height 5' 11 5" (1 816 m), weight 121 kg (267 lb), SpO2 95 %  ,Body mass index is 36 72 kg/m²  Intake/Output Summary (Last 24 hours) at 09/26/17 1432  Last data filed at 09/26/17 1405   Gross per 24 hour   Intake              860 ml   Output             3530 ml   Net            -2670 ml       Physical Exam: General appearance: alert, appears stated age, cooperative, no distress, morbidly obese and slowed mentation  Head: Normocephalic, without obvious abnormality, atraumatic  Neck: no adenopathy, no carotid bruit, no JVD, supple, symmetrical, trachea midline and thyroid not enlarged, symmetric, no tenderness/mass/nodules  Lungs: clear to auscultation bilaterally  Heart: regular rate and rhythm, S1, S2 normal, no murmur, click, rub or gallop  Abdomen: soft, non-tender; bowel sounds normal; no masses,  no organomegaly  Extremities: edema Bilateral lower extremity  Pulses: 2+ and symmetric  Neurologic: Grossly normal  Suprapubic catheter clean dry and intact    Secure and patent for grossly clear urine  Invasive Devices     Peripheral Intravenous Line            Peripheral IV 09/25/17 Left Hand less than 1 day          Drain            Suprapubic Catheter Latex 18 Fr  less than 1 day              Lab Results   Component Value Date    WBC 12 22 (H) 09/26/2017    HGB 11 7 (L) 09/26/2017    HCT 36 1 (L) 09/26/2017    MCV 89 09/26/2017     09/26/2017     Lab Results   Component Value Date    GLUCOSE 189 (H) 09/26/2017    CALCIUM 7 5 (L) 09/26/2017     09/26/2017    K 4 1 09/26/2017    CO2 33 (H) 09/26/2017     09/26/2017    BUN 20 09/26/2017    CREATININE 0 69 09/26/2017       Lab, Imaging and other studies: I have personally reviewed pertinent reports

## 2017-09-26 NOTE — SOCIAL WORK
Cm met with pt and pt aware [facility] has a bed and that he owes them money   Pt not agreeable to pay the money   CM provided snf list and requested referral to [facility] , referrals in in as such   Cm will follow

## 2017-09-26 NOTE — PROGRESS NOTES
[facility] Internal Medicine Progress Note  Patient: Matheus Torrez 68 y o  male   MRN: [id number]  PCP: Bao Harkins MD  Unit/Bed#: [facility] 834-01 Encounter: [id number]  Date Of Visit: 17    Assessment:    Principal Problem:    Urinary retention due to benign prostatic hyperplasia  Active Problems:    Spinal stenosis of lumbar region    Essential hypertension    Hyperlipidemia    Type 2 diabetes mellitus with hyperglycemia    COPD (chronic obstructive pulmonary disease)    Kidney disease, chronic, stage III (GFR 30-59 ml/min)    Ambulatory dysfunction    Morbid obesity      Plan:    · Urinary retention w/ UTI in indwelling siddiqui catheter: pt had suprapubic cath placed today  Will need antibiotics through   Can be swtiched to [address] [place] [address] when d/c  · COPD: stable  C/w current tx  · HTN: stable  C/w current tx  · DM2: stable on ISS    VTE Pharmacologic Prophylaxis:   Pharmacologic: Heparin  Mechanical VTE Prophylaxis in Place: Yes    Patient Centered Rounds: I have performed bedside rounds with nursing staff today  Discussions with Specialists or Other Care Team Provider: not today    Education and Discussions with Family / Patient: pt    Time Spent for Care: 30 minutes  More than 50% of total time spent on counseling and coordination of care as described above  Current Length of Stay: 4 day(s)    Current Patient Status: Inpatient   Certification Statement: The patient will continue to require additional inpatient hospital stay due to need for STR    Discharge Plan / Estimated Discharge Date: d/c tomorrow to STR    Code Status: Level 1 - Full Code      Subjective:   Pt seen and examined  No acute complaints  Objective:     Vitals:   Temp (24hrs), Av 4 °F (36 3 °C), Min:96 5 °F (35 8 °C), Max:98 °F (36 7 °C)    HR:  [57-90] 66  Resp:  [16-22] 20  BP: (130-151)/(63-78) 130/74  SpO2:  [93 %-100 %] 96 %  Body mass index is 36 72 kg/m²  Input and Output Summary (last 24 hours):        Intake/Output Summary (Last 24 hours) at 09/26/17 1320  Last data filed at 09/26/17 1234   Gross per 24 hour   Intake              860 ml   Output             3380 ml   Net            -2520 ml       Physical Exam:     Physical Exam   Constitutional: He is oriented to person, place, and time  He appears well-developed and well-nourished  HENT:   Head: Normocephalic and atraumatic  Eyes: Conjunctivae and EOM are normal    Neck: Normal range of motion  Neck supple  Cardiovascular: Normal rate and regular rhythm  Pulmonary/Chest: Effort normal and breath sounds normal  No respiratory distress  He has no wheezes  He has no rales  Abdominal: Soft  Bowel sounds are normal  He exhibits no distension  There is no tenderness  Genitourinary:   Genitourinary Comments: suprapubic cath in place   Musculoskeletal: Normal range of motion  He exhibits no edema  Neurological: He is alert and oriented to person, place, and time  Skin: Skin is warm and dry  Additional Data:     Labs:      Results from last 7 days  Lab Units 09/26/17  0610   WBC Thousand/uL 12 22*   HEMOGLOBIN g/dL 11 7*   HEMATOCRIT % 36 1*   PLATELETS Thousands/uL 177   NEUTROS PCT % 73   LYMPHS PCT % 13*   MONOS PCT % 9   EOS PCT % 5       Results from last 7 days  Lab Units 09/26/17  0610  09/23/17  0558   SODIUM mmol/L 139  < > 140   POTASSIUM mmol/L 4 1  < > 4 0   CHLORIDE mmol/L 103  < > 105   CO2 mmol/L 33*  < > 32   BUN mg/dL 20  < > 27*   CREATININE mg/dL 0 69  < > 0 64   CALCIUM mg/dL 7 5*  < > 7 5*   TOTAL PROTEIN g/dL  --   --  5 2*   BILIRUBIN TOTAL mg/dL  --   --  0 51   ALK PHOS U/L  --   --  87   ALT U/L  --   --  22   AST U/L  --   --  11   GLUCOSE RANDOM mg/dL 189*  < > 170*   < > = values in this interval not displayed  Results from last 7 days  Lab Units 09/26/17  0610   INR  1 07       * I Have Reviewed All Lab Data Listed Above  * Additional Pertinent Lab Tests Reviewed:  [facility] Admission Reviewed        Recent Cultures (last 7 days):       Results from last 7 days  Lab Units 09/22/17  0845   URINE CULTURE  No Growth <1000 cfu/mL       Last 24 Hours Medication List:     amLODIPine 5 mg Oral Daily   aspirin 81 mg Oral Daily   atorvastatin 40 mg Oral Daily With Dinner   carvedilol 6 25 mg Oral BID With Meals   cefTRIAXone 1,000 mg Intravenous Q24H   enoxaparin 40 mg Subcutaneous Daily   famotidine 20 mg Oral Daily   finasteride 5 mg Oral Daily   insulin glargine 10 Units Subcutaneous HS   [START ON 9/27/2017] insulin glargine 14 Units Subcutaneous QAM   insulin lispro 1-6 Units Subcutaneous TID AC   insulin lispro 1-6 Units Subcutaneous HS   insulin lispro 5 Units Subcutaneous TID With Meals   tamsulosin 0 4 mg Oral Daily With Dinner        Today, Patient Was Seen By: Colonel Oliver DO    ** Please Note: This note has been constructed using a voice recognition system   **

## 2017-09-27 LAB
GLUCOSE SERPL-MCNC: 108 MG/DL (ref 65–140)
GLUCOSE SERPL-MCNC: 154 MG/DL (ref 65–140)
GLUCOSE SERPL-MCNC: 163 MG/DL (ref 65–140)
GLUCOSE SERPL-MCNC: 177 MG/DL (ref 65–140)

## 2017-09-27 PROCEDURE — 82948 REAGENT STRIP/BLOOD GLUCOSE: CPT

## 2017-09-27 RX ORDER — CEFDINIR 300 MG/1
300 CAPSULE ORAL EVERY 12 HOURS SCHEDULED
Status: DISCONTINUED | OUTPATIENT
Start: 2017-09-27 | End: 2017-09-28 | Stop reason: HOSPADM

## 2017-09-27 RX ORDER — CEFDINIR 300 MG/1
300 CAPSULE ORAL EVERY 12 HOURS SCHEDULED
Qty: 4 CAPSULE | Refills: 0
Start: 2017-09-27 | End: 2017-09-28

## 2017-09-27 RX ORDER — INSULIN GLARGINE 100 [IU]/ML
10 INJECTION, SOLUTION SUBCUTANEOUS
Qty: 10 ML | Refills: 0
Start: 2017-09-27 | End: 2018-01-14 | Stop reason: HOSPADM

## 2017-09-27 RX ORDER — INSULIN GLARGINE 100 [IU]/ML
14 INJECTION, SOLUTION SUBCUTANEOUS EVERY MORNING
Qty: 10 ML | Refills: 0
Start: 2017-09-27 | End: 2017-12-31

## 2017-09-27 RX ORDER — DOCUSATE SODIUM 100 MG/1
100 CAPSULE, LIQUID FILLED ORAL 2 TIMES DAILY
Status: DISCONTINUED | OUTPATIENT
Start: 2017-09-27 | End: 2017-09-28 | Stop reason: HOSPADM

## 2017-09-27 RX ADMIN — TAMSULOSIN HYDROCHLORIDE 0.4 MG: 0.4 CAPSULE ORAL at 17:04

## 2017-09-27 RX ADMIN — ATORVASTATIN CALCIUM 40 MG: 40 TABLET, FILM COATED ORAL at 17:04

## 2017-09-27 RX ADMIN — DOCUSATE SODIUM 100 MG: 100 CAPSULE, LIQUID FILLED ORAL at 13:03

## 2017-09-27 RX ADMIN — INSULIN LISPRO 1 UNITS: 100 INJECTION, SOLUTION INTRAVENOUS; SUBCUTANEOUS at 22:36

## 2017-09-27 RX ADMIN — INSULIN LISPRO 1 UNITS: 100 INJECTION, SOLUTION INTRAVENOUS; SUBCUTANEOUS at 09:14

## 2017-09-27 RX ADMIN — CARVEDILOL 6.25 MG: 6.25 TABLET, FILM COATED ORAL at 17:04

## 2017-09-27 RX ADMIN — INSULIN LISPRO 1 UNITS: 100 INJECTION, SOLUTION INTRAVENOUS; SUBCUTANEOUS at 13:02

## 2017-09-27 RX ADMIN — FAMOTIDINE 20 MG: 20 TABLET ORAL at 09:20

## 2017-09-27 RX ADMIN — ASPIRIN 81 MG 81 MG: 81 TABLET ORAL at 09:14

## 2017-09-27 RX ADMIN — AMLODIPINE BESYLATE 5 MG: 5 TABLET ORAL at 09:15

## 2017-09-27 RX ADMIN — BISACODYL 5 MG: 5 TABLET, COATED ORAL at 13:01

## 2017-09-27 RX ADMIN — INSULIN LISPRO 5 UNITS: 100 INJECTION, SOLUTION INTRAVENOUS; SUBCUTANEOUS at 13:03

## 2017-09-27 RX ADMIN — FINASTERIDE 5 MG: 5 TABLET, FILM COATED ORAL at 09:14

## 2017-09-27 RX ADMIN — INSULIN GLARGINE 10 UNITS: 100 INJECTION, SOLUTION SUBCUTANEOUS at 22:35

## 2017-09-27 RX ADMIN — INSULIN LISPRO 5 UNITS: 100 INJECTION, SOLUTION INTRAVENOUS; SUBCUTANEOUS at 09:16

## 2017-09-27 RX ADMIN — CEFDINIR 300 MG: 300 CAPSULE ORAL at 13:02

## 2017-09-27 RX ADMIN — INSULIN GLARGINE 14 UNITS: 100 INJECTION, SOLUTION SUBCUTANEOUS at 09:14

## 2017-09-27 RX ADMIN — CEFDINIR 300 MG: 300 CAPSULE ORAL at 22:35

## 2017-09-27 RX ADMIN — CARVEDILOL 6.25 MG: 6.25 TABLET, FILM COATED ORAL at 09:14

## 2017-09-27 RX ADMIN — ENOXAPARIN SODIUM 40 MG: 40 INJECTION SUBCUTANEOUS at 09:16

## 2017-09-27 NOTE — PROGRESS NOTES
[facility] Internal Medicine Progress Note  Patient: Marleny Barnes 68 y o  male   MRN: [id number]  PCP: Natan Bull MD  Unit/Bed#: [facility] 834-01 Encounter: [id number]  Date Of Visit: 17    Assessment:    Principal Problem:    Urinary retention due to benign prostatic hyperplasia  Active Problems:    Spinal stenosis of lumbar region    Essential hypertension    Hyperlipidemia    Type 2 diabetes mellitus with hyperglycemia    COPD (chronic obstructive pulmonary disease)    Kidney disease, chronic, stage III (GFR 30-59 ml/min)    Ambulatory dysfunction    Morbid obesity      Plan:    · Urinary retention w/ UTI in indwelling siddiqui catheter: pt had suprapubic cath placed today  Will need antibiotics through   Now on PO Cefdinir  · COPD: stable  C/w current tx  · HTN: stable  C/w current tx  · DM2: stable on ISS  · Constipation: Will give colace and dulcolax prn  VTE Pharmacologic Prophylaxis:   Pharmacologic: Heparin  Mechanical VTE Prophylaxis in Place: Yes    Patient Centered Rounds: I have performed bedside rounds with nursing staff today  Discussions with Specialists or Other Care Team Provider: not today    Education and Discussions with Family / Patient: pt    Time Spent for Care: 30 minutes  More than 50% of total time spent on counseling and coordination of care as described above  Current Length of Stay: 5 day(s)    Current Patient Status: Inpatient   Certification Statement: The patient will continue to require additional inpatient hospital stay due to need for STR    Discharge Plan / Estimated Discharge Date: when pt has STR    Code Status: Level 1 - Full Code      Subjective:   Pt seen and examined  Pt c/o pain at suprapubic cath site and constipation  Objective:     Vitals:   Temp (24hrs), Av 9 °F (36 6 °C), Min:97 2 °F (36 2 °C), Max:98 6 °F (37 °C)    HR:  [61-68] 63  Resp:  [16-22] 16  BP: (130-154)/(61-74) 140/61  SpO2:  [95 %-97 %] 96 %  Body mass index is 36 08 kg/m²  Input and Output Summary (last 24 hours): Intake/Output Summary (Last 24 hours) at 09/27/17 1207  Last data filed at 09/27/17 0901   Gross per 24 hour   Intake              920 ml   Output             1950 ml   Net            -1030 ml       Physical Exam:     Physical Exam   Constitutional: He is oriented to person, place, and time  He appears well-developed and well-nourished  HENT:   Head: Normocephalic and atraumatic  Eyes: Conjunctivae and EOM are normal    Neck: Normal range of motion  Neck supple  Cardiovascular: Normal rate and regular rhythm  Pulmonary/Chest: Effort normal and breath sounds normal    Abdominal: Soft  Bowel sounds are normal  He exhibits no distension  There is no tenderness  Musculoskeletal: Normal range of motion  He exhibits no edema  Neurological: He is alert and oriented to person, place, and time  Skin: Skin is warm and dry  Nephrostomy site c/d/i         Additional Data:     Labs:      Results from last 7 days  Lab Units 09/26/17  0610   WBC Thousand/uL 12 22*   HEMOGLOBIN g/dL 11 7*   HEMATOCRIT % 36 1*   PLATELETS Thousands/uL 177   NEUTROS PCT % 73   LYMPHS PCT % 13*   MONOS PCT % 9   EOS PCT % 5       Results from last 7 days  Lab Units 09/26/17  0610  09/23/17  0558   SODIUM mmol/L 139  < > 140   POTASSIUM mmol/L 4 1  < > 4 0   CHLORIDE mmol/L 103  < > 105   CO2 mmol/L 33*  < > 32   BUN mg/dL 20  < > 27*   CREATININE mg/dL 0 69  < > 0 64   CALCIUM mg/dL 7 5*  < > 7 5*   TOTAL PROTEIN g/dL  --   --  5 2*   BILIRUBIN TOTAL mg/dL  --   --  0 51   ALK PHOS U/L  --   --  87   ALT U/L  --   --  22   AST U/L  --   --  11   GLUCOSE RANDOM mg/dL 189*  < > 170*   < > = values in this interval not displayed  Results from last 7 days  Lab Units 09/26/17  0610   INR  1 07       * I Have Reviewed All Lab Data Listed Above  * Additional Pertinent Lab Tests Reviewed:  [facility]        Recent Cultures (last 7 days):       Results from last 7 days  Lab Units 09/22/17  0845   URINE CULTURE  No Growth <1000 cfu/mL       Last 24 Hours Medication List:     amLODIPine 5 mg Oral Daily   aspirin 81 mg Oral Daily   atorvastatin 40 mg Oral Daily With Dinner   carvedilol 6 25 mg Oral BID With Meals   cefdinir 300 mg Oral Q12H [address]   docusate sodium 100 mg Oral BID   enoxaparin 40 mg Subcutaneous Daily   famotidine 20 mg Oral Daily   finasteride 5 mg Oral Daily   insulin glargine 10 Units Subcutaneous HS   insulin glargine 14 Units Subcutaneous QAM   insulin lispro 1-6 Units Subcutaneous TID AC   insulin lispro 1-6 Units Subcutaneous HS   insulin lispro 5 Units Subcutaneous TID With Meals   tamsulosin 0 4 mg Oral Daily With Dinner        Today, Patient Was Seen By: Danni Cade DO    ** Please Note: This note has been constructed using a voice recognition system   **

## 2017-09-27 NOTE — SOCIAL WORK
Tian Blilingsley from [facility] advised that pt has used skilled days under Medicare benefit  CM attempted to call [facility] in [place] to discuss pt's eligibility for SNF and left Vm  CM left VM for [facility] with [facility] for d/c planning ([phone])  CM called and left VM at the [facility] ([phone])  CM called and spoke with the care coordinator Margareth Torres for [facility] ([phone]) and was transferred to [facility] with [facility], who transferred CM to [place], [place] at [facility] ([phone])  CM left VM for Maggie and is awaiting a call back  Pt has hx of SNF at [address] [place] [address] and has exhausted [facility] benefits  Additional referrals made to [address] [place] [address] SNF's per pt request      OPTIONS assessment faxed to [facility] [place], as pt is [place] Pending  MSW RANJIT rios

## 2017-09-27 NOTE — PROGRESS NOTES
Progress Note - Infectious Disease   Faiza Villegas 68 y o  male MRN: [id number]  Unit/Bed#: [facility] 834-01 Encounter: [id number]      Impression/Recommendations:  1   Possible UTI   Patient has very little clinical symptoms consistent of UTI   However, he does have unexplained leukocytosis   Patient is clinically stable on IV ceftriaxone   Urine culture this admission is negative     Change antibiotic to p o  cefdinir    Monitor temperature/WBC  Monitor for recurrent abdominal pain  Treat x 7 days total, another 1 day, through tomorrow      2  Mary Kate Renee retention  Nikki Artist has Ozuna catheter in place   Patient is status post SPC insertion      3   Leukocytosis    WBC decreasing  Monitor WBC with treatment of UTI above      4   PCN allergy   This is unclear   Regardless, reaction was not severe    Patient tolerated cephalosporin well  Monitor for reaction on cephalosporin      Discussed with the patient in detail regarding the above plan      Antibiotics:  Ceftriaxone  Antibiotic # 6     Subjective:  Patient is comfortable   No abdominal or flank pain  Temperature stays down   No chills  He is tolerating antibiotic well   No nausea, vomiting or diarrhea   No pruritus or rash  Objective:  Vitals:  HR:  [61-73] 63  Resp:  [16-22] 16  BP: (130-154)/(61-78) 140/61  SpO2:  [95 %-100 %] 96 %  Temp (24hrs), Av 6 °F (36 4 °C), Min:96 5 °F (35 8 °C), Max:98 6 °F (37 °C)  Current: Temperature: 98 6 °F (37 °C)    Physical Exam:     General: Awake, alert, cooperative, no distress  Lungs: Expansion symmetric, no rales, no wheezing, respirations unlabored  Heart[de-identified]  Regular rate and rhythm, S1 and S2 normal, no murmur  Abdomen: Soft, nondistended, non-tender, bowel sounds active all four quadrants,        no masses, no organomegaly  Extremities: No edema  No erythema/warmth  No ulcer  Nontender to palpation  Skin:  No rash       Invasive Devices     Drain            Suprapubic Catheter Latex 18 Fr  less than 1 day Labs studies:   I have personally reviewed pertinent labs  Results from last 7 days  Lab Units 09/26/17  0610 09/24/17  0505 09/23/17  0558  09/21/17  1604   SODIUM mmol/L 139 138 140  < > 139   POTASSIUM mmol/L 4 1 3 9 4 0  < > 4 5   CHLORIDE mmol/L 103 102 105  < > 100   CO2 mmol/L 33* 31 32  < > 35*   ANION GAP mmol/L 3* 5 3*  < > 4   BUN mg/dL 20 23 27*  < > 30*   CREATININE mg/dL 0 69 0 60 0 64  < > 0 92   EGFR ml/min/1 73sq m 92 97 94  < > 80   GLUCOSE RANDOM mg/dL 189* 218* 170*  < > 286*   CALCIUM mg/dL 7 5* 7 6* 7 5*  < > 7 5*   AST U/L  --   --  11  --  27   ALT U/L  --   --  22  --  29   ALK PHOS U/L  --   --  87  --  93   TOTAL PROTEIN g/dL  --   --  5 2*  --  6 2*   ALBUMIN g/dL  --   --  2 2*  --  2 7*   BILIRUBIN TOTAL mg/dL  --   --  0 51  --  0 60   < > = values in this interval not displayed  Results from last 7 days  Lab Units 09/26/17  0610 09/24/17  0505 09/23/17  0558   WBC Thousand/uL 12 22* 12 42* 13 00*   HEMOGLOBIN g/dL 11 7* 11 9* 11 5*   PLATELETS Thousands/uL 177 165 162       Results from last 7 days  Lab Units 09/22/17  0845   URINE CULTURE  No Growth <1000 cfu/mL       Imaging Studies:   I have personally reviewed pertinent imaging study reports and images in PACS  EKG, Pathology, and Other Studies:   I have personally reviewed pertinent reports

## 2017-09-27 NOTE — PROGRESS NOTES
Progress Note - Infectious Disease   Marleny Barnes 68 y o  male MRN: [id number]  Unit/Bed#: [facility] 834-01 Encounter: [id number]      Impression/Recommendations:  1   Possible UTI   Patient has very little clinical symptoms consistent of UTI   However, he does have unexplained leukocytosis   Patient is clinically stable on IV ceftriaxone   Urine culture this admission is negative   Will continue antibiotic through [facility] insertion, then transition to p o  Continue IV ceftriaxone    Monitor temperature/WBC  Monitor for recurrent abdominal pain  Transition to p  o  cefdinir in am   Treat x 7 days total, another 2 days      2  Jillian Massy retention  Sharee Fisher has Ozuna catheter in place   Patient should be fine for suprapubic catheterization insertion tomorrow      3   Leukocytosis    WBC decreasing  Monitor WBC with treatment of UTI above      4   PCN allergy   This is unclear   Regardless, reaction was not severe    patient tolerated ceftriaxone well  Monitor for reaction on ceftriaxone      Discussed with the patient in detail regarding the above plan  Discussed with [facility] service      Antibiotics:  Ceftriaxone  Antibiotic # 5     Subjective:  Patient is status post SPC placement in IR  He is comfortable   No abdominal or flank pain  Temperature stays down   No chills  He is tolerating antibiotic well   No nausea, vomiting or diarrhea   No pruritus or rash  Objective:  Vitals:  HR:  [57-73] 61  Resp:  [16-22] 16  BP: (130-154)/(63-78) 154/63  SpO2:  [95 %-100 %] 97 %  Temp (24hrs), Av 5 °F (36 4 °C), Min:96 5 °F (35 8 °C), Max:98 °F (36 7 °C)  Current: Temperature: 97 7 °F (36 5 °C)    Physical Exam:     General: Awake, alert, cooperative, no distress  Lungs: Expansion symmetric, no rales, no wheezing, respirations unlabored  Heart[de-identified]  Regular rate and rhythm, S1 and S2 normal, no murmur     Abdomen: Soft, nondistended, non-tender, bowel sounds active all four quadrants,        no masses, no organomegaly  Extremities: Trace edema  No erythema/warmth  No ulcer  Nontender to palpation  Skin:  No rash  Invasive Devices     Peripheral Intravenous Line            Peripheral IV 09/25/17 Left Hand 1 day          Drain            Suprapubic Catheter Latex 18 Fr  less than 1 day                Labs studies:   I have personally reviewed pertinent labs  Results from last 7 days  Lab Units 09/26/17  0610 09/24/17  0505 09/23/17  0558  09/21/17  1604   SODIUM mmol/L 139 138 140  < > 139   POTASSIUM mmol/L 4 1 3 9 4 0  < > 4 5   CHLORIDE mmol/L 103 102 105  < > 100   CO2 mmol/L 33* 31 32  < > 35*   ANION GAP mmol/L 3* 5 3*  < > 4   BUN mg/dL 20 23 27*  < > 30*   CREATININE mg/dL 0 69 0 60 0 64  < > 0 92   EGFR ml/min/1 73sq m 92 97 94  < > 80   GLUCOSE RANDOM mg/dL 189* 218* 170*  < > 286*   CALCIUM mg/dL 7 5* 7 6* 7 5*  < > 7 5*   AST U/L  --   --  11  --  27   ALT U/L  --   --  22  --  29   ALK PHOS U/L  --   --  87  --  93   TOTAL PROTEIN g/dL  --   --  5 2*  --  6 2*   ALBUMIN g/dL  --   --  2 2*  --  2 7*   BILIRUBIN TOTAL mg/dL  --   --  0 51  --  0 60   < > = values in this interval not displayed  Results from last 7 days  Lab Units 09/26/17  0610 09/24/17  0505 09/23/17  0558   WBC Thousand/uL 12 22* 12 42* 13 00*   HEMOGLOBIN g/dL 11 7* 11 9* 11 5*   PLATELETS Thousands/uL 177 165 162       Results from last 7 days  Lab Units 09/22/17  0845   URINE CULTURE  No Growth <1000 cfu/mL       Imaging Studies:   I have personally reviewed pertinent imaging study reports and images in PACS  EKG, Pathology, and Other Studies:   I have personally reviewed pertinent reports

## 2017-09-27 NOTE — SOCIAL WORK
[facility] [place] following and may be able to accept pt for d/c tomorrow  Pt agreeable  MSW RANJIT Niue aware and will follow

## 2017-09-27 NOTE — PHYSICAL THERAPY NOTE
Physical Therapy Cancellation Note    PT ATTEMPTED TO SEE PATIENT ON TWO OCCASIONS TO INITIATE MOBILITY SERVICES  FIRST ATTEMPT PATIENT UNAVAILABLE- W/ PCA DISCUSSING INSERTION OF ENEMA  SECOND ATTEMPT PATIENT UNAVAILABLE- W/   PT WILL RE-ATTEMPT IF TIME AND CONTINUE TO FOLLOW AS APPROPRIATE      Ed Zeng, PT

## 2017-09-28 VITALS
RESPIRATION RATE: 15 BRPM | SYSTOLIC BLOOD PRESSURE: 121 MMHG | WEIGHT: 258.2 LBS | BODY MASS INDEX: 34.97 KG/M2 | DIASTOLIC BLOOD PRESSURE: 70 MMHG | HEIGHT: 72 IN | HEART RATE: 68 BPM | TEMPERATURE: 98.5 F | OXYGEN SATURATION: 99 %

## 2017-09-28 LAB
GLUCOSE SERPL-MCNC: 162 MG/DL (ref 65–140)
GLUCOSE SERPL-MCNC: 213 MG/DL (ref 65–140)
GLUCOSE SERPL-MCNC: 244 MG/DL (ref 65–140)

## 2017-09-28 PROCEDURE — 82948 REAGENT STRIP/BLOOD GLUCOSE: CPT

## 2017-09-28 RX ORDER — CEFDINIR 300 MG/1
300 CAPSULE ORAL ONCE
Qty: 1 CAPSULE | Refills: 0
Start: 2017-09-28 | End: 2017-09-28

## 2017-09-28 RX ADMIN — AMLODIPINE BESYLATE 5 MG: 5 TABLET ORAL at 09:59

## 2017-09-28 RX ADMIN — FAMOTIDINE 20 MG: 20 TABLET ORAL at 09:59

## 2017-09-28 RX ADMIN — INSULIN LISPRO 3 UNITS: 100 INJECTION, SOLUTION INTRAVENOUS; SUBCUTANEOUS at 17:28

## 2017-09-28 RX ADMIN — INSULIN LISPRO 2 UNITS: 100 INJECTION, SOLUTION INTRAVENOUS; SUBCUTANEOUS at 12:24

## 2017-09-28 RX ADMIN — CARVEDILOL 6.25 MG: 6.25 TABLET, FILM COATED ORAL at 09:59

## 2017-09-28 RX ADMIN — ATORVASTATIN CALCIUM 40 MG: 40 TABLET, FILM COATED ORAL at 17:28

## 2017-09-28 RX ADMIN — CEFDINIR 300 MG: 300 CAPSULE ORAL at 09:58

## 2017-09-28 RX ADMIN — INSULIN LISPRO 5 UNITS: 100 INJECTION, SOLUTION INTRAVENOUS; SUBCUTANEOUS at 17:29

## 2017-09-28 RX ADMIN — INSULIN LISPRO 1 UNITS: 100 INJECTION, SOLUTION INTRAVENOUS; SUBCUTANEOUS at 09:58

## 2017-09-28 RX ADMIN — TAMSULOSIN HYDROCHLORIDE 0.4 MG: 0.4 CAPSULE ORAL at 17:28

## 2017-09-28 RX ADMIN — FINASTERIDE 5 MG: 5 TABLET, FILM COATED ORAL at 09:59

## 2017-09-28 RX ADMIN — INSULIN GLARGINE 14 UNITS: 100 INJECTION, SOLUTION SUBCUTANEOUS at 09:58

## 2017-09-28 RX ADMIN — DOCUSATE SODIUM 100 MG: 100 CAPSULE, LIQUID FILLED ORAL at 17:28

## 2017-09-28 RX ADMIN — INSULIN LISPRO 5 UNITS: 100 INJECTION, SOLUTION INTRAVENOUS; SUBCUTANEOUS at 12:24

## 2017-09-28 RX ADMIN — ENOXAPARIN SODIUM 40 MG: 40 INJECTION SUBCUTANEOUS at 09:59

## 2017-09-28 RX ADMIN — ASPIRIN 81 MG 81 MG: 81 TABLET ORAL at 09:59

## 2017-09-28 RX ADMIN — CARVEDILOL 6.25 MG: 6.25 TABLET, FILM COATED ORAL at 17:28

## 2017-09-28 NOTE — PROGRESS NOTES
Rounded with Dr Bao Berman with [facility]  Plan is to [facility] [place] at [phone] for continuum of care   Will continue to monitor

## 2017-09-28 NOTE — DISCHARGE INSTRUCTIONS
Suprapubic Cystostomy     WHAT YOU NEED TO KNOW:   Suprapubic cystostomy is surgery to create a stoma (opening) through your abdomen into your bladder  This opening is where a catheter is inserted to drain urine  You may need a cystostomy if your urine flow is blocked  DISCHARGE INSTRUCTIONS:     Follow up with your healthcare provider as directed: You may need to return to have your suprapubic catheter changed or removed  Write down your questions so you remember to ask them during your visits  Empty your urine drainage bag: Empty your urine drainage bag when it is ½ to ? full, or every 8 hours  If you have a smaller leg bag, empty it every 3 to 4 hours  Do the following when you empty your urine drainage bag:  · Hold the urine bag over a toilet or large container  · Remove the drain spout from its sleeve at the bottom of the urine bag  Do not touch the tip of the drain spout  Open the slide valve on the spout  · Let the urine flow out of the urine bag into the toilet or container  Do not let the drainage tube touch anything  · Clean the end of the drain spout with alcohol when the bag is empty  Ask which cleaning solution is best to use  · Close the slide valve and put the drain spout into its sleeve at the bottom of the urine bag  Write down how much urine was in your bag if you were asked to keep a record  Prevent an infection:   · Clean the stoma and skin around it daily  Wash your hands before and after cystostomy care  Put on a new pair of clean medical gloves  · Change your urine bag or clean reusable bags  Ask how often you need to change or clean your urine drainage bag  You may need to change your reusable bag at least once a week  · Keep the bag below your waist  This will prevent urine from flowing back into your bladder and causing an infection or other problems  Also, keep the tube free of kinks so the urine will drain properly  Do not pull on the catheter   This can cause pain and bleeding and may cause the catheter to come out  Contact Interventional Radiology at [phone] [place] PATIENTS: Contact Interventional Radiology at [phone]) Toby Gonzalez PATIENTS: Contact Interventional Radiology at [phone]) if any of the following occur:  · You have a fever or chills  · You have severe pain  · You have a burning pain in your stoma  · Your stoma is red, swollen, or draining pus  · You have blood in your urine  · You have questions or concerns about your condition or care    Seek care immediately or call 911 if:   · No urine is draining into the urine bag

## 2017-09-28 NOTE — SOCIAL WORK
Cm met with pt and aware he was accepted at [facility] [place] [address]   Pt choice is [facility] [place] [address]   Cm spoke with Kathy Stack  And is able to accept today   Cm set up transport with [facility]  For 18:30 tonight   Cm notified pt , pt nurse , and  Nya Birmingham   Cm completed      CMN and facility transfer form completed

## 2017-09-28 NOTE — SOCIAL WORK
CM received a message from Jono Gonzalez [facility]), will be in around 1 or 2PM today  Pt accepted to [facility] [place]

## 2017-09-28 NOTE — SOCIAL WORK
Facility willing to accept without pt being optioned    CM informed Cam Grover when she came for eval

## 2017-09-28 NOTE — PHYSICAL THERAPY NOTE
Physical Therapy Cancellation Note    PT ATTEMPTED TO SEE PATIENT ON TWO OCCASSIONS THIS AM  THE FIRST ATTEMPT PATIENT DECLINING SECONDARY TO HAVING A BOWEL MOVEMENT AND SECOND ATTEMPT PATIENT DECLINING REPORTING "I AM SLEEPING" DESPITE BEING AWAKE SEATED IN CHAIR  PT WILL CONTINUE TO FOLLOW AND PERFORM INITIAL EVALUATION AS APPROPRIATE      Janelle Michaels, PT

## 2017-09-28 NOTE — PROGRESS NOTES
Progress Note - Infectious Disease   Matheus Torrez 68 y o  male MRN: [id number]  Unit/Bed#: [facility] 834-01 Encounter: [id number]      Impression/Recommendations:  1   Possible UTI   Patient has very little clinical symptoms consistent of UTI   However, he does have unexplained leukocytosis   Patient is clinically stable on IV ceftriaxone   Urine culture this admission is negative     Continue p o  cefdinir    Monitor temperature/WBC  Monitor for recurrent abdominal pain  Treat x 7 days total, another 1 day, through today      2   Urinary retention  Beverly Ferns has Ozuna catheter in place   Patient is status post SPC insertion      3   Leukocytosis    WBC decreasing  Monitor WBC with treatment of UTI above      4   PCN allergy   This is unclear   Regardless, reaction was not severe    Patient tolerated cephalosporin well  Monitor for reaction on cephalosporin      Discussed with the patient in detail regarding the above plan      Antibiotics:  Ceftriaxone  Antibiotic # 7     Subjective:  Patient is comfortable   No abdominal or flank pain  Temperature stays down   No chills  He is tolerating antibiotic well   No nausea, vomiting or diarrhea   No pruritus or rash  Objective:  Vitals:  HR:  [70-97] 70  Resp:  [18] 18  BP: (140-149)/(66-78) 148/67  SpO2:  [95 %-98 %] 95 %  Temp (24hrs), Av 6 °F (37 °C), Min:98 1 °F (36 7 °C), Max:99 4 °F (37 4 °C)  Current: Temperature: 98 1 °F (36 7 °C)    Physical Exam:     General: Awake, alert, cooperative, no distress  Lungs: Expansion symmetric, no rales, no wheezing, respirations unlabored  Heart[de-identified]  Regular rate and rhythm, S1 and S2 normal, no murmur  Abdomen: Soft, nondistended, non-tender, bowel sounds active all four quadrants,        no masses, no organomegaly  Extremities: Trace edema  No erythema/warmth  No ulcer  Nontender to palpation  Skin:  No rash       Invasive Devices     Drain            Suprapubic Catheter Latex 18 Fr  1 day                Labs studies:   I have personally reviewed pertinent labs  Results from last 7 days  Lab Units 09/26/17  0610 09/24/17  0505 09/23/17  0558  09/21/17  1604   SODIUM mmol/L 139 138 140  < > 139   POTASSIUM mmol/L 4 1 3 9 4 0  < > 4 5   CHLORIDE mmol/L 103 102 105  < > 100   CO2 mmol/L 33* 31 32  < > 35*   ANION GAP mmol/L 3* 5 3*  < > 4   BUN mg/dL 20 23 27*  < > 30*   CREATININE mg/dL 0 69 0 60 0 64  < > 0 92   EGFR ml/min/1 73sq m 92 97 94  < > 80   GLUCOSE RANDOM mg/dL 189* 218* 170*  < > 286*   CALCIUM mg/dL 7 5* 7 6* 7 5*  < > 7 5*   AST U/L  --   --  11  --  27   ALT U/L  --   --  22  --  29   ALK PHOS U/L  --   --  87  --  93   TOTAL PROTEIN g/dL  --   --  5 2*  --  6 2*   ALBUMIN g/dL  --   --  2 2*  --  2 7*   BILIRUBIN TOTAL mg/dL  --   --  0 51  --  0 60   < > = values in this interval not displayed  Results from last 7 days  Lab Units 09/26/17  0610 09/24/17  0505 09/23/17  0558   WBC Thousand/uL 12 22* 12 42* 13 00*   HEMOGLOBIN g/dL 11 7* 11 9* 11 5*   PLATELETS Thousands/uL 177 165 162       Results from last 7 days  Lab Units 09/22/17  0845   URINE CULTURE  No Growth <1000 cfu/mL       Imaging Studies:   I have personally reviewed pertinent imaging study reports and images in PACS  EKG, Pathology, and Other Studies:   I have personally reviewed pertinent reports

## 2017-09-28 NOTE — CASE MANAGEMENT
KEVIN RYAN -    PLEASE SEE ADMISSION REVIEW FROM 9/22/17 BELOW + CALL WITH ADDITIONAL NEEDS ! THANKS ! [facility] in the [facility] by Riky Evans for 2017  Network Utilization Review Department  Phone: [phone]; Fax [phone]  ATTENTION: The Network Utilization Review Department is now centralized for our 7 Facilities  Make a note that we have a new phone and fax numbers for our Department  Please call with any questions or concerns to [phone] and carefully follow the prompts so that you are directed to the right person  All voicemails are confidential  Fax any determinations, approvals, denials, and requests for initial or continue stay review clinical to [phone]  Due to HIGH CALL volume, it would be easier if you could please send faxed requests to expedite your requests and in part, help us provide discharge notifications faster  INITIAL REVIEW  Jessica Butler RN Registered Nurse Signed   Case Management Date of Service: 9/25/2017  3:54 PM           Initial Clinical Review     Admission: Date/Time/Statement: 9/22/17 @ 1411            Orders Placed This Encounter   Procedures    Inpatient Admission       Standing Status:   Standing       Number of Occurrences:   1       Order Specific Question:   Admitting Physician       Answer:   Amber Ferguson       Order Specific Question:   Level of Care       Answer:   Med Surg [16]       Order Specific Question:   Estimated length of stay       Answer:   More than 2 Midnights       Order Specific Question:   Certification       Answer:   I certify that inpatient services are medically necessary for this patient for a duration of greater than two midnights   See H&P and MD Progress Notes for additional information about the patient's course of treatment             ED: Date/Time/Mode of Arrival:       ED Arrival Information          Patient not seen in ED                            Chief Complaint: No chief complaint on file         History of Illness: 45-year-old male with extensive past medical history that includes BPH , history of PE, history of urinary retention,systolic and diastolic chronic congestive heart failure, peripheral vascular disease, essential hypertension, diabetes mellitus, COPD, multiple recent urinary tract infections, multiple renal cysts who initially was admitted with multiple bouts of urinary retention not amenable to medical management; as well as multiple urinary catheterizations and failed trials   Patient presented to the emergency room at [address] [place] [address] with refractory urinary retention  [facility] has required recent admissions for ESBL producing UTI   Patient has had difficulty adhering to follow-up   Urologic consultation had requested patient to be transferred to [place] for suprapubic catheter placement   Patient has remained metabolically stable throughout his hospital course   Currently has no complaints including no fever no chills no dysuria no polyuria or any other complaints      ED Vital Signs:            ED Triage Vitals   Temperature Pulse Respirations Blood Pressure SpO2   09/22/17 1400 09/22/17 1400 09/22/17 1400 09/22/17 1400 09/22/17 1400   98 8 °F (37 1 °C) 71 18 162/77 97 %       Temp Source Heart Rate Source Patient Position - Orthostatic VS BP Location FiO2 (%)   09/22/17 1400 09/23/17 1601 09/22/17 1400 09/22/17 1400 --   Oral Monitor Lying Right arm         Pain Score           09/22/17 1430           No Pain                Wt Readings from Last 1 Encounters:   09/22/17 122 kg (269 lb 9 6 oz)         Vital Signs (abnormal):      Abnormal Labs/Diagnostic Test Results: wbc  Wbc 13 00-- 12 42---hgb 11 9--bs 218--ca 7 6     ED Treatment:       Medication Administration - No Administrations Displayed (No Start Event Found)      None             Past Medical/Surgical History:         Active Ambulatory Problems     Diagnosis Date Noted  Systolic and diastolic CHF, chronic      Spinal stenosis of lumbar region      PVD (peripheral vascular disease)      Essential hypertension 06/28/2017    Hyperlipidemia 06/28/2017    Type 2 diabetes mellitus with hyperglycemia 06/28/2017    COPD (chronic obstructive pulmonary disease) 06/28/2017    Urinary retention due to benign prostatic hyperplasia 06/28/2017    Multiple renal cysts 06/29/2017    Vitamin D insufficiency 06/30/2017    Kidney disease, chronic, stage III (GFR 30-59 ml/min) 08/08/2017    Ambulatory dysfunction 09/22/2017    Gait difficulty 10/13/2016    Morbid obesity 10/13/2016    S/P CABG x 3 11/24/2016           Resolved Ambulatory Problems     Diagnosis Date Noted    UTI due to extended-spectrum beta lactamase (ESBL) producing Escherichia coli      Dysuria 06/28/2017    History of ESBL E  coli infection 06/28/2017    History of BPH 06/28/2017    Constipation 06/30/2017    Elevated lactic acid level 08/08/2017    Gram-negative bacteremia 08/14/2017           Past Medical History:   Diagnosis Date    CHF (congestive heart failure)      COPD (chronic obstructive pulmonary disease)      Diabetes mellitus      ESBL (extended spectrum beta-lactamase) producing bacteria infection      History of BPH      Hyperlipidemia      Hypertension      PE (pulmonary thromboembolism)      PVD (peripheral vascular disease)      Spinal stenosis of lumbar region      Stroke      Systolic and diastolic CHF, chronic      Urinary retention      UTI due to extended-spectrum beta lactamase (ESBL) producing Escherichia coli           Admitting Diagnosis: Urinary retention [R33 9]     Age/Sex: 68 y o  male     Assessment/Plan: Principal Problem:    Urinary retention due to benign prostatic hyperplasia  Active Problems:    Spinal stenosis of lumbar region    Essential hypertension    Hyperlipidemia    Type 2 diabetes mellitus with hyperglycemia    COPD (chronic obstructive pulmonary disease)    Kidney disease, chronic, stage III (GFR 30-59 ml/min)    Ambulatory dysfunction    Morbid obesity     #1 UTI recurrent/urine retention  Consultation was urology will be obtained  Patient does have a history of ESBL infections  Continue aztreonam for now  He is afebrile hemodynamically stable and asymptomatic  Continue Ozuna catheter     #2History of HTN:       We will continue patient home medications  Although initially his blood pressure was higher in emergency department, it later stabilized  Well also initiate IV hydralazine and IV metoprolol for SBP greater than 1 60 mmHg  We will advise patient to follow-up with next PCP in regards to further better management of ongoing HTN       #3DM Type II:    Begin no concentrated carbohydrate diet  Cover with Low dose Aspart SSI as needed   Will order HgbA1C to assess status of recent glycemic control  Well initiate home medications once Medication Reconciliation is completed and confirmed by pharmacy      #4Hyperlipidemia:    Currently on statin therapy for elevated lipids     Previously not at goal of LDL < 100 as indicated in PMHx      Will order fasting lipid panel to assess status of hyperlipidemia      Will restart atorvastatin 40 mg po QHS    Patient was counseled in regards to Appropriate nutritional and lifestyle modifications      #5 ambulatory dysfunction  PT/OT eval and treatment     #6 history of COPD  Continue all current management  No acute exacerbation     #7 chronic kidney disease  Creatinine at baseline               Admission Orders:  Scheduled Meds:   amLODIPine 5 mg Oral Daily   aspirin 81 mg Oral Daily   atorvastatin 40 mg Oral Daily With Dinner   carvedilol 6 25 mg Oral BID With Meals   cefTRIAXone 1,000 mg Intravenous Q24H   [START ON 9/26/2017] enoxaparin 40 mg Subcutaneous Daily   famotidine 20 mg Oral Daily   finasteride 5 mg Oral Daily   [START ON 9/26/2017] insulin glargine 10 Units Subcutaneous HS   [START ON 9/27/2017] insulin glargine 14 Units Subcutaneous QAM   insulin glargine 6 Units Subcutaneous Once   [START ON 9/26/2017] insulin glargine 7 Units Subcutaneous Once   insulin lispro 1-6 Units Subcutaneous TID AC   insulin lispro 1-6 Units Subcutaneous HS   insulin lispro 5 Units Subcutaneous TID With Meals   tamsulosin 0 4 mg Oral Daily With Dinner      Continuous Infusions:    PRN Meds:   acetaminophenx1    ALPRAZolamx1    HYDROcodone-acetaminophen    polyethylene glycol      Consult id--Impression/Recommendations: This is a 68 y  o  male, with history of urine retention, presented to [facility] yesterday with lower abdominal pain   Pain resolve with insertion of Ozuna catheterization   Patient was transferred here for suprapubic catheterization  Richie Ahumada has no fever but has leukocytosis      1   Possible UTI   Patient has very little clinical symptoms consistent of UTI   However, he does have unexplained leukocytosis   Pending urine culture, it is reasonable to treat him with a course of antibiotic through his anticipated suprapubic catheterization early next week   Most recently, patient had a urine culture in August which grew Maralyn Hasten in June of this year, he had a urine culture growing ESBL producing Proteus and Enterococcus   With patient clinically and systemically well, I would treat him based on the most urine culture grown Providencia  Will follow up on urine culture and adjust antibiotic accordingly  Change antibiotic to IV ceftriaxone  Follow-up on urine culture results  Monitor temperature/WBC    Monitor for recurrent abdominal pain      2  Kaylie  retention  Maria A Yesi has Ozuna catheter in place   Agree with Urology plan for suprapubic catheterization long-term      3   Leukocytosis   On H&P, it was stated the patient had outpatient rx for prednisone  Maria A Key does not recall taking this   If patient does indeed take prednisone, this would explain his leukocytosis   However, if he does not take prednisone, leukocytosis may be a site of UTI, as in above   Antibiotic will be started, as in above   Will monitor WBC  Monitor WBC with treatment of UTI above      4   PCN allergy   This is unclear   Regardless, reaction was not severe   Patient should be able to tolerate cephalosporin  Monitor for reaction on ceftriaxone      [facility] hospitalization records reviewed in detail    Discussed with the patient in detail regarding the above plan      Fingerstick bg 4x daily     IR-- place suprapubic cath     Consult urologypt/ot

## 2017-09-28 NOTE — DISCHARGE SUMMARY
[facility]charge Summary - [facility]    Patient Information: Kelley Eid 68 y o  male MRN: [id number]  [facility]: [id number]    Discharging Physician / Practitioner: Kim Rosas DO  PCP: April Singer MD  Admission Date: 9/22/2017  Discharge Date: 09/28/17    Reason for Admission: urine retention due to BPH and UTI    Discharge Diagnoses:     Principal Problem:    Urinary retention due to benign prostatic hyperplasia  Active Problems:    Spinal stenosis of lumbar region    Essential hypertension    Hyperlipidemia    Type 2 diabetes mellitus with hyperglycemia    COPD (chronic obstructive pulmonary disease)    Kidney disease, chronic, stage III (GFR 30-59 ml/min)    Ambulatory dysfunction    Morbid obesity  Resolved Problems:    * No resolved hospital problems  *      Consultations During Hospital Stay:  · Dr Celeste Calle - urology  · Dr Jayne Lyon - ID  · IR    Procedures Performed:     · Pt had suprapubic cath placed by IR on 9/26    Significant Findings / Test Results:     · U cx negative    Incidental Findings:   · none     Test Results Pending at Discharge (will require follow up):   · none     Outpatient Tests Requested:  · none    Complications:  none    Hospital Course:     Kelley Eid is a 68 y o  male patient who originally presented to the hospital on 9/22/2017 due to urine retention and possible UTI  Transferred from [facility] for suprapubic cath  Pt had siddiqui placed in ER at [facility] for urine retention  For suspected UTI, he was started on Azactam, and then ID switched him to Rocephin  As above, IR did placed a suprapubic cath and his siddiqui was removed  After 5 days of IV abx, he was switched to PO Cefdinir to complete 7 day course  Condition at Discharge: stable     Discharge Day Visit / Exam:     Subjective:  Pt seen and examined  Constipation resolved  No acute complaints    Vitals: Blood Pressure: 148/67 (09/28/17 0701)  Pulse: 70 (09/28/17 0701)  Temperature: 98 1 °F (36 7 °C) (09/28/17 0701)  Temp Source: Oral (09/28/17 0701)  Respirations: 18 (09/28/17 0701)  Height: 5' 11 5" (181 6 cm) (09/22/17 1400)  Weight - Scale: 117 kg (258 lb 3 2 oz) (09/28/17 0355)  SpO2: 95 % (09/28/17 0701)  Exam:   Physical Exam   Constitutional: He is oriented to person, place, and time  He appears well-developed and well-nourished  HENT:   Head: Normocephalic and atraumatic  Eyes: Conjunctivae and EOM are normal    Neck: Normal range of motion  Neck supple  Cardiovascular: Normal rate and regular rhythm  Pulmonary/Chest: Effort normal and breath sounds normal    Abdominal: Soft  Bowel sounds are normal    Genitourinary:   Genitourinary Comments: Pt has suprapubic cath   Musculoskeletal: Normal range of motion  He exhibits no edema  Neurological: He is alert and oriented to person, place, and time  Skin: Skin is warm and dry  Discussion with Family: pt    Discharge instructions/Information to patient and family:   See after visit summary for information provided to patient and family  Provisions for Follow-Up Care:  See after visit summary for information related to follow-up care and any pertinent home health orders  Disposition:     Short Term Rehab or Skilled Nursing at Other SNF Facility: The [address] [facility]:   · Not Applicable to this Patient - Not Applicable to this Patient    Planned Readmission: no     Discharge Statement:  I spent 35 minutes discharging the patient  This time was spent on the day of discharge  I had direct contact with the patient on the day of discharge  Greater than 50% of the total time was spent examining patient, answering all patient questions, arranging and discussing plan of care with patient as well as directly providing post-discharge instructions  Additional time then spent on discharge activities      Discharge Medications:  See after visit summary for reconciled discharge medications provided to patient and family        ** Please Note: This note has been constructed using a voice recognition system **

## 2017-10-15 ENCOUNTER — HOSPITAL ENCOUNTER (EMERGENCY)
Facility: HOSPITAL | Age: 77
Discharge: HOME | End: 2017-10-15
Admitting: EMERGENCY MEDICINE
Payer: OTHER GOVERNMENT

## 2017-10-15 VITALS
SYSTOLIC BLOOD PRESSURE: 171 MMHG | HEART RATE: 75 BPM | OXYGEN SATURATION: 93 % | BODY MASS INDEX: 36.89 KG/M2 | TEMPERATURE: 98.8 F | HEIGHT: 71 IN | WEIGHT: 263.5 LBS | RESPIRATION RATE: 18 BRPM | DIASTOLIC BLOOD PRESSURE: 72 MMHG

## 2017-10-15 DIAGNOSIS — T83.010A SUPRAPUBIC CATHETER DYSFUNCTION, INITIAL ENCOUNTER (HCC): Primary | ICD-10-CM

## 2017-10-15 LAB
BACTERIA UR QL AUTO: ABNORMAL /HPF
BILIRUB UR QL STRIP: NEGATIVE
CLARITY UR: ABNORMAL
COLOR UR: YELLOW
GLUCOSE UR STRIP-MCNC: ABNORMAL MG/DL
HGB UR QL STRIP.AUTO: ABNORMAL
KETONES UR STRIP-MCNC: NEGATIVE MG/DL
LEUKOCYTE ESTERASE UR QL STRIP: ABNORMAL
NITRITE UR QL STRIP: NEGATIVE
NON-SQ EPI CELLS URNS QL MICRO: ABNORMAL /HPF
PH UR STRIP.AUTO: 7 [PH] (ref 4.5–8)
PROT UR STRIP-MCNC: ABNORMAL MG/DL
RBC #/AREA URNS AUTO: ABNORMAL /HPF
SP GR UR STRIP.AUTO: 1.01 (ref 1–1.03)
UROBILINOGEN UR QL STRIP.AUTO: 0.2 E.U./DL
WBC #/AREA URNS AUTO: ABNORMAL /HPF

## 2017-10-15 PROCEDURE — 81001 URINALYSIS AUTO W/SCOPE: CPT | Performed by: PHYSICIAN ASSISTANT

## 2017-10-15 PROCEDURE — 87147 CULTURE TYPE IMMUNOLOGIC: CPT | Performed by: PHYSICIAN ASSISTANT

## 2017-10-15 PROCEDURE — 87077 CULTURE AEROBIC IDENTIFY: CPT | Performed by: PHYSICIAN ASSISTANT

## 2017-10-15 PROCEDURE — 87186 SC STD MICRODIL/AGAR DIL: CPT | Performed by: PHYSICIAN ASSISTANT

## 2017-10-15 PROCEDURE — 99284 EMERGENCY DEPT VISIT MOD MDM: CPT

## 2017-10-15 PROCEDURE — 87086 URINE CULTURE/COLONY COUNT: CPT | Performed by: PHYSICIAN ASSISTANT

## 2017-10-15 NOTE — DISCHARGE INSTRUCTIONS
How to Care for Your Suprapubic Catheter   WHAT YOU NEED TO KNOW:   A suprapubic catheter is a sterile (germ-free) tube that drains urine out of your bladder  It is inserted through a stoma (created opening) in your abdomen and into the bladder  The catheter has a small balloon filled with solution that holds the catheter inside your bladder  Suprapubic catheters are used when you have problems urinating because of a medical condition  A suprapubic catheter is also called an indwelling urinary catheter  DISCHARGE INSTRUCTIONS:   Catheter problem signs:  Know the signs of problems related to your catheter:  · Lower abdomen pain:  This can be a sign of infection  You may also have pain if the catheter is in the wrong place  · Urine leaking from your stoma or urethra:  Wet clothes or a wet bed may be signs that your catheter is not draining as it should  You may get some leaking of urine from your stoma or urethra if you have bladder spasms  A lot of urine leaking is not normal  The catheter may be blocked or in the wrong position  The drainage tubing may be blocked or kinked  Leaking urine can also be a sign of infection  · No urine draining from the catheter:  No urine drainage for 6 to 8 hours is a sign that your catheter is not working properly  Pain or fullness in you lower abdomen can also be signs of catheter blockage or infection  You may also feel restless  If you have any of these signs, do the following:     ¨ Check to see if the urine tubing is twisted or bent or if you are lying on the catheter or tubing  ¨ Make sure the urine bag and tubing are located below the level of your waist    ¨ Move to a different position  ¨ Contact your healthcare provider immediately if there is still no urine draining or if you continue to have pain or fullness or feel restless  Suprapubic catheter change problems:  Know what to do if you have any of these problems:  · Unplanned catheter change:   Your catheter may accidently fall out or be pulled out  You or a person who helps care for you will need to learn how to put in a new catheter  This must be done right away  Your stoma can start to close up quickly if it does not have a catheter in it  · Old catheter does not come out easily:  Some types of catheter balloons get ridges on them or may hold their inflated shape after the water is removed  You may need a different type of catheter if this happens  A catheter that does not come out easily can damage your stoma and increase your risk for infection  Tell your healthcare provider if you have problems with removing your old catheter  · Not able to insert the new catheter: If you have trouble, cover the stoma with a sterile bandage and call your healthcare provider right away  · New catheter balloon in the wrong place:  A catheter balloon that is in the wrong place may cause pain when you try to fill it  ¨ Not inserted deep enough: This can cause pain and may damage your stoma if the catheter balloon is in the stoma when the balloon is filled  Damage to your stoma can make inserting a new catheter harder or lead to an infection  Insert more of the catheter and try to fill the balloon again  ¨ Inserted too deep:  A catheter that is inserted too far into your bladder can pass into your urethra  This can cause pain and leaking urine when the balloon is filled  In women the end of the new catheter may poke out of the urethra  Your catheter will not drain urine as it should if this happens and may damage your urethra  Pull the catheter back several inches and try to fill the balloon again  Tell your healthcare provider if this happens  Prevent infections:  Urinary catheter-based infections are common and can lead to serious illness and death  Proper care and cleaning of the catheter, the insertion site, and the urine drainage bag can help prevent infection   The following are ways you can help prevent catheter-based infections:  · Drinking liquids:  Adults should drink about 9 to 13 cups of liquid each day  One cup is 8 ounces  Good choices of liquids for most people include water, juice, and milk  Coffee, soup, and fruit may be counted in your daily liquid amount  Ask your caregiver how much liquid you should drink each day  · Good hand hygiene is the best way to prevent infections   Always wash your hands with soap and water before and after you touch your catheter, tubing, or drainage bag  Do this to remove germs on your hands before you touch these items  Do this after you touch these items to remove germs that may have been on them  Wear clean medical gloves when you care for your catheter or disconnect the drainage bag  This will help stop germs from getting into your catheter  Remind anyone who cares for your catheter or drainage system to wash his or her hands  · Ask your healthcare provider when your catheter will be removed or replaced with a new one  Your risk for infection is greater the longer you have a catheter  · Always do proper care and cleaning of the catheter, its insertion site, and the drainage bag  · Keep the catheter drainage system closed  · Keep the catheter tube secured to your skin or leg so that it drains well  Prevent drainage bag problems:   · Use good hand hygiene:  Keep your hands clean and as free of germs as possible  Always wash your hands before and after you touch the catheter or the insertion site  Wear clean medical gloves when you care for your catheter  · Allow gravity drainage and position the drainage bag properly:  Do not loop or kink the tubing so urine can flow out  Keep the drainage bag below the level of your waist  This helps stop urine from moving back up the tubing and into your bladder  · Keep the bag off the floor:  Do not let the drainage bag touch or lie on the floor  · Empty the drainage bag when needed:   The weight of a full drainage bag can pull on and hurt your stoma  Empty the drainage bag every 3 to 6 hours or when it is ½ to ? full  · Clean and change the drainage bag as directed:  Ask your healthcare provider how often you should change the drainage bag  You may buy a special solution to clean the drainage bag, or you may make a solution with household bleach or vinegar and tap water  Wear medical gloves if you must disconnect the tubing  Do not allow the end of the catheter or tubing to touch anything  Clean the ends with a new alcohol pad or as directed before you reconnect them  Prevent catheter and stoma problems:   · Stoma site care:  Clean the skin around your stoma every day or as directed by your healthcare provider  Keep the area clean and dry to prevent skin problems  Look for redness, skin injuries, red spots, drainage, and swelling  Report any skin changes to your healthcare provider  · Know how far to insert your new catheter:  Know how far to insert the new catheter to prevent it from being in the stoma or urethra  Check the catheter position if you have discomfort or pain when you fill the catheter balloon  · Prevent stoma damage or loss of stoma:  Tell your healthcare provider if you have problems removing a catheter  A catheter that does not come out easily can damage your stoma and increases your risk for infection  You may need a different type of catheter  Your stoma can start to close off quickly if you wait longer than 5 to 10 minutes to insert a new one  Make sure you always have enough supplies to be able to change your catheter  Always keep an extra catheter with you  Healthcare providers may be able to save your stoma if you seek care immediately   · Prevent blockage of catheter or tubing:  Signs that your catheter or tubing is blocked or kinked include urine leaking from your stoma or urethra or urine not draining at all  Your risk for infection increases if the tube is blocked  Keep the tubing in a straight line when you hang your drainage bag to prevent it from getting blocked  · Secure your catheter well:  Catheters that are not secured can pull at the insertion site  This causes the stoma to get bigger and urine may start to leak out of the stoma  Make sure the device holding your catheter does not block the tubing  Change how you secure the catheter if you develop an overgrowth of skin at the insertion site  Secure the catheter in a different direction than usual, or secure the drainage bag to your other leg  Take your medicine as directed  Contact your healthcare provider if you think your medicine is not helping or if you have side effects  Tell him of her if you are allergic to any medicine  Keep a list of the medicines, vitamins, and herbs you take  Include the amounts, and when and why you take them  Bring the list or the pill bottles to follow-up visits  Carry your medicine list with you in case of an emergency  Follow up with your healthcare provider as directed:  Write down your questions so you remember to ask them during your visits  Contact your healthcare provider if:   · You have a fever  · You have changes in how your urine looks or smells, or you have blood in your urine  · You have overgrowth of skin at the insertion site that is getting larger  · Urine keeps draining out of the catheter insertion site or from your urethra  · There is less urine than usual or no urine draining into the drainage bag  · The closed drainage system has accidently come open or apart  · Your catheter keeps getting blocked  · Your catheter came out and you have replaced it with a new one  Return to the emergency department if:   · Your catheter becomes blocked and you cannot reach your healthcare provider  · Your catheter comes out and you cannot replace it yourself      · Your insertion site is red, has green or yellow discharge, smells bad, or is bleeding more than usual     · You have pain in your hip, back, pelvis, or lower abdomen  · You are confused or have other changes in the way that you think  © 2017 [address] [employer] Information is for End User's use only and may not be sold, redistributed or otherwise used for commercial purposes  All illustrations and images included in CareNotes® are the copyrighted property of [employer]  or Riky Evans  The above information is an  only  It is not intended as medical advice for individual conditions or treatments  Talk to your doctor, nurse or pharmacist before following any medical regimen to see if it is safe and effective for you

## 2017-10-15 NOTE — ED PROVIDER NOTES
History  Chief Complaint   Patient presents with    Urinary Catheter Problem     Pt having trouble with suprapubic catheter       History provided by:  Patient  Urinary Catheter Problem   Location:  Suprapubic  Quality:  Pressure, fullness, leaking  Severity:  Mild  Onset quality:  Gradual  Duration:  12 hours  Timing:  Constant  Progression:  Unchanged  Chronicity:  New  Context:  Pt with chronic UTI (esbl colonized) with prior siddiqui cath for many years, had inpatient tx for uti/retention end of september and IR sp cath placed 9/28/17  follows with [facility] urology  Relieved by:  None  Worsened by:  None  Ineffective treatments:  None tried  Associated symptoms: no abdominal pain, no chest pain, no congestion, no cough, no diarrhea, no fatigue, no fever, no headaches, no myalgias, no nausea, no rash, no shortness of breath, no sore throat, no vomiting and no wheezing    Risk factors:  Was on azactam/rocephin inpatient urology and ID d/c pt on ceftin completed course 2 weeks ago  not seen urology yet for SPT f/u      Prior to Admission Medications   Prescriptions Last Dose Informant Patient Reported? Taking?    LACTOBACILLUS RHAMNOSUS, GG, PO   Yes No   Sig: Take 1 capsule by mouth 2 (two) times a day   acetaminophen (TYLENOL) 500 mg tablet   No No   Sig: One tablet every 6 hours as needed for moderate pain   amLODIPine (NORVASC) 5 mg tablet   No No   Sig: Take 1 tablet by mouth daily   aspirin 81 mg chewable tablet   No No   Sig: Chew 1 tablet daily   atorvastatin (LIPITOR) 40 mg tablet   No No   Sig: Take 1 tablet by mouth daily with dinner   carvedilol (COREG) 6 25 mg tablet   No No   Sig: Take 1 tablet by mouth 2 (two) times a day with meals   docusate sodium (COLACE) 100 mg capsule   No No   Sig: Take 1 capsule by mouth 2 (two) times a day   Patient taking differently: Take 100 mg by mouth 2 (two) times a day as needed     famotidine (PEPCID) 20 mg tablet   Yes No   Sig: Take 20 mg by mouth daily   ferrous sulfate 325 (65 Fe) mg tablet   Yes No   Sig: Take 325 mg by mouth daily with breakfast Take one hour before meal      finasteride (PROSCAR) 5 mg tablet   No No   Sig: Take 1 tablet by mouth daily   insulin glargine (LANTUS) 100 units/mL subcutaneous injection   No No   Sig: Inject 10 Units under the skin daily at bedtime   insulin glargine (LANTUS) 100 units/mL subcutaneous injection   No No   Sig: Inject 14 Units under the skin every morning   insulin lispro (HumaLOG) 100 units/mL injection   No No   Sig: Inject 1-6 Units under the skin 3 (three) times a day before meals   Patient taking differently: Inject 1 Units under the skin 3 (three) times a day before meals 1 unit for every 5 gm carbohydrates per endocrine instructions  insulin lispro (HumaLOG) 100 units/mL injection   No No   Sig: Inject 5 Units under the skin 3 (three) times a day with meals   polyethylene glycol (MIRALAX) 17 g packet   No No   Sig: Take 17 g by mouth daily as needed (constipation)   tamsulosin (FLOMAX) 0 4 mg   No No   Sig: Take 1 capsule by mouth daily with dinner      Facility-Administered Medications: None       Past Medical History:   Diagnosis Date    [facility]     COPD (chronic obstructive pulmonary disease) ([place] [address] )     Diabetes mellitus ([facility])     ESBL (extended spectrum beta-lactamase) producing bacteria infection     History of BPH     Hyperlipidemia     Hypertension     PE (pulmonary thromboembolism) ([place] [address] )     PVD (peripheral vascular disease) ([place] [address] )     Spinal stenosis of lumbar region     Stroke ([place] [address] )     Systolic and diastolic CHF, chronic ([place] [address] )     Urinary retention     UTI due to extended-spectrum beta lactamase (ESBL) producing Escherichia coli        Past Surgical History:   Procedure Laterality Date    CARDIAC SURGERY      cabg    HIP SURGERY Right     plate and pin       History reviewed  No pertinent family history    I have reviewed and agree with the history as documented  Social History   Substance Use Topics    Smoking status: Former Smoker    Smokeless tobacco: Never Used    Alcohol use No        Review of Systems   Constitutional: Negative for chills, fatigue and fever  HENT: Negative for congestion, facial swelling, hearing loss, sinus pressure and sore throat  Eyes: Negative for photophobia, pain, redness, itching and visual disturbance  Respiratory: Negative for cough, shortness of breath and wheezing  Cardiovascular: Negative for chest pain, palpitations and leg swelling  Gastrointestinal: Negative for abdominal pain, constipation, diarrhea, nausea and vomiting  Genitourinary: Positive for difficulty urinating  Negative for dysuria, flank pain, frequency, hematuria and urgency  Musculoskeletal: Negative for arthralgias, back pain and myalgias  Skin: Negative for rash and wound  Neurological: Negative for dizziness, syncope, weakness, light-headedness and headaches  Physical Exam  ED Triage Vitals [10/15/17 1354]   Temperature Pulse Respirations Blood Pressure SpO2   98 8 °F (37 1 °C) 67 20 165/74 92 %      Temp Source Heart Rate Source Patient Position - Orthostatic VS BP Location FiO2 (%)   Temporal Monitor Sitting Right arm --      Pain Score       No Pain           Physical Exam   Constitutional: He is oriented to person, place, and time  He appears well-developed and well-nourished  No distress  HENT:   Head: Normocephalic and atraumatic  Mouth/Throat: Oropharynx is clear and moist    Cardiovascular: Normal rate, regular rhythm, normal heart sounds and intact distal pulses  No murmur heard  Pulmonary/Chest: Effort normal and breath sounds normal  He has no wheezes  He has no rales  Abdominal: Soft  Bowel sounds are normal  He exhibits no distension and no mass  There is no tenderness  There is no rebound and no guarding  No hernia  Suprapubic cystostomy clean  Immature with some granulation tissue   Clear yellow urine overflow around catheter in situ  SPT exchanged for new catheter with free flowing clear yellow urine 400cc into tubing and resolution of sx bladder pressure  Musculoskeletal: Normal range of motion  He exhibits no edema  Neurological: He is alert and oriented to person, place, and time  Skin: Skin is warm and dry  Capillary refill takes less than 2 seconds  No rash noted  He is not diaphoretic  No erythema  No pallor  Psychiatric: He has a normal mood and affect  Nursing note and vitals reviewed  ED Medications  Medications - No data to display    Diagnostic Studies  Labs Reviewed - No data to display    No orders to display       Procedures  Procedures      Phone Contacts  ED Phone Contact    ED Course  ED Course as of Oct 15 1529   Sun Oct 15, 2017   1508 Color, UA: Yellow   1508 Clarity, UA: Cloudy   1508 Leukocytes, UA: (!) Small   1508 Nitrite, UA: Negative   1508 Blood, UA: (!) Large   1508 Ketones, UA: Negative   1512 RBC, UA: (!) 2-4   1512 WBC, UA: (!) Innumerable   1512 Epithelial Cells: Occasional   1512 Bacteria, UA: Occasional       MDM  Number of Diagnoses or Management Options  Suprapubic catheter dysfunction, initial encounter ([facility] [address] ): new and does not require workup     Amount and/or Complexity of Data Reviewed  Clinical lab tests: ordered and reviewed    Patient Progress  Patient progress: stable    CritCare Time    Disposition  Final diagnoses:   Suprapubic catheter dysfunction, initial encounter [facility])     ED Disposition     ED Disposition Condition Comment    Discharge  Marleny Barnes discharge to home/self care      Condition at discharge: Good        Follow-up Information     Follow up With Specialties Details Why Contact Info    Lew Gillette MD Urology Schedule an appointment as soon as possible for a visit ER followup suprapubic catheter [address]  [address]  [phone]          Discharge Medication List as of 10/15/2017  2:14 PM CONTINUE these medications which have NOT CHANGED    Details   acetaminophen (TYLENOL) 500 mg tablet One tablet every 6 hours as needed for moderate pain, No Print      amLODIPine (NORVASC) 5 mg tablet Take 1 tablet by mouth daily, Starting Fri 8/18/2017, No Print      aspirin 81 mg chewable tablet Chew 1 tablet daily, Starting Fri 8/18/2017, No Print      atorvastatin (LIPITOR) 40 mg tablet Take 1 tablet by mouth daily with dinner, Starting Sat 7/1/2017, Print      carvedilol (COREG) 6 25 mg tablet Take 1 tablet by mouth 2 (two) times a day with meals, Starting Fri 8/18/2017, Print      docusate sodium (COLACE) 100 mg capsule Take 1 capsule by mouth 2 (two) times a day, Starting Sat 7/1/2017, Print      famotidine (PEPCID) 20 mg tablet Take 20 mg by mouth daily, Historical Med      ferrous sulfate 325 (65 Fe) mg tablet Take 325 mg by mouth daily with breakfast Take one hour before meal   , Historical Med      finasteride (PROSCAR) 5 mg tablet Take 1 tablet by mouth daily, Starting Fri 8/18/2017, No Print      !! insulin glargine (LANTUS) 100 units/mL subcutaneous injection Inject 10 Units under the skin daily at bedtime, Starting Wed 9/27/2017, No Print      !! insulin glargine (LANTUS) 100 units/mL subcutaneous injection Inject 14 Units under the skin every morning, Starting Wed 9/27/2017, No Print      !! insulin lispro (HumaLOG) 100 units/mL injection Inject 1-6 Units under the skin 3 (three) times a day before meals, Starting Sat 7/1/2017, Print      !! insulin lispro (HumaLOG) 100 units/mL injection Inject 5 Units under the skin 3 (three) times a day with meals, Starting Wed 9/27/2017, No Print      LACTOBACILLUS RHAMNOSUS, GG, PO Take 1 capsule by mouth 2 (two) times a day, Historical Med      polyethylene glycol (MIRALAX) 17 g packet Take 17 g by mouth daily as needed (constipation), Starting Fri 8/18/2017, No Print      tamsulosin (FLOMAX) 0 4 mg Take 1 capsule by mouth daily with dinner, Starting Sat 7/1/2017, No Print       !! - Potential duplicate medications found  Please discuss with provider  No discharge procedures on file      ED Provider  Electronically Signed by       Sidra Cm PA-C  10/15/17 3980

## 2017-10-19 LAB
BACTERIA UR CULT: ABNORMAL
BACTERIA UR CULT: ABNORMAL

## 2017-11-28 ENCOUNTER — GENERIC CONVERSION - ENCOUNTER (OUTPATIENT)
Dept: OTHER | Facility: OTHER | Age: 77
End: 2017-11-28

## 2017-12-27 ENCOUNTER — GENERIC CONVERSION - ENCOUNTER (OUTPATIENT)
Dept: OTHER | Facility: OTHER | Age: 77
End: 2017-12-27

## 2017-12-31 ENCOUNTER — HOSPITAL ENCOUNTER (EMERGENCY)
Facility: HOSPITAL | Age: 77
Discharge: HOME | End: 2017-12-31
Attending: EMERGENCY MEDICINE | Admitting: EMERGENCY MEDICINE
Payer: OTHER GOVERNMENT

## 2017-12-31 VITALS
WEIGHT: 251.54 LBS | HEART RATE: 74 BPM | BODY MASS INDEX: 35.08 KG/M2 | OXYGEN SATURATION: 96 % | RESPIRATION RATE: 18 BRPM | DIASTOLIC BLOOD PRESSURE: 82 MMHG | SYSTOLIC BLOOD PRESSURE: 175 MMHG | TEMPERATURE: 98.5 F

## 2017-12-31 DIAGNOSIS — T83.091A OBSTRUCTION OF FOLEY CATHETER, INITIAL ENCOUNTER (HCC): Primary | ICD-10-CM

## 2017-12-31 LAB
ACETONE SERPL-MCNC: NEGATIVE MG/DL
ANION GAP SERPL CALCULATED.3IONS-SCNC: 9 MMOL/L (ref 4–13)
BACTERIA UR QL AUTO: ABNORMAL /HPF
BASOPHILS # BLD AUTO: 0.07 THOUSANDS/ΜL (ref 0–0.1)
BASOPHILS NFR BLD AUTO: 1 % (ref 0–1)
BILIRUB UR QL STRIP: NEGATIVE
BUN SERPL-MCNC: 14 MG/DL (ref 5–25)
CALCIUM SERPL-MCNC: 8.3 MG/DL (ref 8.3–10.1)
CHLORIDE SERPL-SCNC: 98 MMOL/L (ref 100–108)
CLARITY UR: ABNORMAL
CO2 SERPL-SCNC: 29 MMOL/L (ref 21–32)
COLOR UR: ABNORMAL
CREAT SERPL-MCNC: 0.9 MG/DL (ref 0.6–1.3)
EOSINOPHIL # BLD AUTO: 0.09 THOUSAND/ΜL (ref 0–0.61)
EOSINOPHIL NFR BLD AUTO: 1 % (ref 0–6)
ERYTHROCYTE [DISTWIDTH] IN BLOOD BY AUTOMATED COUNT: 13.2 % (ref 11.6–15.1)
GFR SERPL CREATININE-BSD FRML MDRD: 82 ML/MIN/1.73SQ M
GLUCOSE SERPL-MCNC: 373 MG/DL (ref 65–140)
GLUCOSE SERPL-MCNC: 534 MG/DL (ref 65–140)
GLUCOSE UR STRIP-MCNC: ABNORMAL MG/DL
HCT VFR BLD AUTO: 44.6 % (ref 36.5–49.3)
HGB BLD-MCNC: 15 G/DL (ref 12–17)
HGB UR QL STRIP.AUTO: ABNORMAL
KETONES UR STRIP-MCNC: ABNORMAL MG/DL
LEUKOCYTE ESTERASE UR QL STRIP: ABNORMAL
LYMPHOCYTES # BLD AUTO: 1.06 THOUSANDS/ΜL (ref 0.6–4.47)
LYMPHOCYTES NFR BLD AUTO: 11 % (ref 14–44)
MCH RBC QN AUTO: 28.7 PG (ref 26.8–34.3)
MCHC RBC AUTO-ENTMCNC: 33.6 G/DL (ref 31.4–37.4)
MCV RBC AUTO: 85 FL (ref 82–98)
MONOCYTES # BLD AUTO: 0.87 THOUSAND/ΜL (ref 0.17–1.22)
MONOCYTES NFR BLD AUTO: 9 % (ref 4–12)
NEUTROPHILS # BLD AUTO: 7.26 THOUSANDS/ΜL (ref 1.85–7.62)
NEUTS SEG NFR BLD AUTO: 78 % (ref 43–75)
NITRITE UR QL STRIP: NEGATIVE
NON-SQ EPI CELLS URNS QL MICRO: ABNORMAL /HPF
OTHER STN SPEC: ABNORMAL
PH BLDV: 7.39 [PH] (ref 7.3–7.4)
PH UR STRIP.AUTO: 6 [PH] (ref 4.5–8)
PLATELET # BLD AUTO: 184 THOUSANDS/UL (ref 149–390)
PMV BLD AUTO: 11.8 FL (ref 8.9–12.7)
POTASSIUM SERPL-SCNC: 4.4 MMOL/L (ref 3.5–5.3)
PROT UR STRIP-MCNC: ABNORMAL MG/DL
RBC # BLD AUTO: 5.22 MILLION/UL (ref 3.88–5.62)
RBC #/AREA URNS AUTO: ABNORMAL /HPF
SODIUM SERPL-SCNC: 136 MMOL/L (ref 136–145)
SP GR UR STRIP.AUTO: <=1.005 (ref 1–1.03)
UROBILINOGEN UR QL STRIP.AUTO: 0.2 E.U./DL
WBC # BLD AUTO: 9.35 THOUSAND/UL (ref 4.31–10.16)
WBC #/AREA URNS AUTO: ABNORMAL /HPF

## 2017-12-31 PROCEDURE — 82009 KETONE BODYS QUAL: CPT | Performed by: EMERGENCY MEDICINE

## 2017-12-31 PROCEDURE — 81001 URINALYSIS AUTO W/SCOPE: CPT | Performed by: EMERGENCY MEDICINE

## 2017-12-31 PROCEDURE — 80048 BASIC METABOLIC PNL TOTAL CA: CPT | Performed by: EMERGENCY MEDICINE

## 2017-12-31 PROCEDURE — 96360 HYDRATION IV INFUSION INIT: CPT

## 2017-12-31 PROCEDURE — 96372 THER/PROPH/DIAG INJ SC/IM: CPT

## 2017-12-31 PROCEDURE — 99284 EMERGENCY DEPT VISIT MOD MDM: CPT

## 2017-12-31 PROCEDURE — 85025 COMPLETE CBC W/AUTO DIFF WBC: CPT | Performed by: EMERGENCY MEDICINE

## 2017-12-31 PROCEDURE — 82800 BLOOD PH: CPT | Performed by: EMERGENCY MEDICINE

## 2017-12-31 PROCEDURE — 82948 REAGENT STRIP/BLOOD GLUCOSE: CPT

## 2017-12-31 PROCEDURE — 36415 COLL VENOUS BLD VENIPUNCTURE: CPT | Performed by: EMERGENCY MEDICINE

## 2017-12-31 RX ORDER — PHENAZOPYRIDINE HYDROCHLORIDE 100 MG/1
100 TABLET, FILM COATED ORAL ONCE
Status: COMPLETED | OUTPATIENT
Start: 2017-12-31 | End: 2017-12-31

## 2017-12-31 RX ORDER — CEPHALEXIN 250 MG/1
500 CAPSULE ORAL ONCE
Status: COMPLETED | OUTPATIENT
Start: 2017-12-31 | End: 2017-12-31

## 2017-12-31 RX ORDER — CEPHALEXIN 500 MG/1
500 CAPSULE ORAL 4 TIMES DAILY
Qty: 28 CAPSULE | Refills: 0 | Status: SHIPPED | OUTPATIENT
Start: 2017-12-31 | End: 2018-01-14 | Stop reason: HOSPADM

## 2017-12-31 RX ADMIN — INSULIN LISPRO 28 UNITS: 100 INJECTION, SOLUTION INTRAVENOUS; SUBCUTANEOUS at 09:14

## 2017-12-31 RX ADMIN — CEPHALEXIN 500 MG: 250 CAPSULE ORAL at 08:07

## 2017-12-31 RX ADMIN — SODIUM CHLORIDE 1000 ML: 0.9 INJECTION, SOLUTION INTRAVENOUS at 09:13

## 2017-12-31 RX ADMIN — PHENAZOPYRIDINE HYDROCHLORIDE 100 MG: 100 TABLET ORAL at 08:08

## 2017-12-31 NOTE — ED NOTES
Suprapubic draining  Yellow urin with mucous shreds  No urine draining from penis       Rod Webber, DEL  12/31/17 0586

## 2017-12-31 NOTE — ED NOTES
No drainage noted from Suprapubic catheter  Dr Ammy Kumra notified  Hand irrigated catheter times 2 with 30 ml sterile water  Slight resistance noted with first attempt  Return  Cloudy irrigant and urine  Suprapubic  Draining yellow urine with mucous shreds       Shelli Nino RN  12/31/17 9046

## 2017-12-31 NOTE — ED NOTES
Patients siddiqui bag was open and leaking when EMS brought him in        Carl Whitmore RN  12/31/17 0058

## 2017-12-31 NOTE — ED NOTES
Bette From Case management visited to help with discharge to home  Patient stated Sister will        Sandra Doan RN  12/31/17 8529

## 2018-01-01 ENCOUNTER — APPOINTMENT (OUTPATIENT)
Dept: WOUND CARE | Facility: CLINIC | Age: 78
End: 2018-01-01
Payer: MEDICARE

## 2018-01-01 ENCOUNTER — TELEPHONE (OUTPATIENT)
Dept: UROLOGY | Facility: AMBULATORY SURGERY CENTER | Age: 78
End: 2018-01-01

## 2018-01-01 ENCOUNTER — TELEPHONE (OUTPATIENT)
Dept: UROLOGY | Facility: HOSPITAL | Age: 78
End: 2018-01-01

## 2018-01-01 ENCOUNTER — OFFICE VISIT (OUTPATIENT)
Dept: UROLOGY | Facility: CLINIC | Age: 78
End: 2018-01-01
Payer: MEDICARE

## 2018-01-01 ENCOUNTER — TELEPHONE (OUTPATIENT)
Dept: UROLOGY | Facility: CLINIC | Age: 78
End: 2018-01-01

## 2018-01-01 ENCOUNTER — OFFICE VISIT (OUTPATIENT)
Dept: UROLOGY | Facility: CLINIC | Age: 78
End: 2018-01-01
Payer: COMMERCIAL

## 2018-01-01 ENCOUNTER — PROCEDURE VISIT (OUTPATIENT)
Dept: UROLOGY | Facility: CLINIC | Age: 78
End: 2018-01-01
Payer: OTHER GOVERNMENT

## 2018-01-01 VITALS
BODY MASS INDEX: 33.99 KG/M2 | HEIGHT: 69 IN | HEART RATE: 68 BPM | DIASTOLIC BLOOD PRESSURE: 60 MMHG | WEIGHT: 229.5 LBS | SYSTOLIC BLOOD PRESSURE: 130 MMHG

## 2018-01-01 VITALS — SYSTOLIC BLOOD PRESSURE: 138 MMHG | HEART RATE: 72 BPM | DIASTOLIC BLOOD PRESSURE: 78 MMHG | HEIGHT: 72 IN

## 2018-01-01 VITALS — DIASTOLIC BLOOD PRESSURE: 70 MMHG | SYSTOLIC BLOOD PRESSURE: 160 MMHG | HEART RATE: 72 BPM

## 2018-01-01 DIAGNOSIS — N40.1 BPH WITH OBSTRUCTION/LOWER URINARY TRACT SYMPTOMS: ICD-10-CM

## 2018-01-01 DIAGNOSIS — Z93.59 CHRONIC SUPRAPUBIC CATHETER (HCC): ICD-10-CM

## 2018-01-01 DIAGNOSIS — N13.8 BPH WITH OBSTRUCTION/LOWER URINARY TRACT SYMPTOMS: ICD-10-CM

## 2018-01-01 DIAGNOSIS — N31.9 NEUROGENIC BLADDER: Primary | ICD-10-CM

## 2018-01-01 DIAGNOSIS — R23.8 SKIN BREAKDOWN: Primary | ICD-10-CM

## 2018-01-01 DIAGNOSIS — R33.9 URINARY RETENTION: Primary | ICD-10-CM

## 2018-01-01 DIAGNOSIS — T83.511A URINARY TRACT INFECTION ASSOCIATED WITH INDWELLING URETHRAL CATHETER, INITIAL ENCOUNTER (HCC): ICD-10-CM

## 2018-01-01 DIAGNOSIS — N39.0 URINARY TRACT INFECTION WITHOUT HEMATURIA, SITE UNSPECIFIED: Primary | ICD-10-CM

## 2018-01-01 DIAGNOSIS — L08.9 SKIN INFECTION: ICD-10-CM

## 2018-01-01 DIAGNOSIS — N39.0 URINARY TRACT INFECTION ASSOCIATED WITH INDWELLING URETHRAL CATHETER, INITIAL ENCOUNTER (HCC): ICD-10-CM

## 2018-01-01 PROCEDURE — 51705 CHANGE OF BLADDER TUBE: CPT | Performed by: UROLOGY

## 2018-01-01 PROCEDURE — 99213 OFFICE O/P EST LOW 20 MIN: CPT | Performed by: UROLOGY

## 2018-01-01 PROCEDURE — 97597 DBRDMT OPN WND 1ST 20 CM/<: CPT

## 2018-01-01 PROCEDURE — 99213 OFFICE O/P EST LOW 20 MIN: CPT | Performed by: PHYSICIAN ASSISTANT

## 2018-01-01 PROCEDURE — 99213 OFFICE O/P EST LOW 20 MIN: CPT

## 2018-01-01 RX ORDER — SULFAMETHOXAZOLE AND TRIMETHOPRIM 800; 160 MG/1; MG/1
1 TABLET ORAL EVERY 12 HOURS SCHEDULED
Qty: 10 TABLET | Refills: 0 | Status: SHIPPED | OUTPATIENT
Start: 2018-01-01 | End: 2018-01-01

## 2018-01-01 RX ORDER — NYSTATIN 100000 [USP'U]/G
POWDER TOPICAL 4 TIMES DAILY
COMMUNITY
End: 2019-01-01 | Stop reason: HOSPADM

## 2018-01-01 RX ORDER — CIPROFLOXACIN 500 MG/1
500 TABLET, FILM COATED ORAL EVERY 12 HOURS SCHEDULED
Qty: 6 TABLET | Refills: 0 | Status: SHIPPED | OUTPATIENT
Start: 2018-01-01 | End: 2018-01-01

## 2018-01-03 ENCOUNTER — GENERIC CONVERSION - ENCOUNTER (OUTPATIENT)
Dept: OTHER | Facility: OTHER | Age: 78
End: 2018-01-03

## 2018-01-03 NOTE — ED PROVIDER NOTES
History  Chief Complaint   Patient presents with    Possible UTI     Patient has a suprapubic a catheter that has some reddness and discharge around it  Patient also stated hes urinating despite having his cath in place  Patient describes a burning pain with voiding and believes he has another UTI  Patient: Bam Nguyễn  77 y o /male  YOB: 1940  MRN: [id number]  PCP: Moe Bishop MD  Date of evaluation: 01/03/18    (N B  Voice-recognition software may have been used in the preparation of this document )    This patient presents to the emergency department with a chief complaint of a malfunctioning suprapubic urinary catheter  He reports some urine coming from his penis as well  He has a sensation or dysuria  No fevers, chest pain, shortness of breath, back pain, abdominal pain, suprapubic pain  He states that he wants this problem taken care of once and for all  He is tired of having to come to the emergency department every few months  History provided by:  Patient  Urinary Catheter Insertion or Check   Onset quality:  Unable to specify  Chronicity:  Recurrent      Prior to Admission Medications   Prescriptions Last Dose Informant Patient Reported? Taking?    LACTOBACILLUS RHAMNOSUS, GG, PO   Yes No   Sig: Take 1 capsule by mouth 2 (two) times a day   acetaminophen (TYLENOL) 500 mg tablet   No No   Sig: One tablet every 6 hours as needed for moderate pain   amLODIPine (NORVASC) 5 mg tablet   No Yes   Sig: Take 1 tablet by mouth daily   aspirin 81 mg chewable tablet   No Yes   Sig: Chew 1 tablet daily   atorvastatin (LIPITOR) 40 mg tablet   No Yes   Sig: Take 1 tablet by mouth daily with dinner   carvedilol (COREG) 6 25 mg tablet   No Yes   Sig: Take 1 tablet by mouth 2 (two) times a day with meals   docusate sodium (COLACE) 100 mg capsule   No Yes   Sig: Take 1 capsule by mouth 2 (two) times a day   Patient taking differently: Take 100 mg by mouth 2 (two) times a day as needed famotidine (PEPCID) 20 mg tablet   Yes No   Sig: Take 20 mg by mouth daily   ferrous sulfate 325 (65 Fe) mg tablet   Yes Yes   Sig: Take 325 mg by mouth daily with breakfast Take one hour before meal      finasteride (PROSCAR) 5 mg tablet   No No   Sig: Take 1 tablet by mouth daily   insulin glargine (LANTUS) 100 units/mL subcutaneous injection   No No   Sig: Inject 10 Units under the skin daily at bedtime   Patient taking differently: Inject 60 Units under the skin daily at bedtime     insulin lispro (HumaLOG) 100 units/mL injection   Yes Yes   Sig: Inject 28 Units under the skin daily   polyethylene glycol (MIRALAX) 17 g packet   No No   Sig: Take 17 g by mouth daily as needed (constipation)   tamsulosin (FLOMAX) 0 4 mg   No Yes   Sig: Take 1 capsule by mouth daily with dinner      Facility-Administered Medications: None       Past Medical History:   Diagnosis Date    [facility]est[facility] failure[facility])     COPD [facility]chronic obstructive pulmonary disease) ([facility])     Diabetes mellitus ([facility])     ESBL (extended spectrum beta-lactamase) producing bacteria infection     History of BPH     Hyperlipidemia     Hypertension     PE [facility]ulmonary throm[facility]embolism) ([facility])     [facility]     Spinal stenosis of lumbar region     Stroke ([facility]     Systolic and diastolic CHF, chronic ([facility]     Urinary retention     UTI due to extended-spectrum beta lactamase (ESBL) producing Escherichia coli        Past Surgical History:   Procedure Laterality Date    CARDIAC SURGERY      cabg    HIP SURGERY Right     plate and pin       History reviewed  No pertinent family history  I have reviewed and agree with the history as documented  Social History   Substance Use Topics    Smoking status: Former Smoker    Smokeless tobacco: Never Used    Alcohol use No        Review of Systems   Constitutional: Negative  HENT: Negative  Respiratory: Negative  Cardiovascular: Negative  Gastrointestinal: Negative  Endocrine: Negative  Genitourinary: Positive for dysuria  Negative for difficulty urinating  Ozuna catheter malfunction  Musculoskeletal: Negative  Skin: Negative  Neurological: Negative  Hematological: Negative  All other systems reviewed and are negative  Physical Exam  ED Triage Vitals [12/31/17 0602]   Temperature Pulse Respirations Blood Pressure SpO2   98 7 °F (37 1 °C) (!) 118 20 (!) 184/96 94 %      Temp Source Heart Rate Source Patient Position - Orthostatic VS BP Location FiO2 (%)   Temporal Monitor Lying Left arm --      Pain Score       8           Orthostatic Vital Signs  Vitals:    12/31/17 0630 12/31/17 0715 12/31/17 0800 12/31/17 1100   BP:  157/72 (!) 176/78 (!) 175/82   Pulse: 86  66 74   Patient Position - Orthostatic VS:  Lying         Physical Exam   Constitutional: He is oriented to person, place, and time  He appears well-developed and well-nourished  HENT:   Mouth/Throat: Oropharynx is clear and moist and mucous membranes are normal    Voice normal   Eyes: EOM are normal  Pupils are equal, round, and reactive to light  Cardiovascular: Normal rate and regular rhythm  Pulmonary/Chest: Effort normal    Abdominal: Soft  Bowel sounds are normal  He exhibits no distension  There is no tenderness  There is no rebound and no guarding  Suprapubic catheter in place - RN able to flush catheter to drain freely, no further leakage or urine from penis  Small redness around catheter site, no induration or fluctuance  Small drainage  Genitourinary: No phimosis or paraphimosis  Neurological: He is alert and oriented to person, place, and time  GCS eye subscore is 4  GCS verbal subscore is 5  GCS motor subscore is 6  Skin: Skin is warm and dry  Psychiatric: His speech is normal and behavior is normal  His affect is angry  Nursing note and vitals reviewed        ED Medications  Medications   phenazopyridine (PYRIDIUM) tablet 100 mg (100 mg Oral Given 12/31/17 0808)   cephalexin (KEFLEX) capsule 500 mg (500 mg Oral Given 12/31/17 0807)   insulin lispro (HumaLOG) 100 units/mL subcutaneous injection 28 Units (28 Units Subcutaneous Given 12/31/17 0914)   sodium chloride 0 9 % bolus 1,000 mL (0 mL Intravenous Stopped 12/31/17 1010)       Diagnostic Studies  Results Reviewed     Procedure Component Value Units Date/Time    Fingerstick Glucose (POCT) [[id number]]  (Abnormal) Collected:  12/31/17 1021    Lab Status:  Final result Updated:  12/31/17 1022     POC Glucose 373 (H) mg/dl     Acetone [[id number]]  (Normal) Collected:  12/31/17 0817    Lab Status:  Final result Specimen:  Blood from Arm, Right Updated:  12/31/17 0832     Acetone, Bld Negative    pH, venous [[id number]]  (Normal) Collected:  12/31/17 0817    Lab Status:  Final result Specimen:  Blood from Arm, Right Updated:  12/31/17 0823     pH, Julian 5 467    Basic metabolic panel [[id number]]  (Abnormal) Collected:  12/31/17 0730    Lab Status:  Final result Specimen:  Blood from Arm, Right Updated:  12/31/17 0840     Sodium 136 mmol/L      Potassium 4 4 mmol/L      Chloride 98 (L) mmol/L      CO2 29 mmol/L      Anion Gap 9 mmol/L      BUN 14 mg/dL      Creatinine 0 90 mg/dL      Glucose 534 (HH) mg/dL      Calcium 8 3 mg/dL      eGFR 82 ml/min/1 73sq m     Narrative:         National Kidney Disease Education Program recommendations are as follows:  GFR calculation is accurate only with a steady state creatinine  Chronic Kidney disease less than 60 ml/min/1 73 sq  meters  Kidney failure less than 15 ml/min/1 73 sq  meters      Urine Microscopic [[id number]]  (Abnormal) Collected:  12/31/17 0732    Lab Status:  Final result Specimen:  Urine from Urine, Clean Catch Updated:  12/31/17 0753     RBC, UA 1-2 (A) /hpf      WBC, UA 4-10 (A) /hpf      Epithelial Cells Occasional /hpf      Bacteria, UA Occasional /hpf      OTHER OBSERVATIONS Yeast Cells Present    UA w Reflex to Microscopic w Reflex to Culture [[id number]]  (Abnormal) Collected:  12/31/17 0732    Lab Status:  Final result Specimen:  Urine from Urine, Clean Catch Updated:  12/31/17 0744     Color, UA Light Yellow     Clarity, UA Slightly Cloudy     Specific Gravity, UA <=1 005     pH, UA 6 0     Leukocytes, UA Elevated glucose may cause decreased leukocyte values  See urine microscopic for [facility] result/ (A)     Nitrite, UA Negative     Protein, UA 30 (1+) (A) mg/dl      Glucose, UA >=1000 (1%) (A) mg/dl      Ketones, UA 15 (1+) (A) mg/dl      Urobilinogen, UA 0 2 E U /dl      Bilirubin, UA Negative     Blood, UA Trace-lysed (A)    CBC and differential [[id number]]  (Abnormal) Collected:  12/31/17 0730    Lab Status:  Final result Specimen:  Blood from Arm, Right Updated:  12/31/17 0744     WBC 9 35 Thousand/uL      RBC 5 22 Million/uL      Hemoglobin 15 0 g/dL      Hematocrit 44 6 %      MCV 85 fL      MCH 28 7 pg      MCHC 33 6 g/dL      RDW 13 2 %      MPV 11 8 fL      Platelets 200 Thousands/uL      Neutrophils Relative 78 (H) %      Lymphocytes Relative 11 (L) %      Monocytes Relative 9 %      Eosinophils Relative 1 %      Basophils Relative 1 %      Neutrophils Absolute 7 26 Thousands/µL      Lymphocytes Absolute 1 06 Thousands/µL      Monocytes Absolute 0 87 Thousand/µL      Eosinophils Absolute 0 09 Thousand/µL      Basophils Absolute 0 07 Thousands/µL                  No orders to display              Procedures  Procedures       Phone Contacts  ED Phone Contact    ED Course  ED Course as of Jan 03 0600   Sun Dec 31, 2017   3418 He is upset because his wife who has brain injury is forced to empty the bucket into which he drains his Ozuna bag                                  MDM  CritCare Time    Disposition  Final diagnoses:   Obstruction of Ozuna catheter, initial encounter [facility])     Time reflects when diagnosis was documented in both MDM as applicable and the Disposition within this note     Time User Action Codes Description Comment 12/31/2017 10:24 AM Joanne Gurmeet Lockett Add [T83 [id number]] Obstruction of Ozuna catheter, initial encounter [facility])       ED Disposition     ED Disposition Condition Comment    Discharge  Cassidy Love discharge to home/self care  Condition at discharge: Good        Follow-up Information     Follow up With Specialties Details Why Win Raza MD Internal Medicine Call in 2 days For followup [facility] [address] [place] [address] [facility]      Rah Johnston MD Urology Call in 2 days For followup, Tell about this ER visit   [facility]  [phone]          Discharge Medication List as of 12/31/2017 10:26 AM      START taking these medications    Details   cephalexin (KEFLEX) 500 mg capsule Take 1 capsule by mouth 4 (four) times a day for 7 days (antibiotic), Starting Sun 12/31/2017, Until Sun 1/7/2018, Normal         CONTINUE these medications which have NOT CHANGED    Details   amLODIPine (NORVASC) 5 mg tablet Take 1 tablet by mouth daily, Starting Fri 8/18/2017, No Print      aspirin 81 mg chewable tablet Chew 1 tablet daily, Starting Fri 8/18/2017, No Print      atorvastatin (LIPITOR) 40 mg tablet Take 1 tablet by mouth daily with dinner, Starting Sat 7/1/2017, Print      carvedilol (COREG) 6 25 mg tablet Take 1 tablet by mouth 2 (two) times a day with meals, Starting Fri 8/18/2017, Print      docusate sodium (COLACE) 100 mg capsule Take 1 capsule by mouth 2 (two) times a day, Starting Sat 7/1/2017, Print      ferrous sulfate 325 (65 Fe) mg tablet Take 325 mg by mouth daily with breakfast Take one hour before meal   , Historical Med      insulin lispro (HumaLOG) 100 units/mL injection Inject 28 Units under the skin daily, Historical Med      tamsulosin (FLOMAX) 0 4 mg Take 1 capsule by mouth daily with dinner, Starting Sat 7/1/2017, No Print      acetaminophen (TYLENOL) 500 mg tablet One tablet every 6 hours as needed for moderate pain, No Print famotidine (PEPCID) 20 mg tablet Take 20 mg by mouth daily, Historical Med      finasteride (PROSCAR) 5 mg tablet Take 1 tablet by mouth daily, Starting Fri 8/18/2017, No Print      insulin glargine (LANTUS) 100 units/mL subcutaneous injection Inject 10 Units under the skin daily at bedtime, Starting Wed 9/27/2017, No Print      LACTOBACILLUS RHAMNOSUS, GG, PO Take 1 capsule by mouth 2 (two) times a day, Historical Med      polyethylene glycol (MIRALAX) 17 g packet Take 17 g by mouth daily as needed (constipation), Starting Fri 8/18/2017, No Print           No discharge procedures on file      ED Provider  Electronically Signed by           Cecy Torres MD  01/03/18 0600

## 2018-01-05 ENCOUNTER — HOSPITAL ENCOUNTER (INPATIENT)
Facility: HOSPITAL | Age: 78
LOS: 8 days | Discharge: NURSING, REHAB OR LONG-TERM CARE | DRG: 699 | End: 2018-01-14
Attending: EMERGENCY MEDICINE | Admitting: INTERNAL MEDICINE
Payer: OTHER GOVERNMENT

## 2018-01-05 ENCOUNTER — GENERIC CONVERSION - ENCOUNTER (OUTPATIENT)
Dept: OTHER | Facility: OTHER | Age: 78
End: 2018-01-05

## 2018-01-05 ENCOUNTER — APPOINTMENT (EMERGENCY)
Dept: RADIOLOGY | Facility: HOSPITAL | Age: 78
DRG: 699 | End: 2018-01-05
Payer: OTHER GOVERNMENT

## 2018-01-05 DIAGNOSIS — T83.011A MALFUNCTION OF FOLEY CATHETER (HCC): ICD-10-CM

## 2018-01-05 DIAGNOSIS — R79.89 LOW SERUM PREALBUMIN: ICD-10-CM

## 2018-01-05 DIAGNOSIS — IMO0002 DM (DIABETES MELLITUS), TYPE 2, UNCONTROLLED: Primary | ICD-10-CM

## 2018-01-05 DIAGNOSIS — N39.0 UTI (URINARY TRACT INFECTION) WITH PYURIA: ICD-10-CM

## 2018-01-05 LAB
ACETONE SERPL-MCNC: ABNORMAL MG/DL
ALBUMIN SERPL BCP-MCNC: 3.1 G/DL (ref 3.5–5)
ALP SERPL-CCNC: 101 U/L (ref 46–116)
ALT SERPL W P-5'-P-CCNC: 21 U/L (ref 12–78)
ANION GAP SERPL CALCULATED.3IONS-SCNC: 6 MMOL/L (ref 4–13)
ARTERIAL PATENCY WRIST A: YES
AST SERPL W P-5'-P-CCNC: 10 U/L (ref 5–45)
BACTERIA UR QL AUTO: ABNORMAL /HPF
BASE EXCESS BLDA CALC-SCNC: -0.3 MMOL/L
BASOPHILS # BLD AUTO: 0.08 THOUSANDS/ΜL (ref 0–0.1)
BASOPHILS NFR BLD AUTO: 1 % (ref 0–1)
BILIRUB SERPL-MCNC: 0.5 MG/DL (ref 0.2–1)
BILIRUB UR QL STRIP: NEGATIVE
BUN SERPL-MCNC: 19 MG/DL (ref 5–25)
CALCIUM SERPL-MCNC: 8.3 MG/DL (ref 8.3–10.1)
CHLORIDE SERPL-SCNC: 98 MMOL/L (ref 100–108)
CLARITY UR: ABNORMAL
CO2 SERPL-SCNC: 30 MMOL/L (ref 21–32)
COLOR UR: YELLOW
CREAT SERPL-MCNC: 1.04 MG/DL (ref 0.6–1.3)
EOSINOPHIL # BLD AUTO: 0.08 THOUSAND/ΜL (ref 0–0.61)
EOSINOPHIL NFR BLD AUTO: 1 % (ref 0–6)
ERYTHROCYTE [DISTWIDTH] IN BLOOD BY AUTOMATED COUNT: 13.2 % (ref 11.6–15.1)
GFR SERPL CREATININE-BSD FRML MDRD: 69 ML/MIN/1.73SQ M
GLUCOSE SERPL-MCNC: 296 MG/DL (ref 65–140)
GLUCOSE SERPL-MCNC: 311 MG/DL (ref 65–140)
GLUCOSE SERPL-MCNC: 352 MG/DL (ref 65–140)
GLUCOSE SERPL-MCNC: 441 MG/DL (ref 65–140)
GLUCOSE UR STRIP-MCNC: ABNORMAL MG/DL
HCO3 BLDA-SCNC: 23.8 MMOL/L (ref 22–28)
HCT VFR BLD AUTO: 45.8 % (ref 36.5–49.3)
HGB BLD-MCNC: 15.3 G/DL (ref 12–17)
HGB UR QL STRIP.AUTO: ABNORMAL
KETONES UR STRIP-MCNC: ABNORMAL MG/DL
LACTATE SERPL-SCNC: 1.2 MMOL/L (ref 0.5–2)
LEUKOCYTE ESTERASE UR QL STRIP: ABNORMAL
LYMPHOCYTES # BLD AUTO: 1.17 THOUSANDS/ΜL (ref 0.6–4.47)
LYMPHOCYTES NFR BLD AUTO: 11 % (ref 14–44)
MCH RBC QN AUTO: 28.5 PG (ref 26.8–34.3)
MCHC RBC AUTO-ENTMCNC: 33.4 G/DL (ref 31.4–37.4)
MCV RBC AUTO: 85 FL (ref 82–98)
MONOCYTES # BLD AUTO: 0.98 THOUSAND/ΜL (ref 0.17–1.22)
MONOCYTES NFR BLD AUTO: 9 % (ref 4–12)
NEUTROPHILS # BLD AUTO: 8.35 THOUSANDS/ΜL (ref 1.85–7.62)
NEUTS SEG NFR BLD AUTO: 78 % (ref 43–75)
NITRITE UR QL STRIP: NEGATIVE
NON VENT ROOM AIR: ABNORMAL %
NON-SQ EPI CELLS URNS QL MICRO: ABNORMAL /HPF
O2 CT BLDA-SCNC: 19.4 ML/DL (ref 16–23)
OTHER STN SPEC: ABNORMAL
OXYHGB MFR BLDA: 93.3 % (ref 94–97)
PCO2 BLDA: 37.3 MM HG (ref 36–44)
PH BLDA: 7.42 [PH] (ref 7.35–7.45)
PH UR STRIP.AUTO: 5.5 [PH] (ref 4.5–8)
PLATELET # BLD AUTO: 191 THOUSANDS/UL (ref 149–390)
PMV BLD AUTO: 11.7 FL (ref 8.9–12.7)
PO2 BLDA: 69.9 MM HG (ref 75–129)
POTASSIUM SERPL-SCNC: 3.8 MMOL/L (ref 3.5–5.3)
PROT SERPL-MCNC: 6.9 G/DL (ref 6.4–8.2)
PROT UR STRIP-MCNC: ABNORMAL MG/DL
RBC # BLD AUTO: 5.36 MILLION/UL (ref 3.88–5.62)
RBC #/AREA URNS AUTO: ABNORMAL /HPF
SODIUM SERPL-SCNC: 134 MMOL/L (ref 136–145)
SP GR UR STRIP.AUTO: 1.01 (ref 1–1.03)
SPECIMEN SOURCE: ABNORMAL
TROPONIN I SERPL-MCNC: 0.02 NG/ML
UROBILINOGEN UR QL STRIP.AUTO: 0.2 E.U./DL
WBC # BLD AUTO: 10.66 THOUSAND/UL (ref 4.31–10.16)
WBC #/AREA URNS AUTO: ABNORMAL /HPF

## 2018-01-05 PROCEDURE — 87205 SMEAR GRAM STAIN: CPT | Performed by: HOSPITALIST

## 2018-01-05 PROCEDURE — 80053 COMPREHEN METABOLIC PANEL: CPT | Performed by: EMERGENCY MEDICINE

## 2018-01-05 PROCEDURE — 87070 CULTURE OTHR SPECIMN AEROBIC: CPT | Performed by: HOSPITALIST

## 2018-01-05 PROCEDURE — 36600 WITHDRAWAL OF ARTERIAL BLOOD: CPT

## 2018-01-05 PROCEDURE — 94762 N-INVAS EAR/PLS OXIMTRY CONT: CPT

## 2018-01-05 PROCEDURE — 82805 BLOOD GASES W/O2 SATURATION: CPT | Performed by: INTERNAL MEDICINE

## 2018-01-05 PROCEDURE — 82009 KETONE BODYS QUAL: CPT | Performed by: EMERGENCY MEDICINE

## 2018-01-05 PROCEDURE — 84484 ASSAY OF TROPONIN QUANT: CPT | Performed by: EMERGENCY MEDICINE

## 2018-01-05 PROCEDURE — 82948 REAGENT STRIP/BLOOD GLUCOSE: CPT

## 2018-01-05 PROCEDURE — 96360 HYDRATION IV INFUSION INIT: CPT

## 2018-01-05 PROCEDURE — 36415 COLL VENOUS BLD VENIPUNCTURE: CPT | Performed by: EMERGENCY MEDICINE

## 2018-01-05 PROCEDURE — 87086 URINE CULTURE/COLONY COUNT: CPT | Performed by: EMERGENCY MEDICINE

## 2018-01-05 PROCEDURE — 99285 EMERGENCY DEPT VISIT HI MDM: CPT

## 2018-01-05 PROCEDURE — 83605 ASSAY OF LACTIC ACID: CPT | Performed by: EMERGENCY MEDICINE

## 2018-01-05 PROCEDURE — 87040 BLOOD CULTURE FOR BACTERIA: CPT | Performed by: EMERGENCY MEDICINE

## 2018-01-05 PROCEDURE — 81001 URINALYSIS AUTO W/SCOPE: CPT | Performed by: EMERGENCY MEDICINE

## 2018-01-05 PROCEDURE — 71045 X-RAY EXAM CHEST 1 VIEW: CPT

## 2018-01-05 PROCEDURE — 85025 COMPLETE CBC W/AUTO DIFF WBC: CPT | Performed by: EMERGENCY MEDICINE

## 2018-01-05 PROCEDURE — 93005 ELECTROCARDIOGRAM TRACING: CPT | Performed by: EMERGENCY MEDICINE

## 2018-01-05 PROCEDURE — 83036 HEMOGLOBIN GLYCOSYLATED A1C: CPT | Performed by: HOSPITALIST

## 2018-01-05 RX ORDER — SODIUM CHLORIDE 9 MG/ML
250 INJECTION, SOLUTION INTRAVENOUS CONTINUOUS
Status: DISCONTINUED | OUTPATIENT
Start: 2018-01-05 | End: 2018-01-05

## 2018-01-05 RX ORDER — FINASTERIDE 5 MG/1
5 TABLET, FILM COATED ORAL DAILY
Status: DISCONTINUED | OUTPATIENT
Start: 2018-01-06 | End: 2018-01-14 | Stop reason: HOSPADM

## 2018-01-05 RX ORDER — ASPIRIN 81 MG/1
81 TABLET, CHEWABLE ORAL DAILY
Status: DISCONTINUED | OUTPATIENT
Start: 2018-01-06 | End: 2018-01-14 | Stop reason: HOSPADM

## 2018-01-05 RX ORDER — TAMSULOSIN HYDROCHLORIDE 0.4 MG/1
0.4 CAPSULE ORAL
Status: DISCONTINUED | OUTPATIENT
Start: 2018-01-05 | End: 2018-01-14 | Stop reason: HOSPADM

## 2018-01-05 RX ORDER — POLYETHYLENE GLYCOL 3350 17 G/17G
17 POWDER, FOR SOLUTION ORAL DAILY PRN
Status: DISCONTINUED | OUTPATIENT
Start: 2018-01-05 | End: 2018-01-14 | Stop reason: HOSPADM

## 2018-01-05 RX ORDER — CARVEDILOL 3.12 MG/1
6.25 TABLET ORAL 2 TIMES DAILY WITH MEALS
Status: DISCONTINUED | OUTPATIENT
Start: 2018-01-05 | End: 2018-01-14 | Stop reason: HOSPADM

## 2018-01-05 RX ORDER — FERROUS SULFATE 325(65) MG
325 TABLET ORAL
Status: DISCONTINUED | OUTPATIENT
Start: 2018-01-06 | End: 2018-01-14 | Stop reason: HOSPADM

## 2018-01-05 RX ORDER — ACETAMINOPHEN 325 MG/1
650 TABLET ORAL EVERY 6 HOURS PRN
Status: DISCONTINUED | OUTPATIENT
Start: 2018-01-05 | End: 2018-01-07

## 2018-01-05 RX ORDER — DOCUSATE SODIUM 100 MG/1
100 CAPSULE, LIQUID FILLED ORAL 2 TIMES DAILY
Status: DISCONTINUED | OUTPATIENT
Start: 2018-01-05 | End: 2018-01-14 | Stop reason: HOSPADM

## 2018-01-05 RX ORDER — ATORVASTATIN CALCIUM 40 MG/1
40 TABLET, FILM COATED ORAL
Status: DISCONTINUED | OUTPATIENT
Start: 2018-01-06 | End: 2018-01-14 | Stop reason: HOSPADM

## 2018-01-05 RX ORDER — AMLODIPINE BESYLATE 5 MG/1
5 TABLET ORAL DAILY
Status: DISCONTINUED | OUTPATIENT
Start: 2018-01-06 | End: 2018-01-14 | Stop reason: HOSPADM

## 2018-01-05 RX ORDER — ASPIRIN 325 MG
325 TABLET ORAL DAILY
COMMUNITY
End: 2018-06-11 | Stop reason: HOSPADM

## 2018-01-05 RX ORDER — INSULIN GLARGINE 100 [IU]/ML
10 INJECTION, SOLUTION SUBCUTANEOUS
Status: DISCONTINUED | OUTPATIENT
Start: 2018-01-05 | End: 2018-01-06

## 2018-01-05 RX ORDER — SODIUM CHLORIDE 9 MG/ML
40 INJECTION, SOLUTION INTRAVENOUS CONTINUOUS
Status: DISCONTINUED | OUTPATIENT
Start: 2018-01-05 | End: 2018-01-09

## 2018-01-05 RX ORDER — SACCHAROMYCES BOULARDII 250 MG
250 CAPSULE ORAL 2 TIMES DAILY
Status: DISCONTINUED | OUTPATIENT
Start: 2018-01-06 | End: 2018-01-14 | Stop reason: HOSPADM

## 2018-01-05 RX ADMIN — INSULIN LISPRO 3 UNITS: 100 INJECTION, SOLUTION INTRAVENOUS; SUBCUTANEOUS at 21:33

## 2018-01-05 RX ADMIN — DOCUSATE SODIUM 100 MG: 100 CAPSULE, LIQUID FILLED ORAL at 21:24

## 2018-01-05 RX ADMIN — VANCOMYCIN HYDROCHLORIDE 1750 MG: 10 INJECTION, POWDER, LYOPHILIZED, FOR SOLUTION INTRAVENOUS at 22:32

## 2018-01-05 RX ADMIN — TAMSULOSIN HYDROCHLORIDE 0.4 MG: 0.4 CAPSULE ORAL at 21:24

## 2018-01-05 RX ADMIN — SODIUM CHLORIDE 75 ML/HR: 0.9 INJECTION, SOLUTION INTRAVENOUS at 21:24

## 2018-01-05 RX ADMIN — CEFTRIAXONE 1000 MG: 1 INJECTION, SOLUTION INTRAVENOUS at 21:42

## 2018-01-05 RX ADMIN — SODIUM CHLORIDE 1000 ML: 0.9 INJECTION, SOLUTION INTRAVENOUS at 12:40

## 2018-01-05 RX ADMIN — INSULIN GLARGINE 10 UNITS: 100 INJECTION, SOLUTION SUBCUTANEOUS at 21:24

## 2018-01-05 RX ADMIN — CARVEDILOL 6.25 MG: 3.12 TABLET, FILM COATED ORAL at 21:24

## 2018-01-05 RX ADMIN — SODIUM CHLORIDE 250 ML/HR: 0.9 INJECTION, SOLUTION INTRAVENOUS at 17:37

## 2018-01-05 RX ADMIN — INSULIN HUMAN 5 UNITS: 100 INJECTION, SOLUTION PARENTERAL at 17:35

## 2018-01-05 NOTE — ED NOTES
Suprapubic catheter flushed with NSS  Initially cloudy white pus colored urine returned  reflushed with NSS without return   Dr Terrie Muñoz made aware     Stephane Anna RN  01/05/18 0219

## 2018-01-05 NOTE — ED PROVIDER NOTES
History  Chief Complaint   Patient presents with    Urinary Catheter Problem     pt states that his suprapubic catheter has not been draining, he has been urinating through his penis  called urology  they told him to come here for admission to be seen monday     49-year-old male with chronic indwelling suprapubic catheter presents with decreased output from his catheter he believed it to be block over the last several days he is urinating through his phallus which she does on occasion he denies any fever he has had no nausea vomiting diarrhea no history of trauma or falls he has chronic back pain  His blood sugars this morning were 473  He denies any cough or upper respiratory complaints no lightheadedness            Prior to Admission Medications   Prescriptions Last Dose Informant Patient Reported? Taking?    LACTOBACILLUS RHAMNOSUS, GG, PO Unknown at Unknown time  Yes No   Sig: Take 1 capsule by mouth 2 (two) times a day   acetaminophen (TYLENOL) 500 mg tablet 1/4/2018 at Unknown time  No Yes   Sig: One tablet every 6 hours as needed for moderate pain   amLODIPine (NORVASC) 5 mg tablet 1/5/2018 at Unknown time  No Yes   Sig: Take 1 tablet by mouth daily   aspirin 325 mg tablet 1/4/2018 at Unknown time  Yes Yes   Sig: Take 325 mg by mouth daily   atorvastatin (LIPITOR) 40 mg tablet 1/4/2018 at Unknown time  No Yes   Sig: Take 1 tablet by mouth daily with dinner   carvedilol (COREG) 6 25 mg tablet Unknown at Unknown time  No No   Sig: Take 1 tablet by mouth 2 (two) times a day with meals   cephalexin (KEFLEX) 500 mg capsule Unknown at Unknown time  No No   Sig: Take 1 capsule by mouth 4 (four) times a day for 7 days (antibiotic)   docusate sodium (COLACE) 100 mg capsule 1/4/2018 at Unknown time  No Yes   Sig: Take 1 capsule by mouth 2 (two) times a day   Patient taking differently: Take 100 mg by mouth 2 (two) times a day as needed     ferrous sulfate 325 (65 Fe) mg tablet 1/4/2018 at Unknown time  Yes Yes Sig: Take 325 mg by mouth daily with breakfast Take one hour before meal      finasteride (PROSCAR) 5 mg tablet Unknown at Unknown time  No No   Sig: Take 1 tablet by mouth daily   insulin glargine (LANTUS) 100 units/mL subcutaneous injection Unknown at Unknown time  No No   Sig: Inject 10 Units under the skin daily at bedtime   Patient taking differently: Inject 60 Units under the skin daily at bedtime     insulin lispro (HumaLOG) 100 units/mL injection Unknown at Unknown time  Yes No   Sig: Inject 28 Units under the skin daily   tamsulosin (FLOMAX) 0 4 mg 1/4/2018 at Unknown time  No Yes   Sig: Take 1 capsule by mouth daily with dinner      Facility-Administered Medications: None       Past Medical History:   Diagnosis Date    CH[facility]congestive heart failure) ([facility])     COPD (chronic obstructive pulmonary disease) ([place] [address] )     Diabetes mellitus ([facility])     ESBL (extended spectrum beta-lactamase) producing bacteria infection     History of BPH     Hyperlipidemia     Hypertension     PE (pulmonary thromboembolism) ([place] [address] )     PVD (peripheral vascular disease) ([place] [address] )     Spinal stenosis of lumbar region     Stroke ([place] [address] )     Systolic and diastolic CHF, chronic ([place] [address] )     Urinary retention     UTI due to extended-spectrum beta lactamase (ESBL) producing Escherichia coli        Past Surgical History:   Procedure Laterality Date    APPENDECTOMY      BACK SURGERY      CARDIAC SURGERY      cabg    HIP SURGERY Right     plate and pin       Family History   Problem Relation Age of Onset    Coronary artery disease Mother     Cancer Father      I have reviewed and agree with the history as documented  Social History   Substance Use Topics    Smoking status: Former Smoker     Packs/day: 2 00     Years: 32 00     Quit date: 1/5/1984    Smokeless tobacco: Never Used    Alcohol use No        Review of Systems   Constitutional: Negative for activity change, appetite change, chills and fever     HENT: Negative for congestion, ear pain, rhinorrhea, sneezing and sore throat  Eyes: Negative for discharge  Respiratory: Negative for cough and shortness of breath  Cardiovascular: Negative for chest pain and leg swelling  Gastrointestinal: Negative for abdominal pain, blood in stool, diarrhea, nausea and vomiting  Endocrine: Negative for polyuria  Genitourinary: Positive for difficulty urinating  Negative for dysuria, frequency and urgency  Musculoskeletal: Negative for back pain and myalgias  Skin: Negative for rash  Neurological: Negative for dizziness and numbness  Hematological: Negative for adenopathy  Psychiatric/Behavioral: Negative for confusion  All other systems reviewed and are negative  Physical Exam  ED Triage Vitals [01/05/18 1109]   Temperature Pulse Respirations Blood Pressure SpO2   98 2 °F (36 8 °C) 77 20 (!) 172/96 94 %      Temp Source Heart Rate Source Patient Position - Orthostatic VS BP Location FiO2 (%)   Temporal Monitor Lying Left arm --      Pain Score       8           Orthostatic Vital Signs  Vitals:    01/05/18 1800 01/05/18 1830 01/05/18 1845 01/05/18 1949   BP: (!) 181/142 (!) 192/110  170/80   Pulse: 64 60 61 82   Patient Position - Orthostatic VS: Lying   Lying       Physical Exam   Constitutional: He is oriented to person, place, and time  He appears well-developed and well-nourished  No distress  HENT:   Head: Normocephalic and atraumatic  Right Ear: External ear normal    Left Ear: External ear normal    Nose: Nose normal    Mouth/Throat: Oropharynx is clear and moist  No oropharyngeal exudate  Dry  Oropharynx; Eyes: Conjunctivae are normal  Pupils are equal, round, and reactive to light  Right eye exhibits no discharge  Neck: Normal range of motion  Neck supple  Cardiovascular: Normal rate and regular rhythm  Pulmonary/Chest: Effort normal  No respiratory distress  He has no wheezes  diminshed bibasilar   Abdominal: Soft   Bowel sounds are normal  He exhibits no distension  There is no tenderness  There is no guarding  Whitish exudate at suprapubic site with candial rash under pannus   Genitourinary: Penis normal    Musculoskeletal: Normal range of motion  He exhibits no edema or deformity  Lymphadenopathy:     He has no cervical adenopathy  Neurological: He is alert and oriented to person, place, and time  No cranial nerve deficit or sensory deficit  He exhibits normal muscle tone  Coordination normal    Skin: Skin is warm and dry  Capillary refill takes less than 2 seconds  Psychiatric: He has a normal mood and affect  Vitals reviewed        ED Medications  Medications   acetaminophen (TYLENOL) tablet 650 mg (not administered)   amLODIPine (NORVASC) tablet 5 mg (not administered)   aspirin chewable tablet 81 mg (not administered)   atorvastatin (LIPITOR) tablet 40 mg (not administered)   carvedilol (COREG) tablet 6 25 mg (6 25 mg Oral Given 1/5/18 2124)   docusate sodium (COLACE) capsule 100 mg (100 mg Oral Given 1/5/18 2124)   ferrous sulfate tablet 325 mg (not administered)   finasteride (PROSCAR) tablet 5 mg (not administered)   insulin glargine (LANTUS) subcutaneous injection 10 Units (10 Units Subcutaneous Given 1/5/18 2124)   saccharomyces boulardii (FLORASTOR) capsule 250 mg (not administered)   tamsulosin (FLOMAX) capsule 0 4 mg (0 4 mg Oral Given 1/5/18 2124)   sodium chloride 0 9 % infusion (75 mL/hr Intravenous New Bag 1/5/18 2124)   polyethylene glycol (MIRALAX) packet 17 g (not administered)   enoxaparin (LOVENOX) subcutaneous injection 40 mg (not administered)   insulin lispro (HumaLOG) 100 units/mL subcutaneous injection 1-6 Units (not administered)   insulin lispro (HumaLOG) 100 units/mL subcutaneous injection 1-5 Units (3 Units Subcutaneous Given 1/5/18 2133)   insulin lispro (HumaLOG) 100 units/mL subcutaneous injection 1-5 Units (not administered)   vancomycin (VANCOCIN) 1,750 mg in sodium chloride 0 9 % 500 mL IVPB (not administered)   cefTRIAXone (ROCEPHIN) IVPB (premix) 1,000 mg (1,000 mg Intravenous New Bag 1/5/18 2142)   sodium chloride 0 9 % bolus 1,000 mL (0 mL Intravenous Stopped 1/5/18 1340)   insulin regular (HumuLIN R,NovoLIN R) injection 5 Units (5 Units Intravenous Given 1/5/18 1735)       Diagnostic Studies  Results Reviewed     Procedure Component Value Units Date/Time    Blood gas, arterial [[id number]]  (Abnormal) Collected:  01/05/18 1534    Lab Status:  Final result Specimen:  Blood, Arterial from Radial, Right Updated:  01/05/18 1540     pH, Arterial 7 423     pCO2, Arterial 37 3 mm Hg      pO2, Arterial 69 9 (L) mm Hg      HCO3, Arterial 23 8 mmol/L      Base Excess, Arterial -0 3 mmol/L      O2 Content, Arterial 19 4 mL/dL      O2 HGB,Arterial  93 3 (L) %      SOURCE Radial, Right     JONAS TEST Yes     ROOM AIR FIO2 21% %     Fingerstick Glucose (POCT) [[id number]]  (Abnormal) Collected:  01/05/18 1356    Lab Status:  Final result Updated:  01/05/18 1358     POC Glucose 352 (H) mg/dl     Blood culture #1 [[id number]] Collected:  01/05/18 1329    Lab Status: In process Specimen:  Blood from Arm, Left Updated:  01/05/18 1346    Lactic acid, plasma [[id number]]  (Normal) Collected:  01/05/18 1239    Lab Status:  Final result Specimen:  Blood from Arm, Right Updated:  01/05/18 1317     LACTIC ACID 1 2 mmol/L     Narrative:         Result may be elevated if tourniquet was used during collection  Troponin I [[id number]]  (Normal) Collected:  01/05/18 1239    Lab Status:  Final result Specimen:  Blood from Arm, Right Updated:  01/05/18 1314     Troponin I 0 02 ng/mL     Narrative:         [employer] Chemistry analyzer 99% cutoff is > 0 04 ng/mL in network labs    o cTnI 99% cutoff is useful only when applied to patients in the clinical setting of myocardial ischemia  o cTnI 99% cutoff should be interpreted in the context of clinical history, ECG findings and possibly cardiac imaging to establish correct diagnosis    o cTnI 99% cutoff may be suggestive but clearly not indicative of a coronary event without the clinical setting of myocardial ischemia  Urine Microscopic [[id number]]  (Abnormal) Collected:  01/05/18 1240    Lab Status:  Final result Specimen:  Urine from Urine, Clean Catch Updated:  01/05/18 1313     RBC, UA 2-4 (A) /hpf      WBC, UA Innumerable (A) /hpf      Epithelial Cells Occasional /hpf      Bacteria, UA Occasional /hpf      OTHER OBSERVATIONS Yeast Cells Present    Urine culture [[id number]] Collected:  01/05/18 1240    Lab Status: In process Specimen:  Urine from Urine, Clean Catch Updated:  01/05/18 1313    Comprehensive metabolic panel [[id number]]  (Abnormal) Collected:  01/05/18 1239    Lab Status:  Final result Specimen:  Blood from Arm, Right Updated:  01/05/18 1309     Sodium 134 (L) mmol/L      Potassium 3 8 mmol/L      Chloride 98 (L) mmol/L      CO2 30 mmol/L      Anion Gap 6 mmol/L      BUN 19 mg/dL      Creatinine 1 04 mg/dL      Glucose 441 (H) mg/dL      Calcium 8 3 mg/dL      AST 10 U/L      ALT 21 U/L      Alkaline Phosphatase 101 U/L      Total Protein 6 9 g/dL      Albumin 3 1 (L) g/dL      Total Bilirubin 0 50 mg/dL      eGFR 69 ml/min/1 73sq m     Narrative:         National Kidney Disease Education Program recommendations are as follows:  GFR calculation is accurate only with a steady state creatinine  Chronic Kidney disease less than 60 ml/min/1 73 sq  meters  Kidney failure less than 15 ml/min/1 73 sq  meters      Acetone [[id number]]  (Abnormal) Collected:  01/05/18 1239    Lab Status:  Final result Specimen:  Blood from Arm, Right Updated:  01/05/18 1309     Acetone, Bld Trace (A)    UA w Reflex to Microscopic w Reflex to Culture [[id number]]  (Abnormal) Collected:  01/05/18 1240    Lab Status:  Final result Specimen:  Urine from Urine, Clean Catch Updated:  01/05/18 1257     Color, UA Yellow     Clarity, UA Turbid     Specific [place], UA 1 015     pH, UA 5 5     Leukocytes, UA Moderate (A) Nitrite, UA Negative     Protein,  (2+) (A) mg/dl      Glucose, UA >=1000 (1%) (A) mg/dl      Ketones, UA 15 (1+) (A) mg/dl      Urobilinogen, UA 0 2 E U /dl      Bilirubin, UA Negative     Blood, UA Small (A)    CBC and differential [[id number]]  (Abnormal) Collected:  01/05/18 1239    Lab Status:  Final result Specimen:  Blood from Arm, Right Updated:  01/05/18 1253     WBC 10 66 (H) Thousand/uL      RBC 5 36 Million/uL      Hemoglobin 15 3 g/dL      Hematocrit 45 8 %      MCV 85 fL      MCH 28 5 pg      MCHC 33 4 g/dL      RDW 13 2 %      MPV 11 7 fL      Platelets 251 Thousands/uL      Neutrophils Relative 78 (H) %      Lymphocytes Relative 11 (L) %      Monocytes Relative 9 %      Eosinophils Relative 1 %      Basophils Relative 1 %      Neutrophils Absolute 8 35 (H) Thousands/µL      Lymphocytes Absolute 1 17 Thousands/µL      Monocytes Absolute 0 98 Thousand/µL      Eosinophils Absolute 0 08 Thousand/µL      Basophils Absolute 0 08 Thousands/µL     Blood culture #2 [[id number]] Collected:  01/05/18 1239    Lab Status: In process Specimen:  Blood from Arm, Right Updated:  01/05/18 1244    Urine culture [[id number]] Collected:  01/05/18 1240    Lab Status: In process Specimen:  Urine from Urine, Clean Catch Updated:  01/05/18 1244                 XR chest 1 view portable   Final Result by Rakan John DO (01/05 1236)   Stable appearance of the chest   Persistent fluid and/or infiltrate left lower lobe           Workstation performed: [id number]         CT chest w contrast    (Results Pending)              Procedures  Suprapubic Tube  Date/Time: 1/5/2018 4:49 PM  Performed by: Junaid Hebert by: Anisha Fernandez     Patient location:  ED  Indications / Diagnosis:  Malfuctioning catheter  Other Assisting Provider: Yes (comment) Kristina Green RN, Sabas Will RN)    Consent:     Consent obtained:  Verbal    Consent given by:  Patient    Risks discussed:  Bleeding and pain    Alternatives discussed: No treatment  Universal protocol:     Procedure explained and questions answered to patient or proxy's satisfaction: yes      Patient identity confirmed:  Verbally with patient and arm band  Anesthesia (see MAR for exact dosages): Anesthesia method:  None  Procedure details:     Complexity:  Simple    Catheter type: Ozuna    Catheter size:  18 Fr    Ultrasound guidance: no      Number of attempts:  1    Successful placement: yes      Urine characteristics:  Clear  Post-procedure details:     Patient tolerance of procedure: Tolerated well, no immediate complications  ECG 12 Lead Documentation  Date/Time: 1/5/2018 12:26 PM  Performed by: Rafat Boo by: Dasie Babinski     Indications / Diagnosis:  Elevated BS  ECG reviewed by me, the ED Provider: yes    Patient location:  ED  Rate:     ECG rate:  65    ECG rate assessment: normal    Rhythm:     Rhythm: sinus rhythm    QRS:     QRS axis:  Left  Comments:      IVCD; nonspecific twave changes new vs  9/21/17           Phone Contacts  ED Phone Contact    ED Course  ED Course as of Jan 05 2209 Fri Jan 05, 2018   1643 Case reviewed with Dr Barb Bourgeois recommends observation  Discussed blood sugars  5mg insulin IV for now with IVF; will hold off on any antibiotics                                MDM  Number of Diagnoses or Management Options  DM (diabetes mellitus), type 2, uncontrolled ([facility] ):    Malfunction of Ozuna catheter [facility]):   Diagnosis management comments: Mdm: will eval for urosepsis, DKA; prior UC 10/2017 was Enterococcus facecalis - sens to ampicillin, macrobid, vanco    CritCare Time    Disposition  Final diagnoses:   DM (diabetes mellitus), type 2, uncontrolled ([facility] )   Malfunction of Ozuna catheter ([facility] ) - suprapubic cathter s/p exchange     Time reflects when diagnosis was documented in both MDM as applicable and the Disposition within this note     Time User Action Codes Description Comment    1/5/2018  4:59 PM Blade Emerson [E11 65] DM (diabetes mellitus), type 2, uncontrolled ([facility] )     1/5/2018  5:00 PM Elissa Jacquie Add [Q74 [id number]] [facility] Ozuna catheter [facility] )     1/5/2018  5:01 PM Elissa Jacquie Modify [T62 183F] Malfunction of Ozuna catheter [facility]) suprapubic cathter s/p exchange    1/5/2018  9:01 PM Graylon Seat Modify [E11 65] DM (diabetes mellitus), type 2, uncontrolled ([facility] )     1/5/2018  9:01 PM Graylon Seat Add [N39 0] UTI (urinary tract infection) with pyuria       ED Disposition     ED Disposition Condition Comment    Admit  Case was discussed with Dr Ludivina Nunez  and the patient's admission status was agreed to be Admission Status: observation status to the service of Dr Ludivina Nunez           Follow-up Information    None       Current Discharge Medication List      CONTINUE these medications which have NOT CHANGED    Details   acetaminophen (TYLENOL) 500 mg tablet One tablet every 6 hours as needed for moderate pain      amLODIPine (NORVASC) 5 mg tablet Take 1 tablet by mouth daily  Refills: 0      aspirin 325 mg tablet Take 325 mg by mouth daily      atorvastatin (LIPITOR) 40 mg tablet Take 1 tablet by mouth daily with dinner  Qty: 30 tablet, Refills: 0      docusate sodium (COLACE) 100 mg capsule Take 1 capsule by mouth 2 (two) times a day  Qty: 10 capsule, Refills: 0      ferrous sulfate 325 (65 Fe) mg tablet Take 325 mg by mouth daily with breakfast Take one hour before meal         tamsulosin (FLOMAX) 0 4 mg Take 1 capsule by mouth daily with dinner  Refills: 0      carvedilol (COREG) 6 25 mg tablet Take 1 tablet by mouth 2 (two) times a day with meals  Qty: 60 tablet, Refills: 0      cephalexin (KEFLEX) 500 mg capsule Take 1 capsule by mouth 4 (four) times a day for 7 days (antibiotic)  Qty: 28 capsule, Refills: 0      finasteride (PROSCAR) 5 mg tablet Take 1 tablet by mouth daily  Refills: 0      insulin glargine (LANTUS) 100 units/mL subcutaneous injection Inject 10 Units under the skin daily at bedtime  Qty: 10 mL, Refills: 0      insulin lispro (HumaLOG) 100 units/mL injection Inject 28 Units under the skin daily      LACTOBACILLUS RHAMNOSUS, GG, PO Take 1 capsule by mouth 2 (two) times a day           No discharge procedures on file      ED Provider  Electronically Signed by           Tali Cobos MD  01/05/18 9345

## 2018-01-05 NOTE — ED NOTES
Pt tolerated suprapubic cath well  No complaints at this time  Currently draining well   Clean dry and intact      Tonja Orlando RN  01/05/18 4125

## 2018-01-05 NOTE — ED NOTES
Pt requesting something to drink   Instructed that a doctor must evaluate him prior to having anything po     Georgie Chung RN  01/05/18 5352

## 2018-01-06 PROBLEM — R93.89 ABNORMAL CHEST X-RAY: Status: ACTIVE | Noted: 2018-01-06

## 2018-01-06 PROBLEM — N40.1 URINARY RETENTION DUE TO BENIGN PROSTATIC HYPERPLASIA: Status: RESOLVED | Noted: 2017-06-28 | Resolved: 2018-01-06

## 2018-01-06 PROBLEM — IMO0002 DM (DIABETES MELLITUS), TYPE 2, UNCONTROLLED: Status: RESOLVED | Noted: 2018-01-05 | Resolved: 2018-01-06

## 2018-01-06 PROBLEM — R26.2 AMBULATORY DYSFUNCTION: Status: RESOLVED | Noted: 2017-09-22 | Resolved: 2018-01-06

## 2018-01-06 PROBLEM — R33.8 URINARY RETENTION DUE TO BENIGN PROSTATIC HYPERPLASIA: Status: RESOLVED | Noted: 2017-06-28 | Resolved: 2018-01-06

## 2018-01-06 PROBLEM — K59.00 CONSTIPATION: Status: RESOLVED | Noted: 2017-06-30 | Resolved: 2018-01-06

## 2018-01-06 PROBLEM — N30.00 ACUTE CYSTITIS: Status: ACTIVE | Noted: 2018-01-06

## 2018-01-06 PROBLEM — N18.30 KIDNEY DISEASE, CHRONIC, STAGE III (GFR 30-59 ML/MIN) (HCC): Chronic | Status: RESOLVED | Noted: 2017-08-08 | Resolved: 2018-01-06

## 2018-01-06 PROBLEM — E87.6 HYPOKALEMIA: Status: ACTIVE | Noted: 2018-01-06

## 2018-01-06 PROBLEM — N39.0 UTI (URINARY TRACT INFECTION) WITH PYURIA: Status: RESOLVED | Noted: 2018-01-05 | Resolved: 2018-01-06

## 2018-01-06 LAB
ANION GAP SERPL CALCULATED.3IONS-SCNC: 6 MMOL/L (ref 4–13)
BACTERIA UR CULT: NORMAL
BUN SERPL-MCNC: 13 MG/DL (ref 5–25)
CALCIUM SERPL-MCNC: 7.9 MG/DL (ref 8.3–10.1)
CHLORIDE SERPL-SCNC: 104 MMOL/L (ref 100–108)
CO2 SERPL-SCNC: 29 MMOL/L (ref 21–32)
CREAT SERPL-MCNC: 0.78 MG/DL (ref 0.6–1.3)
ERYTHROCYTE [DISTWIDTH] IN BLOOD BY AUTOMATED COUNT: 13.4 % (ref 11.6–15.1)
EST. AVERAGE GLUCOSE BLD GHB EST-MCNC: 303 MG/DL
GFR SERPL CREATININE-BSD FRML MDRD: 87 ML/MIN/1.73SQ M
GLUCOSE SERPL-MCNC: 260 MG/DL (ref 65–140)
GLUCOSE SERPL-MCNC: 262 MG/DL (ref 65–140)
GLUCOSE SERPL-MCNC: 291 MG/DL (ref 65–140)
GLUCOSE SERPL-MCNC: 292 MG/DL (ref 65–140)
GLUCOSE SERPL-MCNC: 315 MG/DL (ref 65–140)
GLUCOSE SERPL-MCNC: 328 MG/DL (ref 65–140)
GLUCOSE SERPL-MCNC: 349 MG/DL (ref 65–140)
GLUCOSE SERPL-MCNC: 370 MG/DL (ref 65–140)
HBA1C MFR BLD: 12.2 % (ref 4.2–6.3)
HCT VFR BLD AUTO: 42.3 % (ref 36.5–49.3)
HGB BLD-MCNC: 14.2 G/DL (ref 12–17)
MAGNESIUM SERPL-MCNC: 1.9 MG/DL (ref 1.6–2.6)
MCH RBC QN AUTO: 28.8 PG (ref 26.8–34.3)
MCHC RBC AUTO-ENTMCNC: 33.6 G/DL (ref 31.4–37.4)
MCV RBC AUTO: 86 FL (ref 82–98)
PHOSPHATE SERPL-MCNC: 3.4 MG/DL (ref 2.3–4.1)
PLATELET # BLD AUTO: 172 THOUSANDS/UL (ref 149–390)
PMV BLD AUTO: 12 FL (ref 8.9–12.7)
POTASSIUM SERPL-SCNC: 3.1 MMOL/L (ref 3.5–5.3)
RBC # BLD AUTO: 4.93 MILLION/UL (ref 3.88–5.62)
SODIUM SERPL-SCNC: 139 MMOL/L (ref 136–145)
WBC # BLD AUTO: 10.48 THOUSAND/UL (ref 4.31–10.16)

## 2018-01-06 PROCEDURE — 83735 ASSAY OF MAGNESIUM: CPT | Performed by: HOSPITALIST

## 2018-01-06 PROCEDURE — 84100 ASSAY OF PHOSPHORUS: CPT | Performed by: HOSPITALIST

## 2018-01-06 PROCEDURE — 94762 N-INVAS EAR/PLS OXIMTRY CONT: CPT

## 2018-01-06 PROCEDURE — 80048 BASIC METABOLIC PNL TOTAL CA: CPT | Performed by: HOSPITALIST

## 2018-01-06 PROCEDURE — 85027 COMPLETE CBC AUTOMATED: CPT | Performed by: HOSPITALIST

## 2018-01-06 PROCEDURE — 94760 N-INVAS EAR/PLS OXIMETRY 1: CPT

## 2018-01-06 PROCEDURE — 87086 URINE CULTURE/COLONY COUNT: CPT | Performed by: INTERNAL MEDICINE

## 2018-01-06 PROCEDURE — 82948 REAGENT STRIP/BLOOD GLUCOSE: CPT

## 2018-01-06 PROCEDURE — 87081 CULTURE SCREEN ONLY: CPT | Performed by: INTERNAL MEDICINE

## 2018-01-06 RX ORDER — LABETALOL HYDROCHLORIDE 5 MG/ML
10 INJECTION, SOLUTION INTRAVENOUS EVERY 4 HOURS PRN
Status: DISCONTINUED | OUTPATIENT
Start: 2018-01-06 | End: 2018-01-13

## 2018-01-06 RX ORDER — POTASSIUM CHLORIDE 20 MEQ/1
40 TABLET, EXTENDED RELEASE ORAL ONCE
Status: COMPLETED | OUTPATIENT
Start: 2018-01-06 | End: 2018-01-06

## 2018-01-06 RX ORDER — INSULIN GLARGINE 100 [IU]/ML
15 INJECTION, SOLUTION SUBCUTANEOUS
Status: DISCONTINUED | OUTPATIENT
Start: 2018-01-06 | End: 2018-01-07

## 2018-01-06 RX ADMIN — Medication 250 MG: at 17:45

## 2018-01-06 RX ADMIN — INSULIN LISPRO 4 UNITS: 100 INJECTION, SOLUTION INTRAVENOUS; SUBCUTANEOUS at 16:04

## 2018-01-06 RX ADMIN — INSULIN LISPRO 3 UNITS: 100 INJECTION, SOLUTION INTRAVENOUS; SUBCUTANEOUS at 22:09

## 2018-01-06 RX ADMIN — TAMSULOSIN HYDROCHLORIDE 0.4 MG: 0.4 CAPSULE ORAL at 16:03

## 2018-01-06 RX ADMIN — VANCOMYCIN HYDROCHLORIDE 1750 MG: 10 INJECTION, POWDER, LYOPHILIZED, FOR SOLUTION INTRAVENOUS at 10:35

## 2018-01-06 RX ADMIN — AMLODIPINE BESYLATE 5 MG: 5 TABLET ORAL at 08:52

## 2018-01-06 RX ADMIN — CEFTRIAXONE 1000 MG: 1 INJECTION, SOLUTION INTRAVENOUS at 22:03

## 2018-01-06 RX ADMIN — CARVEDILOL 6.25 MG: 3.12 TABLET, FILM COATED ORAL at 08:51

## 2018-01-06 RX ADMIN — INSULIN LISPRO 5 UNITS: 100 INJECTION, SOLUTION INTRAVENOUS; SUBCUTANEOUS at 11:52

## 2018-01-06 RX ADMIN — POTASSIUM CHLORIDE 40 MEQ: 1500 TABLET, EXTENDED RELEASE ORAL at 16:59

## 2018-01-06 RX ADMIN — FINASTERIDE 5 MG: 5 TABLET, FILM COATED ORAL at 08:51

## 2018-01-06 RX ADMIN — VANCOMYCIN HYDROCHLORIDE 1750 MG: 10 INJECTION, POWDER, LYOPHILIZED, FOR SOLUTION INTRAVENOUS at 22:38

## 2018-01-06 RX ADMIN — DOCUSATE SODIUM 100 MG: 100 CAPSULE, LIQUID FILLED ORAL at 08:51

## 2018-01-06 RX ADMIN — CARVEDILOL 6.25 MG: 3.12 TABLET, FILM COATED ORAL at 16:03

## 2018-01-06 RX ADMIN — Medication 250 MG: at 08:52

## 2018-01-06 RX ADMIN — INSULIN GLARGINE 15 UNITS: 100 INJECTION, SOLUTION SUBCUTANEOUS at 22:37

## 2018-01-06 RX ADMIN — SODIUM CHLORIDE 75 ML/HR: 0.9 INJECTION, SOLUTION INTRAVENOUS at 16:02

## 2018-01-06 RX ADMIN — ATORVASTATIN CALCIUM 40 MG: 40 TABLET, FILM COATED ORAL at 16:03

## 2018-01-06 RX ADMIN — INSULIN LISPRO 4 UNITS: 100 INJECTION, SOLUTION INTRAVENOUS; SUBCUTANEOUS at 17:00

## 2018-01-06 RX ADMIN — INSULIN LISPRO 4 UNITS: 100 INJECTION, SOLUTION INTRAVENOUS; SUBCUTANEOUS at 01:55

## 2018-01-06 RX ADMIN — ASPIRIN 81 MG CHEWABLE TABLET 81 MG: 81 TABLET CHEWABLE at 08:51

## 2018-01-06 RX ADMIN — DOCUSATE SODIUM 100 MG: 100 CAPSULE, LIQUID FILLED ORAL at 17:45

## 2018-01-06 RX ADMIN — Medication 325 MG: at 08:51

## 2018-01-06 RX ADMIN — ENOXAPARIN SODIUM 40 MG: 40 INJECTION SUBCUTANEOUS at 08:52

## 2018-01-06 RX ADMIN — INSULIN LISPRO 3 UNITS: 100 INJECTION, SOLUTION INTRAVENOUS; SUBCUTANEOUS at 08:45

## 2018-01-06 NOTE — PROGRESS NOTES
Patient received from Ed, patient brought to room 420  Patient alert and oriented  The patient denied pain at current time  Patient laying in bed  Will continue to monitor

## 2018-01-06 NOTE — PROGRESS NOTES
Progress Note - Jeffrey Shown 68 y o  male MRN: [id number]    Unit/Bed#: 420-01 Encounter: [id number]      Assessment:  Patient Active Problem List   Diagnosis    Spinal stenosis of lumbar region    [facility]    Essential hypertension    Hyperlipidemia    Type 2 diabetes mellitus with hyperglycemia ([facility] )    COPD (chronic obstructive pulmonary disease) ([facility])    Multiple renal cysts    Vitamin D insufficiency    S/P CABG x 3    Hypokalemia    Acute cystitis    Abnormal chest x-ray         Plan:  1)  Acute cystitis/UTI associated with a suprapubic catheter:  Continue IV Ceftriaxone  Recheck a urine culture with the first culture being mixed contaminants  The suprapubic catheter was changed on 1/5/18  Consult Urology  Follow his WBC  2)  Hypokalemia:  Replete with PO potassium chloride  Follow his potassium level and magnesium level  3)  Abnormal chest x-ray:  Check a CT scan of the chest     4)  Type 2 diabetes mellitus with hyperglycemia:  Increase Lantus to 15 Units SQ QHS  Add Humalog 4 Units TID with meals  Follow his blood glucose trend closely  The patient continues to require IV antibiotics and IV fluids  He will now require at least a 2-midnight hospitalization and will be made inpatient status  Subjective:   Patient seen and examined  The patient complains of pain at the site of his suprapubic catheter  Objective:     Vitals: Blood pressure 152/83, pulse 72, temperature 98 1 °F (36 7 °C), temperature source Temporal, resp  rate 18, height 5' 11" (1 803 m), weight 112 kg (246 lb 7 6 oz), SpO2 93 %  ,Body mass index is 34 38 kg/m²    Weight (last 2 days)     Date/Time   Weight    01/06/18 0600  112 (246 47)    01/05/18 1949  110 (242 95)    01/05/18 1109  113 (249 34)              Intake/Output Summary (Last 24 hours) at 01/06/18 1612  Last data filed at 01/06/18 1300   Gross per 24 hour   Intake              480 ml   Output             3625 ml   Net -3145 ml       Physical Exam: General:  NAD, awake, alert, follows commands  HEENT:  NC/AT, mucous membranes dry  Neck:  Supple, No JVP elevation  CV:  + S1, + S2, RRR  Pulm:  Lung fields are CTA bilaterally  Abd:  Soft, + Suprapubic pain with palpation, Non-distended  Ext:  No clubbing/cyanosis/edema    Scheduled Meds:  amLODIPine 5 mg Oral Daily   aspirin 81 mg Oral Daily   atorvastatin 40 mg Oral Daily With Dinner   carvedilol 6 25 mg Oral BID With Meals   cefTRIAXone 1,000 mg Intravenous Q24H   docusate sodium 100 mg Oral BID   enoxaparin 40 mg Subcutaneous Daily   ferrous sulfate 325 mg Oral Daily With Breakfast   finasteride 5 mg Oral Daily   insulin glargine 15 Units Subcutaneous HS   insulin lispro 1-5 Units Subcutaneous HS   insulin lispro 1-6 Units Subcutaneous TID AC   potassium chloride 40 mEq Oral Once   saccharomyces boulardii 250 mg Oral BID   tamsulosin 0 4 mg Oral Daily With Dinner   vancomycin 15 mg/kg Intravenous Q12H     Continuous Infusions:  sodium chloride 75 mL/hr Last Rate: 75 mL/hr (01/06/18 1602)     PRN Meds:   acetaminophen    labetalol    polyethylene glycol      Invasive Devices     Peripheral Intravenous Line            Peripheral IV 01/05/18 Right Forearm 1 day          Drain            Suprapubic Catheter Non-latex 18 Fr  less than 1 day                  Results from last 7 days  Lab Units 01/06/18  0548 01/05/18  1239 12/31/17  0730   WBC Thousand/uL 10 48* 10 66* 9 35   HEMOGLOBIN g/dL 14 2 15 3 15 0   HEMATOCRIT % 42 3 45 8 44 6   PLATELETS Thousands/uL 172 191 184   NEUTROS PCT %  --  78* 78*   MONOS PCT %  --  9 9         Results from last 7 days  Lab Units 01/06/18  0548 01/05/18  1239 12/31/17  0730   SODIUM mmol/L 139 134* 136   POTASSIUM mmol/L 3 1* 3 8 4 4   CHLORIDE mmol/L 104 98* 98*   CO2 mmol/L 29 30 29   BUN mg/dL 13 19 14   CREATININE mg/dL 0 78 1 04 0 90   CALCIUM mg/dL 7 9* 8 3 8 3   TOTAL PROTEIN g/dL  --  6 9  --    BILIRUBIN TOTAL mg/dL  --  0 50 --    ALK PHOS U/L  --  101  --    ALT U/L  --  21  --    AST U/L  --  10  --    GLUCOSE RANDOM mg/dL 315* 441* 534*       Lab Results   Component Value Date    TROPONINI 0 02 01/05/2018       Lab Results   Component Value Date    INR 1 07 09/26/2017    INR 0 97 08/17/2017    INR 0 95 08/09/2017    PROTIME 13 9 09/26/2017    PROTIME 12 8 08/17/2017    PROTIME 12 6 08/09/2017                 Lab, Imaging and other studies: I have personally reviewed pertinent reports      VTE Pharmacologic Prophylaxis: Enoxaparin (Lovenox)  VTE Mechanical Prophylaxis: sequential compression device

## 2018-01-06 NOTE — CASE MANAGEMENT
Thank you,  [facility] in the [facility] by Riky Evans for 2017  Network Utilization Review Department  Phone: [phone]; Fax [phone]  ATTENTION: The Network Utilization Review Department is now centralized for our 7 Facilities  Make a note that we have a new phone and fax numbers for our Department  Please call with any questions or concerns to [phone] and carefully follow the prompts so that you are directed to the right person  All voicemails are confidential  Fax any determinations, approvals, denials, and requests for initial or continue stay review clinical to [phone]  Due to HIGH CALL volume, it would be easier if you could please send faxed requests to expedite your requests and in part, help us provide discharge notifications faster  Initial Clinical Review    Admission: Date/Time/Statement: [email or web address]    Orders Placed This Encounter   Procedures    Place in Observation (expected length of stay for this patient is less than two midnights)     Standing Status:   Standing     Number of Occurrences:   1     Order Specific Question:   Admitting Physician     Answer:   Sheila Fang     Order Specific Question:   Level of Care     Answer:   Med Surg [16]         ED: Date/Time/Mode of Arrival:   ED Arrival Information     Expected Arrival Acuity Means of Arrival Escorted By Service Admission Type    - 1/5/2018 11:00 Urgent Ambulance [facility] General Medicine Urgent    Arrival Complaint    Catheter problem          Chief Complaint:   Chief Complaint   Patient presents with    Urinary Catheter Problem     pt states that his suprapubic catheter has not been draining, he has been urinating through his penis  called urology   they told him to come here for admission to be seen monday       History of Illness: Mr Vianey Barrett is a pleasant 68year old male who comes into the hospital because he feels a lot of pressure in his bladder and feels like the keflex he has been taking is not working to improve his UTI symptoms  He denies fevers, chills, diaphoresis or increased back pain (has chronic back pain)  He felt he needed IV antibiotics  His catheter was changed in the ED   [employer]   Constitutional: Positive for appetite change and unexpected weight change  Down 20 pounds in 3 months due to decreased appetite   Gastrointestinal: Positive for constipation  Musculoskeletal: Positive for arthralgias, back pain and gait problem  Neurological: Positive for weakness  ED Vital Signs:   ED Triage Vitals [01/05/18 1109]   Temperature Pulse Respirations Blood Pressure SpO2   98 2 °F (36 8 °C) 77 20 (!) 172/96 94 %      Temp Source Heart Rate Source Patient Position - Orthostatic VS BP Location FiO2 (%)   Temporal Monitor Lying Left arm --      Pain Score       8        Wt Readings from Last 1 Encounters:   01/06/18 112 kg (246 lb 7 6 oz)       Vital Signs (abnormal):   /05/18 1830  --  60  22   192/110  93 %  --  --   01/05/18 1800  --  64  20   181/142  95 %  None (Room air)  Lying   01/05/18 1600  --  58  22   201/77  93 %  --  --   01/05/18 1430  --  60  22   182/80  94 %  --  --   01/05/18 1330  --  64  22  134/70  93 %  --  --   01/05/18 1300  --  73  20   199/84  93 %  --  --   01/05/18 1202  --  --  --  --  --  None (Room air)  --   01/05/18 1200  --  66  --  162/74  94 %  --  --   01/05/18 1130  --  72  --  151/69  93 %  --  --   01/05/18 1109  98 2 °F (36 8 °C)  77  20   172/96  94 %  None (Room air)           Abnormal Labs/Diagnostic Test Results:   Lab Results: Results for Adonis Lazaro (MRN [id number]) as of 1/5/2018 20:19    Ref   Range 1/5/2018 12:23 1/5/2018 12:39 1/5/2018 12:40 1/5/2018 12:40 1/5/2018 13:29 1/5/2018 13:56 1/5/2018 15:34   JONAS TEST Unknown             Yes   pH, Arterial Latest Ref Range: 7 350 - 7 450              7 423   pCO2, Arterial Latest Ref Range: 36 0 - 44 0 mm Hg             37 3   pO2, Arterial Latest Ref Range: 75 0 - 129 0 mm Hg             69 9 (L)   HCO3, Arterial Latest Ref Range: 22 0 - 28 0 mmol/L             23 8   Base Excess, Arterial Latest Units: mmol/L             -0 3   O2 Content, Arterial Latest Ref Range: 16 0 - 23 0 mL/dL             19 4   O2 HGB,Arterial Latest Ref Range: 94 0 - 97 0 %             93 3 (L)   ABG SOURCE Unknown             Radial, Right   ROOM AIR FIO2 Latest Units: %             21%   POC Glucose Latest Ref Range: 65 - 140 mg/dl           352 (H)     eGFR Latest Units: ml/min/1 73sq m   69             Sodium Latest Ref Range: 136 - 145 mmol/L   134 (L)             Potassium Latest Ref Range: 3 5 - 5 3 mmol/L   3 8             Chloride Latest Ref Range: 100 - 108 mmol/L   98 (L)             CO2 Latest Ref Range: 21 - 32 mmol/L   30             Anion Gap Latest Ref Range: 4 - 13 mmol/L   6             BUN Latest Ref Range: 5 - 25 mg/dL   19             Creatinine Latest Ref Range: 0 60 - 1 30 mg/dL   1 04             Glucose Latest Ref Range: 65 - 140 mg/dL   441 (H)             Calcium Latest Ref Range: 8 3 - 10 1 mg/dL   8 3             AST Latest Ref Range: 5 - 45 U/L   10             ALT Latest Ref Range: 12 - 78 U/L   21             Alkaline Phosphatase Latest Ref Range: 46 - 116 U/L   101             Total Protein Latest Ref Range: 6 4 - 8 2 g/dL   6 9             Albumin Latest Ref Range: 3 5 - 5 0 g/dL   3 1 (L)             Total Bilirubin Latest Ref Range: 0 20 - 1 00 mg/dL   0 50             Troponin I Latest Ref Range: <=0 04 ng/mL   0 02             LACTIC ACID Latest Ref Range: 0 5 - 2 0 mmol/L   1 2             WBC Latest Ref Range: 4 31 - 10 16 Thousand/uL   10 66 (H)             RBC Latest Ref Range: 3 88 - 5 62 Million/uL   5 36             Hemoglobin Latest Ref Range: 12 0 - 17 0 g/dL   15  3             Hematocrit Latest Ref Range: 36 5 - 49 3 %   45  8             MCV Latest Ref Range: 82 - 98 fL   85             MCH Latest Ref Range: 26 8 - 34 3 pg   28  5             MCHC Latest Ref Range: 31 4 - 37 4 g/dL   33  4             RDW Latest Ref Range: 11 6 - 15 1 %   13 2             Platelets Latest Ref Range: 149 - 390 Thousands/uL   191             MPV Latest Ref Range: 8 9 - 12 7 fL   11  7             Neutrophils Relative Latest Ref Range: 43 - 75 %   78 (H)             Lymphocytes Relative Latest Ref Range: 14 - 44 %   11 (L)             Monocytes Relative Latest Ref Range: 4 - 12 %   9             Eosinophils Relative Latest Ref Range: 0 - 6 %   1             Basophils Relative Latest Ref Range: 0 - 1 %   1             Neutrophils Absolute Latest Ref Range: 1 85 - 7 62 Thousands/µL   8 35 (H)             Lymphocytes Absolute Latest Ref Range: 0 60 - 4 47 Thousands/µL   1 17             Monocytes Absolute Latest Ref Range: 0 17 - 1 22 Thousand/µL   0 98             Eosinophils Absolute Latest Ref Range: 0 00 - 0 61 Thousand/µL   0 08             Basophils Absolute Latest Ref Range: 0 00 - 0 10 Thousands/µL   0 08             Epithelial Cells Latest Ref Range: None Seen, Occasional /hpf     Occasional           Color, UA Unknown     Yellow           Clarity, UA Unknown     Turbid           Specific [place], UA Latest Ref Range: 1 003 - 1 030      1 015           Glucose, UA Latest Ref Range: Negative mg/dl     >=1000 (1%) (A)           Ketones, UA Latest Ref Range: Negative mg/dl     15 (1+) (A)           Blood, UA Latest Ref Range: Negative, Trace-Intact      Small (A)           Nitrite, UA Latest Ref Range: Negative      Negative           Leukocytes, UA Latest Ref Range: Negative      Moderate (A)           pH, UA Latest Ref Range: 4 5 - 8 0      5 5           Protein, UA Latest Ref Range: Negative mg/dl     100 (2+) (A)           Bilirubin, UA Latest Ref Range: Negative      Negative           Urobilinogen, UA Latest Ref Range: 0 2, 1 0 E U /dl E U /dl     0 2           RBC, UA Latest Ref Range: None Seen, 0-5 /hpf     2-4 (A)         WBC, UA Latest Ref Range: None Seen, 0-5, 5-55, 5-65 /hpf     Innumerable (A)           Bacteria, UA Latest Ref Range: None Seen, Occasional /hpf     Occasional           OTHER OBSERVATIONS Unknown     Yeast Cells Present           Acetone, Bld Latest Ref Range: Negative    Trace (A)             BLOOD CULTURE Unknown   Rpt ((NONE))     Rpt ((NONE))       URINE CULTURE Unknown     Rpt ((NONE)) Rpt ((NONE))         ECG 12-LEAD Unknown Rpt                  Imaging: CXR: IMPRESSION:  Stable appearance of the chest   Persistent fluid and/or infiltrate left lower lobe      EKG, Pathology, and Other Studies: Sinus rhythm rate 65 with chronic IVCD and nonspecific ST-T changes       ED Treatment:   Medication Administration from 01/05/2018 1100 to 01/05/2018 1932       Date/Time Order Dose Route Action Action by Comments     01/05/2018 1340 sodium chloride 0 9 % bolus 1,000 mL 0 mL Intravenous Stopped Kumar Riddle RN      01/05/2018 1240 sodium chloride 0 9 % bolus 1,000 mL 1,000 mL Intravenous [address] Rita Severance, [address] [place] [address]      01/05/2018 1735 insulin regular (HumuLIN R,NovoLIN R) injection 5 Units 5 Units Intravenous Given Hermes Gore RN bgs 260     01/05/2018 1737 sodium chloride 0 9 % infusion 250 mL/hr Intravenous New Bag Hermes Gore RN           Past Medical/Surgical History:    Active Ambulatory Problems     Diagnosis Date Noted    Systolic and diastolic [facility] chronic [facility] )     Spinal stenosis of lumbar region     [facility]VD [facility]peripheral vascular disease) ([facility] )     Essential hypertension 06/28/2017    Hyperlipidemia 06/28/2017    Type 2 diabetes mellitus with hyperglycemia ([facility] ) 06/28/2017    COPD (chronic obstructive pulmonary disease) ([facility] ) 06/28/2017    Urinary retention due to benign prostatic hyperplasia 06/28/2017    Multiple renal cysts 06/29/2017    Vitamin D insufficiency 06/30/2017    Constipation 06/30/2017    Kidney disease, chronic, stage III (GFR 30-59 ml/min) 08/08/2017    Ambulatory dysfunction 09/22/2017    Gait difficulty 10/13/2016    [facility] 10/13/2016    S/P CABG x 3 11/24/2016     Resolved Ambulatory Problems     Diagnosis Date Noted    UTI due to extended-spectrum beta lactamase (ESBL) producing Escherichia coli     Dysuria 06/28/2017    History of ESBL E  coli infection 06/28/2017    History of BPH 06/28/2017    Elevated lactic acid level 08/08/2017    Gram-negative bacteremia 08/14/2017     Past Medical History:   Diagnosis Date    [facility]est[facility]     COPD (chronic obstructive pulmonary disease) ([facility])     Diabetes mellitus ([facility])     ESBL (extended spectrum beta-lactamase) producing bacteria infection     History of BPH     Hyperlipidemia     Hypertension     PE (pulmonary thromboembolism) ([facility])     PVD (peripheral vascular disease) ([place] [address] )     Spinal stenosis of lumbar region     Stroke ([place] [address] )     Systolic and diastolic CHF, chronic ([place] [address] )     Urinary retention     UTI due to extended-spectrum beta lactamase (ESBL) producing Escherichia coli        Admitting Diagnosis: DM (diabetes mellitus), type 2, uncontrolled ([place] [address] ) [E11 65]  Malfunction of Siddiqui catheter ([place] [address] ) [T83 011A]  Siddiqui catheter problem ([place] [address] ) [U62  9XXA]    Age/Sex: 68 y o  male    Assessment/Plan: Assessment:  1  UTI with purulent drainage around suprapubic catheter and pyuria (hx of ESBL)  2  DM with elevated blood sugars  3  Weight loss, pleural effusion, hypoxia on ABG, hx tob abuse        Plan:  1  Admit to hospital  2  Rocephin and Vancomycin  3  Culture urine and discharge around siddiqui catheter  4  ID consult  5  Continue home insulin with SSI coverage  6  Continue home medications for HTN, CAD and hyperlipidemia  7  Urology consult for suprapubic catheter management and ? Infusions into bladder for difficult infections  8   Documented 30 pound weight loss since September--pleural effusion noted since Jan 2017, hx tobacco abuse, no dedicated chest CT in our system--ordered to r/o pulmonary mass     1/6:  Progress Note - Isa Benitez 68 y o  male MRN: [id number]     Unit/Bed#: 420-01 Encounter: [id number]        Assessment:      Patient Active Problem List   Diagnosis    Spinal stenosis of lumbar region    PVD (peripheral vascular disease) ([facility] )    Essential hypertension    Hyperlipidemia    Type 2 diabetes mellitus with hyperglycemia ([facility] )    COPD (chronic obstructive pulmonary disease) ([facility])    Multiple renal cysts    Vitamin D insufficiency    S/P CABG x 3    Hypokalemia    Acute cystitis    Abnormal chest x-ray            Plan:  1)  Acute cystitis/UTI associated with a suprapubic catheter:  Continue IV Ceftriaxone  Recheck a urine culture with the first culture being mixed contaminants  The suprapubic catheter likely needs to be changed  Consult Urology  Follow his WBC     2)  Hypokalemia:  Replete with PO potassium chloride  Follow his potassium level and magnesium level      3)  Abnormal chest x-ray:  Check a CT scan of the chest      4)  Type 2 diabetes mellitus with hyperglycemia:  Increase Lantus to 15 Units SQ QHS  Add Humalog 4 Units TID with meals  Follow his blood glucose trend closely      The patient continues to require IV antibiotics and IV fluids    He will now require at least a 2-midnight hospitalization and will be made inpatient status         Admission Orders:  [email or web address]  OOB  DAILY WEIGHTS  CONSULT UROLOGY  O2  SEQ COMP DEVICE  IS  CONSUTL ID  [email or web address]    Scheduled Meds:   amLODIPine 5 mg Oral Daily   aspirin 81 mg Oral Daily   atorvastatin 40 mg Oral Daily With Dinner   carvedilol 6 25 mg Oral BID With Meals   cefTRIAXone 1,000 mg Intravenous Q24H   docusate sodium 100 mg Oral BID   enoxaparin 40 mg Subcutaneous Daily   ferrous sulfate 325 mg Oral Daily With Breakfast   finasteride 5 mg Oral Daily   insulin glargine 10 Units Subcutaneous HS   insulin lispro 1-5 Units Subcutaneous HS   insulin lispro 1-5 Units Subcutaneous 0200   insulin lispro 1-6 Units Subcutaneous TID AC   saccharomyces boulardii 250 mg Oral BID   tamsulosin 0 4 mg Oral Daily With Dinner   vancomycin 15 mg/kg Intravenous Q12H     Continuous Infusions:   sodium chloride 75 mL/hr Last Rate: 75 mL/hr (01/05/18 2124)     PRN Meds:   acetaminophen    polyethylene glycol

## 2018-01-06 NOTE — H&P
H&P Exam - Yvonna Shown 68 y o  male MRN: [id number]    Unit/Bed#: 420-01 Encounter: [id number]    Assessment:  1  UTI with purulent drainage around suprapubic catheter and pyuria (hx of ESBL)  2  DM with elevated blood sugars  3  Weight loss, pleural effusion, hypoxia on ABG, hx tob abuse      Plan:  1  Admit to hospital  2  Rocephin and Vancomycin  3  Culture urine and discharge around siddiqui catheter  4  ID consult  5  Continue home insulin with SSI coverage  6  Continue home medications for HTN, CAD and hyperlipidemia  7  Urology consult for suprapubic catheter management and ? Infusions into bladder for difficult infections  8  Documented 30 pound weight loss since September--pleural effusion noted since Jan 2017, hx tobacco abuse, no dedicated chest CT in our system--ordered to r/o pulmonary mass      History of Present Illness   Mr Kim Roberson is a pleasant 68year old male who comes into the hospital because he feels a lot of pressure in his bladder and feels like the keflex he has been taking is not working to improve his UTI symptoms  He denies fevers, chills, diaphoresis or increased back pain (has chronic back pain)  He felt he needed IV antibiotics  His catheter was changed in the ED  Review of Systems   Constitutional: Positive for appetite change and unexpected weight change  Down 20 pounds in 3 months due to decreased appetite   Gastrointestinal: Positive for constipation  Musculoskeletal: Positive for arthralgias, back pain and gait problem  Neurological: Positive for weakness  All other systems reviewed and are negative        Historical Information   Past Medical History:   Diagnosis Date    [facility]     COPD (chronic obstructive pulmonary disease) (HCC)     Diabetes mellitus (HCC)     ESBL (extended spectrum beta-lactamase) producing bacteria infection     History of BPH     Hyperlipidemia     Hypertension     [facility]embolism[facility]     [facility]     Spinal stenosis of lumbar region     [facility]     Systolic and diastolic CHF, chronic ([facility] )     Urinary retention     UTI due to extended-spectrum beta lactamase (ESBL) producing Escherichia coli      Past Surgical History:   Procedure Laterality Date    APPENDECTOMY      BACK SURGERY      CARDIAC SURGERY      cabg    HIP SURGERY Right     plate and pin     Social History   History   Alcohol Use No     History   Drug Use No     History   Smoking Status    Former Smoker    Packs/day: 2 00    Years: 32 00    Quit date: 1/5/1984   Smokeless Tobacco    Never Used     Family History:   Family History   Problem Relation Age of Onset    Coronary artery disease Mother     Cancer Father        Meds/Allergies   PTA meds:   Prior to Admission Medications   Prescriptions Last Dose Informant Patient Reported? Taking?    LACTOBACILLUS RHAMNOSUS, GG, PO Unknown at Unknown time  Yes No   Sig: Take 1 capsule by mouth 2 (two) times a day   acetaminophen (TYLENOL) 500 mg tablet 1/4/2018 at Unknown time  No Yes   Sig: One tablet every 6 hours as needed for moderate pain   amLODIPine (NORVASC) 5 mg tablet 1/5/2018 at Unknown time  No Yes   Sig: Take 1 tablet by mouth daily   aspirin 325 mg tablet 1/4/2018 at Unknown time  Yes Yes   Sig: Take 325 mg by mouth daily   atorvastatin (LIPITOR) 40 mg tablet 1/4/2018 at Unknown time  No Yes   Sig: Take 1 tablet by mouth daily with dinner   carvedilol (COREG) 6 25 mg tablet Unknown at Unknown time  No No   Sig: Take 1 tablet by mouth 2 (two) times a day with meals   cephalexin (KEFLEX) 500 mg capsule Unknown at Unknown time  No No   Sig: Take 1 capsule by mouth 4 (four) times a day for 7 days (antibiotic)   docusate sodium (COLACE) 100 mg capsule 1/4/2018 at Unknown time  No Yes   Sig: Take 1 capsule by mouth 2 (two) times a day   Patient taking differently: Take 100 mg by mouth 2 (two) times a day as needed     ferrous sulfate 325 (65 Fe) mg tablet 1/4/2018 at Unknown time  Yes Yes   Sig: Take 325 mg by mouth daily with breakfast Take one hour before meal      finasteride (PROSCAR) 5 mg tablet Unknown at Unknown time  No No   Sig: Take 1 tablet by mouth daily   insulin glargine (LANTUS) 100 units/mL subcutaneous injection Unknown at Unknown time  No No   Sig: Inject 10 Units under the skin daily at bedtime   Patient taking differently: Inject 60 Units under the skin daily at bedtime     insulin lispro (HumaLOG) 100 units/mL injection Unknown at Unknown time  Yes No   Sig: Inject 28 Units under the skin daily   tamsulosin (FLOMAX) 0 4 mg 1/4/2018 at Unknown time  No Yes   Sig: Take 1 capsule by mouth daily with dinner      Facility-Administered Medications: None     Allergies   Allergen Reactions    Penicillins      States he is unsure, does not believe he is allergic anymore  Has had cephalo's       Objective   First Vitals:   Blood Pressure: (!) 172/96 (01/05/18 1109)  Pulse: 77 (01/05/18 1109)  Temperature: 98 2 °F (36 8 °C) (01/05/18 1109)  Temp Source: Temporal (01/05/18 1109)  Respirations: 20 (01/05/18 1109)  Height: 5' 11" (180 3 cm) (01/05/18 1949)  Weight - Scale: 113 kg (249 lb 5 4 oz) (01/05/18 1109)  SpO2: 94 % (01/05/18 1109)    Current Vitals:   Blood Pressure: 170/80 (01/05/18 1949)  Pulse: 82 (01/05/18 1949)  Temperature: 97 7 °F (36 5 °C) (01/05/18 1949)  Temp Source: Temporal (01/05/18 1949)  Respirations: 18 (01/05/18 1949)  Height: 5' 11" (180 3 cm) (01/05/18 1949)  Weight - Scale: 110 kg (242 lb 15 2 oz) (01/05/18 1949)  SpO2: 92 % (01/05/18 1949)      Intake/Output Summary (Last 24 hours) at 01/05/18 2037  Last data filed at 01/05/18 1340   Gross per 24 hour   Intake             1000 ml   Output                0 ml   Net             1000 ml       Invasive Devices     Peripheral Intravenous Line            Peripheral IV 01/05/18 Right Forearm less than 1 day          Drain            Suprapubic Catheter Non-latex 18 Fr  less than 1 day                Physical Exam   Constitutional: He is oriented to person, place, and time  He appears well-developed and well-nourished  No distress  HENT:   Head: Normocephalic and atraumatic  Mouth/Throat: No oropharyngeal exudate  Eyes: Conjunctivae and EOM are normal  Pupils are equal, round, and reactive to light  Right eye exhibits no discharge  Left eye exhibits no discharge  No scleral icterus  Neck: Normal range of motion  Neck supple  No JVD present  No tracheal deviation present  No thyromegaly present  Cardiovascular: Normal rate, normal heart sounds and intact distal pulses  Exam reveals no gallop and no friction rub  No murmur heard  Pulmonary/Chest: Effort normal and breath sounds normal  No stridor  No respiratory distress  He has no wheezes  He has no rales  He exhibits no tenderness  Abdominal: Soft  Bowel sounds are normal  He exhibits no distension and no mass  There is tenderness  There is no rebound and no guarding  Suprapubic tenderness with suprapubic catheter in place draining into bag cloudly light yellow urine and with purulent drainage seeping out around the catheter--sent for culture   Musculoskeletal: He exhibits no edema, tenderness or deformity  Lymphadenopathy:     He has no cervical adenopathy  Neurological: He is alert and oriented to person, place, and time  He has normal reflexes  He displays normal reflexes  No cranial nerve deficit  He exhibits normal muscle tone  Coordination normal    Skin: Skin is warm and dry  No rash noted  He is not diaphoretic  No erythema  No pallor  Psychiatric: He has a normal mood and affect  His behavior is normal  Judgment and thought content normal        Lab Results: Results for Major De Adna (MRN [id number]) as of 1/5/2018 20:19   Ref   Range 1/5/2018 12:23 1/5/2018 12:39 1/5/2018 12:40 1/5/2018 12:40 1/5/2018 13:29 1/5/2018 13:56 1/5/2018 15:34   JONAS TEST Unknown       Yes   pH, Arterial Latest Ref Range: 7 350 - 7 450        7 423   pCO2, Arterial Latest Ref Range: 36 0 - 44 0 mm Hg       37 3   pO2, Arterial Latest Ref Range: 75 0 - 129 0 mm Hg       69 9 (L)   HCO3, Arterial Latest Ref Range: 22 0 - 28 0 mmol/L       23 8   Base Excess, Arterial Latest Units: mmol/L       -0 3   O2 Content, Arterial Latest Ref Range: 16 0 - 23 0 mL/dL       19 4   O2 HGB,Arterial Latest Ref Range: 94 0 - 97 0 %       93 3 (L)   ABG SOURCE Unknown       Radial, Right   ROOM AIR FIO2 Latest Units: %       21%   POC Glucose Latest Ref Range: 65 - 140 mg/dl      352 (H)    eGFR Latest Units: ml/min/1 73sq m  69        Sodium Latest Ref Range: 136 - 145 mmol/L  134 (L)        Potassium Latest Ref Range: 3 5 - 5 3 mmol/L  3 8        Chloride Latest Ref Range: 100 - 108 mmol/L  98 (L)        CO2 Latest Ref Range: 21 - 32 mmol/L  30        Anion Gap Latest Ref Range: 4 - 13 mmol/L  6        BUN Latest Ref Range: 5 - 25 mg/dL  19        Creatinine Latest Ref Range: 0 60 - 1 30 mg/dL  1 04        Glucose Latest Ref Range: 65 - 140 mg/dL  441 (H)        Calcium Latest Ref Range: 8 3 - 10 1 mg/dL  8 3        AST Latest Ref Range: 5 - 45 U/L  10        ALT Latest Ref Range: 12 - 78 U/L  21        Alkaline Phosphatase Latest Ref Range: 46 - 116 U/L  101        Total Protein Latest Ref Range: 6 4 - 8 2 g/dL  6 9        Albumin Latest Ref Range: 3 5 - 5 0 g/dL  3 1 (L)        Total Bilirubin Latest Ref Range: 0 20 - 1 00 mg/dL  0 50        Troponin I Latest Ref Range: <=0 04 ng/mL  0 02        LACTIC ACID Latest Ref Range: 0 5 - 2 0 mmol/L  1 2        WBC Latest Ref Range: 4 31 - 10 16 Thousand/uL  10 66 (H)        RBC Latest Ref Range: 3 88 - 5 62 Million/uL  5 36        Hemoglobin Latest Ref Range: 12 0 - 17 0 g/dL  15 3        Hematocrit Latest Ref Range: 36 5 - 49 3 %  45 8        MCV Latest Ref Range: 82 - 98 fL  85        MCH Latest Ref Range: 26 8 - 34 3 pg  28 5        MCHC Latest Ref Range: 31 4 - 37 4 g/dL  33 4        RDW Latest Ref Range: 11 6 - 15 1 %  13 2        Platelets Latest Ref Range: 149 - 390 Thousands/uL  191        MPV Latest Ref Range: 8 9 - 12 7 fL  11 7        Neutrophils Relative Latest Ref Range: 43 - 75 %  78 (H)        Lymphocytes Relative Latest Ref Range: 14 - 44 %  11 (L)        Monocytes Relative Latest Ref Range: 4 - 12 %  9        Eosinophils Relative Latest Ref Range: 0 - 6 %  1        Basophils Relative Latest Ref Range: 0 - 1 %  1        Neutrophils Absolute Latest Ref Range: 1 85 - 7 62 Thousands/µL  8 35 (H)        Lymphocytes Absolute Latest Ref Range: 0 60 - 4 47 Thousands/µL  1 17        Monocytes Absolute Latest Ref Range: 0 17 - 1 22 Thousand/µL  0 98        Eosinophils Absolute Latest Ref Range: 0 00 - 0 61 Thousand/µL  0 08        Basophils Absolute Latest Ref Range: 0 00 - 0 10 Thousands/µL  0 08        Epithelial Cells Latest Ref Range: None Seen, Occasional /hpf   Occasional       Color, UA Unknown   Yellow       Clarity, UA Unknown   Turbid       Specific Gravity, UA Latest Ref Range: 1 003 - 1 030    1 015       Glucose, UA Latest Ref Range: Negative mg/dl   >=1000 (1%) (A)       Ketones, UA Latest Ref Range: Negative mg/dl   15 (1+) (A)       Blood, UA Latest Ref Range: Negative, Trace-Intact    Small (A)       Nitrite, UA Latest Ref Range: Negative    Negative       Leukocytes, UA Latest Ref Range: Negative    Moderate (A)       pH, UA Latest Ref Range: 4 5 - 8 0    5 5       Protein, UA Latest Ref Range: Negative mg/dl   100 (2+) (A)       Bilirubin, UA Latest Ref Range: Negative    Negative       Urobilinogen, UA Latest Ref Range: 0 2, 1 0 E U /dl E U /dl   0 2       RBC, UA Latest Ref Range: None Seen, 0-5 /hpf   2-4 (A)       WBC, UA Latest Ref Range: None Seen, 0-5, 5-55, 5-65 /hpf   Innumerable (A)       Bacteria, UA Latest Ref Range: None Seen, Occasional /hpf   Occasional       OTHER OBSERVATIONS Unknown   Yeast Cells Present       Acetone, Bld Latest Ref Range: Negative   Trace (A) BLOOD CULTURE Unknown  Rpt ((NONE))   Rpt ((NONE))     URINE CULTURE Unknown   Rpt ((NONE)) Rpt ((NONE))      ECG 12-LEAD Unknown Rpt           Imaging: CXR: IMPRESSION:  Stable appearance of the chest   Persistent fluid and/or infiltrate left lower lobe  EKG, Pathology, and Other Studies: Sinus rhythm rate 65 with chronic IVCD and nonspecific ST-T changes    Code Status:Full Code  Advance Directive and Living Will: Yes    Power of :    POLST:      Counseling / Coordination of Care: Total floor / unit time spent today 48 minutes

## 2018-01-07 ENCOUNTER — APPOINTMENT (INPATIENT)
Dept: CT IMAGING | Facility: HOSPITAL | Age: 78
DRG: 699 | End: 2018-01-07
Payer: OTHER GOVERNMENT

## 2018-01-07 PROBLEM — N28.1 RENAL CYST, LEFT: Status: ACTIVE | Noted: 2018-01-07

## 2018-01-07 LAB
ALBUMIN SERPL BCP-MCNC: 2.5 G/DL (ref 3.5–5)
ALP SERPL-CCNC: 87 U/L (ref 46–116)
ALT SERPL W P-5'-P-CCNC: 12 U/L (ref 12–78)
ANION GAP SERPL CALCULATED.3IONS-SCNC: 6 MMOL/L (ref 4–13)
AST SERPL W P-5'-P-CCNC: 11 U/L (ref 5–45)
BACTERIA UR CULT: ABNORMAL
BASOPHILS # BLD AUTO: 0.08 THOUSANDS/ΜL (ref 0–0.1)
BASOPHILS NFR BLD AUTO: 1 % (ref 0–1)
BILIRUB SERPL-MCNC: 0.4 MG/DL (ref 0.2–1)
BUN SERPL-MCNC: 7 MG/DL (ref 5–25)
CALCIUM SERPL-MCNC: 7.7 MG/DL (ref 8.3–10.1)
CHLORIDE SERPL-SCNC: 105 MMOL/L (ref 100–108)
CO2 SERPL-SCNC: 27 MMOL/L (ref 21–32)
CREAT SERPL-MCNC: 0.65 MG/DL (ref 0.6–1.3)
EOSINOPHIL # BLD AUTO: 0.36 THOUSAND/ΜL (ref 0–0.61)
EOSINOPHIL NFR BLD AUTO: 4 % (ref 0–6)
ERYTHROCYTE [DISTWIDTH] IN BLOOD BY AUTOMATED COUNT: 13.3 % (ref 11.6–15.1)
GFR SERPL CREATININE-BSD FRML MDRD: 94 ML/MIN/1.73SQ M
GLUCOSE SERPL-MCNC: 235 MG/DL (ref 65–140)
GLUCOSE SERPL-MCNC: 257 MG/DL (ref 65–140)
GLUCOSE SERPL-MCNC: 257 MG/DL (ref 65–140)
GLUCOSE SERPL-MCNC: 262 MG/DL (ref 65–140)
GLUCOSE SERPL-MCNC: 305 MG/DL (ref 65–140)
HCT VFR BLD AUTO: 41.5 % (ref 36.5–49.3)
HGB BLD-MCNC: 13.7 G/DL (ref 12–17)
LACTATE SERPL-SCNC: 0.7 MMOL/L (ref 0.5–2)
LYMPHOCYTES # BLD AUTO: 1.69 THOUSANDS/ΜL (ref 0.6–4.47)
LYMPHOCYTES NFR BLD AUTO: 20 % (ref 14–44)
MAGNESIUM SERPL-MCNC: 1.8 MG/DL (ref 1.6–2.6)
MCH RBC QN AUTO: 28.4 PG (ref 26.8–34.3)
MCHC RBC AUTO-ENTMCNC: 33 G/DL (ref 31.4–37.4)
MCV RBC AUTO: 86 FL (ref 82–98)
MONOCYTES # BLD AUTO: 0.95 THOUSAND/ΜL (ref 0.17–1.22)
MONOCYTES NFR BLD AUTO: 11 % (ref 4–12)
NEUTROPHILS # BLD AUTO: 5.58 THOUSANDS/ΜL (ref 1.85–7.62)
NEUTS SEG NFR BLD AUTO: 64 % (ref 43–75)
PHOSPHATE SERPL-MCNC: 3.1 MG/DL (ref 2.3–4.1)
PLATELET # BLD AUTO: 166 THOUSANDS/UL (ref 149–390)
PMV BLD AUTO: 11.9 FL (ref 8.9–12.7)
POTASSIUM SERPL-SCNC: 3.4 MMOL/L (ref 3.5–5.3)
PROT SERPL-MCNC: 5.9 G/DL (ref 6.4–8.2)
RBC # BLD AUTO: 4.82 MILLION/UL (ref 3.88–5.62)
SODIUM SERPL-SCNC: 138 MMOL/L (ref 136–145)
VANCOMYCIN TROUGH SERPL-MCNC: 18.2 UG/ML (ref 10–20)
WBC # BLD AUTO: 8.66 THOUSAND/UL (ref 4.31–10.16)

## 2018-01-07 PROCEDURE — 83605 ASSAY OF LACTIC ACID: CPT | Performed by: INTERNAL MEDICINE

## 2018-01-07 PROCEDURE — 84100 ASSAY OF PHOSPHORUS: CPT | Performed by: INTERNAL MEDICINE

## 2018-01-07 PROCEDURE — 71260 CT THORAX DX C+: CPT

## 2018-01-07 PROCEDURE — 85025 COMPLETE CBC W/AUTO DIFF WBC: CPT | Performed by: INTERNAL MEDICINE

## 2018-01-07 PROCEDURE — 83735 ASSAY OF MAGNESIUM: CPT | Performed by: INTERNAL MEDICINE

## 2018-01-07 PROCEDURE — 82948 REAGENT STRIP/BLOOD GLUCOSE: CPT

## 2018-01-07 PROCEDURE — 80202 ASSAY OF VANCOMYCIN: CPT | Performed by: FAMILY MEDICINE

## 2018-01-07 PROCEDURE — 94762 N-INVAS EAR/PLS OXIMTRY CONT: CPT

## 2018-01-07 PROCEDURE — 80053 COMPREHEN METABOLIC PANEL: CPT | Performed by: INTERNAL MEDICINE

## 2018-01-07 PROCEDURE — 94760 N-INVAS EAR/PLS OXIMETRY 1: CPT

## 2018-01-07 RX ORDER — INSULIN GLARGINE 100 [IU]/ML
20 INJECTION, SOLUTION SUBCUTANEOUS
Status: DISCONTINUED | OUTPATIENT
Start: 2018-01-07 | End: 2018-01-08

## 2018-01-07 RX ORDER — POTASSIUM CHLORIDE 20 MEQ/1
40 TABLET, EXTENDED RELEASE ORAL ONCE
Status: COMPLETED | OUTPATIENT
Start: 2018-01-07 | End: 2018-01-07

## 2018-01-07 RX ORDER — ACETAMINOPHEN 325 MG/1
650 TABLET ORAL EVERY 6 HOURS PRN
Status: DISCONTINUED | OUTPATIENT
Start: 2018-01-07 | End: 2018-01-10

## 2018-01-07 RX ORDER — LABETALOL HYDROCHLORIDE 5 MG/ML
10 INJECTION, SOLUTION INTRAVENOUS EVERY 4 HOURS PRN
Status: DISCONTINUED | OUTPATIENT
Start: 2018-01-07 | End: 2018-01-09

## 2018-01-07 RX ADMIN — INSULIN LISPRO 4 UNITS: 100 INJECTION, SOLUTION INTRAVENOUS; SUBCUTANEOUS at 12:35

## 2018-01-07 RX ADMIN — VANCOMYCIN HYDROCHLORIDE 1750 MG: 10 INJECTION, POWDER, LYOPHILIZED, FOR SOLUTION INTRAVENOUS at 09:54

## 2018-01-07 RX ADMIN — INSULIN LISPRO 4 UNITS: 100 INJECTION, SOLUTION INTRAVENOUS; SUBCUTANEOUS at 09:21

## 2018-01-07 RX ADMIN — Medication 250 MG: at 09:54

## 2018-01-07 RX ADMIN — FINASTERIDE 5 MG: 5 TABLET, FILM COATED ORAL at 09:51

## 2018-01-07 RX ADMIN — AMLODIPINE BESYLATE 5 MG: 5 TABLET ORAL at 09:50

## 2018-01-07 RX ADMIN — CARVEDILOL 6.25 MG: 3.12 TABLET, FILM COATED ORAL at 16:24

## 2018-01-07 RX ADMIN — IOHEXOL 85 ML: 350 INJECTION, SOLUTION INTRAVENOUS at 14:58

## 2018-01-07 RX ADMIN — INSULIN GLARGINE 20 UNITS: 100 INJECTION, SOLUTION SUBCUTANEOUS at 21:41

## 2018-01-07 RX ADMIN — TAMSULOSIN HYDROCHLORIDE 0.4 MG: 0.4 CAPSULE ORAL at 16:24

## 2018-01-07 RX ADMIN — CEFTRIAXONE 1000 MG: 1 INJECTION, SOLUTION INTRAVENOUS at 21:35

## 2018-01-07 RX ADMIN — ENOXAPARIN SODIUM 40 MG: 40 INJECTION SUBCUTANEOUS at 09:50

## 2018-01-07 RX ADMIN — Medication 250 MG: at 17:52

## 2018-01-07 RX ADMIN — ATORVASTATIN CALCIUM 40 MG: 40 TABLET, FILM COATED ORAL at 16:24

## 2018-01-07 RX ADMIN — ASPIRIN 81 MG CHEWABLE TABLET 81 MG: 81 TABLET CHEWABLE at 09:51

## 2018-01-07 RX ADMIN — INSULIN LISPRO 4 UNITS: 100 INJECTION, SOLUTION INTRAVENOUS; SUBCUTANEOUS at 16:26

## 2018-01-07 RX ADMIN — Medication 325 MG: at 09:51

## 2018-01-07 RX ADMIN — INSULIN LISPRO 3 UNITS: 100 INJECTION, SOLUTION INTRAVENOUS; SUBCUTANEOUS at 12:35

## 2018-01-07 RX ADMIN — CARVEDILOL 6.25 MG: 3.12 TABLET, FILM COATED ORAL at 09:50

## 2018-01-07 RX ADMIN — Medication 400 MG: at 16:24

## 2018-01-07 RX ADMIN — POTASSIUM CHLORIDE 40 MEQ: 1500 TABLET, EXTENDED RELEASE ORAL at 16:24

## 2018-01-07 RX ADMIN — INSULIN LISPRO 4 UNITS: 100 INJECTION, SOLUTION INTRAVENOUS; SUBCUTANEOUS at 16:25

## 2018-01-07 RX ADMIN — DOCUSATE SODIUM 100 MG: 100 CAPSULE, LIQUID FILLED ORAL at 17:52

## 2018-01-07 RX ADMIN — VANCOMYCIN HYDROCHLORIDE 1750 MG: 10 INJECTION, POWDER, LYOPHILIZED, FOR SOLUTION INTRAVENOUS at 22:08

## 2018-01-07 RX ADMIN — INSULIN LISPRO 3 UNITS: 100 INJECTION, SOLUTION INTRAVENOUS; SUBCUTANEOUS at 09:21

## 2018-01-07 RX ADMIN — INSULIN LISPRO 2 UNITS: 100 INJECTION, SOLUTION INTRAVENOUS; SUBCUTANEOUS at 21:41

## 2018-01-07 NOTE — PROGRESS NOTES
Progress Note - Shivani Torres 68 y o  male MRN: [id number]    Unit/Bed#: 420-01 Encounter: [id number]      Assessment:  Patient Active Problem List   Diagnosis    Spinal stenosis of lumbar region    [facility]) [facility]    Essential hypertension    Hyperlipidemia    Type 2 diabetes mellitus with hyperglycemia ([facility] )    COPD (chronic obstructive pulmonary disease) (HCC)    Multiple renal cysts    Vitamin D insufficiency    S/P CABG x 3    Hypokalemia    Acute cystitis    Abnormal chest x-ray    Renal cyst, left         Plan:  1)  Acute cystitis/UTI associated with a suprapubic catheter:  Continue IV Ceftriaxone  Await the results of the repeat urine culture  I appreciate Urology's input  The leukocytosis has resolved  Consult Infectious Disease with the recurrent UTI's     2)  Hypokalemia:  Replete with PO potassium chloride  Follow his potassium level and magnesium level  3)  Abnormal chest x-ray:  There is no evidence of a lung mass on CT scan of the chest     4)  Type 2 diabetes mellitus with hyperglycemia:  Increase Lantus to 20 Units SQ QHS  Continue Humalog 4 Units TID with meals  Follow his blood glucose trend closely  5)  Left renal cyst:  He will need outpatient surveillance imaging of the renal cyst     The patient continues to require inpatient hospitalization for IV antibiotics and IV fluids  Subjective:   Patient seen and examined  The patient continues to complain of pain at the site of his suprapubic catheter  No chest pain  No shortness of breath  Objective:     Vitals: Blood pressure 150/76, pulse 64, temperature (!) 97 2 °F (36 2 °C), temperature source Temporal, resp  rate 18, height 5' 11" (1 803 m), weight 111 kg (245 lb 6 oz), SpO2 94 %  ,Body mass index is 34 22 kg/m²    Weight (last 2 days)     Date/Time   Weight    01/07/18 0605  111 (245 37)    01/06/18 0600  112 (246 47)    01/05/18 1949  110 (242 95)    01/05/18 1109  113 (249 34) Intake/Output Summary (Last 24 hours) at 01/07/18 1529  Last data filed at 01/07/18 1453   Gross per 24 hour   Intake           4637 5 ml   Output             3300 ml   Net           1337 5 ml       Physical Exam: General:  NAD, awake, alert, follows commands  HEENT:  NC/AT, mucous membranes dry  Neck:  Supple, No JVP elevation  CV:  + S1, + S2, RRR  Pulm:  Lung fields are CTA bilaterally, No wheezing  Abd:  Soft, + Suprapubic pain with palpation, Non-distended, Suprapubic catheter/tube in place  Ext:  No clubbing/cyanosis/edema    Scheduled Meds:    amLODIPine 5 mg Oral Daily   aspirin 81 mg Oral Daily   atorvastatin 40 mg Oral Daily With Dinner   carvedilol 6 25 mg Oral BID With Meals   cefTRIAXone 1,000 mg Intravenous Q24H   docusate sodium 100 mg Oral BID   enoxaparin 40 mg Subcutaneous Daily   ferrous sulfate 325 mg Oral Daily With Breakfast   finasteride 5 mg Oral Daily   insulin glargine 20 Units Subcutaneous HS   insulin lispro 1-5 Units Subcutaneous HS   insulin lispro 1-6 Units Subcutaneous TID AC   insulin lispro 4 Units Subcutaneous TID With Meals   potassium chloride 40 mEq Oral Once   saccharomyces boulardii 250 mg Oral BID   tamsulosin 0 4 mg Oral Daily With Dinner   vancomycin 15 mg/kg Intravenous Q12H     Continuous Infusions:    sodium chloride 75 mL/hr Last Rate: Stopped (01/07/18 0925)     PRN Meds:   acetaminophen    labetalol    labetalol    polyethylene glycol      Invasive Devices     Peripheral Intravenous Line            Peripheral IV 01/07/18 Left Hand less than 1 day          Drain            Suprapubic Catheter Non-latex 18 Fr  1 day                  Results from last 7 days  Lab Units 01/07/18  0525 01/06/18  0548 01/05/18  1239   WBC Thousand/uL 8 66 10 48* 10 66*   HEMOGLOBIN g/dL 13 7 14 2 15 3   HEMATOCRIT % 41 5 42 3 45 8   PLATELETS Thousands/uL 166 172 191   NEUTROS PCT % 64  --  78*   MONOS PCT % 11  --  9         Results from last 7 days  Lab Units 01/07/18  0525 01/06/18  0548 01/05/18  1239   SODIUM mmol/L 138 139 134*   POTASSIUM mmol/L 3 4* 3 1* 3 8   CHLORIDE mmol/L 105 104 98*   CO2 mmol/L 27 29 30   BUN mg/dL 7 13 19   CREATININE mg/dL 0 65 0 78 1 04   CALCIUM mg/dL 7 7* 7 9* 8 3   TOTAL PROTEIN g/dL 5 9*  --  6 9   BILIRUBIN TOTAL mg/dL 0 40  --  0 50   ALK PHOS U/L 87  --  101   ALT U/L 12  --  21   AST U/L 11  --  10   GLUCOSE RANDOM mg/dL 257* 315* 441*       Lab Results   Component Value Date    TROPONINI 0 02 01/05/2018       Lab Results   Component Value Date    INR 1 07 09/26/2017    INR 0 97 08/17/2017    INR 0 95 08/09/2017    PROTIME 13 9 09/26/2017    PROTIME 12 8 08/17/2017    PROTIME 12 6 08/09/2017                 Lab, Imaging and other studies: I have personally reviewed pertinent reports      VTE Pharmacologic Prophylaxis: Enoxaparin (Lovenox)  VTE Mechanical Prophylaxis: sequential compression device

## 2018-01-07 NOTE — CASE MANAGEMENT
Thank you,  [address] [facility] in the [employer] by Carlos Gonzalez for 2017  Network Utilization Review Department  Phone: [phone]; Fax [phone]  ATTENTION: The Network Utilization Review Department is now centralized for our 7 Facilities  Make a note that we have a new phone and fax numbers for our Department  Please call with any questions or concerns to [phone] and carefully follow the prompts so that you are directed to the right person  All voicemails are confidential  Fax any determinations, approvals, denials, and requests for initial or continue stay review clinical to [phone]  Due to HIGH CALL volume, it would be easier if you could please send faxed requests to expedite your requests and in part, help us provide discharge notifications faster  Initial Clinical Review    Admission: Date/Time/Statement: 1/6/18 @ 1618     Orders Placed This Encounter   Procedures    Place in Observation (expected length of stay for this patient is less than two midnights)     Standing Status:   Standing     Number of Occurrences:   1     Order Specific Question:   Admitting Physician     Answer:   Joanna Durham     Order Specific Question:   Level of Care     Answer:   Med Surg [16]    Inpatient Admission     Standing Status:   Standing     Number of Occurrences:   1     Order Specific Question:   Admitting Physician     Answer:   Mendel Priest     Order Specific Question:   Level of Care     Answer:   Med Surg [16]     Order Specific Question:   Estimated length of stay     Answer:   More than 2 Midnights     Order Specific Question:   Certification     Answer:   I certify that inpatient services are medically necessary for this patient for a duration of greater than two midnights  See H&P and MD Progress Notes for additional information about the patient's course of treatment           ED: Date/Time/Mode of Arrival:   ED Arrival Information Expected Arrival Acuity Means of Arrival Escorted By Service Admission Type    - 1/5/2018 11:00 Urgent Ambulance [facility] General Medicine Urgent    Arrival Complaint    Catheter problem          Chief Complaint:   Chief Complaint   Patient presents with    Urinary Catheter Problem     pt states that his suprapubic catheter has not been draining, he has been urinating through his penis  called urology   they told him to come here for admission to be seen monday       History of Illness:     ED Vital Signs:   ED Triage Vitals [01/05/18 1109]   Temperature Pulse Respirations Blood Pressure SpO2   98 2 °F (36 8 °C) 77 20 (!) 172/96 94 %      Temp Source Heart Rate Source Patient Position - Orthostatic VS BP Location FiO2 (%)   Temporal Monitor Lying Left arm --      Pain Score       8        Wt Readings from Last 1 Encounters:   01/07/18 111 kg (245 lb 6 oz)       Vital Signs (abnormal):   01/07/18 0743   97 2 °F (36 2 °C)  64  18   177/88  94 %  None (Room air)  Sitting   01/07/18 0432  --  --  --  --  92 %  None (Room air)  --   01/07/18 0039   97 3 °F (36 3 °C)  65  17  --  94 %  None (Room air)  --         Abnormal Labs/Diagnostic Test Results:    WBC 8 66 01/07/2018     HGB 13 7 01/07/2018     HCT 41 5 01/07/2018     MCV 86 01/07/2018      01/07/2018     MCH 28 4 01/07/2018     MCHC 33 0 01/07/2018     RDW 13 3 01/07/2018     MPV 11 9 01/07/2018   , CMP:         Lab Results   Component Value Date      01/07/2018     K 3 4 (L) 01/07/2018      01/07/2018     CO2 27 01/07/2018     ANIONGAP 6 01/07/2018     BUN 7 01/07/2018     CREATININE 0 65 01/07/2018     GLUCOSE 257 (H) 01/07/2018     CALCIUM 7 7 (L) 01/07/2018     AST 11 01/07/2018     ALT 12 01/07/2018     ALKPHOS 87 01/07/2018     PROT 5 9 (L) 01/07/2018     ALBUMIN 2 5 (L) 01/07/2018     BILITOT 0 40 01/07/2018     EGFR 94 01/07/2018   , Urinalysis: No results found for: NORTH Butts, GEOVANNA LAZCANO, Inna MILLER           ED Treatment:   Medication Administration from 01/05/2018 1100 to 01/05/2018 1932       Date/Time Order Dose Route Action Action by Comments     01/05/2018 1340 sodium chloride 0 9 % bolus 1,000 mL 0 mL Intravenous Stopped Dell Daniel RN      01/05/2018 1240 sodium chloride 0 9 % bolus 1,000 mL 1,000 mL Intravenous [address] [facility], [address]      01/05/2018 1735 insulin regular (HumuLIN R,NovoLIN R) injection 5 Units 5 Units Intravenous Given Ling Mcallister RN bgs 260     01/05/2018 1737 sodium chloride 0 9 % infusion 250 mL/hr Intravenous Rebecca Caputo RN           Past Medical/Surgical History:    Active Ambulatory Problems     Diagnosis Date Noted    Spinal stenosis of lumbar region     PVD (peripheral vascular disease) ([place] [address] )     Essential hypertension 06/28/2017    Hyperlipidemia 06/28/2017    Type 2 diabetes mellitus with hyperglycemia ([place] [address] ) 06/28/2017    COPD (chronic obstructive pulmonary disease) ([place] [address] ) 06/28/2017    Multiple renal cysts 06/29/2017    Vitamin D insufficiency 06/30/2017    S/P CABG x 3 11/24/2016     Resolved Ambulatory Problems     Diagnosis Date Noted    UTI due to extended-spectrum beta lactamase (ESBL) producing Escherichia coli     Systolic and diastolic CHF, chronic ([place] [address] )     Dysuria 06/28/2017    History of ESBL E  coli infection 06/28/2017    History of BPH 06/28/2017    Urinary retention due to benign prostatic hyperplasia 06/28/2017    Constipation 06/30/2017    Elevated lactic acid level 08/08/2017    Kidney disease, chronic, stage III (GFR 30-59 ml/min) 08/08/2017    Gram-negative bacteremia 08/14/2017    Ambulatory dysfunction 09/22/2017    Gait difficulty 10/13/2016    Morbid obesity ([facility] ) 10/13/2016     Past Medical History:   Diagnosis Date    CHF (congestive heart failure) ([place] [address] )     COPD (chronic obstructive pulmonary disease) ([place] [address] )     Diabetes mellitus ([place] [address] )     ESBL (extended spectrum beta-lactamase) producing bacteria infection     History of BPH     Hyperlipidemia     Hypertension     PE (pulmonary thromboembolism) ([place] [address] )     PVD (peripheral vascular disease) ([place] [address] )     Spinal stenosis of lumbar region     Stroke ([place] [address] )     Systolic and diastolic CHF, chronic ([place] [address] )     Urinary retention     UTI due to extended-spectrum beta lactamase (ESBL) producing Escherichia coli        Admitting Diagnosis: DM (diabetes mellitus), type 2, uncontrolled ([place] [address] ) [E11 65]  Malfunction of Ozuna catheter ([place] [address] ) [T83 011A]  Ozuna catheter problem ([place] [address] ) [V00  9XXA]    Age/Sex: 68 y o  male    Assessment/Plan: Assessment:  UTI, likely related to indwelling suprapubic tube which had not been changed for several weeks     Plan:  Maintain suprapubic tube  Empiric antibiotics until final culture results known  Consultation - Urology   Fela Davison 68 y o  male MRN: [id number]  Unit/Bed#: 420-01 Encounter: [id number] 1/6/2018            Assessment/Plan      Assessment:  SP tube for planned chronic management of sustained BPH related MELGOZA/Urinary retention  UTI     Plan:  Maintaining new SP tube- changed in the ER on 1/5/2018    Empiric antibiotic while awaiting urine culture results  Follow-up scheduling for judicious routine SP tube changes        Admission Orders:  Scheduled Meds:   amLODIPine 5 mg Oral Daily   aspirin 81 mg Oral Daily   atorvastatin 40 mg Oral Daily With Dinner   carvedilol 6 25 mg Oral BID With Meals   cefTRIAXone 1,000 mg Intravenous Q24H   docusate sodium 100 mg Oral BID   enoxaparin 40 mg Subcutaneous Daily   ferrous sulfate 325 mg Oral Daily With Breakfast   finasteride 5 mg Oral Daily   insulin glargine 15 Units Subcutaneous HS   insulin lispro 1-5 Units Subcutaneous HS   insulin lispro 1-6 Units Subcutaneous TID AC   insulin lispro 4 Units Subcutaneous TID With Meals   saccharomyces boulardii 250 mg Oral BID   tamsulosin 0 4 mg Oral Daily With Dinner   vancomycin 15 mg/kg Intravenous Q12H     Continuous Infusions:   sodium chloride 75 mL/hr Last Rate: Stopped (01/07/18 0925)     PRN Meds:   acetaminophen    labetalol    labetalol    polyethylene glycol

## 2018-01-07 NOTE — CONSULTS
Consultation - Urology   Panda Souza 68 y o  male MRN: [id number]  Unit/Bed#: 420-01 Encounter: [id number] 1/6/2018       Assessment/Plan   Assessment:  SP tube for planned chronic management of sustained BPH related MELGOZA/Urinary retention  UTI    Plan:  Maintaining new SP tube- changed in the ER on 1/5/2018  Empiric antibiotic while awaiting urine culture results  Follow-up scheduling for judicious routine SP tube changes    History of Present Illness   69 y/o man with multiple medical problems, who had a SP tube placed for recurrent bladder outlet obstruction associated UTI's from his BPH, this past September by Dr Lexi Johnson  Admitted with lower pelvic pains, elevated blood sugars, and s/s of a UTI  He denied any fever, chills, or hematuria  The patient states he was recently voiding some urine through his penis, and may have had problems with effective drainage with the prior SP tube (which was placed 11/282017), before it was replaced in the ER yesterday  He states he no longer has the pelvic pains now after the tube being replaced yesterday      Attending: No att  providers found  Reason for Consult / Principal Problem:  Patient Active Problem List   Diagnosis    Spinal stenosis of lumbar region    PVD (peripheral vascular disease) ([address] )    Essential hypertension    Hyperlipidemia    Type 2 diabetes mellitus with hyperglycemia ([address] )    [facility]) [facility]    Multiple renal cysts    Vitamin D insufficiency    S/P CABG x 3    Hypokalemia    Acute cystitis    Abnormal chest x-ray       Consults ID and     Review of Systems   14 systems reviewed and all negative with exception of those associated with the HPI    Historical Information   Allergies   Allergen Reactions    Penicillins      States he is unsure, does not believe he is allergic anymore  Has had cephalo's     Past Medical History:   Diagnosis Date    CHF (congestive heart failure) ([address] )     COPD (chronic obstructive pulmonary disease) ([place] [address] )     Diabetes mellitus ([place] [address] )     ESBL (extended spectrum beta-lactamase) producing bacteria infection     History of BPH     Hyperlipidemia     Hypertension     PE (pulmonary thromboembolism) ([place] [address] )     PVD (peripheral vascular disease) ([place] [address] )     Spinal stenosis of lumbar region     Stroke ([place] [address] )     Systolic and diastolic CHF, chronic ([place] [address] )     Urinary retention     UTI due to extended-spectrum beta lactamase (ESBL) producing Escherichia coli      Past Surgical History:   Procedure Laterality Date    APPENDECTOMY      BACK SURGERY      CARDIAC SURGERY      cabg    HIP SURGERY Right     plate and pin     Social History   History   Alcohol Use No     History   Drug Use No     History   Smoking Status    Former Smoker    Packs/day: 2 00    Years: 32 00    Quit date: 1/5/1984   Smokeless Tobacco    Never Used     Family History:   Family History   Problem Relation Age of Onset    Coronary artery disease Mother     Cancer Father        Meds/Allergies   all current active meds have been reviewed    Current Facility-Administered Medications:     acetaminophen (TYLENOL) tablet 650 mg, 650 mg, Oral, Q6H PRN, [place] Souleymane, DO    amLODIPine (NORVASC) tablet 5 mg, 5 mg, Oral, Daily, Arvella Fusi Mederos, DO, 5 mg at 01/06/18 2938    aspirin chewable tablet 81 mg, 81 mg, Oral, Daily, Arvella Fusi Mederos, DO, 81 mg at 01/06/18 0851    atorvastatin (LIPITOR) tablet 40 mg, 40 mg, Oral, Daily With Dinner, [place] Souleymane, DO, 40 mg at 01/06/18 1603    carvedilol (COREG) tablet 6 25 mg, 6 25 mg, Oral, BID With Meals, Zorita Hoguet, DO, 6 25 mg at 01/06/18 1603    cefTRIAXone (ROCEPHIN) IVPB (premix) 1,000 mg, 1,000 mg, Intravenous, Q24H, [place] Souleymane, DO, Last Rate: 100 mL/hr at 01/05/18 2142, 1,000 mg at 01/05/18 2142    docusate sodium (COLACE) capsule 100 mg, 100 mg, Oral, BID, Arvella Fusi Mederos, DO, 100 mg at 01/06/18 1745    enoxaparin (LOVENOX) subcutaneous injection 40 mg, 40 mg, Subcutaneous, Daily, Virginia Dillard DO, 40 mg at 01/06/18 7637    ferrous sulfate tablet 325 mg, 325 mg, Oral, Daily With Breakfast, Virginia Dillard DO, 325 mg at 01/06/18 0845    finasteride (PROSCAR) tablet 5 mg, 5 mg, Oral, Daily, Virginia Dillard, DO, 5 mg at 01/06/18 0851    insulin glargine (LANTUS) subcutaneous injection 15 Units, 15 Units, Subcutaneous, HS, Edwardo Linares DO    insulin lispro (HumaLOG) 100 units/mL subcutaneous injection 1-5 Units, 1-5 Units, Subcutaneous, HS, Virginia Dillard DO, 3 Units at 01/05/18 2133    insulin lispro (HumaLOG) 100 units/mL subcutaneous injection 1-6 Units, 1-6 Units, Subcutaneous, TID AC, 4 Units at 01/06/18 1604 **AND** Fingerstick Glucose (POCT), , , TID AC, Virginia Dillard DO    insulin lispro (HumaLOG) 100 units/mL subcutaneous injection 4 Units, 4 Units, Subcutaneous, TID With Meals, Edwardo Linares DO, 4 Units at 01/06/18 1700    labetalol (NORMODYNE) injection 10 mg, 10 mg, Intravenous, Q4H PRN, Edwardo Linares DO    polyethylene glycol (MIRALAX) packet 17 g, 17 g, Oral, Daily PRN, Virginia Dillard DO    saccharomyces boulardii (FLORASTOR) capsule 250 mg, 250 mg, Oral, BID, Kira Mederos DO, 250 mg at 01/06/18 1745    sodium chloride 0 9 % infusion, 75 mL/hr, Intravenous, Continuous, Virginia Dillard DO, Last Rate: 75 mL/hr at 01/06/18 1602, 75 mL/hr at 01/06/18 1602    tamsulosin (FLOMAX) capsule 0 4 mg, 0 4 mg, Oral, Daily With Marianna Nunez DO, 0 4 mg at 01/06/18 1603    vancomycin (VANCOCIN) 1,750 mg in sodium chloride 0 9 % 500 mL IVPB, 15 mg/kg, Intravenous, Q12H, Virginia Dillard DO, Last Rate: 250 mL/hr at 01/06/18 1035, 1,750 mg at 01/06/18 1035    amLODIPine 5 mg Oral Daily   aspirin 81 mg Oral Daily   atorvastatin 40 mg Oral Daily With Dinner   carvedilol 6 25 mg Oral BID With Meals   cefTRIAXone 1,000 mg Intravenous Q24H   docusate sodium 100 mg Oral BID   enoxaparin 40 mg Subcutaneous Daily   ferrous sulfate 325 mg Oral Daily With Breakfast   finasteride 5 mg Oral Daily   insulin glargine 15 Units Subcutaneous HS   insulin lispro 1-5 Units Subcutaneous HS   insulin lispro 1-6 Units Subcutaneous TID AC   insulin lispro 4 Units Subcutaneous TID With Meals   saccharomyces boulardii 250 mg Oral BID   tamsulosin 0 4 mg Oral Daily With Dinner   vancomycin 15 mg/kg Intravenous Q12H       acetaminophen    labetalol    polyethylene glycol    sodium chloride 75 mL/hr Last Rate: 75 mL/hr (01/06/18 1602)       Objective   Vitals: Blood pressure 152/83, pulse 72, temperature 98 1 °F (36 7 °C), temperature source Temporal, resp  rate 18, height 5' 11" (1 803 m), weight 112 kg (246 lb 7 6 oz), SpO2 93 %  I/O last 24 hours: In: 200 [P O :980]  Out: 3625 [Urine:3625]    Invasive Devices     Peripheral Intravenous Line            Peripheral IV 01/05/18 Right Forearm 1 day          Drain            Suprapubic Catheter Non-latex 18 Fr  1 day                Physical Exam   A&O x 3 in NAD  Lungs clear to A and P  Heart S1S2 RRR, no murmurs  Abdomen with sizable pannus, soft non tender no masses     SP tube in good position with meg u/o  Ext no C,C,E, good ROM  Neuro no gross deficits  Psych normal affect    Lab Results:   CBC:   Lab Results   Component Value Date    WBC 10 48 (H) 01/06/2018    HGB 14 2 01/06/2018    HCT 42 3 01/06/2018    MCV 86 01/06/2018     01/06/2018    MCH 28 8 01/06/2018    MCHC 33 6 01/06/2018    RDW 13 4 01/06/2018    MPV 12 0 01/06/2018     CMP:   Lab Results   Component Value Date     01/06/2018     01/06/2018    CO2 29 01/06/2018    ANIONGAP 6 01/06/2018    BUN 13 01/06/2018    CREATININE 0 78 01/06/2018    GLUCOSE 315 (H) 01/06/2018    CALCIUM 7 9 (L) 01/06/2018    EGFR 87 01/06/2018     Urinalysis: No results found for: Chiara Cheese, SPECGRAV, PHUR, LEUKOCYTESUR, NITRITE, PROTEINUA, GLUCOSEU, KETONESU, BILIRUBINUR, BLOODU  Urine Culture: No results found for: URINECX    Imaging Studies: I have personally reviewed pertinent films in PACS  Xr Chest 1 View Portable    Result Date: 1/5/2018  Narrative: CHEST INDICATION:  Elevated blood sugar  COMPARISON:  Chest radiographs January 27, 2017 VIEWS:   AP frontal IMAGES:  1 FINDINGS:     The heart is enlarged  Atherosclerotic changes in the aorta  Lung volumes diminished  Elevation of left hemidiaphragm  Persistent obscuration of the left heart border secondary to small amount of fluid and/or infiltrate  Right lung clear  Visualized osseous structures appear within normal limits for the patient's age  Impression: Stable appearance of the chest   Persistent fluid and/or infiltrate left lower lobe  Workstation performed: [id number]       EKG, Pathology, and Other Studies: I have personally reviewed pertinent reports  VTE Prophylaxis: Sequential compression device (Venodyne)     Code Status: Level 1 - Full Code  Advance Directive and Living Will: Yes      Counseling / Coordination of Care  Total floor / unit time spent today 40 minutes  Greater than 50% of total time was spent with the patient and / or family counseling and / or coordination of care   A description of the counseling / coordination of care:     Velma Omer MD, FACS

## 2018-01-07 NOTE — PROGRESS NOTES
Progress Note - Urology Progress  Bryanna Gamino 68 y o  male MRN: [id number]  Unit/Bed#: 420-01 Encounter: [id number]    Assessment:  UTI, likely related to indwelling suprapubic tube which had not been changed for several weeks    Plan:  Maintain suprapubic tube  Empiric antibiotics until final culture results known    Subjective/Objective   Chief Complaint:  None    Objective:  No issues overnight    Blood pressure (!) 177/88, pulse 64, temperature (!) 97 2 °F (36 2 °C), temperature source Temporal, resp  rate 18, height 5' 11" (1 803 m), weight 111 kg (245 lb 6 oz), SpO2 94 %  ,Body mass index is 34 22 kg/m²  Intake/Output Summary (Last 24 hours) at 01/07/18 1314  Last data filed at 01/07/18 1100   Gross per 24 hour   Intake           3777 5 ml   Output             2350 ml   Net           1427 5 ml       Invasive Devices     Peripheral Intravenous Line            Peripheral IV 01/07/18 Left Hand less than 1 day          Drain            Suprapubic Catheter Non-latex 18 Fr  1 day                Physical Exam:  Alert and oriented x3  Lungs clear  Heart regular rate  Abdomen soft, suprapubic tube in place, meg urine returned  Neuro intact    Lab, Imaging and other studies:  I have personally reviewed pertinent lab results    , CBC:   Lab Results   Component Value Date    WBC 8 66 01/07/2018    HGB 13 7 01/07/2018    HCT 41 5 01/07/2018    MCV 86 01/07/2018     01/07/2018    MCH 28 4 01/07/2018    MCHC 33 0 01/07/2018    RDW 13 3 01/07/2018    MPV 11 9 01/07/2018   , CMP:   Lab Results   Component Value Date     01/07/2018    K 3 4 (L) 01/07/2018     01/07/2018    CO2 27 01/07/2018    ANIONGAP 6 01/07/2018    BUN 7 01/07/2018    CREATININE 0 65 01/07/2018    GLUCOSE 257 (H) 01/07/2018    CALCIUM 7 7 (L) 01/07/2018    AST 11 01/07/2018    ALT 12 01/07/2018    ALKPHOS 87 01/07/2018    PROT 5 9 (L) 01/07/2018    ALBUMIN 2 5 (L) 01/07/2018    BILITOT 0 40 01/07/2018    EGFR 94 01/07/2018   , Urinalysis: No results found for: COLORU, CLARITYU, SPECGRAV, PHUR, LEUKOCYTESUR, NITRITE, PROTEINUA, GLUCOSEU, KETONESU, BILIRUBINUR, BLOODU       VTE Pharmacologic Prophylaxis: Sequential compression device (Venodyne)   VTE Mechanical Prophylaxis: sequential compression device

## 2018-01-08 ENCOUNTER — APPOINTMENT (INPATIENT)
Dept: ULTRASOUND IMAGING | Facility: HOSPITAL | Age: 78
DRG: 699 | End: 2018-01-08
Payer: OTHER GOVERNMENT

## 2018-01-08 PROBLEM — R79.89 LOW SERUM PREALBUMIN: Status: ACTIVE | Noted: 2018-01-08

## 2018-01-08 LAB
ALBUMIN SERPL BCP-MCNC: 2.5 G/DL (ref 3.5–5)
ALP SERPL-CCNC: 91 U/L (ref 46–116)
ALT SERPL W P-5'-P-CCNC: 16 U/L (ref 12–78)
ANION GAP SERPL CALCULATED.3IONS-SCNC: 5 MMOL/L (ref 4–13)
AST SERPL W P-5'-P-CCNC: 10 U/L (ref 5–45)
ATRIAL RATE: 65 BPM
BACTERIA WND AEROBE CULT: ABNORMAL
BASOPHILS # BLD AUTO: 0.09 THOUSANDS/ΜL (ref 0–0.1)
BASOPHILS NFR BLD AUTO: 1 % (ref 0–1)
BILIRUB SERPL-MCNC: 0.3 MG/DL (ref 0.2–1)
BUN SERPL-MCNC: 8 MG/DL (ref 5–25)
CALCIUM SERPL-MCNC: 7.8 MG/DL (ref 8.3–10.1)
CHLORIDE SERPL-SCNC: 106 MMOL/L (ref 100–108)
CO2 SERPL-SCNC: 28 MMOL/L (ref 21–32)
CREAT SERPL-MCNC: 0.67 MG/DL (ref 0.6–1.3)
EOSINOPHIL # BLD AUTO: 0.37 THOUSAND/ΜL (ref 0–0.61)
EOSINOPHIL NFR BLD AUTO: 5 % (ref 0–6)
ERYTHROCYTE [DISTWIDTH] IN BLOOD BY AUTOMATED COUNT: 13.4 % (ref 11.6–15.1)
GFR SERPL CREATININE-BSD FRML MDRD: 93 ML/MIN/1.73SQ M
GLUCOSE SERPL-MCNC: 247 MG/DL (ref 65–140)
GLUCOSE SERPL-MCNC: 253 MG/DL (ref 65–140)
GLUCOSE SERPL-MCNC: 272 MG/DL (ref 65–140)
GLUCOSE SERPL-MCNC: 294 MG/DL (ref 65–140)
GLUCOSE SERPL-MCNC: 306 MG/DL (ref 65–140)
GRAM STN SPEC: ABNORMAL
GRAM STN SPEC: ABNORMAL
HCT VFR BLD AUTO: 41.3 % (ref 36.5–49.3)
HGB BLD-MCNC: 13.7 G/DL (ref 12–17)
LYMPHOCYTES # BLD AUTO: 1.62 THOUSANDS/ΜL (ref 0.6–4.47)
LYMPHOCYTES NFR BLD AUTO: 20 % (ref 14–44)
MAGNESIUM SERPL-MCNC: 1.7 MG/DL (ref 1.6–2.6)
MCH RBC QN AUTO: 28.6 PG (ref 26.8–34.3)
MCHC RBC AUTO-ENTMCNC: 33.2 G/DL (ref 31.4–37.4)
MCV RBC AUTO: 86 FL (ref 82–98)
MONOCYTES # BLD AUTO: 0.98 THOUSAND/ΜL (ref 0.17–1.22)
MONOCYTES NFR BLD AUTO: 12 % (ref 4–12)
MRSA NOSE QL CULT: NORMAL
NEUTROPHILS # BLD AUTO: 5.15 THOUSANDS/ΜL (ref 1.85–7.62)
NEUTS SEG NFR BLD AUTO: 62 % (ref 43–75)
P AXIS: 49 DEGREES
PHOSPHATE SERPL-MCNC: 3.3 MG/DL (ref 2.3–4.1)
PLATELET # BLD AUTO: 159 THOUSANDS/UL (ref 149–390)
PMV BLD AUTO: 12.4 FL (ref 8.9–12.7)
POTASSIUM SERPL-SCNC: 3.5 MMOL/L (ref 3.5–5.3)
PR INTERVAL: 166 MS
PREALB SERPL-MCNC: 15.6 MG/DL (ref 18–40)
PROT SERPL-MCNC: 5.8 G/DL (ref 6.4–8.2)
QRS AXIS: -65 DEGREES
QRSD INTERVAL: 132 MS
QT INTERVAL: 440 MS
QTC INTERVAL: 457 MS
RBC # BLD AUTO: 4.79 MILLION/UL (ref 3.88–5.62)
SODIUM SERPL-SCNC: 139 MMOL/L (ref 136–145)
T WAVE AXIS: 136 DEGREES
VENTRICULAR RATE: 65 BPM
WBC # BLD AUTO: 8.21 THOUSAND/UL (ref 4.31–10.16)

## 2018-01-08 PROCEDURE — 83735 ASSAY OF MAGNESIUM: CPT | Performed by: INTERNAL MEDICINE

## 2018-01-08 PROCEDURE — 76770 US EXAM ABDO BACK WALL COMP: CPT

## 2018-01-08 PROCEDURE — 82948 REAGENT STRIP/BLOOD GLUCOSE: CPT

## 2018-01-08 PROCEDURE — 80053 COMPREHEN METABOLIC PANEL: CPT | Performed by: INTERNAL MEDICINE

## 2018-01-08 PROCEDURE — 84134 ASSAY OF PREALBUMIN: CPT | Performed by: INTERNAL MEDICINE

## 2018-01-08 PROCEDURE — 85025 COMPLETE CBC W/AUTO DIFF WBC: CPT | Performed by: INTERNAL MEDICINE

## 2018-01-08 PROCEDURE — 94760 N-INVAS EAR/PLS OXIMETRY 1: CPT

## 2018-01-08 PROCEDURE — 94762 N-INVAS EAR/PLS OXIMTRY CONT: CPT

## 2018-01-08 PROCEDURE — 84100 ASSAY OF PHOSPHORUS: CPT | Performed by: INTERNAL MEDICINE

## 2018-01-08 RX ORDER — MAGNESIUM SULFATE HEPTAHYDRATE 40 MG/ML
2 INJECTION, SOLUTION INTRAVENOUS ONCE
Status: COMPLETED | OUTPATIENT
Start: 2018-01-08 | End: 2018-01-08

## 2018-01-08 RX ORDER — INSULIN GLARGINE 100 [IU]/ML
25 INJECTION, SOLUTION SUBCUTANEOUS
Status: DISCONTINUED | OUTPATIENT
Start: 2018-01-08 | End: 2018-01-09

## 2018-01-08 RX ORDER — POTASSIUM CHLORIDE 20 MEQ/1
40 TABLET, EXTENDED RELEASE ORAL ONCE
Status: COMPLETED | OUTPATIENT
Start: 2018-01-08 | End: 2018-01-08

## 2018-01-08 RX ADMIN — CEFTRIAXONE 1000 MG: 1 INJECTION, SOLUTION INTRAVENOUS at 21:37

## 2018-01-08 RX ADMIN — MAGNESIUM SULFATE HEPTAHYDRATE 2 G: 40 INJECTION, SOLUTION INTRAVENOUS at 14:41

## 2018-01-08 RX ADMIN — DOCUSATE SODIUM 100 MG: 100 CAPSULE, LIQUID FILLED ORAL at 18:06

## 2018-01-08 RX ADMIN — INSULIN LISPRO 4 UNITS: 100 INJECTION, SOLUTION INTRAVENOUS; SUBCUTANEOUS at 11:56

## 2018-01-08 RX ADMIN — TAMSULOSIN HYDROCHLORIDE 0.4 MG: 0.4 CAPSULE ORAL at 15:35

## 2018-01-08 RX ADMIN — INSULIN LISPRO 3 UNITS: 100 INJECTION, SOLUTION INTRAVENOUS; SUBCUTANEOUS at 21:41

## 2018-01-08 RX ADMIN — CARVEDILOL 6.25 MG: 3.12 TABLET, FILM COATED ORAL at 09:57

## 2018-01-08 RX ADMIN — INSULIN LISPRO 4 UNITS: 100 INJECTION, SOLUTION INTRAVENOUS; SUBCUTANEOUS at 09:45

## 2018-01-08 RX ADMIN — FINASTERIDE 5 MG: 5 TABLET, FILM COATED ORAL at 09:58

## 2018-01-08 RX ADMIN — INSULIN LISPRO 3 UNITS: 100 INJECTION, SOLUTION INTRAVENOUS; SUBCUTANEOUS at 15:38

## 2018-01-08 RX ADMIN — Medication 325 MG: at 09:57

## 2018-01-08 RX ADMIN — POTASSIUM CHLORIDE 40 MEQ: 1500 TABLET, EXTENDED RELEASE ORAL at 14:41

## 2018-01-08 RX ADMIN — ENOXAPARIN SODIUM 40 MG: 40 INJECTION SUBCUTANEOUS at 09:57

## 2018-01-08 RX ADMIN — CARVEDILOL 6.25 MG: 3.12 TABLET, FILM COATED ORAL at 15:35

## 2018-01-08 RX ADMIN — INSULIN GLARGINE 25 UNITS: 100 INJECTION, SOLUTION SUBCUTANEOUS at 21:41

## 2018-01-08 RX ADMIN — AMLODIPINE BESYLATE 5 MG: 5 TABLET ORAL at 09:58

## 2018-01-08 RX ADMIN — ASPIRIN 81 MG CHEWABLE TABLET 81 MG: 81 TABLET CHEWABLE at 09:58

## 2018-01-08 RX ADMIN — DOCUSATE SODIUM 100 MG: 100 CAPSULE, LIQUID FILLED ORAL at 09:57

## 2018-01-08 RX ADMIN — VANCOMYCIN HYDROCHLORIDE 1750 MG: 10 INJECTION, POWDER, LYOPHILIZED, FOR SOLUTION INTRAVENOUS at 22:17

## 2018-01-08 RX ADMIN — Medication 250 MG: at 09:58

## 2018-01-08 RX ADMIN — ATORVASTATIN CALCIUM 40 MG: 40 TABLET, FILM COATED ORAL at 15:35

## 2018-01-08 RX ADMIN — SODIUM CHLORIDE 75 ML/HR: 0.9 INJECTION, SOLUTION INTRAVENOUS at 06:50

## 2018-01-08 RX ADMIN — Medication 250 MG: at 18:06

## 2018-01-08 RX ADMIN — VANCOMYCIN HYDROCHLORIDE 1750 MG: 10 INJECTION, POWDER, LYOPHILIZED, FOR SOLUTION INTRAVENOUS at 09:59

## 2018-01-08 NOTE — PHYSICIAN ADVISOR
Current patient class: Inpatient  The patient is currently on Hospital Day: 4      The patient was admitted to the hospital at [phone] on 1/6/18 for the following diagnosis:  DM (diabetes mellitus), type 2, uncontrolled ([address] ) [E11 65]  Malfunction of Ozuna catheter ([facility] [address] ) Amish Fierro  Ozuna catheter problem ([facility] [address] ) [T83  9XXA]       There is documentation in the medical record of an expected length of stay of at least 2 midnights  The patient is therefore expected to satisfy the 2 midnight benchmark and given the 2 midnight presumption is appropriate for INPATIENT ADMISSION  Given this expectation of a satisfying stay, CMS instructs us that the patient is most often appropriate for inpatient admission under part A provided medical necessity is documented in the chart  After review of the relevant documentation, labs, vital signs and test results, the patient is appropriate for INPATIENT ADMISSION  Admission to the hospital as an inpatient is a complex decision making process which requires the practitioner to consider the patients presenting complaint, history and physical examination and all relevant testing  With this in mind, in this case, the patient was deemed appropriate for INPATIENT ADMISSION  After review of the documentation and testing available at the time of the admission I concur with this clinical determination of medical necessity  Rationale is as follows: The patient is a 68 yrs old Male who presented to the ED at 1/5/2018 11:00 AM with a chief complaint of Urinary Catheter Problem (pt states that his suprapubic catheter has not been draining, he has been urinating through his penis  called urology  they told him to come here for admission to be seen monday)     Pt will continue to remain in the hospital for acute cystitis  The patient requires IV antibiotics  Pt does have ID consult on board and will remain for at least a 2nd midnight  The patient is appropriate for inpatient admission  The patients vitals on arrival were ED Triage Vitals [01/05/18 1109]   Temperature Pulse Respirations Blood Pressure SpO2   98 2 °F (36 8 °C) 77 20 (!) 172/96 94 %      Temp Source Heart Rate Source Patient Position - Orthostatic VS BP Location FiO2 (%)   Temporal Monitor Lying Left arm --      Pain Score       8           Past Medical History:   Diagnosis Date    CHF (congestive heart failure) (HCC)     COPD (chronic obstructive pulmonary disease) (HCC)     Diabetes mellitus (HCC)     ESBL (extended spectrum beta-lactamase) producing bacteria infection     History of BPH     Hyperlipidemia     Hypertension     PE (pulmonary thromboembolism) (HCC)     PVD (peripheral vascular disease) (HCC)     Spinal stenosis of lumbar region     Stroke ([facility] [address] )     Systolic and diastolic CHF, chronic (HCC)     Urinary retention     UTI due to extended-spectrum beta lactamase (ESBL) producing Escherichia coli      Past Surgical History:   Procedure Laterality Date    APPENDECTOMY      BACK SURGERY      CARDIAC SURGERY      cabg    HIP SURGERY Right     plate and pin           Consults have been placed to:   IP CONSULT TO CASE MANAGEMENT  IP CONSULT TO INFECTIOUS DISEASES  IP CONSULT TO UROLOGY    Vitals:    01/08/18 0600 01/08/18 0720 01/08/18 1110 01/08/18 1459   BP:  138/61     Pulse:  66     Resp:  16     Temp:  (!) 97 °F (36 1 °C)     TempSrc:  Temporal     SpO2:  95% 96%    Weight: 112 kg (247 lb 12 8 oz)   112 kg (246 lb 14 6 oz)   Height:           Most recent labs:    Recent Labs      01/07/18   0525  01/08/18   0505   WBC  8 66  8 21   HGB  13 7  13 7   HCT  41 5  41 3   PLT  166  159   K  3 4*  3 5   NA  138  139   CALCIUM  7 7*  7 8*   BUN  7  8   CREATININE  0 65  0 67   AST  11  10   ALT  12  16   ALKPHOS  87  91   BILITOT  0 40  0 30       Scheduled Meds:  amLODIPine 5 mg Oral Daily   aspirin 81 mg Oral Daily   atorvastatin 40 mg Oral Daily With Dinner   carvedilol 6 25 mg Oral BID With Meals cefTRIAXone 1,000 mg Intravenous Q24H   docusate sodium 100 mg Oral BID   enoxaparin 40 mg Subcutaneous Daily   ferrous sulfate 325 mg Oral Daily With Breakfast   finasteride 5 mg Oral Daily   insulin glargine 25 Units Subcutaneous HS   insulin lispro 1-5 Units Subcutaneous HS   insulin lispro 1-6 Units Subcutaneous TID AC   insulin lispro 6 Units Subcutaneous TID With Meals   magnesium sulfate 2 g Intravenous Once   saccharomyces boulardii 250 mg Oral BID   tamsulosin 0 4 mg Oral Daily With Dinner   vancomycin 15 mg/kg Intravenous Q12H     Continuous Infusions:  sodium chloride 40 mL/hr Last Rate: 40 mL/hr (01/08/18 1437)     PRN Meds:   acetaminophen    labetalol    labetalol    polyethylene glycol    Surgical procedures (if appropriate):

## 2018-01-08 NOTE — CONSULTS
Consultation - Infectious Disease   Kendra Koch 68 y o  male MRN: [id number]  Unit/Bed#: 420-01 Encounter: [id number]      Assessment/Plan     Assessment:  68year old male with history of benign prostate hypertrophy, bladder outlet obstruction causing recurrent urinary tract infections s/p suprapubic catheterization 9/28/17 presents with obstructed urinary catheter and acute cystitis  Catheter has reportedly not been exchanged since placement and may have been the cause for obstruction  Urine culture not diagnostic but patient was on antibiotics prior to culture being taken  Patient also had superficial candidal infection around catheter site which has resolved    Recommendations:  -Recommend obtaining bladder/renal ultrasound   -Follow up with Urology for suprapubic catheter care  -Follow up with Infectious disease for recurrent UTI  -Monitor repeat urine culture to assess for clearance of candiduria with catheter exchange  -Complete 7 days of empiric therapy if above work up negative, may switch to oral cefdinir     Thank you for involving me in the care of your patient  Please call with questions, change in status or if tests recommended above are abnormal      History of Present Illness   Physician Requesting Consult: No att  providers found  Reason for Consult / Principal Problem: Recurrent UTI  Hx and PE limited by: Patient has poor historical recall   HPI: Kendra oKch is a 68y o  year old male who presents with cystitis  Patient is a poor historian and states he is here for a urinary tract infection and that he has had an infection for a long time  He has a history of benign prostate hypertrophy and bladder outlet obstruction  He also has a history of recurrent urinary tract infections and a history of chronic as well as intermittent siddiqui catheterization  Past urine cultures have been positive for Providencia, Enterococcus as well as ESBL producing Proteus   Previous bladder imaging noted a lobular mass at the bladder base for which he saw Urology and underwent a cystoscopy, findings were reportedly consistent with enlarged prostate  Ozuna catheterization was advised but he was admitted in September 2017 with urinary retention and a possible UTI  He underwent suprapubic catheterization on 9/26/17  Patient states he cannot recall following up with Urology or having a suprapubic catheter change since that time  He presented to the ER on 12/31 with reports of a malfunctioning suprapubic catheter as well as redness and drainage around it  He also reported some urine coming from his penis as well  Urianalysis noted mild pyuria  It appears that the catheter was flushed and he was discharged home on Keflex  No urine culture is reported  He then presented to the ER on 1/5 again with reports of decreased output from his catheter which he thought was blocked  He also noted pain and pressure over his bladder and slight burning with urine coming out of his penis  He had no fever  His blood glucose was also elevated  In the ER he was afebrile  He was noted to have a whitish exudate at the suprapubic site with a candidal rash under the pannus  Urinalysis noted marked pyuria  White count was mildly elevated at 10 66  His catheter was exchanged in the emergency room  He was started on ceftriaxone and Vancomycin  Urology was consulted and empiric antibiotics were recommended  Blood cultures have been negative  Initial urine culture noted candiduria  Patient continues to note a dull pressure but has no reports of burning or leaking of urine from the urinary meatus  Denies nausea, vomiting, diarrhea or rash and is tolerating antibiotics well  Infectious disease is being consulted for diagnostic work up and antibiotic management        Inpatient consult to Infectious Diseases  Consult performed by: Nate Olson ordered by: Rashida Jamison          Review of Systems  Pertinent positives and negatives as noted in HPI and 13+ review of systems is negative    Historical Information   Past Medical History:   Diagnosis Date    [facility]congest[facility] failure) ([facility] )     COPD (chronic obstructive pulmonary disease) ([place] [address] )     Diabetes mellitus (HCC)     ESBL (extended spectrum beta-lactamase) producing bacteria infection     History of BPH     Hyperlipidemia     Hypertension     [facility]     PVD (peripheral vascular disease) ([place] [address] )     Spinal stenosis of lumbar region     Stroke ([place] [address] )     Systolic and diastolic CHF, chronic ([place] [address] )     Urinary retention     UTI due to extended-spectrum beta lactamase (ESBL) producing Escherichia coli      Past Surgical History:   Procedure Laterality Date    APPENDECTOMY      BACK SURGERY      CARDIAC SURGERY      cabg    HIP SURGERY Right     plate and pin     Social History   History   Alcohol Use No     History   Drug Use No     History   Smoking Status    Former Smoker    Packs/day: 2 00    Years: 32 00    Quit date: 1/5/1984   Smokeless Tobacco    Never Used     Family History: non-contributory    Meds/Allergies   all current active meds have been reviewed    Allergies   Allergen Reactions    Penicillins      States he is unsure, does not believe he is allergic anymore  Has had cephalo's       Objective       Intake/Output Summary (Last 24 hours) at 01/08/18 1022  Last data filed at 01/08/18 0650   Gross per 24 hour   Intake             2840 ml   Output             3000 ml   Net             -160 ml       Invasive Devices:   Peripheral IV 01/07/18 Left Hand (Active)   Site Assessment Clean; Intact;Dry 1/7/2018  4:34 PM   Dressing Type Transparent;Securing device 1/7/2018  4:34 PM   Line Status Infusing 1/7/2018  4:34 PM   Dressing Status Clean;Dry; Intact 1/7/2018  4:34 PM   Dressing Change Due 01/11/18 1/7/2018  4:34 PM       Suprapubic Catheter Non-latex 18 Fr  (Active)   Site Assessment Intact; Clean 1/7/2018  4:34 PM   Dressing Status Open to air 1/7/2018  4:34 PM   Dressing Type Open to air 1/7/2018  4:34 PM   Collection Container Standard drainage bag 1/6/2018  4:09 PM   Output (mL) 1300 mL 1/8/2018  5:08 AM       Physical Exam    Physical Exam   Constitutional: He is oriented to person, place, and time  He appears chronically ill but in no acute distress  HENT:   Head: Normocephalic and atraumatic  Mouth/Throat: Oropharynx is clear and moist  No oropharyngeal exudate  Eyes: Pupils are equal, round, and reactive to light  No scleral icterus  Neck: Normal range of motion  Neck supple  Cardiovascular: Normal rate and regular rhythm  Pulmonary/Chest: Effort normal and breath sounds normal    Abdominal: Soft  Bowel sounds are normal  Large midline abdominal pannus  No rash noted  Suprapubic site c/d/i  Musculoskeletal: Normal range of motion  He exhibits no edema or tenderness  Neurological: He is alert and oriented to person, place, and time  Skin: Skin is warm and dry  He is not diaphoretic  No erythema  Lab Results:   I have personally reviewed pertinent labs  CBC:   Lab Results   Component Value Date    WBC 8 21 01/08/2018    RBC 4 79 01/08/2018     CMP:   Lab Results   Component Value Date     01/08/2018     01/08/2018    CO2 28 01/08/2018    ANIONGAP 5 01/08/2018    BUN 8 01/08/2018    CREATININE 0 67 01/08/2018    GLUCOSE 253 (H) 01/08/2018    CALCIUM 7 8 (L) 01/08/2018    AST 10 01/08/2018    ALT 16 01/08/2018    ALKPHOS 91 01/08/2018    PROT 5 8 (L) 01/08/2018    ALBUMIN 2 5 (L) 01/08/2018    BILITOT 0 30 01/08/2018    EGFR 93 01/08/2018     Imaging Studies: I have personally reviewed pertinent films in PACS  EKG, Pathology, and Other Studies: I have personally reviewed pertinent films in PACS    Counseling / Coordination of Care  Total floor / unit time spent today 60 minutes  Greater than 50% of total time was spent with the patient and / or family counseling and / or coordination of care   A description of the counseling / coordination of care: Discussed in depth diagnosis, prognosis and work up of recurrent urinary tract infections with patient  I also educated the patient about the importance of regular follow up with Urology  Labs, medical tests, cardiac testing and imaging studies were independently reviewed by me as noted above in HPI and old records were obtained and summarized as noted above in HPI

## 2018-01-08 NOTE — SOCIAL WORK
Cm met with the patient to assess the  functional status prior to the admission  Cm reviewed the discharge check list with the patient and answered any questions  The patient is aware that if they have any questions related to the d/c plan they can have cm review the plan with them at any time  Cm will continue to follow and  develop a safe plan to address all needs and concerns they the patient may have  The patient is at home with his wife the patient has o2 at home that he wears continuously and he also uses a walker at home  His pcp is at the [facility] Aracelis Julien and he [facility] and [facility] 5 days a week  He is on one floor at home bed and bath is on that floor and he has one step to enter the home   [facility]

## 2018-01-08 NOTE — PROGRESS NOTES
Progress Note - Bam Nguyễn 68 y o  male MRN: [id number]    Unit/Bed#: 420-01 Encounter: [id number]      Assessment:  Patient Active Problem List   Diagnosis    Spinal stenosis of lumbar region    PVD (peripheral vascular disease) ([facility] [address] )    Essential hypertension    Hyperlipidemia    Type 2 diabetes mellitus with hyperglycemia ([facility] )    COPD (chronic obstructive pulmonary disease) (HCC)    Multiple renal cysts    Vitamin D insufficiency    S/P CABG x 3    Hypokalemia    Acute cystitis    Abnormal chest x-ray    Renal cyst, left    Low serum prealbumin         Plan:  1)  Acute cystitis/UTI associated with a suprapubic catheter:  Continue IV Ceftriaxone  Await the results of the repeat urine culture  I appreciate Urology's input  The leukocytosis has resolved  I appreciate Infectious Disease's input  Check a retroperitoneal ultrasound  Complete a 7-day antibiotic course per Infectious Disease recommendations  2)  Hypokalemia:  Continue to replete with PO potassium chloride  Follow his potassium level and magnesium level  3)  Abnormal chest x-ray:  There is no evidence of a lung mass on CT scan of the chest     4)  Type 2 diabetes mellitus with hyperglycemia:  Increase Lantus to 25 Units SQ QHS  Increase Humalog to 6 Units TID with meals  Follow his blood glucose trend closely  5)  Left renal cyst:  He will need outpatient surveillance imaging of the renal cyst     The patient continues to require inpatient hospitalization for IV antibiotics and IV fluids  Subjective:   Patient seen and examined  The patient continues to complain of suprapubic abdominal discomfort  Objective:     Vitals: Blood pressure 138/61, pulse 66, temperature (!) 97 °F (36 1 °C), temperature source Temporal, resp  rate 16, height 5' 11" (1 803 m), weight 112 kg (247 lb 12 8 oz), SpO2 96 %  ,Body mass index is 34 56 kg/m²    Weight (last 2 days)     Date/Time   Weight    01/08/18 0600  112 (247 8)    01/07/18 0605  111 (245 37)    01/06/18 0600  112 (246 47)              Intake/Output Summary (Last 24 hours) at 01/08/18 1418  Last data filed at 01/08/18 0650   Gross per 24 hour   Intake             2000 ml   Output             3000 ml   Net            -1000 ml       Physical Exam: General:  NAD, awake, alert, follows commands  HEENT:  NC/AT, mucous membranes dry  Neck:  Supple, No JVP elevation  CV:  + S1, + S2, RRR  Pulm:  Lung fields are CTA bilaterally, No wheezing, No rhonchi  Abd:  Soft, + Suprapubic pain with palpation, Non-distended, Suprapubic catheter/tube in place  Ext:  No clubbing/cyanosis/edema    Scheduled Meds:    amLODIPine 5 mg Oral Daily   aspirin 81 mg Oral Daily   atorvastatin 40 mg Oral Daily With Dinner   carvedilol 6 25 mg Oral BID With Meals   cefTRIAXone 1,000 mg Intravenous Q24H   docusate sodium 100 mg Oral BID   enoxaparin 40 mg Subcutaneous Daily   ferrous sulfate 325 mg Oral Daily With Breakfast   finasteride 5 mg Oral Daily   insulin glargine 25 Units Subcutaneous HS   insulin lispro 1-5 Units Subcutaneous HS   insulin lispro 1-6 Units Subcutaneous TID AC   insulin lispro 6 Units Subcutaneous TID With Meals   magnesium sulfate 2 g Intravenous Once   potassium chloride 40 mEq Oral Once   saccharomyces boulardii 250 mg Oral BID   tamsulosin 0 4 mg Oral Daily With Dinner   vancomycin 15 mg/kg Intravenous Q12H     Continuous Infusions:    sodium chloride 75 mL/hr Last Rate: 75 mL/hr (01/08/18 0650)     PRN Meds:   acetaminophen    labetalol    labetalol    polyethylene glycol      Invasive Devices     Peripheral Intravenous Line            Peripheral IV 01/07/18 Left Hand 1 day          Drain            Suprapubic Catheter Non-latex 18 Fr  2 days                  Results from last 7 days  Lab Units 01/08/18  0505 01/07/18  0525 01/06/18  0548 01/05/18  1239   WBC Thousand/uL 8 21 8 66 10 48* 10 66*   HEMOGLOBIN g/dL 13 7 13 7 14 2 15 3   HEMATOCRIT % 41 3 41 5 42 3 45 8   PLATELETS Thousands/uL 159 166 172 191   NEUTROS PCT % 62 64  --  78*   MONOS PCT % 12 11  --  9         Results from last 7 days  Lab Units 01/08/18  0505 01/07/18  0525 01/06/18  0548 01/05/18  1239   SODIUM mmol/L 139 138 139 134*   POTASSIUM mmol/L 3 5 3 4* 3 1* 3 8   CHLORIDE mmol/L 106 105 104 98*   CO2 mmol/L 28 27 29 30   BUN mg/dL 8 7 13 19   CREATININE mg/dL 0 67 0 65 0 78 1 04   CALCIUM mg/dL 7 8* 7 7* 7 9* 8 3   TOTAL PROTEIN g/dL 5 8* 5 9*  --  6 9   BILIRUBIN TOTAL mg/dL 0 30 0 40  --  0 50   ALK PHOS U/L 91 87  --  101   ALT U/L 16 12  --  21   AST U/L 10 11  --  10   GLUCOSE RANDOM mg/dL 253* 257* 315* 441*       Lab Results   Component Value Date    TROPONINI 0 02 01/05/2018       Lab Results   Component Value Date    INR 1 07 09/26/2017    INR 0 97 08/17/2017    INR 0 95 08/09/2017    PROTIME 13 9 09/26/2017    PROTIME 12 8 08/17/2017    PROTIME 12 6 08/09/2017                 Lab, Imaging and other studies: I have personally reviewed pertinent reports      VTE Pharmacologic Prophylaxis: Enoxaparin (Lovenox)  VTE Mechanical Prophylaxis: sequential compression device

## 2018-01-09 PROBLEM — B37.49 CANDIDA UTI: Status: ACTIVE | Noted: 2018-01-09

## 2018-01-09 PROBLEM — E83.51 HYPOCALCEMIA: Status: ACTIVE | Noted: 2018-01-09

## 2018-01-09 PROBLEM — B37.9 CANDIDA INFECTION: Status: ACTIVE | Noted: 2018-01-09

## 2018-01-09 PROBLEM — N20.0 RENAL CALCULUS, RIGHT: Status: ACTIVE | Noted: 2018-01-09

## 2018-01-09 LAB
ANION GAP SERPL CALCULATED.3IONS-SCNC: 8 MMOL/L (ref 4–13)
BACTERIA UR CULT: ABNORMAL
BUN SERPL-MCNC: 11 MG/DL (ref 5–25)
CA-I BLD-SCNC: 1.11 MMOL/L (ref 1.12–1.32)
CALCIUM SERPL-MCNC: 7.8 MG/DL (ref 8.3–10.1)
CHLORIDE SERPL-SCNC: 104 MMOL/L (ref 100–108)
CO2 SERPL-SCNC: 27 MMOL/L (ref 21–32)
CREAT SERPL-MCNC: 0.7 MG/DL (ref 0.6–1.3)
GFR SERPL CREATININE-BSD FRML MDRD: 91 ML/MIN/1.73SQ M
GLUCOSE SERPL-MCNC: 202 MG/DL (ref 65–140)
GLUCOSE SERPL-MCNC: 253 MG/DL (ref 65–140)
GLUCOSE SERPL-MCNC: 256 MG/DL (ref 65–140)
GLUCOSE SERPL-MCNC: 257 MG/DL (ref 65–140)
GLUCOSE SERPL-MCNC: 264 MG/DL (ref 65–140)
MAGNESIUM SERPL-MCNC: 2.1 MG/DL (ref 1.6–2.6)
POTASSIUM SERPL-SCNC: 3.6 MMOL/L (ref 3.5–5.3)
SODIUM SERPL-SCNC: 139 MMOL/L (ref 136–145)

## 2018-01-09 PROCEDURE — 83735 ASSAY OF MAGNESIUM: CPT | Performed by: INTERNAL MEDICINE

## 2018-01-09 PROCEDURE — 80048 BASIC METABOLIC PNL TOTAL CA: CPT | Performed by: INTERNAL MEDICINE

## 2018-01-09 PROCEDURE — 82330 ASSAY OF CALCIUM: CPT | Performed by: INTERNAL MEDICINE

## 2018-01-09 PROCEDURE — 82948 REAGENT STRIP/BLOOD GLUCOSE: CPT

## 2018-01-09 RX ORDER — FLUCONAZOLE 2 MG/ML
200 INJECTION, SOLUTION INTRAVENOUS EVERY 24 HOURS
Status: DISCONTINUED | OUTPATIENT
Start: 2018-01-09 | End: 2018-01-13

## 2018-01-09 RX ORDER — INSULIN GLARGINE 100 [IU]/ML
30 INJECTION, SOLUTION SUBCUTANEOUS
Status: DISCONTINUED | OUTPATIENT
Start: 2018-01-09 | End: 2018-01-10

## 2018-01-09 RX ADMIN — ASPIRIN 81 MG CHEWABLE TABLET 81 MG: 81 TABLET CHEWABLE at 10:25

## 2018-01-09 RX ADMIN — TAMSULOSIN HYDROCHLORIDE 0.4 MG: 0.4 CAPSULE ORAL at 15:51

## 2018-01-09 RX ADMIN — CARVEDILOL 6.25 MG: 3.12 TABLET, FILM COATED ORAL at 10:25

## 2018-01-09 RX ADMIN — Medication 325 MG: at 10:25

## 2018-01-09 RX ADMIN — INSULIN LISPRO 3 UNITS: 100 INJECTION, SOLUTION INTRAVENOUS; SUBCUTANEOUS at 12:54

## 2018-01-09 RX ADMIN — INSULIN LISPRO 2 UNITS: 100 INJECTION, SOLUTION INTRAVENOUS; SUBCUTANEOUS at 17:12

## 2018-01-09 RX ADMIN — INSULIN LISPRO 3 UNITS: 100 INJECTION, SOLUTION INTRAVENOUS; SUBCUTANEOUS at 10:27

## 2018-01-09 RX ADMIN — Medication 250 MG: at 10:26

## 2018-01-09 RX ADMIN — INSULIN GLARGINE 30 UNITS: 100 INJECTION, SOLUTION SUBCUTANEOUS at 21:40

## 2018-01-09 RX ADMIN — INSULIN LISPRO 2 UNITS: 100 INJECTION, SOLUTION INTRAVENOUS; SUBCUTANEOUS at 21:40

## 2018-01-09 RX ADMIN — DOCUSATE SODIUM 100 MG: 100 CAPSULE, LIQUID FILLED ORAL at 17:13

## 2018-01-09 RX ADMIN — CARVEDILOL 6.25 MG: 3.12 TABLET, FILM COATED ORAL at 15:51

## 2018-01-09 RX ADMIN — CEFTRIAXONE 1000 MG: 1 INJECTION, SOLUTION INTRAVENOUS at 20:42

## 2018-01-09 RX ADMIN — ATORVASTATIN CALCIUM 40 MG: 40 TABLET, FILM COATED ORAL at 15:52

## 2018-01-09 RX ADMIN — ENOXAPARIN SODIUM 40 MG: 40 INJECTION SUBCUTANEOUS at 10:26

## 2018-01-09 RX ADMIN — FLUCONAZOLE 200 MG: 2 INJECTION INTRAVENOUS at 10:23

## 2018-01-09 RX ADMIN — FINASTERIDE 5 MG: 5 TABLET, FILM COATED ORAL at 10:25

## 2018-01-09 RX ADMIN — DOCUSATE SODIUM 100 MG: 100 CAPSULE, LIQUID FILLED ORAL at 10:25

## 2018-01-09 RX ADMIN — Medication 250 MG: at 17:13

## 2018-01-09 RX ADMIN — AMLODIPINE BESYLATE 5 MG: 5 TABLET ORAL at 10:25

## 2018-01-09 RX ADMIN — VANCOMYCIN HYDROCHLORIDE 1750 MG: 10 INJECTION, POWDER, LYOPHILIZED, FOR SOLUTION INTRAVENOUS at 21:39

## 2018-01-09 RX ADMIN — VANCOMYCIN HYDROCHLORIDE 1750 MG: 10 INJECTION, POWDER, LYOPHILIZED, FOR SOLUTION INTRAVENOUS at 12:55

## 2018-01-09 NOTE — PROGRESS NOTES
Progress Note - Shivani Torres 68 y o  male MRN: [id number]    Unit/Bed#: 420-01 Encounter: [id number]      Assessment:  Patient Active Problem List   Diagnosis    Spinal stenosis of lumbar region    PVD (peripheral vascular disease) ([facility] [address]    Essential hypertension    Hyperlipidemia    Type 2 diabetes mellitus with hyperglycemia ([facility] )    COPD (chronic obstructive pulmonary disease) (HCC)    Multiple renal cysts    Vitamin D insufficiency    S/P CABG x 3    UTI (urinary tract infection) with pyuria    Hypokalemia    Acute cystitis    Abnormal chest x-ray    Renal cyst, left    Low serum prealbumin    Renal calculus, right    Candida UTI    Hypocalcemia         Plan:  1)  Acute cystitis/UTI associated with a suprapubic catheter/Candida UTI:  Continue IV Ceftriaxone  I appreciate Urology's input  The leukocytosis has resolved  I appreciate Infectious Disease's input  Complete a 7-day antibiotic course per Infectious Disease recommendations  I will add Fluconazole 200 mg IV Qdaily with the repeat urine culture being positive for Candida albicans  2)  Hypokalemia:  Improved  Follow his potassium level and magnesium level  3)  Abnormal chest x-ray:  There is no evidence of a lung mass on CT scan of the chest     4)  Type 2 diabetes mellitus with hyperglycemia:  Increase Lantus to 30 Units SQ QHS  Increase Humalog to 8 Units TID with meals  Follow his blood glucose trend closely  5)  Right renal calculus/Multiple renal cysts:  He will need outpatient surveillance imaging of the renal cysts and for the right renal calculus  The patient continues to require inpatient hospitalization for IV antibiotics and IV fluconazole  Subjective:   Patient seen and examined  The patient continues to complain of discomfort surrounding the suprapubic catheter/tube  No chest pain  No shortness of breath      Objective:     Vitals: Blood pressure (!) 191/87, pulse 68, temperature 97 6 °F (36 4 °C), temperature source Temporal, resp  rate 18, height 5' 11" (1 803 m), weight 113 kg (250 lb 3 6 oz), SpO2 93 %  ,Body mass index is 34 9 kg/m²    Weight (last 2 days)     Date/Time   Weight    01/09/18 0534  113 (250 22)    01/08/18 1459  112 (246 92)    01/08/18 0600  112 (247 8)    01/07/18 0605  111 (245 37)              Intake/Output Summary (Last 24 hours) at 01/09/18 1354  Last data filed at 01/09/18 1300   Gross per 24 hour   Intake             1350 ml   Output             4325 ml   Net            -2975 ml       Physical Exam: General:  NAD, awake, alert, follows commands  HEENT:  NC/AT, mucous membranes dry  Neck:  Supple, No JVP elevation  CV:  + S1, + S2, RRR  Pulm:  Lung fields are CTA bilaterally, No wheezing, No rhonchi, No crackles  Abd:  Soft, + Suprapubic pain with palpation, Non-distended, Suprapubic catheter/tube in place  Ext:  No clubbing/cyanosis/edema    Scheduled Meds:    amLODIPine 5 mg Oral Daily   aspirin 81 mg Oral Daily   atorvastatin 40 mg Oral Daily With Dinner   carvedilol 6 25 mg Oral BID With Meals   cefTRIAXone 1,000 mg Intravenous Q24H   docusate sodium 100 mg Oral BID   enoxaparin 40 mg Subcutaneous Daily   ferrous sulfate 325 mg Oral Daily With Breakfast   finasteride 5 mg Oral Daily   fluconazole 200 mg Intravenous Q24H   insulin glargine 25 Units Subcutaneous HS   insulin lispro 1-5 Units Subcutaneous HS   insulin lispro 1-6 Units Subcutaneous TID AC   insulin lispro 6 Units Subcutaneous TID With Meals   saccharomyces boulardii 250 mg Oral BID   tamsulosin 0 4 mg Oral Daily With Dinner   vancomycin 15 mg/kg Intravenous Q12H     Continuous Infusions:    sodium chloride 40 mL/hr Last Rate: 40 mL/hr (01/08/18 1437)     PRN Meds:   acetaminophen    labetalol    labetalol    polyethylene glycol      Invasive Devices     Peripheral Intravenous Line            Peripheral IV 01/07/18 Left Hand 2 days          Drain            Suprapubic Catheter Non-latex 18 Fr  3 days Results from last 7 days  Lab Units 01/08/18  0505 01/07/18  0525 01/06/18  0548 01/05/18  1239   WBC Thousand/uL 8 21 8 66 10 48* 10 66*   HEMOGLOBIN g/dL 13 7 13 7 14 2 15 3   HEMATOCRIT % 41 3 41 5 42 3 45 8   PLATELETS Thousands/uL 159 166 172 191   NEUTROS PCT % 62 64  --  78*   MONOS PCT % 12 11  --  9         Results from last 7 days  Lab Units 01/09/18  0523 01/08/18  0505 01/07/18  0525  01/05/18  1239   SODIUM mmol/L 139 139 138  < > 134*   POTASSIUM mmol/L 3 6 3 5 3 4*  < > 3 8   CHLORIDE mmol/L 104 106 105  < > 98*   CO2 mmol/L 27 28 27  < > 30   BUN mg/dL 11 8 7  < > 19   CREATININE mg/dL 0 70 0 67 0 65  < > 1 04   CALCIUM mg/dL 7 8* 7 8* 7 7*  < > 8 3   TOTAL PROTEIN g/dL  --  5 8* 5 9*  --  6 9   BILIRUBIN TOTAL mg/dL  --  0 30 0 40  --  0 50   ALK PHOS U/L  --  91 87  --  101   ALT U/L  --  16 12  --  21   AST U/L  --  10 11  --  10   GLUCOSE RANDOM mg/dL 264* 253* 257*  < > 441*   < > = values in this interval not displayed  Lab Results   Component Value Date    TROPONINI 0 02 01/05/2018       Lab Results   Component Value Date    INR 1 07 09/26/2017    INR 0 97 08/17/2017    INR 0 95 08/09/2017    PROTIME 13 9 09/26/2017    PROTIME 12 8 08/17/2017    PROTIME 12 6 08/09/2017                 Lab, Imaging and other studies: I have personally reviewed pertinent reports      VTE Pharmacologic Prophylaxis: Enoxaparin (Lovenox)  VTE Mechanical Prophylaxis: sequential compression device

## 2018-01-09 NOTE — PLAN OF CARE
DISCHARGE PLANNING     Discharge to home or other facility with appropriate resources Progressing        DISCHARGE PLANNING - CARE MANAGEMENT     Discharge to post-acute care or home with appropriate resources Progressing        INFECTION - ADULT     Absence or prevention of progression during hospitalization Progressing        Knowledge Deficit     Patient/family/caregiver demonstrates understanding of disease process, treatment plan, medications, and discharge instructions Progressing        Nutrition/Hydration-ADULT     Nutrient/Hydration intake appropriate for improving, restoring or maintaining nutritional needs Progressing        PAIN - ADULT     Verbalizes/displays adequate comfort level or baseline comfort level Progressing        Potential for Falls     Patient will remain free of falls Progressing        Prexisting or High Potential for Compromised Skin Integrity     Skin integrity is maintained or improved Progressing        SAFETY ADULT     Maintain or return to baseline ADL function Progressing     Maintain or return mobility status to optimal level Progressing     Patient will remain free of falls Progressing

## 2018-01-10 PROBLEM — R10.9 INTRACTABLE ABDOMINAL PAIN: Status: ACTIVE | Noted: 2018-01-10

## 2018-01-10 LAB
ALBUMIN SERPL BCP-MCNC: 2.4 G/DL (ref 3.5–5)
ALP SERPL-CCNC: 94 U/L (ref 46–116)
ALT SERPL W P-5'-P-CCNC: 14 U/L (ref 12–78)
ANION GAP SERPL CALCULATED.3IONS-SCNC: 5 MMOL/L (ref 4–13)
AST SERPL W P-5'-P-CCNC: 10 U/L (ref 5–45)
BACTERIA BLD CULT: NORMAL
BACTERIA BLD CULT: NORMAL
BILIRUB SERPL-MCNC: 0.2 MG/DL (ref 0.2–1)
BUN SERPL-MCNC: 9 MG/DL (ref 5–25)
CALCIUM SERPL-MCNC: 7.6 MG/DL (ref 8.3–10.1)
CHLORIDE SERPL-SCNC: 103 MMOL/L (ref 100–108)
CO2 SERPL-SCNC: 30 MMOL/L (ref 21–32)
CREAT SERPL-MCNC: 0.72 MG/DL (ref 0.6–1.3)
GFR SERPL CREATININE-BSD FRML MDRD: 90 ML/MIN/1.73SQ M
GLUCOSE SERPL-MCNC: 207 MG/DL (ref 65–140)
GLUCOSE SERPL-MCNC: 273 MG/DL (ref 65–140)
GLUCOSE SERPL-MCNC: 275 MG/DL (ref 65–140)
GLUCOSE SERPL-MCNC: 289 MG/DL (ref 65–140)
GLUCOSE SERPL-MCNC: 295 MG/DL (ref 65–140)
MAGNESIUM SERPL-MCNC: 1.8 MG/DL (ref 1.6–2.6)
POTASSIUM SERPL-SCNC: 3.5 MMOL/L (ref 3.5–5.3)
PROT SERPL-MCNC: 5.6 G/DL (ref 6.4–8.2)
SODIUM SERPL-SCNC: 138 MMOL/L (ref 136–145)

## 2018-01-10 PROCEDURE — 83735 ASSAY OF MAGNESIUM: CPT | Performed by: INTERNAL MEDICINE

## 2018-01-10 PROCEDURE — 94760 N-INVAS EAR/PLS OXIMETRY 1: CPT

## 2018-01-10 PROCEDURE — 80053 COMPREHEN METABOLIC PANEL: CPT | Performed by: INTERNAL MEDICINE

## 2018-01-10 PROCEDURE — 82948 REAGENT STRIP/BLOOD GLUCOSE: CPT

## 2018-01-10 RX ORDER — TRAMADOL HYDROCHLORIDE 50 MG/1
50 TABLET ORAL ONCE
Status: COMPLETED | OUTPATIENT
Start: 2018-01-10 | End: 2018-01-10

## 2018-01-10 RX ORDER — TRAMADOL HYDROCHLORIDE 50 MG/1
50 TABLET ORAL EVERY 6 HOURS PRN
Status: DISCONTINUED | OUTPATIENT
Start: 2018-01-10 | End: 2018-01-14 | Stop reason: HOSPADM

## 2018-01-10 RX ORDER — INSULIN GLARGINE 100 [IU]/ML
35 INJECTION, SOLUTION SUBCUTANEOUS
Status: DISCONTINUED | OUTPATIENT
Start: 2018-01-10 | End: 2018-01-12

## 2018-01-10 RX ORDER — ACETAMINOPHEN 325 MG/1
650 TABLET ORAL EVERY 6 HOURS PRN
Status: DISCONTINUED | OUTPATIENT
Start: 2018-01-10 | End: 2018-01-14 | Stop reason: HOSPADM

## 2018-01-10 RX ORDER — SODIUM PHOSPHATE, DIBASIC AND SODIUM PHOSPHATE, MONOBASIC 7; 19 G/133ML; G/133ML
1 ENEMA RECTAL ONCE
Status: COMPLETED | OUTPATIENT
Start: 2018-01-10 | End: 2018-01-10

## 2018-01-10 RX ORDER — POTASSIUM CHLORIDE 20 MEQ/1
40 TABLET, EXTENDED RELEASE ORAL ONCE
Status: COMPLETED | OUTPATIENT
Start: 2018-01-10 | End: 2018-01-10

## 2018-01-10 RX ORDER — LACTULOSE 20 G/30ML
20 SOLUTION ORAL ONCE
Status: COMPLETED | OUTPATIENT
Start: 2018-01-10 | End: 2018-01-10

## 2018-01-10 RX ADMIN — CARVEDILOL 6.25 MG: 3.12 TABLET, FILM COATED ORAL at 08:22

## 2018-01-10 RX ADMIN — VANCOMYCIN HYDROCHLORIDE 1750 MG: 10 INJECTION, POWDER, LYOPHILIZED, FOR SOLUTION INTRAVENOUS at 11:24

## 2018-01-10 RX ADMIN — INSULIN LISPRO 4 UNITS: 100 INJECTION, SOLUTION INTRAVENOUS; SUBCUTANEOUS at 17:52

## 2018-01-10 RX ADMIN — TRAMADOL HYDROCHLORIDE 50 MG: 50 TABLET, FILM COATED ORAL at 11:27

## 2018-01-10 RX ADMIN — DOCUSATE SODIUM 100 MG: 100 CAPSULE, LIQUID FILLED ORAL at 17:41

## 2018-01-10 RX ADMIN — VANCOMYCIN HYDROCHLORIDE 1750 MG: 10 INJECTION, POWDER, LYOPHILIZED, FOR SOLUTION INTRAVENOUS at 23:28

## 2018-01-10 RX ADMIN — Medication 400 MG: at 17:41

## 2018-01-10 RX ADMIN — LACTULOSE 20 G: 20 SOLUTION ORAL at 11:27

## 2018-01-10 RX ADMIN — FLUCONAZOLE 200 MG: 2 INJECTION INTRAVENOUS at 11:28

## 2018-01-10 RX ADMIN — Medication 400 MG: at 11:27

## 2018-01-10 RX ADMIN — ENOXAPARIN SODIUM 40 MG: 40 INJECTION SUBCUTANEOUS at 08:21

## 2018-01-10 RX ADMIN — Medication 250 MG: at 17:41

## 2018-01-10 RX ADMIN — DOCUSATE SODIUM 100 MG: 100 CAPSULE, LIQUID FILLED ORAL at 08:21

## 2018-01-10 RX ADMIN — INSULIN LISPRO 4 UNITS: 100 INJECTION, SOLUTION INTRAVENOUS; SUBCUTANEOUS at 12:16

## 2018-01-10 RX ADMIN — SODIUM PHOSPHATE, DIBASIC AND SODIUM PHOSPHATE, MONOBASIC 1 ENEMA: 7; 19 ENEMA RECTAL at 16:12

## 2018-01-10 RX ADMIN — TAMSULOSIN HYDROCHLORIDE 0.4 MG: 0.4 CAPSULE ORAL at 17:40

## 2018-01-10 RX ADMIN — POTASSIUM CHLORIDE 40 MEQ: 1500 TABLET, EXTENDED RELEASE ORAL at 11:27

## 2018-01-10 RX ADMIN — CEFTRIAXONE 1000 MG: 1 INJECTION, SOLUTION INTRAVENOUS at 21:31

## 2018-01-10 RX ADMIN — AMLODIPINE BESYLATE 5 MG: 5 TABLET ORAL at 08:22

## 2018-01-10 RX ADMIN — INSULIN LISPRO 4 UNITS: 100 INJECTION, SOLUTION INTRAVENOUS; SUBCUTANEOUS at 07:59

## 2018-01-10 RX ADMIN — Medication 325 MG: at 08:22

## 2018-01-10 RX ADMIN — ATORVASTATIN CALCIUM 40 MG: 40 TABLET, FILM COATED ORAL at 17:41

## 2018-01-10 RX ADMIN — ASPIRIN 81 MG CHEWABLE TABLET 81 MG: 81 TABLET CHEWABLE at 08:22

## 2018-01-10 RX ADMIN — Medication 250 MG: at 08:23

## 2018-01-10 RX ADMIN — INSULIN LISPRO 1 UNITS: 100 INJECTION, SOLUTION INTRAVENOUS; SUBCUTANEOUS at 21:31

## 2018-01-10 RX ADMIN — FINASTERIDE 5 MG: 5 TABLET, FILM COATED ORAL at 08:22

## 2018-01-10 RX ADMIN — INSULIN GLARGINE 35 UNITS: 100 INJECTION, SOLUTION SUBCUTANEOUS at 21:31

## 2018-01-10 NOTE — PROGRESS NOTES
Patient resting in bed  Medication education provided about new medication; patient verbilzes understanding  Call bell in reach; bed in low position

## 2018-01-10 NOTE — CASE MANAGEMENT
Continued Stay Review    Date: 1/10/18    Vital Signs: /67   Pulse 61   Temp 97 7 °F (36 5 °C) (Temporal)   Resp 18   Ht 5' 11" (1 803 m)   Wt 114 kg (250 lb 10 6 oz)   SpO2 96%   BMI 34 96 kg/m²     Medications:   Scheduled Meds:   amLODIPine 5 mg Oral Daily   aspirin 81 mg Oral Daily   atorvastatin 40 mg Oral Daily With Dinner   carvedilol 6 25 mg Oral BID With Meals   cefTRIAXone 1,000 mg Intravenous Q24H   docusate sodium 100 mg Oral BID   enoxaparin 40 mg Subcutaneous Daily   ferrous sulfate 325 mg Oral Daily With Breakfast   finasteride 5 mg Oral Daily   fluconazole 200 mg Intravenous Q24H   insulin glargine 35 Units Subcutaneous HS   insulin lispro 1-5 Units Subcutaneous HS   insulin lispro 1-6 Units Subcutaneous TID AC   insulin lispro 10 Units Subcutaneous TID With Meals   magnesium oxide 400 mg Oral BID   saccharomyces boulardii 250 mg Oral BID   tamsulosin 0 4 mg Oral Daily With Dinner   vancomycin 15 mg/kg Intravenous Q12H     Continuous Infusions:    PRN Meds:   acetaminophen    labetalol    polyethylene glycol    traMADol    Abnormal Labs/Diagnostic Results:   GLUC 289 TPROT 5 6 ALB 2 4 CA 7 6     Age/Sex: 68 y o  male     Assessment/Plan:   1)  Acute cystitis/UTI associated with a suprapubic catheter/Candida UTI:  Continue IV Ceftriaxone for a 7-day antibiotic course  I appreciate Urology's input  The leukocytosis has resolved  I appreciate Infectious Disease's input  Complete a 7-day antibiotic course per Infectious Disease recommendations  I will add Fluconazole 200 mg IV Qdaily with the repeat urine culture being positive for Candida albicans  PRN PO Tramadol for the suprapubic abdominal pain     2)  Hypokalemia:  Improved  Follow his potassium level and magnesium level      3)  Abnormal chest x-ray:  There is no evidence of a lung mass on CT scan of the chest      4)  Type 2 diabetes mellitus with hyperglycemia:  Increase Lantus to 35 Units SQ QHS    Increase Humalog to 10 Units TID with meals  Follow his blood glucose trend closely     5)  Right renal calculus/Multiple renal cysts:  He will need outpatient surveillance imaging of the renal cysts and for the right renal calculus     6)  Constipation:  The patient will receive Lactulose 20 grams PO x 1 dose      The patient continues to require inpatient hospitalization for IV antibiotics and IV fluconazole      Discharge Plan: TBD

## 2018-01-10 NOTE — PROGRESS NOTES
Progress Note - Joceline Masterson 68 y o  male MRN: [id number]    Unit/Bed#: 420-01 Encounter: [id number]      Assessment:  Patient Active Problem List   Diagnosis    Spinal stenosis of lumbar region    [facility]VD [facility]peripheral vascular disease) ([facility] [address] [facility]    Essential hypertension    Hyperlipidemia    Type 2 diabetes mellitus with hyperglycemia ([facility] )    COPD (chronic obstructive pulmonary disease) (HCC)    Multiple renal cysts    Vitamin D insufficiency    Constipation    S/P CABG x 3    UTI (urinary tract infection) with pyuria    Hypokalemia    Acute cystitis    Abnormal chest x-ray    Renal cyst, left    Low serum prealbumin    Renal calculus, right    Candida UTI    Hypocalcemia    Intractable abdominal pain         Plan:  1)  Acute cystitis/UTI associated with a suprapubic catheter/Candida UTI:  Continue IV Ceftriaxone for a 7-day antibiotic course  I appreciate Urology's input  The leukocytosis has resolved  I appreciate Infectious Disease's input  Complete a 7-day antibiotic course per Infectious Disease recommendations  Continue Fluconazole 200 mg IV Qdaily with the repeat urine culture being positive for Candida albicans  PRN PO Tramadol for the suprapubic abdominal pain  2)  Hypokalemia:  Improved  Follow his potassium level and magnesium level  3)  Abnormal chest x-ray:  There is no evidence of a lung mass on CT scan of the chest     4)  Type 2 diabetes mellitus with hyperglycemia:  Increase Lantus to 35 Units SQ QHS  Increase Humalog to 10 Units TID with meals  Follow his blood glucose trend closely  5)  Right renal calculus/Multiple renal cysts:  He will need outpatient surveillance imaging of the renal cysts and for the right renal calculus  6)  Constipation:  The patient will receive Lactulose 20 grams PO x 1 dose  The patient continues to require inpatient hospitalization for IV antibiotics and IV fluconazole  Subjective:   Patient seen and examined    The patient continues to complain of suprapubic abdominal discomfort  He also complains of constipation  Objective:     Vitals: Blood pressure 142/67, pulse 61, temperature 97 7 °F (36 5 °C), temperature source Temporal, resp  rate 18, height 5' 11" (1 803 m), weight 114 kg (250 lb 10 6 oz), SpO2 96 %  ,Body mass index is 34 96 kg/m²    Weight (last 2 days)     Date/Time   Weight    01/10/18 0600  114 (250 66)    01/09/18 0534  113 (250 22)    01/08/18 1459  112 (246 92)    01/08/18 0600  112 (247 8)              Intake/Output Summary (Last 24 hours) at 01/10/18 1042  Last data filed at 01/10/18 1001   Gross per 24 hour   Intake          4109 08 ml   Output             4000 ml   Net           109 08 ml       Physical Exam: General:  NAD, awake, alert, follows commands  HEENT:  NC/AT, mucous membranes dry  Neck:  Supple, No JVP elevation  CV:  + S1, + S2, RRR  Pulm:  Lung fields are CTA bilaterally, No wheezing, No rhonchi, No crackles  Abd:  Soft, + Suprapubic pain with palpation, Mild distension, Suprapubic catheter/tube in place  Ext:  No clubbing/cyanosis/edema    Scheduled Meds:    amLODIPine 5 mg Oral Daily   aspirin 81 mg Oral Daily   atorvastatin 40 mg Oral Daily With Dinner   carvedilol 6 25 mg Oral BID With Meals   cefTRIAXone 1,000 mg Intravenous Q24H   docusate sodium 100 mg Oral BID   enoxaparin 40 mg Subcutaneous Daily   ferrous sulfate 325 mg Oral Daily With Breakfast   finasteride 5 mg Oral Daily   fluconazole 200 mg Intravenous Q24H   insulin glargine 30 Units Subcutaneous HS   insulin lispro 1-5 Units Subcutaneous HS   insulin lispro 1-6 Units Subcutaneous TID AC   insulin lispro 8 Units Subcutaneous TID With Meals   lactulose 20 g Oral Once   magnesium oxide 400 mg Oral BID   potassium chloride 40 mEq Oral Once   saccharomyces boulardii 250 mg Oral BID   tamsulosin 0 4 mg Oral Daily With Dinner   traMADol 50 mg Oral Once   vancomycin 15 mg/kg Intravenous Q12H     Continuous Infusions:     PRN Meds:   acetaminophen   labetalol    polyethylene glycol    traMADol      Invasive Devices     Peripheral Intravenous Line            Peripheral IV 01/10/18 Right Forearm less than 1 day          Drain            Suprapubic Catheter Non-latex 18 Fr  4 days                  Results from last 7 days  Lab Units 01/08/18  0505 01/07/18  0525 01/06/18  0548 01/05/18  1239   WBC Thousand/uL 8 21 8 66 10 48* 10 66*   HEMOGLOBIN g/dL 13 7 13 7 14 2 15 3   HEMATOCRIT % 41 3 41 5 42 3 45 8   PLATELETS Thousands/uL 159 166 172 191   NEUTROS PCT % 62 64  --  78*   MONOS PCT % 12 11  --  9         Results from last 7 days  Lab Units 01/10/18  0510 01/09/18  0523 01/08/18  0505 01/07/18  0525   SODIUM mmol/L 138 139 139 138   POTASSIUM mmol/L 3 5 3 6 3 5 3 4*   CHLORIDE mmol/L 103 104 106 105   CO2 mmol/L 30 27 28 27   BUN mg/dL 9 11 8 7   CREATININE mg/dL 0 72 0 70 0 67 0 65   CALCIUM mg/dL 7 6* 7 8* 7 8* 7 7*   TOTAL PROTEIN g/dL 5 6*  --  5 8* 5 9*   BILIRUBIN TOTAL mg/dL 0 20  --  0 30 0 40   ALK PHOS U/L 94  --  91 87   ALT U/L 14  --  16 12   AST U/L 10  --  10 11   GLUCOSE RANDOM mg/dL 289* 264* 253* 257*       Lab Results   Component Value Date    TROPONINI 0 02 01/05/2018       Lab Results   Component Value Date    INR 1 07 09/26/2017    INR 0 97 08/17/2017    INR 0 95 08/09/2017    PROTIME 13 9 09/26/2017    PROTIME 12 8 08/17/2017    PROTIME 12 6 08/09/2017                 Lab, Imaging and other studies: I have personally reviewed pertinent reports      VTE Pharmacologic Prophylaxis: Enoxaparin (Lovenox)  VTE Mechanical Prophylaxis: sequential compression device

## 2018-01-11 LAB
ALBUMIN SERPL BCP-MCNC: 1.6 G/DL (ref 3.5–5)
ALP SERPL-CCNC: 84 U/L (ref 46–116)
ALT SERPL W P-5'-P-CCNC: 18 U/L (ref 12–78)
ANION GAP SERPL CALCULATED.3IONS-SCNC: 5 MMOL/L (ref 4–13)
AST SERPL W P-5'-P-CCNC: 13 U/L (ref 5–45)
BASOPHILS # BLD AUTO: 0.06 THOUSANDS/ΜL (ref 0–0.1)
BASOPHILS NFR BLD AUTO: 1 % (ref 0–1)
BILIRUB SERPL-MCNC: 0.2 MG/DL (ref 0.2–1)
BUN SERPL-MCNC: 9 MG/DL (ref 5–25)
CALCIUM SERPL-MCNC: 8 MG/DL (ref 8.3–10.1)
CHLORIDE SERPL-SCNC: 105 MMOL/L (ref 100–108)
CO2 SERPL-SCNC: 29 MMOL/L (ref 21–32)
CREAT SERPL-MCNC: 0.91 MG/DL (ref 0.6–1.3)
EOSINOPHIL # BLD AUTO: 0.39 THOUSAND/ΜL (ref 0–0.61)
EOSINOPHIL NFR BLD AUTO: 4 % (ref 0–6)
ERYTHROCYTE [DISTWIDTH] IN BLOOD BY AUTOMATED COUNT: 13.7 % (ref 11.6–15.1)
GFR SERPL CREATININE-BSD FRML MDRD: 81 ML/MIN/1.73SQ M
GLUCOSE SERPL-MCNC: 144 MG/DL (ref 65–140)
GLUCOSE SERPL-MCNC: 157 MG/DL (ref 65–140)
GLUCOSE SERPL-MCNC: 193 MG/DL (ref 65–140)
GLUCOSE SERPL-MCNC: 205 MG/DL (ref 65–140)
GLUCOSE SERPL-MCNC: 305 MG/DL (ref 65–140)
HCT VFR BLD AUTO: 42.8 % (ref 36.5–49.3)
HGB BLD-MCNC: 13.9 G/DL (ref 12–17)
LYMPHOCYTES # BLD AUTO: 1.38 THOUSANDS/ΜL (ref 0.6–4.47)
LYMPHOCYTES NFR BLD AUTO: 15 % (ref 14–44)
MAGNESIUM SERPL-MCNC: 1.9 MG/DL (ref 1.6–2.6)
MCH RBC QN AUTO: 28.6 PG (ref 26.8–34.3)
MCHC RBC AUTO-ENTMCNC: 32.5 G/DL (ref 31.4–37.4)
MCV RBC AUTO: 88 FL (ref 82–98)
MONOCYTES # BLD AUTO: 1.07 THOUSAND/ΜL (ref 0.17–1.22)
MONOCYTES NFR BLD AUTO: 11 % (ref 4–12)
NEUTROPHILS # BLD AUTO: 6.51 THOUSANDS/ΜL (ref 1.85–7.62)
NEUTS SEG NFR BLD AUTO: 69 % (ref 43–75)
PHOSPHATE SERPL-MCNC: 3.8 MG/DL (ref 2.3–4.1)
PLATELET # BLD AUTO: 172 THOUSANDS/UL (ref 149–390)
PMV BLD AUTO: 11.7 FL (ref 8.9–12.7)
POTASSIUM SERPL-SCNC: 3.9 MMOL/L (ref 3.5–5.3)
PROT SERPL-MCNC: 6 G/DL (ref 6.4–8.2)
RBC # BLD AUTO: 4.86 MILLION/UL (ref 3.88–5.62)
SODIUM SERPL-SCNC: 139 MMOL/L (ref 136–145)
VANCOMYCIN TROUGH SERPL-MCNC: 23.8 UG/ML (ref 10–20)
WBC # BLD AUTO: 9.41 THOUSAND/UL (ref 4.31–10.16)

## 2018-01-11 PROCEDURE — 83735 ASSAY OF MAGNESIUM: CPT | Performed by: INTERNAL MEDICINE

## 2018-01-11 PROCEDURE — 80202 ASSAY OF VANCOMYCIN: CPT | Performed by: INTERNAL MEDICINE

## 2018-01-11 PROCEDURE — 84100 ASSAY OF PHOSPHORUS: CPT | Performed by: INTERNAL MEDICINE

## 2018-01-11 PROCEDURE — 80053 COMPREHEN METABOLIC PANEL: CPT | Performed by: INTERNAL MEDICINE

## 2018-01-11 PROCEDURE — 82948 REAGENT STRIP/BLOOD GLUCOSE: CPT

## 2018-01-11 PROCEDURE — 85025 COMPLETE CBC W/AUTO DIFF WBC: CPT | Performed by: INTERNAL MEDICINE

## 2018-01-11 RX ADMIN — Medication 325 MG: at 09:41

## 2018-01-11 RX ADMIN — ENOXAPARIN SODIUM 40 MG: 40 INJECTION SUBCUTANEOUS at 09:38

## 2018-01-11 RX ADMIN — ATORVASTATIN CALCIUM 40 MG: 40 TABLET, FILM COATED ORAL at 15:38

## 2018-01-11 RX ADMIN — VANCOMYCIN HYDROCHLORIDE 1750 MG: 10 INJECTION, POWDER, LYOPHILIZED, FOR SOLUTION INTRAVENOUS at 10:58

## 2018-01-11 RX ADMIN — FLUCONAZOLE 200 MG: 2 INJECTION INTRAVENOUS at 09:31

## 2018-01-11 RX ADMIN — INSULIN LISPRO 2 UNITS: 100 INJECTION, SOLUTION INTRAVENOUS; SUBCUTANEOUS at 11:57

## 2018-01-11 RX ADMIN — INSULIN LISPRO 1 UNITS: 100 INJECTION, SOLUTION INTRAVENOUS; SUBCUTANEOUS at 09:39

## 2018-01-11 RX ADMIN — FINASTERIDE 5 MG: 5 TABLET, FILM COATED ORAL at 09:41

## 2018-01-11 RX ADMIN — ASPIRIN 81 MG CHEWABLE TABLET 81 MG: 81 TABLET CHEWABLE at 09:41

## 2018-01-11 RX ADMIN — Medication 250 MG: at 09:41

## 2018-01-11 RX ADMIN — Medication 250 MG: at 17:27

## 2018-01-11 RX ADMIN — CARVEDILOL 6.25 MG: 3.12 TABLET, FILM COATED ORAL at 09:41

## 2018-01-11 RX ADMIN — DOCUSATE SODIUM 100 MG: 100 CAPSULE, LIQUID FILLED ORAL at 17:27

## 2018-01-11 RX ADMIN — CARVEDILOL 6.25 MG: 3.12 TABLET, FILM COATED ORAL at 15:38

## 2018-01-11 RX ADMIN — INSULIN LISPRO 3 UNITS: 100 INJECTION, SOLUTION INTRAVENOUS; SUBCUTANEOUS at 22:08

## 2018-01-11 RX ADMIN — AMLODIPINE BESYLATE 5 MG: 5 TABLET ORAL at 09:41

## 2018-01-11 RX ADMIN — INSULIN GLARGINE 35 UNITS: 100 INJECTION, SOLUTION SUBCUTANEOUS at 22:09

## 2018-01-11 RX ADMIN — TAMSULOSIN HYDROCHLORIDE 0.4 MG: 0.4 CAPSULE ORAL at 15:38

## 2018-01-11 RX ADMIN — CEFTRIAXONE 1000 MG: 1 INJECTION, SOLUTION INTRAVENOUS at 22:15

## 2018-01-11 RX ADMIN — INSULIN LISPRO 2 UNITS: 100 INJECTION, SOLUTION INTRAVENOUS; SUBCUTANEOUS at 15:38

## 2018-01-11 RX ADMIN — DOCUSATE SODIUM 100 MG: 100 CAPSULE, LIQUID FILLED ORAL at 09:41

## 2018-01-11 NOTE — PROGRESS NOTES
Progress Note - Kednra Koch 68 y o  male MRN: [id number]    Unit/Bed#: 420-01 Encounter: [id number]      Assessment:  Patient Active Problem List   Diagnosis    Spinal stenosis of lumbar region    [facility]VD [facility]peripheral vascular [facility]) ([facility] [address] )    Essential hypertension    Hyperlipidemia    Type 2 diabetes mellitus with hyperglycemia ([facility] )    COPD (chronic obstructive pulmonary disease) (HCC)    Multiple renal cysts    Vitamin D insufficiency    Constipation    S/P CABG x 3    UTI (urinary tract infection) with pyuria    Hypokalemia    Acute cystitis    Abnormal chest x-ray    Renal cyst, left    Low serum prealbumin    Renal calculus, right    Candida UTI    Hypocalcemia    Intractable abdominal pain         Plan:  1)  Acute cystitis/UTI associated with a suprapubic catheter/Candida UTI:  Continue IV Ceftriaxone for a 7-day antibiotic course  I appreciate Urology's input  The leukocytosis has resolved  I appreciate Infectious Disease's input  Complete a 7-day antibiotic course per Infectious Disease recommendations  Continue Fluconazole 200 mg IV Qdaily with the repeat urine culture being positive for Candida albicans  PRN PO Tramadol for the suprapubic abdominal pain  2)  Hypokalemia:  Improved  Follow his potassium level and magnesium level  3)  Abnormal chest x-ray:  There is no evidence of a lung mass on CT scan of the chest     4)  Type 2 diabetes mellitus with hyperglycemia:  Continue Lantus at 35 Units SQ QHS  Continue Humalog at 10 Units TID with meals  Follow his blood glucose trend closely  5)  Right renal calculus/Multiple renal cysts:  He will need outpatient surveillance imaging of the renal cysts and for the right renal calculus  6)  Constipation:  Improved  Continue PO Colace for now  The patient continues to require inpatient hospitalization for IV antibiotics and IV fluconazole  Subjective:   Patient seen and examined    The patient's constipation has improved  The patient's suprapubic abdominal pain is slightly improved this AM     Objective:     Vitals: Blood pressure 122/53, pulse 71, temperature (!) 97 4 °F (36 3 °C), temperature source Temporal, resp  rate 18, height 5' 11" (1 803 m), weight 114 kg (250 lb 7 1 oz), SpO2 94 %  ,Body mass index is 34 93 kg/m²    Weight (last 2 days)     Date/Time   Weight    01/11/18 0600  114 (250 44)    01/10/18 0600  114 (250 66)    01/09/18 0534  113 (250 22)              Intake/Output Summary (Last 24 hours) at 01/11/18 1617  Last data filed at 01/11/18 1535   Gross per 24 hour   Intake              100 ml   Output             2550 ml   Net            -2450 ml       Physical Exam: General:  NAD, awake, alert, follows commands  HEENT:  NC/AT, mucous membranes dry  Neck:  Supple, No JVP elevation  CV:  + S1, + S2, RRR  Pulm:  Lung fields are CTA bilaterally, No wheezing, No rhonchi, No crackles  Abd:  Soft, + Suprapubic pain with palpation, Mild distension, Suprapubic catheter/tube in place, Bowel sounds positive and normoactive  Ext:  No clubbing/cyanosis/edema    Scheduled Meds:    amLODIPine 5 mg Oral Daily   aspirin 81 mg Oral Daily   atorvastatin 40 mg Oral Daily With Dinner   carvedilol 6 25 mg Oral BID With Meals   cefTRIAXone 1,000 mg Intravenous Q24H   docusate sodium 100 mg Oral BID   enoxaparin 40 mg Subcutaneous Daily   ferrous sulfate 325 mg Oral Daily With Breakfast   finasteride 5 mg Oral Daily   fluconazole 200 mg Intravenous Q24H   insulin glargine 35 Units Subcutaneous HS   insulin lispro 1-5 Units Subcutaneous HS   insulin lispro 1-6 Units Subcutaneous TID AC   insulin lispro 10 Units Subcutaneous TID With Meals   saccharomyces boulardii 250 mg Oral BID   tamsulosin 0 4 mg Oral Daily With Dinner   vancomycin 15 mg/kg Intravenous Q12H     Continuous Infusions:     PRN Meds:   acetaminophen    labetalol    polyethylene glycol    traMADol      Invasive Devices     Peripheral Intravenous Line Peripheral IV 01/10/18 Right Forearm 1 day          Drain            Suprapubic Catheter Non-latex 18 Fr  5 days                  Results from last 7 days  Lab Units 01/11/18  0613 01/08/18  0505 01/07/18  0525   WBC Thousand/uL 9 41 8 21 8 66   HEMOGLOBIN g/dL 13 9 13 7 13 7   HEMATOCRIT % 42 8 41 3 41 5   PLATELETS Thousands/uL 172 159 166   NEUTROS PCT % 69 62 64   MONOS PCT % 11 12 11         Results from last 7 days  Lab Units 01/11/18  0613 01/10/18  0510 01/09/18  0523 01/08/18  0505   SODIUM mmol/L 139 138 139 139   POTASSIUM mmol/L 3 9 3 5 3 6 3 5   CHLORIDE mmol/L 105 103 104 106   CO2 mmol/L 29 30 27 28   BUN mg/dL 9 9 11 8   CREATININE mg/dL 0 91 0 72 0 70 0 67   CALCIUM mg/dL 8 0* 7 6* 7 8* 7 8*   TOTAL PROTEIN g/dL 6 0* 5 6*  --  5 8*   BILIRUBIN TOTAL mg/dL 0 20 0 20  --  0 30   ALK PHOS U/L 84 94  --  91   ALT U/L 18 14  --  16   AST U/L 13 10  --  10   GLUCOSE RANDOM mg/dL 144* 289* 264* 253*       Lab Results   Component Value Date    TROPONINI 0 02 01/05/2018       Lab Results   Component Value Date    INR 1 07 09/26/2017    INR 0 97 08/17/2017    INR 0 95 08/09/2017    PROTIME 13 9 09/26/2017    PROTIME 12 8 08/17/2017    PROTIME 12 6 08/09/2017                 Lab, Imaging and other studies: I have personally reviewed pertinent reports      VTE Pharmacologic Prophylaxis: Enoxaparin (Lovenox)  VTE Mechanical Prophylaxis: sequential compression device

## 2018-01-12 LAB
GLUCOSE SERPL-MCNC: 216 MG/DL (ref 65–140)
GLUCOSE SERPL-MCNC: 221 MG/DL (ref 65–140)
GLUCOSE SERPL-MCNC: 266 MG/DL (ref 65–140)
GLUCOSE SERPL-MCNC: 300 MG/DL (ref 65–140)
VANCOMYCIN TROUGH SERPL-MCNC: 21.3 UG/ML (ref 10–20)

## 2018-01-12 PROCEDURE — 82948 REAGENT STRIP/BLOOD GLUCOSE: CPT

## 2018-01-12 PROCEDURE — 80202 ASSAY OF VANCOMYCIN: CPT | Performed by: HOSPITALIST

## 2018-01-12 RX ORDER — INSULIN GLARGINE 100 [IU]/ML
40 INJECTION, SOLUTION SUBCUTANEOUS
Status: DISCONTINUED | OUTPATIENT
Start: 2018-01-12 | End: 2018-01-14 | Stop reason: HOSPADM

## 2018-01-12 RX ADMIN — DOCUSATE SODIUM 100 MG: 100 CAPSULE, LIQUID FILLED ORAL at 09:47

## 2018-01-12 RX ADMIN — CARVEDILOL 6.25 MG: 3.12 TABLET, FILM COATED ORAL at 09:47

## 2018-01-12 RX ADMIN — Medication 325 MG: at 09:47

## 2018-01-12 RX ADMIN — ENOXAPARIN SODIUM 40 MG: 40 INJECTION SUBCUTANEOUS at 09:47

## 2018-01-12 RX ADMIN — ASPIRIN 81 MG CHEWABLE TABLET 81 MG: 81 TABLET CHEWABLE at 09:47

## 2018-01-12 RX ADMIN — CARVEDILOL 6.25 MG: 3.12 TABLET, FILM COATED ORAL at 16:28

## 2018-01-12 RX ADMIN — INSULIN GLARGINE 40 UNITS: 100 INJECTION, SOLUTION SUBCUTANEOUS at 21:34

## 2018-01-12 RX ADMIN — TAMSULOSIN HYDROCHLORIDE 0.4 MG: 0.4 CAPSULE ORAL at 16:28

## 2018-01-12 RX ADMIN — Medication 250 MG: at 17:38

## 2018-01-12 RX ADMIN — INSULIN LISPRO 3 UNITS: 100 INJECTION, SOLUTION INTRAVENOUS; SUBCUTANEOUS at 21:34

## 2018-01-12 RX ADMIN — Medication 250 MG: at 09:58

## 2018-01-12 RX ADMIN — INSULIN LISPRO 2 UNITS: 100 INJECTION, SOLUTION INTRAVENOUS; SUBCUTANEOUS at 09:58

## 2018-01-12 RX ADMIN — INSULIN LISPRO 2 UNITS: 100 INJECTION, SOLUTION INTRAVENOUS; SUBCUTANEOUS at 16:29

## 2018-01-12 RX ADMIN — AMLODIPINE BESYLATE 5 MG: 5 TABLET ORAL at 09:47

## 2018-01-12 RX ADMIN — FLUCONAZOLE 200 MG: 2 INJECTION INTRAVENOUS at 09:48

## 2018-01-12 RX ADMIN — DOCUSATE SODIUM 100 MG: 100 CAPSULE, LIQUID FILLED ORAL at 17:38

## 2018-01-12 RX ADMIN — INSULIN LISPRO 3 UNITS: 100 INJECTION, SOLUTION INTRAVENOUS; SUBCUTANEOUS at 12:21

## 2018-01-12 RX ADMIN — VANCOMYCIN HYDROCHLORIDE 1250 MG: 1 INJECTION, POWDER, LYOPHILIZED, FOR SOLUTION INTRAVENOUS at 00:47

## 2018-01-12 RX ADMIN — FINASTERIDE 5 MG: 5 TABLET, FILM COATED ORAL at 09:47

## 2018-01-12 RX ADMIN — ATORVASTATIN CALCIUM 40 MG: 40 TABLET, FILM COATED ORAL at 16:28

## 2018-01-12 NOTE — PROGRESS NOTES
Pt c/o urine draining around his suprapubic catheter and being soaked  Upon assessment, his gown and bed was soaked  There was 300ml of urine present in the bag (550ml was emptied at 1530)  Dr Mariscal Reasons made aware, advised to call urology  Called urology answering service  Answering service said they were paging the Dr on-call  Awaiting call back

## 2018-01-12 NOTE — PROGRESS NOTES
Did not receive call from on-call urologist  Pt has not had any drainage from around suprapubic catheter since earlier  Will continue to monitor

## 2018-01-12 NOTE — PROGRESS NOTES
Progress Note - Debbie Goss 68 y o  male MRN: [id number]    Unit/Bed#: 420-01 Encounter: [id number]      Assessment:  Patient Active Problem List   Diagnosis    Spinal stenosis of lumbar region    [facility]VD [facility]per[facility]) [facility] [address] [facility]    Essential hypertension    Hyperlipidemia    Type 2 [facility] mellitus with [facility]    COPD (chronic obstructive pulmonary disease) (HCC)    Multiple renal cysts    Vitamin D insufficiency    Constipation    S/P CABG x 3    UTI (urinary tract infection) with pyuria    Hypokalemia    Acute cystitis    Abnormal chest x-ray    Renal cyst, left    Low serum prealbumin    Renal calculus, right    Candida UTI    Hypocalcemia    Intractable abdominal pain         Plan:  1)  Acute cystitis/UTI associated with a suprapubic catheter/Candida UTI:  The patient completed a 7-day course of IV Ceftriaxone per Infectious Disease's recommendations  I appreciate Urology's input  The leukocytosis has resolved  I appreciate Infectious Disease's input  Continue Fluconazole 200 mg IV Qdaily with the repeat urine culture being positive for Candida albicans  PRN PO Tramadol for the suprapubic abdominal pain  Likely can transition to PO Fluconazole in the next 24-48 hours if the patient's suprapubic abdominal pain continues to improve  PT/OT  2)  Hypokalemia:  Improved  Follow his potassium level and magnesium level  3)  Abnormal chest x-ray:  There is no evidence of a lung mass on CT scan of the chest     4)  Type 2 diabetes mellitus with hyperglycemia:  Increase Lantus to 40 Units SQ QHS  Increase Humalog to 12 Units TID with meals  Follow his blood glucose trend closely  5)  Right renal calculus/Multiple renal cysts:  He will need outpatient surveillance imaging of the renal cysts and for the right renal calculus  6)  Constipation:  Improved  Continue PO Colace for now      The patient continues to require inpatient hospitalization for IV fluconazole  Subjective:   Patient seen and examined  The patient's suprapubic abdominal pain is slightly improved  Objective:     Vitals: Blood pressure (!) 186/65, pulse 63, temperature 97 8 °F (36 6 °C), temperature source Temporal, resp  rate 18, height 5' 11" (1 803 m), weight 114 kg (250 lb 14 1 oz), SpO2 93 %  ,Body mass index is 34 99 kg/m²    Weight (last 2 days)     Date/Time   Weight    01/12/18 0600  114 (250 88)    01/11/18 0600  114 (250 44)    01/10/18 0600  114 (250 66)              Intake/Output Summary (Last 24 hours) at 01/12/18 1422  Last data filed at 01/12/18 1300   Gross per 24 hour   Intake          1596 67 ml   Output             2900 ml   Net         -1303 33 ml       Physical Exam: General:  NAD, awake, alert, follows commands  HEENT:  NC/AT, mucous membranes dry  Neck:  Supple, No JVP elevation  CV:  + S1, + S2, RRR  Pulm:  Lung fields are CTA bilaterally, No wheezing, No rhonchi, No crackles  Abd:  Soft, + Persistent suprapubic pain with palpation, Suprapubic catheter/tube in place, Bowel sounds positive and normoactive  Ext:  No clubbing/cyanosis/edema    Scheduled Meds:    amLODIPine 5 mg Oral Daily   aspirin 81 mg Oral Daily   atorvastatin 40 mg Oral Daily With Dinner   carvedilol 6 25 mg Oral BID With Meals   docusate sodium 100 mg Oral BID   enoxaparin 40 mg Subcutaneous Daily   ferrous sulfate 325 mg Oral Daily With Breakfast   finasteride 5 mg Oral Daily   fluconazole 200 mg Intravenous Q24H   insulin glargine 40 Units Subcutaneous HS   insulin lispro 1-5 Units Subcutaneous HS   insulin lispro 1-6 Units Subcutaneous TID AC   insulin lispro 12 Units Subcutaneous TID With Meals   saccharomyces boulardii 250 mg Oral BID   tamsulosin 0 4 mg Oral Daily With Dinner     Continuous Infusions:     PRN Meds:   acetaminophen    labetalol    polyethylene glycol    traMADol      Invasive Devices     Peripheral Intravenous Line            Peripheral IV 01/12/18 Right Hand less than 1 day          Drain            Suprapubic Catheter Non-latex 18 Fr  6 days                  Results from last 7 days  Lab Units 01/11/18  0613 01/08/18  0505 01/07/18  0525   WBC Thousand/uL 9 41 8 21 8 66   HEMOGLOBIN g/dL 13 9 13 7 13 7   HEMATOCRIT % 42 8 41 3 41 5   PLATELETS Thousands/uL 172 159 166   NEUTROS PCT % 69 62 64   MONOS PCT % 11 12 11         Results from last 7 days  Lab Units 01/11/18  0613 01/10/18  0510 01/09/18  0523 01/08/18  0505   SODIUM mmol/L 139 138 139 139   POTASSIUM mmol/L 3 9 3 5 3 6 3 5   CHLORIDE mmol/L 105 103 104 106   CO2 mmol/L 29 30 27 28   BUN mg/dL 9 9 11 8   CREATININE mg/dL 0 91 0 72 0 70 0 67   CALCIUM mg/dL 8 0* 7 6* 7 8* 7 8*   TOTAL PROTEIN g/dL 6 0* 5 6*  --  5 8*   BILIRUBIN TOTAL mg/dL 0 20 0 20  --  0 30   ALK PHOS U/L 84 94  --  91   ALT U/L 18 14  --  16   AST U/L 13 10  --  10   GLUCOSE RANDOM mg/dL 144* 289* 264* 253*       Lab Results   Component Value Date    TROPONINI 0 02 01/05/2018       Lab Results   Component Value Date    INR 1 07 09/26/2017    INR 0 97 08/17/2017    INR 0 95 08/09/2017    PROTIME 13 9 09/26/2017    PROTIME 12 8 08/17/2017    PROTIME 12 6 08/09/2017                 Lab, Imaging and other studies: I have personally reviewed pertinent reports      VTE Pharmacologic Prophylaxis: Enoxaparin (Lovenox)  VTE Mechanical Prophylaxis: sequential compression device

## 2018-01-13 LAB
ALBUMIN SERPL BCP-MCNC: 2.6 G/DL (ref 3.5–5)
ALP SERPL-CCNC: 83 U/L (ref 46–116)
ALT SERPL W P-5'-P-CCNC: 19 U/L (ref 12–78)
ANION GAP SERPL CALCULATED.3IONS-SCNC: 3 MMOL/L (ref 4–13)
AST SERPL W P-5'-P-CCNC: 17 U/L (ref 5–45)
BASOPHILS # BLD AUTO: 0.05 THOUSANDS/ΜL (ref 0–0.1)
BASOPHILS NFR BLD AUTO: 1 % (ref 0–1)
BILIRUB SERPL-MCNC: 0.2 MG/DL (ref 0.2–1)
BUN SERPL-MCNC: 12 MG/DL (ref 5–25)
CA-I BLD-SCNC: 1.17 MMOL/L (ref 1.12–1.32)
CALCIUM SERPL-MCNC: 8 MG/DL (ref 8.3–10.1)
CHLORIDE SERPL-SCNC: 104 MMOL/L (ref 100–108)
CO2 SERPL-SCNC: 31 MMOL/L (ref 21–32)
CREAT SERPL-MCNC: 0.73 MG/DL (ref 0.6–1.3)
EOSINOPHIL # BLD AUTO: 0.41 THOUSAND/ΜL (ref 0–0.61)
EOSINOPHIL NFR BLD AUTO: 6 % (ref 0–6)
ERYTHROCYTE [DISTWIDTH] IN BLOOD BY AUTOMATED COUNT: 14 % (ref 11.6–15.1)
GFR SERPL CREATININE-BSD FRML MDRD: 89 ML/MIN/1.73SQ M
GLUCOSE SERPL-MCNC: 169 MG/DL (ref 65–140)
GLUCOSE SERPL-MCNC: 178 MG/DL (ref 65–140)
GLUCOSE SERPL-MCNC: 184 MG/DL (ref 65–140)
GLUCOSE SERPL-MCNC: 200 MG/DL (ref 65–140)
GLUCOSE SERPL-MCNC: 245 MG/DL (ref 65–140)
HCT VFR BLD AUTO: 40.9 % (ref 36.5–49.3)
HGB BLD-MCNC: 13.3 G/DL (ref 12–17)
LYMPHOCYTES # BLD AUTO: 1.59 THOUSANDS/ΜL (ref 0.6–4.47)
LYMPHOCYTES NFR BLD AUTO: 21 % (ref 14–44)
MAGNESIUM SERPL-MCNC: 2 MG/DL (ref 1.6–2.6)
MCH RBC QN AUTO: 28.6 PG (ref 26.8–34.3)
MCHC RBC AUTO-ENTMCNC: 32.5 G/DL (ref 31.4–37.4)
MCV RBC AUTO: 88 FL (ref 82–98)
MONOCYTES # BLD AUTO: 1.02 THOUSAND/ΜL (ref 0.17–1.22)
MONOCYTES NFR BLD AUTO: 14 % (ref 4–12)
NEUTROPHILS # BLD AUTO: 4.42 THOUSANDS/ΜL (ref 1.85–7.62)
NEUTS SEG NFR BLD AUTO: 58 % (ref 43–75)
PHOSPHATE SERPL-MCNC: 3.9 MG/DL (ref 2.3–4.1)
PLATELET # BLD AUTO: 142 THOUSANDS/UL (ref 149–390)
PMV BLD AUTO: 12.6 FL (ref 8.9–12.7)
POTASSIUM SERPL-SCNC: 3.8 MMOL/L (ref 3.5–5.3)
PROT SERPL-MCNC: 6.2 G/DL (ref 6.4–8.2)
RBC # BLD AUTO: 4.65 MILLION/UL (ref 3.88–5.62)
SODIUM SERPL-SCNC: 138 MMOL/L (ref 136–145)
WBC # BLD AUTO: 7.49 THOUSAND/UL (ref 4.31–10.16)

## 2018-01-13 PROCEDURE — 83735 ASSAY OF MAGNESIUM: CPT | Performed by: INTERNAL MEDICINE

## 2018-01-13 PROCEDURE — 80053 COMPREHEN METABOLIC PANEL: CPT | Performed by: INTERNAL MEDICINE

## 2018-01-13 PROCEDURE — G8979 MOBILITY GOAL STATUS: HCPCS | Performed by: PHYSICAL THERAPIST

## 2018-01-13 PROCEDURE — 85025 COMPLETE CBC W/AUTO DIFF WBC: CPT | Performed by: INTERNAL MEDICINE

## 2018-01-13 PROCEDURE — 97116 GAIT TRAINING THERAPY: CPT | Performed by: PHYSICAL THERAPIST

## 2018-01-13 PROCEDURE — 82948 REAGENT STRIP/BLOOD GLUCOSE: CPT

## 2018-01-13 PROCEDURE — G8978 MOBILITY CURRENT STATUS: HCPCS | Performed by: PHYSICAL THERAPIST

## 2018-01-13 PROCEDURE — 84100 ASSAY OF PHOSPHORUS: CPT | Performed by: INTERNAL MEDICINE

## 2018-01-13 PROCEDURE — 97162 PT EVAL MOD COMPLEX 30 MIN: CPT | Performed by: PHYSICAL THERAPIST

## 2018-01-13 PROCEDURE — 82330 ASSAY OF CALCIUM: CPT | Performed by: INTERNAL MEDICINE

## 2018-01-13 RX ORDER — FLUCONAZOLE 40 MG/ML
200 POWDER, FOR SUSPENSION ORAL DAILY
Status: DISCONTINUED | OUTPATIENT
Start: 2018-01-14 | End: 2018-01-14 | Stop reason: ALTCHOICE

## 2018-01-13 RX ADMIN — INSULIN GLARGINE 40 UNITS: 100 INJECTION, SOLUTION SUBCUTANEOUS at 21:17

## 2018-01-13 RX ADMIN — FLUCONAZOLE 200 MG: 2 INJECTION INTRAVENOUS at 09:58

## 2018-01-13 RX ADMIN — INSULIN LISPRO 1 UNITS: 100 INJECTION, SOLUTION INTRAVENOUS; SUBCUTANEOUS at 17:29

## 2018-01-13 RX ADMIN — DOCUSATE SODIUM 100 MG: 100 CAPSULE, LIQUID FILLED ORAL at 09:14

## 2018-01-13 RX ADMIN — Medication 250 MG: at 09:14

## 2018-01-13 RX ADMIN — ENOXAPARIN SODIUM 40 MG: 40 INJECTION SUBCUTANEOUS at 09:15

## 2018-01-13 RX ADMIN — CARVEDILOL 6.25 MG: 3.12 TABLET, FILM COATED ORAL at 16:25

## 2018-01-13 RX ADMIN — INSULIN LISPRO 1 UNITS: 100 INJECTION, SOLUTION INTRAVENOUS; SUBCUTANEOUS at 09:12

## 2018-01-13 RX ADMIN — CARVEDILOL 6.25 MG: 3.12 TABLET, FILM COATED ORAL at 09:14

## 2018-01-13 RX ADMIN — INSULIN LISPRO 2 UNITS: 100 INJECTION, SOLUTION INTRAVENOUS; SUBCUTANEOUS at 21:17

## 2018-01-13 RX ADMIN — Medication 325 MG: at 09:14

## 2018-01-13 RX ADMIN — Medication 250 MG: at 17:29

## 2018-01-13 RX ADMIN — ATORVASTATIN CALCIUM 40 MG: 40 TABLET, FILM COATED ORAL at 16:24

## 2018-01-13 RX ADMIN — DOCUSATE SODIUM 100 MG: 100 CAPSULE, LIQUID FILLED ORAL at 17:29

## 2018-01-13 RX ADMIN — TAMSULOSIN HYDROCHLORIDE 0.4 MG: 0.4 CAPSULE ORAL at 16:24

## 2018-01-13 RX ADMIN — FINASTERIDE 5 MG: 5 TABLET, FILM COATED ORAL at 09:14

## 2018-01-13 RX ADMIN — AMLODIPINE BESYLATE 5 MG: 5 TABLET ORAL at 09:14

## 2018-01-13 RX ADMIN — ASPIRIN 81 MG CHEWABLE TABLET 81 MG: 81 TABLET CHEWABLE at 09:14

## 2018-01-13 RX ADMIN — INSULIN LISPRO 2 UNITS: 100 INJECTION, SOLUTION INTRAVENOUS; SUBCUTANEOUS at 12:17

## 2018-01-13 NOTE — PROGRESS NOTES
[facility] Internal Medicine Progress Note  Patient: Yael Church 68 y o  male   MRN: [id number]  PCP: Jose Juan Cai MD  Unit/Bed#: 420-01 Encounter: [id number]  Date Of Visit: 18    Assessment:    Principal Problem:    Acute cystitis  Active Problems:    Spinal stenosis of lumbar region    PVD (peripheral vascular disease) ([facility] )    Essential hypertension    Hyperlipidemia    Type 2 diabetes mellitus with hyperglycemia (HCC)    COPD (chronic obstructive pulmonary disease) ([facility] )    Multiple renal cysts    Vitamin D insufficiency    S/P CABG x 3    UTI (urinary tract infection) with pyuria    Hypokalemia    Abnormal chest x-ray    Renal cyst, left    Low serum prealbumin    Renal calculus, right    Candida UTI    Hypocalcemia    Intractable abdominal pain      Plan:    · Changed to p o  Diflucan, discharge home tomorrow      VTE Pharmacologic Prophylaxis:   Pharmacologic: Enoxaparin (Lovenox)  Mechanical VTE Prophylaxis in Place: Yes    Current Length of Stay: 7 day(s)    Current Patient Status: Inpatient     Code Status: Level 1 - Full Code      Subjective:   No pain    Objective:     Vitals:   Temp (24hrs), Av 4 °F (36 9 °C), Min:98 1 °F (36 7 °C), Max:98 7 °F (37 1 °C)    HR:  [55-60] 55  Resp:  [18] 18  BP: (139-156)/(63-71) 146/65  SpO2:  [92 %-94 %] 94 %  Body mass index is 34 96 kg/m²  Input and Output Summary (last 24 hours): Intake/Output Summary (Last 24 hours) at 18 1401  Last data filed at 18 1240   Gross per 24 hour   Intake             1440 ml   Output             3800 ml   Net            -2360 ml       Physical Exam:     Physical Exam   Constitutional: He is oriented to person, place, and time  He appears well-developed and well-nourished  No distress  Pulmonary/Chest: Effort normal and breath sounds normal  No respiratory distress  Abdominal: Soft  Bowel sounds are normal  He exhibits no distension  There is no tenderness     Neurological: He is alert and oriented to person, place, and time  No cranial nerve deficit  Coordination normal    Skin: He is not diaphoretic  Nursing note and vitals reviewed  Additional Data:     Labs:      Results from last 7 days  Lab Units 01/13/18  0429   WBC Thousand/uL 7 49   HEMOGLOBIN g/dL 13 3   HEMATOCRIT % 40 9   PLATELETS Thousands/uL 142*   NEUTROS PCT % 58   LYMPHS PCT % 21   MONOS PCT % 14*   EOS PCT % 6       Results from last 7 days  Lab Units 01/13/18  0429   SODIUM mmol/L 138   POTASSIUM mmol/L 3 8   CHLORIDE mmol/L 104   CO2 mmol/L 31   BUN mg/dL 12   CREATININE mg/dL 0 73   CALCIUM mg/dL 8 0*   TOTAL PROTEIN g/dL 6 2*   BILIRUBIN TOTAL mg/dL 0 20   ALK PHOS U/L 83   ALT U/L 19   AST U/L 17   GLUCOSE RANDOM mg/dL 184*           * I Have Reviewed All Lab Data Listed Above  * Additional Pertinent Lab Tests Reviewed:  All [facility] Admission Reviewed        Recent Cultures (last 7 days):       Results from last 7 days  Lab Units 01/06/18  1856   URINE CULTURE  <10,000 cfu/ml Candida sp  presumptively albicans*       Last 24 Hours Medication List:     amLODIPine 5 mg Oral Daily   aspirin 81 mg Oral Daily   atorvastatin 40 mg Oral Daily With Dinner   carvedilol 6 25 mg Oral BID With Meals   docusate sodium 100 mg Oral BID   enoxaparin 40 mg Subcutaneous Daily   ferrous sulfate 325 mg Oral Daily With Breakfast   finasteride 5 mg Oral Daily   [START ON 1/14/2018] fluconazole 200 mg Oral Daily   insulin glargine 40 Units Subcutaneous HS   insulin lispro 1-5 Units Subcutaneous HS   insulin lispro 1-6 Units Subcutaneous TID AC   insulin lispro 12 Units Subcutaneous TID With Meals   saccharomyces boulardii 250 mg Oral BID   tamsulosin 0 4 mg Oral Daily With Dinner        Today, Patient Was Seen By: Zoila Arita DO

## 2018-01-13 NOTE — PHYSICAL THERAPY NOTE
PT EVALUATION      01/13/18 0915   Note Type   Note type Eval/Treat   Pain Assessment   Pain Assessment 0-10   Pain Score No Pain   Home Living   Type of [address] One level  (1 CHARLES)   Home Equipment Walker  (O2 concentrator)   Prior Function   Level of [place] Needs assistance with ADLs and functional mobility   Lives With [facility] Help From Other (Comment)  (Aide from [place] - 9-1, 5 days a week )   ADL Assistance Needs assistance  (Aide from [place] assists )   IADLs Needs assistance  (Aide from [place] assists   )   Restrictions/Precautions   Other Precautions Contact/isolation;O2;Telemetry; Fall Risk   General   Family/Caregiver Present No   Cognition   Overall Cognitive Status WFL   Arousal/Participation Cooperative   Orientation Level Oriented X4   Following Commands Follows all commands and directions without difficulty   RLE Assessment   RLE Assessment WFL  (Strength grossly 3+/5 t/o)   LLE Assessment   LLE Assessment WFL  (Strength grossly 3+ to 4-/5 t/o)   Light Touch   RLE Light Touch Grossly intact   LLE Light Touch Grossly intact   Bed Mobility   Supine to Sit (NT - pt seated EOB upon entering room)   Sit to Supine 4  Minimal assistance   Additional items Assist x 1;Bedrails   Transfers   Sit to Stand 4  Minimal assistance   Additional items Assist x 1;Bedrails;Verbal cues   Stand to Sit 4  Minimal assistance   Additional items Assist x 1;Bedrails;Verbal cues   Ambulation/Elevation   Gait Assistance (CGA)   Additional items Assist x 1   Assistive Device Rolling walker   Distance 20 feet   Balance   Static Sitting Good   Dynamic Sitting Fair +   Static Standing Fair +  (with RW)   Dynamic Standing Fair  (with RW)   Ambulatory Fair  (with RW)   Activity Tolerance   Activity Tolerance Patient limited by fatigue   Assessment   Prognosis Fair   Problem List Decreased strength;Decreased endurance; Impaired balance;Decreased mobility; Decreased safety awareness   Assessment The patient was admitted with diagnosis of acute cystitis  He is completing bed mobility and transfers with min A x 1 and verbal cues to improve safety  He ambulates in room 20 feet with RW with CGA x 1  No LOB noted with ambulation though he reports his legs feel fatigued  Patient returned to bed at end of session, HOB elevated and call bell within reach  SpO2 after ambulation was 97% on room air  He would benefit from continued PT to address deficits and improve function  He may benefit from course of STR vs  home care upon discharge pending progress in PT  Goals   LTG Expiration Date 01/27/18   Long Term Goal #1 Patient to complete bed mobility and transfers I with safe technique  Long Term Goal #2 Patient will ambulate with -200 feet with S and no LOB noted  Plan   Treatment/Interventions LE strengthening/ROM; Elevations; Therapeutic exercise;Patient/family training;Bed mobility;Gait training   PT Frequency (3-5 times/week)   The patient was back in bed at end of session, HOB elevated and call bell within reach  SCDs in place

## 2018-01-14 VITALS
HEIGHT: 71 IN | HEART RATE: 53 BPM | TEMPERATURE: 97.5 F | SYSTOLIC BLOOD PRESSURE: 132 MMHG | BODY MASS INDEX: 35.12 KG/M2 | OXYGEN SATURATION: 94 % | DIASTOLIC BLOOD PRESSURE: 62 MMHG | RESPIRATION RATE: 18 BRPM | WEIGHT: 250.88 LBS

## 2018-01-14 PROBLEM — R10.9 INTRACTABLE ABDOMINAL PAIN: Status: RESOLVED | Noted: 2018-01-10 | Resolved: 2018-01-14

## 2018-01-14 PROBLEM — N30.00 ACUTE CYSTITIS: Status: RESOLVED | Noted: 2018-01-06 | Resolved: 2018-01-14

## 2018-01-14 PROBLEM — E87.6 HYPOKALEMIA: Status: RESOLVED | Noted: 2018-01-06 | Resolved: 2018-01-14

## 2018-01-14 PROBLEM — E83.51 HYPOCALCEMIA: Status: RESOLVED | Noted: 2018-01-09 | Resolved: 2018-01-14

## 2018-01-14 PROBLEM — R93.89 ABNORMAL CHEST X-RAY: Status: RESOLVED | Noted: 2018-01-06 | Resolved: 2018-01-14

## 2018-01-14 LAB
GLUCOSE SERPL-MCNC: 201 MG/DL (ref 65–140)
GLUCOSE SERPL-MCNC: 220 MG/DL (ref 65–140)

## 2018-01-14 PROCEDURE — 82948 REAGENT STRIP/BLOOD GLUCOSE: CPT

## 2018-01-14 RX ORDER — SACCHAROMYCES BOULARDII 250 MG
250 CAPSULE ORAL 2 TIMES DAILY
Refills: 0
Start: 2018-01-14 | End: 2018-06-06

## 2018-01-14 RX ORDER — POLYETHYLENE GLYCOL 3350 17 G/17G
17 POWDER, FOR SOLUTION ORAL DAILY PRN
Qty: 14 EACH | Refills: 0
Start: 2018-01-14 | End: 2018-06-06

## 2018-01-14 RX ORDER — INSULIN GLARGINE 100 [IU]/ML
40 INJECTION, SOLUTION SUBCUTANEOUS
Qty: 10 ML | Refills: 0 | Status: SHIPPED | OUTPATIENT
Start: 2018-01-14 | End: 2019-01-01 | Stop reason: HOSPADM

## 2018-01-14 RX ORDER — FLUCONAZOLE 100 MG/1
200 TABLET ORAL DAILY
Status: DISCONTINUED | OUTPATIENT
Start: 2018-01-14 | End: 2018-01-14 | Stop reason: HOSPADM

## 2018-01-14 RX ORDER — TRAMADOL HYDROCHLORIDE 50 MG/1
50 TABLET ORAL EVERY 6 HOURS PRN
Qty: 30 TABLET | Refills: 0
Start: 2018-01-14 | End: 2018-01-24

## 2018-01-14 RX ORDER — FLUCONAZOLE 200 MG/1
200 TABLET ORAL DAILY
Refills: 0
Start: 2018-01-14 | End: 2018-01-21

## 2018-01-14 RX ADMIN — FINASTERIDE 5 MG: 5 TABLET, FILM COATED ORAL at 08:15

## 2018-01-14 RX ADMIN — Medication 250 MG: at 08:16

## 2018-01-14 RX ADMIN — INSULIN LISPRO 2 UNITS: 100 INJECTION, SOLUTION INTRAVENOUS; SUBCUTANEOUS at 12:18

## 2018-01-14 RX ADMIN — Medication 325 MG: at 08:15

## 2018-01-14 RX ADMIN — AMLODIPINE BESYLATE 5 MG: 5 TABLET ORAL at 08:13

## 2018-01-14 RX ADMIN — ENOXAPARIN SODIUM 40 MG: 40 INJECTION SUBCUTANEOUS at 08:14

## 2018-01-14 RX ADMIN — DOCUSATE SODIUM 100 MG: 100 CAPSULE, LIQUID FILLED ORAL at 08:14

## 2018-01-14 RX ADMIN — FLUCONAZOLE 200 MG: 100 TABLET ORAL at 12:18

## 2018-01-14 RX ADMIN — INSULIN LISPRO 2 UNITS: 100 INJECTION, SOLUTION INTRAVENOUS; SUBCUTANEOUS at 08:15

## 2018-01-14 RX ADMIN — ASPIRIN 81 MG CHEWABLE TABLET 81 MG: 81 TABLET CHEWABLE at 08:13

## 2018-01-14 NOTE — DISCHARGE SUMMARY
Discharge Summary - [address] [facility] Medicine    Patient Information: Deborah Rothman 68 y o  male MRN: [id number]  Unit/Bed#: 420-01 Encounter: [id number]    Discharging Physician / Practitioner: Surijt Marx DO  PCP: Ruddy Parisi MD  Admission Date: 1/5/2018  Discharge Date: 01/14/18    Reason for Admission: Mr Claudell Gathers is a pleasant 68year old male who comes into the hospital because he feels a lot of pressure in his bladder and feels like the keflex he has been taking is not working to improve his UTI symptoms  He denies fevers, chills, diaphoresis or increased back pain (has chronic back pain)  He felt he needed IV antibiotics  His catheter was changed in the ED  Review of Systems   Constitutional: Positive for appetite change and unexpected weight change  Down 20 pounds in 3 months due to decreased appetite   Gastrointestinal: Positive for constipation  Musculoskeletal: Positive for arthralgias, back pain and gait problem  Neurological: Positive for weakness  All other systems reviewed and are negative        Discharge Diagnoses:     Principal Problem (Resolved):    Acute cystitis  Active Problems:    Spinal stenosis of lumbar region    PVD (peripheral vascular disease) ([facility] [address] [facility] )    Essential hypertension    Hyperlipidemia    Type 2 diabetes mellitus with hyperglycemia (HCC)    COPD [facility]ch[facility] obstructive [facility]) ([facility])    Multiple renal cysts    Vitamin D insufficiency    S/P CABG x 3    UTI (urinary tract infection) with pyuria    Renal cyst, left    Low serum prealbumin    Renal calculus, right    Candida UTI  Resolved Problems:    DM (diabetes mellitus), type 2, uncontrolled (HCC)    Hypokalemia    Abnormal chest x-ray    Hypocalcemia    Intractable abdominal pain      Consultations During Hospital Stay:  · Urology  · Infectious Disease      Hospital Course:     Deborah Rothman is a 68 y o  male patient who originally presented to the hospital on 1/5/2018 due to HPI as noted above, patient was treated with 7 days of IV ceftriaxone with gradual improvement in urinary symptoms  Patient has completed 7 days of IV ceftriaxone which was empiric therapy for suspected bacterial UTI, patient was also treated with Diflucan due to candiduria  Patient needs outpatient follow-up with both Infectious Disease for recurrent UTIs and continued follow-up with Urology for suprapubic catheter care  Patient is significantly deconditioned, he is being discharged to short-term nursing facility for ongoing physical therapy prior to return home  Condition at Discharge: stable     Discharge Day Visit / Exam:     Subjective:  No pain  Vitals: Blood Pressure: 132/62 (01/14/18 0727)  Pulse: (!) 53 (01/14/18 0727)  Temperature: 97 5 °F (36 4 °C) (01/14/18 0727)  Temp Source: Temporal (01/14/18 0727)  Respirations: 18 (01/14/18 0727)  Height: 5' 11" (180 3 cm) (01/05/18 1949)  Weight - Scale: 114 kg (250 lb 14 1 oz) (01/14/18 0550)  SpO2: 94 % (01/14/18 0727)  Exam:   Physical Exam  No acute distress  Discharge instructions/Information to patient and family:   See after visit summary for information provided to patient and family  Provisions for Follow-Up Care:  See after visit summary for information related to follow-up care and any pertinent home health orders  Disposition:     Other: SNF      Planned Readmission: no     Discharge Statement:  I spent 20 minutes discharging the patient  This time was spent on the day of discharge  I had direct contact with the patient on the day of discharge  Greater than 50% of the total time was spent examining patient, answering all patient questions, arranging and discussing plan of care with patient as well as directly providing post-discharge instructions  Additional time then spent on discharge activities  Discharge Medications:  See after visit summary for reconciled discharge medications provided to patient and family        ** Please Note: This note has been constructed using a voice recognition system **

## 2018-01-15 NOTE — SOCIAL WORK
The patient is d/c to home today and he states that he cannot go home as his wife cannot take care of him she has a "brain Bleed" I spoke with the PT and she states that he did well would benefit from skilled but can go home  He wants a referral to the fifth floor and there was no bed avail at this time he is agreeable to go to the rehab at [facility] and a referral was made

## 2018-01-15 NOTE — PROCEDURES
Assessment    1  Recurrent urinary tract infection (599 0) (N39 0)   2  Urinary retention (788 20) (R33 9)   3  Bladder outlet obstruction (596 0) (N32 0)    Plan  Bladder outlet obstruction    · *1- [facility] INTERVENTIONAL RADIOLOGY Co-Management  Suprapubic tube placement    Amikacin 1 g prior  Status: Hold For - Scheduling  Requested for: [id number]   Ordered; For: Bladder outlet obstruction; Ordered By: Gerardo Quintana Performed:  Due: [id number]  Care Summary provided  : Yes   · Follow-up visit in 2 months Evaluation and Treatment  Follow-up  Status: Hold For -  Scheduling  Requested for: [id number]   Ordered; For: Bladder outlet obstruction; Ordered By: Gerardo Quintana Performed:  Due: [id number]  Recurrent urinary tract infection    · Sulfamethoxazole-Trimethoprim 800-160 MG Oral Tablet (Bactrim DS); TAKE 1  TABLET TWICE DAILY   Rx By: Gerardo Quintana; Dispense: 3 Days ; #:6 Tablet; Refill: 0; For: Recurrent urinary tract infection; KEKE = N; Sent To: [address] [facility]    Discussion/Summary  Discussion Summary:   I've recommended that the Catheter that we placed today be maintained given the patient's history of retention and chronic infections  I will start the patient on Bactrim given susceptibility's from his last documented infection  I strongly recommended to the patient at since he is already on Proscar and Flomax and continues to fail these medications, we consider a more permanent option for bladder outlet drainage  I recommended to the patient in interventional radiology suprapubic tube placement  We will order that procedure today  The patient's indwelling urethral catheter can be maintained until this suprapubic tube has been placed  I will see the patient back 6 weeks after suprapubic tube placement  We will determine whether this is a short-term option for the patient or a more permanent option based on how he does after the procedure  I will see him back at that time   The patient should be monitored for infectious signs or symptoms  The Bactrim should be given for 3 days  He should continue on the Proscar and Flomax  History of Present Illness  HPI: 51-year-old male who is presented to the emergency room in [facility] multiple occasions with urinary retention and chronic urinary tract infections with providential, Proteus and East UTIs  The patient has return on multiple occasions and so the medical services contacted me to plan a cystoscopy to value the patient's outlet  Patient had a prior appointment but this was canceled by his facility  He presents today for his initial evaluation and cystoscopy  The patient has been on intravenous amikacin through a PICC line with this completed in early September  He does not have a Ozuna catheter at present  Review of Systems  ROS Reviewed:   ROS reviewed  Surgical History  Surgical History Reviewed: The surgical history was reviewed and updated today  Family History  Family History Reviewed: The family history was reviewed and updated today  Social History  Social History Reviewed: The social history was reviewed and updated today  Current Meds  Medication List Reviewed: The medication list was reviewed and updated today  Physical Exam    Constitutional   General appearance: No acute distress, well appearing and well nourished  Pulmonary   Respiratory effort: Abnormal   Marcated respiratory effort  Cardiovascular   Examination of extremities for edema and/or varicosities: Abnormal   Diffuse peripheral edema  Abdomen   Abdomen: Abnormal   Markedly obese abdomen with large pannus  Genitourinary   Scrotum contents: Normal size, no masses  Epididymis: Normal, no masses  Testes: Normal testes, no masses  Urethral meatus: Normal, no lesions  Penis: Abnormal   Buried penis  Musculoskeletal   Gait and station: Abnormal   [facility] bowel     Skin   Skin and subcutaneous tissue: Normal without rashes or lesions  Results/Data  Diagnostic Studies Reviewed Urology:   U/S Review BLADDER ULTRASOUND  Â   INDICATION: 80-year-old male with urinary tract infection frequent urination  Â   COMPARISON: None  Â   TECHNIQUE: Ultrasound of the bladder was performed with a curvilinear transducer utilizing volumetric sweeps and still imaging techniques  Â   FINDINGS:  Â   BLADDER:   Normally distended  A relatively avascular lobular mass at the bladder base measures 5 2 x 3 6 cm for which cystoscopy recommended to exclude clot material, chronic cystitis, or neoplasia  Â   Pre-void volume = 243 cc   Â   Post-void volume = 194 cc  Â   IMPRESSION:  Â   Moderate post void residual urine volume  Estimated volume is 194 cc  Â   Avascular lobular mass at the bladder base measuring 5 2 x 3 6 cm  Cystoscopy recommended  Lab Studies Reviewed: Culture     8978-9581 cfu/ml Proteus species    >100,000 cfu/ml Candida sp  presumptively albicans  This organism has been edited  The previous result was Yeast on 8/10/2017 at 1101 EDT      Specimen Collected: 08/08/17 14:04      Culture     >100,000 cfu/ml Providencia stuartii    Susceptibility        Providencia stuartii    BRISA    Amikacin ($$) <=16 ug/ml"><=16 ug/ml Susceptible    Amoxicillin + Clavulanate >16/8 ug/ml Resistant    Ampicillin ($$) >16 00 ug/ml Resistant    Ampicillin + Sulbactam ($) 16/8 ug/ml Intermediate    Aztreonam ($$$)  <=8 ug/ml"><=8 ug/ml Susceptible    Cefazolin ($) >16 00 ug/ml Resistant    Cefotaxime ($) <=2 ug/ml"><=2 ug/ml Susceptible    Ceftazidime ($$) <=1 ug/ml"><=1 ug/ml Susceptible    Ceftriaxone ($$) <=8 00 ug/ml"><=8 00 ug/ml Susceptible    Cefuroxime ($$) 16 ug/ml Intermediate    Ciprofloxacin ($)  >2 00 ug/ml Resistant    Ertapenem ($$$) <=2 0 ug/ml"><=2 0 ug/ml Susceptible    Gentamicin ($$) 8 ug/ml Resistant    Imipenem <=4 ug/ml"><=4 ug/ml Susceptible    Levofloxacin ($) >4 00 ug/ml Resistant    Meropenem ($$) <=4 00 ug/ml"><=4 00 ug/ml Susceptible    Nitrofurantoin >64 ug/ml Resistant    Piperacillin + Tazobactam ($$$) <=16 ug/ml"><=16 ug/ml Susceptible    Tetracycline >8 ug/ml Resistant    Tobramycin ($) 8 ug/ml Resistant    Trimethoprim + Sulfamethoxazole ($$$) <=2/38 ug/ml"><=2/38 ug/ml Susceptible           Specimen Collected: 08/03/17 14:47 Last Resulted: 08/05/17 10:25                    Procedure  Cystoscopy procedure note:    Risk and benefits of flexible cystoscopy were discussed  Informed consent was obtained  Urine dip was adequate for cystoscopy  The patient was placed in the supine position  His genitalia was prepped with Betadine and draped in a sterile fashion  Viscous 2% lidocaine jelly was instilled into the urethra and allowed dwell time for local anesthesia  Flexible cystoscopy was then performed using a 16F scope  The distal urethra was normal in course and caliber  The prostatic urethra was seen to have bilateral hypertrophy with an elevated bladder neck and an intravesical component  The urine was cloudy and particulate  Once inside the bladder, it was carefully inspected in a 360 degree fashion  There was no evidence of mucosal abnormalities, lesions, diverticula or stones  Both ureteral orifices in their orthotopic location were visualized with clear efflux of urine  Retroflexion for evaluation of the bladder neck demonstrated a large intravesical component  Overall this was a cystoscopy positive for trilobar hypertrophy of the prostate with elevated bladder neck  The patient was not able to void after the fact  Using sterile technique, an 25 [place] coude catheter was placed with return of 200 mL of clear concentrated urine   This catheter was left in place      Signatures   Electronically signed by : ZAHEER Burr ; Sep 12 2017  8:41AM EST                       (Author)

## 2018-01-15 NOTE — SOCIAL WORK
The patient was d/c to the [facility] and I spoke with Lazaro Vigil and she accepted the patient he went via [facility] w/c

## 2018-01-22 VITALS — SYSTOLIC BLOOD PRESSURE: 150 MMHG | RESPIRATION RATE: 20 BRPM | DIASTOLIC BLOOD PRESSURE: 90 MMHG | HEART RATE: 84 BPM

## 2018-01-23 NOTE — MISCELLANEOUS
Message  Pt called requesting antibiotics for possible UTI  Pt s/p SPT placement by IR 9/26/2017  Initial change of SPT performed on 12/28/2017  Pt c/o weakness for one month, states "I'm sick" and " I have infection down there " Pt denies any fever/chills  Pt oriented  Attempted to explain to pt with SPT urine will never be clean  Instructed pt to increase water intake and add cranberry tablets to his medicine  Pt upset antibiotics were not prescribed and hung up the phone  Active Problems    1  Benign essential hypertension (401 1) (I10)   2  Bladder outlet obstruction (596 0) (N32 0)   3  CAD (coronary artery disease) (414 00) (I25 10)   4  Chronic obstructive pulmonary disease, unspecified COPD type (496) (J44 9)   5  Hyperlipidemia, unspecified hyperlipidemia type (272 4) (E78 5)   6  Recurrent urinary tract infection (599 0) (N39 0)   7  Type 2 diabetes mellitus with complication (148 25) (D00 3)   8  Urinary retention (788 20) (R33 9)    Current Meds   1  Acetaminophen 325 MG Oral Tablet; Therapy: (Recorded:12Sep2017) to Recorded   2  Almacone -336-66 MG/5ML SUSP; Therapy: (Recorded:12Sep2017) to Recorded   3  Amikacin Sulfate 1 GM/4ML Injection Solution; Therapy: (Recorded:12Sep2017) to Recorded   4  AmLODIPine Besylate 5 MG Oral Tablet; Therapy: (Recorded:12Sep2017) to Recorded   5  Aspirin 81 MG Oral Tablet Chewable; Therapy: (Recorded:12Sep2017) to Recorded   6  Bisacodyl EC 5 MG Oral Tablet Delayed Release; Therapy: (Recorded:12Sep2017) to Recorded   7  Carvedilol 6 25 MG Oral Tablet; Therapy: (Recorded:12Sep2017) to Recorded   8  Culturelle Oral Capsule; Therapy: (Recorded:12Sep2017) to Recorded   9  Diabetic Siltussin-DM Max St  MG/5ML Oral Liquid; Therapy: (Recorded:12Sep2017) to Recorded   10   MG Oral Capsule; Therapy: (Recorded:12Sep2017) to Recorded   11  Famotidine 20 MG Oral Tablet; Therapy: (Recorded:12Sep2017) to Recorded   12   Finasteride 5 MG Oral Tablet; Therapy: (Recorded:12Sep2017) to Recorded   13  HumaLOG 100 UNIT/ML Subcutaneous Solution; Therapy: (Recorded:12Sep2017) to Recorded   14  Lantus 100 UNIT/ML Subcutaneous Solution; Therapy: (Recorded:12Sep2017) to Recorded   15  Lasix 20 MG Oral Tablet (Furosemide); Therapy: (Recorded:12Sep2017) to Recorded   16  Loperamide HCl 2 MG TABS; Therapy: (Recorded:12Sep2017) to Recorded   17  LORazepam 0 5 MG Oral Tablet; Therapy: (Recorded:12Sep2017) to Recorded   18  Metoprolol Tartrate 25 MG Oral Tablet; Therapy: (Recorded:12Sep2017) to Recorded   19  Polyethylene Glycol 3350 Oral Packet; Therapy: (Recorded:12Sep2017) to Recorded   20  Potassium Chloride ER 20 MEQ Oral Tablet Extended Release; Therapy: (Recorded:12Sep2017) to Recorded   21  Promethazine HCl 25 MG/ML Injection Solution; Therapy: (Recorded:12Sep2017) to Recorded   22  Tamsulosin HCl - 0 4 MG Oral Capsule; Therapy: (Recorded:12Sep2017) to Recorded    Allergies    1   Penicillins    Signatures   Electronically signed by : Khai Evans, ; Dec 27 2017  1:18PM EST                       (Author)

## 2018-01-23 NOTE — MISCELLANEOUS
Message   Recorded as Task   Date: 01/02/2018 11:57 AM, Created By: Negra Salomon   Task Name: Care Coordination   Assigned To: Misti Harmon   Regarding Patient: KASEY OLEA, Status: Active   Comment:    Negra Salomon - 02 Jan 2018 11:57 AM     TASK CREATED  spt change on 1/3/18 cancelled and not rescheduled  Misti Harmon - 03 Jan 2018 10:35 AM     TASK EDITED  Spoke with pt's dtr, Ilda Malik, christina r/s of SPT change  Pt seen at [facility] ER 12/31/2017 for malfunctioning SPT  Pt experiencing penile leakage with burning  ER prescribed Keflex for pt  UC not done just micro  Pt's glucose in  and UA showed > 1000 glucose  Discussed with dtr pt's elevated BS and glycosuria  Dtr states pt checks his BS several times a day  Pt r/s for 1/11/2018 for SPT change  Dtr aware  Active Problems   1  Benign essential hypertension (401 1) (I10)  2  Bladder outlet obstruction (596 0) (N32 0)  3  CAD (coronary artery disease) (414 00) (I25 10)  4  Chronic obstructive pulmonary disease, unspecified COPD type (496) (J44 9)  5  Hyperlipidemia, unspecified hyperlipidemia type (272 4) (E78 5)  6  Recurrent urinary tract infection (599 0) (N39 0)  7  Type 2 diabetes mellitus with complication (964 83) (A92 7)  8  Urinary retention (788 20) (R33 9)    Current Meds  1  Acetaminophen 325 MG Oral Tablet; Therapy: (Recorded:12Sep2017) to Recorded  2  Almacone -053-17 MG/5ML SUSP; Therapy: (Recorded:12Sep2017) to Recorded  3  Amikacin Sulfate 1 GM/4ML Injection Solution; Therapy: (Recorded:12Sep2017) to Recorded  4  AmLODIPine Besylate 5 MG Oral Tablet; Therapy: (Recorded:12Sep2017) to Recorded  5  Aspirin 81 MG Oral Tablet Chewable; Therapy: (Recorded:12Sep2017) to Recorded  6  Bisacodyl EC 5 MG Oral Tablet Delayed Release; Therapy: (Recorded:12Sep2017) to Recorded  7  Carvedilol 6 25 MG Oral Tablet; Therapy: (Recorded:12Sep2017) to Recorded  8  Culturelle Oral Capsule;    Therapy: (Recorded:12Sep2017) to Recorded  9  Diabetic Siltussin-DM Max St  MG/5ML Oral Liquid; Therapy: (Recorded:12Sep2017) to Recorded  10   MG Oral Capsule; Therapy: (Recorded:12Sep2017) to Recorded  11  Famotidine 20 MG Oral Tablet; Therapy: (Recorded:12Sep2017) to Recorded  12  Finasteride 5 MG Oral Tablet; Therapy: (Recorded:12Sep2017) to Recorded  13  HumaLOG 100 UNIT/ML Subcutaneous Solution; Therapy: (Recorded:12Sep2017) to Recorded  14  Lantus 100 UNIT/ML Subcutaneous Solution; Therapy: (Recorded:12Sep2017) to Recorded  15  Lasix 20 MG Oral Tablet (Furosemide); Therapy: (Recorded:12Sep2017) to Recorded  16  Loperamide HCl 2 MG TABS; Therapy: (Recorded:12Sep2017) to Recorded  17  LORazepam 0 5 MG Oral Tablet; Therapy: (Recorded:12Sep2017) to Recorded  18  Metoprolol Tartrate 25 MG Oral Tablet; Therapy: (Recorded:12Sep2017) to Recorded  19  Polyethylene Glycol 3350 Oral Packet; Therapy: (Recorded:12Sep2017) to Recorded  20  Potassium Chloride ER 20 MEQ Oral Tablet Extended Release; Therapy: (Recorded:12Sep2017) to Recorded  21  Promethazine HCl 25 MG/ML Injection Solution; Therapy: (Recorded:12Sep2017) to Recorded  22  Tamsulosin HCl - 0 4 MG Oral Capsule; Therapy: (Recorded:12Sep2017) to Recorded    Allergies   1   Penicillins    Signatures   Electronically signed by : Marcelle Magallanes, ; Josse  3 2018 10:37AM EST                       (Author)

## 2018-01-25 RX ORDER — FINASTERIDE 5 MG/1
TABLET, FILM COATED ORAL
COMMUNITY
End: 2018-02-13

## 2018-01-25 RX ORDER — FERROUS SULFATE 325(65) MG
325 TABLET ORAL
COMMUNITY
End: 2018-01-29 | Stop reason: SDUPTHER

## 2018-01-25 RX ORDER — METOPROLOL TARTRATE 50 MG/1
50 TABLET, FILM COATED ORAL
COMMUNITY
Start: 2016-11-30 | End: 2018-06-06

## 2018-01-25 RX ORDER — TAMSULOSIN HYDROCHLORIDE 0.4 MG/1
CAPSULE ORAL
COMMUNITY
End: 2018-01-29 | Stop reason: SDUPTHER

## 2018-01-25 RX ORDER — POTASSIUM CHLORIDE 1500 MG/1
TABLET, FILM COATED, EXTENDED RELEASE ORAL
COMMUNITY
End: 2019-01-01

## 2018-01-25 RX ORDER — ALUMINA, MAGNESIA, AND SIMETHICONE 2400; 2400; 240 MG/30ML; MG/30ML; MG/30ML
SUSPENSION ORAL
COMMUNITY
End: 2018-06-06

## 2018-01-25 RX ORDER — LORAZEPAM 0.5 MG/1
TABLET ORAL
COMMUNITY
End: 2018-01-01 | Stop reason: ALTCHOICE

## 2018-01-25 RX ORDER — AMLODIPINE BESYLATE 10 MG/1
10 TABLET ORAL
COMMUNITY
Start: 2016-11-28 | End: 2019-01-01 | Stop reason: HOSPADM

## 2018-01-25 RX ORDER — DOCUSATE SODIUM 100 MG/1
CAPSULE, LIQUID FILLED ORAL
COMMUNITY
End: 2018-01-29 | Stop reason: SDUPTHER

## 2018-01-25 RX ORDER — LACTOBACILLUS RHAMNOSUS GG 10B CELL
1 CAPSULE ORAL
COMMUNITY
Start: 2017-02-08 | End: 2018-06-06

## 2018-01-25 RX ORDER — ASPIRIN 81 MG/1
TABLET, CHEWABLE ORAL
COMMUNITY
End: 2018-06-06

## 2018-01-25 RX ORDER — FAMOTIDINE 20 MG/1
20 TABLET, FILM COATED ORAL
COMMUNITY
Start: 2016-11-28 | End: 2018-06-06

## 2018-01-25 RX ORDER — FLUCONAZOLE 200 MG/1
200 TABLET ORAL
COMMUNITY
Start: 2017-06-12 | End: 2018-01-29 | Stop reason: ALTCHOICE

## 2018-01-25 RX ORDER — LOPERAMIDE HYDROCHLORIDE 2 MG/1
TABLET ORAL
COMMUNITY
End: 2018-06-06

## 2018-01-25 RX ORDER — FUROSEMIDE 20 MG/1
TABLET ORAL
COMMUNITY
End: 2018-06-06

## 2018-01-25 RX ORDER — AMIKACIN SULFATE 250 MG/ML
INJECTION, SOLUTION INTRAMUSCULAR; INTRAVENOUS
COMMUNITY
End: 2018-01-29

## 2018-01-25 RX ORDER — AMLODIPINE BESYLATE 5 MG/1
TABLET ORAL
COMMUNITY
End: 2018-01-29 | Stop reason: SDUPTHER

## 2018-01-25 RX ORDER — PROMETHAZINE HYDROCHLORIDE 25 MG/ML
INJECTION, SOLUTION INTRAMUSCULAR; INTRAVENOUS
COMMUNITY
End: 2018-06-06

## 2018-01-25 RX ORDER — CARVEDILOL 6.25 MG/1
TABLET ORAL
COMMUNITY
End: 2018-06-06

## 2018-01-25 RX ORDER — TIZANIDINE 2 MG/1
4 TABLET ORAL
COMMUNITY
Start: 2017-03-05 | End: 2018-06-06

## 2018-01-25 RX ORDER — INSULIN GLARGINE 100 [IU]/ML
40 INJECTION, SOLUTION SUBCUTANEOUS
COMMUNITY
Start: 2017-02-08 | End: 2018-06-06

## 2018-01-25 RX ORDER — POTASSIUM CHLORIDE 750 MG/1
40 TABLET, EXTENDED RELEASE ORAL
COMMUNITY
Start: 2016-11-30 | End: 2018-06-06

## 2018-01-25 RX ORDER — SULFAMETHOXAZOLE AND TRIMETHOPRIM 800; 160 MG/1; MG/1
1 TABLET ORAL
COMMUNITY
Start: 2017-06-20 | End: 2018-01-29 | Stop reason: ALTCHOICE

## 2018-01-29 ENCOUNTER — OFFICE VISIT (OUTPATIENT)
Dept: INFECTIOUS DISEASES | Facility: HOSPITAL | Age: 78
End: 2018-01-29
Payer: MEDICARE

## 2018-01-29 VITALS
HEART RATE: 59 BPM | SYSTOLIC BLOOD PRESSURE: 144 MMHG | BODY MASS INDEX: 34 KG/M2 | TEMPERATURE: 97.8 F | HEIGHT: 72 IN | WEIGHT: 251 LBS | DIASTOLIC BLOOD PRESSURE: 63 MMHG

## 2018-01-29 DIAGNOSIS — N28.1 RENAL CYST, LEFT: ICD-10-CM

## 2018-01-29 DIAGNOSIS — N20.0 RENAL CALCULUS, RIGHT: ICD-10-CM

## 2018-01-29 DIAGNOSIS — E11.65 TYPE 2 DIABETES MELLITUS WITH HYPERGLYCEMIA, WITH LONG-TERM CURRENT USE OF INSULIN (HCC): ICD-10-CM

## 2018-01-29 DIAGNOSIS — Z79.4 TYPE 2 DIABETES MELLITUS WITH HYPERGLYCEMIA, WITH LONG-TERM CURRENT USE OF INSULIN (HCC): ICD-10-CM

## 2018-01-29 DIAGNOSIS — B37.49 CANDIDURIA: ICD-10-CM

## 2018-01-29 DIAGNOSIS — K59.00 CONSTIPATION, UNSPECIFIED CONSTIPATION TYPE: ICD-10-CM

## 2018-01-29 DIAGNOSIS — N39.0 RECURRENT UTI (URINARY TRACT INFECTION): Primary | ICD-10-CM

## 2018-01-29 PROCEDURE — 99215 OFFICE O/P EST HI 40 MIN: CPT | Performed by: INTERNAL MEDICINE

## 2018-01-29 RX ORDER — OSELTAMIVIR PHOSPHATE 75 MG/1
75 CAPSULE ORAL EVERY 12 HOURS SCHEDULED
COMMUNITY
End: 2018-06-06

## 2018-01-29 NOTE — PROGRESS NOTES
Hospital Follow Up - Infectious Disease   Mac Martinez 68 y o  male MRN: [id number]  Unit/Bed#:  Encounter: [id number]      IMPRESSION & RECOMMENDATIONS:     Assessment:  68year old male with history of benign prostate hypertrophy, bladder outlet obstruction and chronic constipation causing recurrent urinary tract infections s/p suprapubic catheterization 9/28/17   Recently admitted for obstructed urinary catheter and acute cystitis  Patient also had candiduria- suspect it to be colonization rather than true infection  --Urine bacterial culture not diagnostic but patient was on antibiotics prior to culture being taken  --Suspect obstruction from enlarged prostate and/or mechanical catheter blockage and/or constipation to be a cause of recurrent UTI     Recommendations:  -Follow up with Urology for suprapubic catheter care, should be exchanged monthly, next due around 2/5  -Recommend checking urine culture only if symptomatic  -Recommend local skin care around [facility] site  -Recommend intensive bowel regime  -FU in ID clinic after fu with Urology    HISTORY OF PRESENT ILLNESS:  Reason for Consult: Recurrent UTI  HPI: Mac Martinez is a 68y o  year old male who presented to [address] [place] [address] from 1/5-1/14 with cystitis  Patient has a history of benign prostate hypertrophy and bladder outlet obstruction  He also has a history of recurrent urinary tract infections and a history of chronic as well as intermittent siddiqui catheterization  Past urine cultures have been positive for Providencia, Enterococcus as well as ESBL producing Proteus  Previous bladder imaging noted a lobular mass at the bladder base for which he saw Urology and underwent a cystoscopy, findings were reportedly consistent with enlarged prostate  Siddiqui catheterization was advised but he was admitted in September 2017 with urinary retention and a possible UTI   He underwent suprapubic catheterization on 9/26/17 and completed a course of iv Amikacin  He subsequently had his [address] [place] [address] catheter exchanged on 11/28  He presented to the ER at [facility] on 12/31 with reports of a malfunctioning suprapubic catheter as well as redness and drainage around it  He also reported some urine coming from his penis as well  Urinalysis noted mild pyuria  It appears that the catheter was flushed only and he was discharged home on Keflex  No urine culture is reported  He then presented to the ER on 1/5 again with reports of decreased output from his urinary catheter which he thought was blocked  He also noted pain and pressure over his bladder and slight burning with urine coming out of his penis  He had no fever  Urinalysis noted marked pyuria  White count was mildly elevated at 10 66  His catheter was exchanged in the emergency room  He was started on ceftriaxone and Vancomycin  Urology was consulted and empiric antibiotics were recommended  Blood cultures have been negative  Urine culture noted candiduria  He completed 7 days of empiric treatment with Ceftriaxone and was also treated with Fluconazole  A renal ultrasound was checked and noted non obstructing renal cortical calculus and left renal cyst     Interval History:  Patient continues to note a dull pressure but has no reports of burning or leaking of urine from the urinary meatus  Denies fever, nausea, vomiting, diarrhea or rash  He says he is "still infected" but only localizing symptom is the bladder pressure which he says has been present for years  No issues with redness around the [address] [place] [address] site  Reports issues with constipation  REVIEW OF SYSTEMS:  A complete 12 point system-based review of systems is otherwise negative      PAST MEDICAL HISTORY:  Past Medical History:   Diagnosis Date    [facility]     [facility]     Diabetes mellitus (HCC)     ESBL (extended spectrum beta-lactamase) producing bacteria infection     History of BPH     Hyperlipidemia     Hypertension     PE [facility]embolism) [facility]     PVD (peripheral vascular disease) ([facility]     Spinal stenosis of lumbar region     Stroke ([facility] )     Systolic and diastolic CHF, chronic ([facility] )     Urinary retention     UTI due to extended-spectrum beta lactamase (ESBL) producing Escherichia coli      Past Surgical History:   Procedure Laterality Date    APPENDECTOMY      BACK SURGERY      CARDIAC SURGERY      cabg    HIP SURGERY Right     plate and pin       FAMILY HISTORY:  Non-contributory    SOCIAL HISTORY:  Social History   History   Alcohol Use No     History   Drug Use No     History   Smoking Status    Former Smoker    Packs/day: 2 00    Years: 32 00    Quit date: 1/5/1984   Smokeless Tobacco    Never Used       ALLERGIES:  Allergies   Allergen Reactions    Ace Inhibitors      Hyperkalemia /decline in GFR    Penicillins      States he is unsure, does not believe he is allergic anymore  Has had cephalo's       MEDICATIONS:  All current active medications have been reviewed  Antibiotics:    PHYSICAL EXAM:  Vitals:    01/29/18 0936   BP: 144/63   BP Location: Left arm   Patient Position: Sitting   Cuff Size: Adult   Pulse: 59   Temp: 97 8 °F (36 6 °C)   TempSrc: Tympanic   Weight: 114 kg (251 lb)   Height: 5' 11 5" (1 816 m)         General Appearance:  Appearing well, nontoxic, and in no distress   Head:  Normocephalic, without obvious abnormality, atraumatic   Eyes:  Conjunctiva pink and sclera anicteric, both eyes   Nose: Nares normal, mucosa normal, no drainage   Throat: Oropharynx moist without lesions   Neck: Supple, symmetrical, no adenopathy, no tenderness/mass/nodules   Back:   Symmetric, no curvature, ROM normal, no CVA tenderness   Lungs:   Clear to auscultation bilaterally, respirations unlabored   Chest Wall:  No tenderness or deformity   Heart:  RRR; no murmur, rub or gallop   Abdomen:   Soft, non-tender, non-distended, large pannus covering SP tube   Site c/d/i with meg u/o   Extremities: No cyanosis, clubbing or edema   Skin: No rashes or lesions  No draining wounds noted  Lymph nodes: Cervical, supraclavicular nodes normal   Neurologic: Alert and oriented times 3, lower extremity strength 3/5       LABS, IMAGING, & OTHER STUDIES:  Lab Results:  I have personally reviewed pertinent labs  Lab Results   Component Value Date     01/13/2018    K 3 8 01/13/2018     01/13/2018    CO2 31 01/13/2018    ANIONGAP 3 (L) 01/13/2018    BUN 12 01/13/2018    CREATININE 0 73 01/13/2018    GLUCOSE 184 (H) 01/13/2018    CALCIUM 8 0 (L) 01/13/2018    AST 17 01/13/2018    ALT 19 01/13/2018    ALKPHOS 83 01/13/2018    PROT 6 2 (L) 01/13/2018    BILITOT 0 20 01/13/2018    EGFR 89 01/13/2018     Lab Results   Component Value Date    WBC 7 49 01/13/2018    HGB 13 3 01/13/2018    HCT 40 9 01/13/2018    MCV 88 01/13/2018     (L) 01/13/2018   No results found for: SEDRATE      Imaging Studies:   I have personally reviewed pertinent imaging study reports and images in PACS  Other Studies:   I have personally reviewed pertinent reports

## 2018-02-12 PROBLEM — N13.8 BPH WITH OBSTRUCTION/LOWER URINARY TRACT SYMPTOMS: Status: ACTIVE | Noted: 2018-02-12

## 2018-02-12 PROBLEM — N40.1 BPH WITH OBSTRUCTION/LOWER URINARY TRACT SYMPTOMS: Status: ACTIVE | Noted: 2018-02-12

## 2018-02-12 NOTE — PROGRESS NOTES
2/13/2018      Chief Complaint   Patient presents with    Urinary Retention       Assessment and Plan    68 y o  male managed by Dr Darron Horne    1  Bladder outlet obstruction with suprapubic tube  2  Recurrent urinary tract infections    18 [place] suprapubic tube was changed in sterile fashion and without complication  Irrigated well  Will give the patient prophylactic Macrobid x 3 days for his suprapubic tube exchange today and will continue with changes every 1 month as recommended by Infectious Disease  Will also follow up with the patient in 3 months for cap trial   If he passes will follow up with Dr Darron Horne 4 weeks later for PVR and if emptying we can consider removing his SPT as he is motivated to do so  Unfortunatley, it is more likely that the patient will fail and will require suprapubic tube indefinitely  He has inquired about surgery and has been told that given his multiple medical comorbidities, we would be hesitant to move forward with surgical options  However, if he fails he should follow up with Dr Darron Horne to discuss  The patient and his caretaker understand and agree to this treatment plan  All questions and concerns have been addressed answered  History of Present Illness  Jeffrey Shown is a 68 y o  male here for follow up evaluation of of benign prostate hypertrophy, bladder outlet obstruction and chronic constipation causing recurrent urinary tract infections s/p suprapubic catheterization 9/28/17  The patient was recently in the hospital 1/5/2018 due to UTI symptoms not improving on by mouth Keflex  The patient was treated with 7 days of IV ceftriaxone with gradual improvement in urinary symptoms  The patients culture revealed candida and he was also treated with diflucan  Last SPT change was during this hospital visit his suprapubic tube was changed 1/5/2018  Was evaluated by Infectious Disease 1/29/2018    Believed that the Candida was colonization rather than true infection  Recommend that urine cultures are only checked if symptomatic, local skin care and SP tube site, intensive bowel regimen, and continued super pubic catheter exchanged on a monthly basis  The patient presents today with no complaints and no signs of infection  Is very motivated to have a suprapubic tube removed  Review of Systems   Constitutional: Negative for activity change, chills and fever  Gastrointestinal: Negative for abdominal distention and abdominal pain  Psychiatric/Behavioral: Negative for behavioral problems and confusion  Past Medical History  Past Medical History:   Diagnosis Date    [facility]     [facility]     Diabetes mellitus [facility]     ESBL (extended spectrum beta-lactamase) producing bacteria infection     History of BPH     Hyperlipidemia     Hypertension     PE [facility] throm[facility]embolism) [facility]     [facility]     Pyelonephritis     Spinal stenosis of lumbar region     Stroke ([facility] )     Systolic and diastolic CHF, chronic ([facility] )     UTI due to extended-spectrum beta lactamase (ESBL) producing Escherichia coli        Past Social History  Past Surgical History:   Procedure Laterality Date    APPENDECTOMY      BACK SURGERY      CARDIAC SURGERY      cabg    HIP SURGERY Right     plate and pin     History   Smoking Status    Former Smoker    Packs/day: 2 00    Years: 32 00    Quit date: 1/5/1984   Smokeless Tobacco    Never Used       Past Family History  Family History   Problem Relation Age of Onset    Coronary artery disease Mother     Cancer Father        Past Social history  Social History     Social History    Marital status: /Civil Union     Spouse name: N/A    Number of children: N/A    Years of education: N/A     Occupational History    Not on file       Social History Main Topics    Smoking status: Former Smoker     Packs/day: 2 00     Years: 32 00     Quit date: 1/5/1984    Smokeless tobacco: Never Used    Alcohol use No    Drug use: No    Sexual activity: No     Other Topics Concern    Not on file     Social History Narrative    No narrative on file       Current Medications  Current Outpatient Prescriptions   Medication Sig Dispense Refill    acetaminophen (TYLENOL) 500 mg tablet One tablet every 6 hours as needed for moderate pain      aluminum-magnesium hydroxide-simethicone (ALMACONE DOUBLE STRENGTH) 400-400-40 MG/5ML suspension Take by mouth      amLODIPine (NORVASC) 10 mg tablet Take 10 mg by mouth      amLODIPine (NORVASC) 5 mg tablet Take 1 tablet by mouth daily  0    aspirin 325 mg tablet Take 325 mg by mouth daily      aspirin 81 mg chewable tablet Chew      atorvastatin (LIPITOR) 40 mg tablet Take 1 tablet by mouth daily with dinner 30 tablet 0    bisacodyl (DULCOLAX) 5 mg EC tablet Take by mouth      carvedilol (COREG) 6 25 mg tablet Take 1 tablet by mouth 2 (two) times a day with meals 60 tablet 0    carvedilol (COREG) 6 25 mg tablet Take by mouth      Dextromethorphan-Guaifenesin  MG/5ML LIQD Take by mouth      docusate sodium (COLACE) 100 mg capsule Take 1 capsule by mouth 2 (two) times a day (Patient taking differently: Take 100 mg by mouth 2 (two) times a day as needed  ) 10 capsule 0    famotidine (PEPCID) 20 mg tablet Take 20 mg by mouth      ferrous sulfate 325 (65 Fe) mg tablet Take 325 mg by mouth daily with breakfast Take one hour before meal         finasteride (PROSCAR) 5 mg tablet Take 1 tablet (5 mg total) by mouth daily 30 tablet 11    furosemide (LASIX) 20 mg tablet Take by mouth      insulin aspart (NovoLOG) 100 units/mL injection 1 unit for every 5 gm carbohydrates per endocrine instructions      insulin glargine (LANTUS) 100 units/mL subcutaneous injection Inject 40 Units under the skin daily at bedtime 10 mL 0    insulin glargine (LANTUS) 100 units/mL subcutaneous injection Inject 40 Units under the skin      insulin lispro (HumaLOG) 100 units/mL injection Inject 1-5 Units under the skin daily at bedtime  0    insulin lispro (HumaLOG) 100 units/mL injection Inject 12 Units under the skin 3 (three) times a day with meals  0    insulin lispro (HumaLOG) 100 units/mL injection Inject 1-6 Units under the skin 3 (three) times a day before meals  0    insulin lispro (HUMALOG) 100 units/mL injection Inject under the skin      Lactobacillus Rhamnosus, GG, (CULTURELLE) CAPS Take 1 capsule by mouth      loperamide (IMODIUM A-D) 2 MG tablet Take by mouth      LORazepam (ATIVAN) 0 5 mg tablet Take by mouth      metoprolol tartrate (LOPRESSOR) 25 mg tablet Take by mouth      metoprolol tartrate (LOPRESSOR) 50 mg tablet Take 50 mg by mouth      oseltamivir (TAMIFLU) 75 mg capsule Take 75 mg by mouth every 12 (twelve) hours      polyethylene glycol (MIRALAX) 17 g packet Take 17 g by mouth daily as needed (Constipation) 14 each 0    potassium chloride (K-DUR,KLOR-CON) 10 mEq tablet Take 40 mEq by mouth      Potassium Chloride ER 20 MEQ TBCR Take by mouth      promethazine (PHENERGAN) 25 mg/mL injection Inject as directed      saccharomyces boulardii (FLORASTOR) 250 mg capsule Take 1 capsule by mouth 2 (two) times a day  0    tamsulosin (FLOMAX) 0 4 mg Take 1 capsule (0 4 mg total) by mouth daily with dinner 30 capsule 11    tiZANidine (ZANAFLEX) 2 mg tablet Take 4 mg by mouth      nitrofurantoin (MACROBID) 100 mg capsule Take 1 capsule (100 mg total) by mouth 2 (two) times a day for 3 days 6 capsule 0     No current facility-administered medications for this visit          Allergies  Allergies   Allergen Reactions    Ace Inhibitors      Hyperkalemia /decline in GFR    Penicillins      States he is unsure, does not believe he is allergic anymore  Has had cephalo's         The following portions of the patient's history were reviewed and updated as appropriate: allergies, current medications, past medical history, past social history, past surgical history and problem list       Vitals  Vitals:    02/13/18 1101   BP: 142/82   Pulse: 69   Weight: 114 kg (251 lb)   Height: 5' 11" (1 803 m)         Physical Exam    Constitutional   General appearance: Patient is seated and in no acute distress, well appearing and well nourished  Head and Face   Head and face: Normal     Eyes   Conjunctiva and lids: No erythema, swelling or discharge  Ears, Nose, Mouth, and Throat   Hearing: Normal     Pulmonary   Respiratory effort: No increased work of breathing or signs of respiratory distress  Cardiovascular   Examination of extremities for edema and/or varicosities: Normal     Abdomen   Abdomen: Non-tender, no masses  Obese  Suprapubic tube in place draining clear yellow urine  Musculoskeletal   Gait and station:  Wheelchair-bound  Skin   Skin and subcutaneous tissue: Warm, dry, and intact  No visible lesions or rashes  Psychiatric   Judgment and insight: Normal  Recent and remote memory:  Normal  Mood and affect: Normal        Results  No results found for this or any previous visit (from the past 1 hour(s))  ]  No results found for: PSA  Lab Results   Component Value Date    GLUCOSE 184 (H) 01/13/2018    CALCIUM 8 0 (L) 01/13/2018     01/13/2018    K 3 8 01/13/2018    CO2 31 01/13/2018     01/13/2018    BUN 12 01/13/2018    CREATININE 0 73 01/13/2018     Lab Results   Component Value Date    WBC 7 49 01/13/2018    HGB 13 3 01/13/2018    HCT 40 9 01/13/2018    MCV 88 01/13/2018     (L) 01/13/2018           Orders  No orders of the defined types were placed in this encounter

## 2018-02-13 ENCOUNTER — OFFICE VISIT (OUTPATIENT)
Dept: UROLOGY | Facility: HOSPITAL | Age: 78
End: 2018-02-13
Payer: OTHER GOVERNMENT

## 2018-02-13 VITALS
WEIGHT: 251 LBS | SYSTOLIC BLOOD PRESSURE: 142 MMHG | DIASTOLIC BLOOD PRESSURE: 82 MMHG | BODY MASS INDEX: 35.14 KG/M2 | HEIGHT: 71 IN | HEART RATE: 69 BPM

## 2018-02-13 DIAGNOSIS — N13.8 BPH WITH OBSTRUCTION/LOWER URINARY TRACT SYMPTOMS: ICD-10-CM

## 2018-02-13 DIAGNOSIS — N39.0 UTI (URINARY TRACT INFECTION) WITH PYURIA: Primary | ICD-10-CM

## 2018-02-13 DIAGNOSIS — N40.1 BPH WITH OBSTRUCTION/LOWER URINARY TRACT SYMPTOMS: ICD-10-CM

## 2018-02-13 PROCEDURE — 99213 OFFICE O/P EST LOW 20 MIN: CPT | Performed by: PHYSICIAN ASSISTANT

## 2018-02-13 RX ORDER — NITROFURANTOIN 25; 75 MG/1; MG/1
100 CAPSULE ORAL 2 TIMES DAILY
Qty: 6 CAPSULE | Refills: 0 | Status: SHIPPED | OUTPATIENT
Start: 2018-02-13 | End: 2018-02-16

## 2018-02-13 RX ORDER — FINASTERIDE 5 MG/1
5 TABLET, FILM COATED ORAL DAILY
Qty: 30 TABLET | Refills: 11
Start: 2018-02-13 | End: 2018-06-06

## 2018-02-13 RX ORDER — TAMSULOSIN HYDROCHLORIDE 0.4 MG/1
0.4 CAPSULE ORAL
Qty: 30 CAPSULE | Refills: 11
Start: 2018-02-13 | End: 2019-01-01 | Stop reason: HOSPADM

## 2018-03-12 ENCOUNTER — CLINICAL SUPPORT (OUTPATIENT)
Dept: UROLOGY | Facility: CLINIC | Age: 78
End: 2018-03-12

## 2018-03-12 VITALS — RESPIRATION RATE: 20 BRPM | DIASTOLIC BLOOD PRESSURE: 70 MMHG | SYSTOLIC BLOOD PRESSURE: 140 MMHG | HEART RATE: 68 BPM

## 2018-03-12 DIAGNOSIS — N40.1 BPH WITH OBSTRUCTION/LOWER URINARY TRACT SYMPTOMS: ICD-10-CM

## 2018-03-12 DIAGNOSIS — N13.8 BPH WITH OBSTRUCTION/LOWER URINARY TRACT SYMPTOMS: ICD-10-CM

## 2018-03-12 PROCEDURE — 51705 CHANGE OF BLADDER TUBE: CPT

## 2018-03-12 NOTE — PROGRESS NOTES
Ihsan Castelan is a 68 y o  male    Chief Complaint     SPT CHANGE        Pt presents to office for SPT change  Pt has history urinary retention  Vitals:    03/12/18 1326   BP: 140/70   Pulse: 68   Resp: 20     3/12/2018    Ihsan Castelan  1940  [id number]    Diagnosis  Urinary retention    Plan  Pt will return next month to see Dr Stanton Saucedo to discuss removing of SPT and change    Procedure: Suprapubic Tube Change   Current catheter removed without difficulty  18F  spt change via aseptic technique without incident, 10 ml balloon inflated with sterile water  irrigated easily for straw-yellow return, mild spasm noted  Patient tolerated well  Attached to drainage bag  Pt requesting to see MD at next visit to discuss prostate surgery and removing of SPT       Vitals:    03/12/18 1326   BP: 140/70   Pulse: 68   Resp: [address] Lamar Carrizales RN

## 2018-05-02 ENCOUNTER — TELEPHONE (OUTPATIENT)
Dept: UROLOGY | Facility: CLINIC | Age: 78
End: 2018-05-02

## 2018-05-02 NOTE — TELEPHONE ENCOUNTER
Called and spoke with patient  He no showed for catheter change appt today  He reports he will have his aide call to reschedule

## 2018-05-09 NOTE — TELEPHONE ENCOUNTER
Spoke with pt's daughter, Hima Germain, re rescheduling of appt  Hima Germain states pt has [employer] so he needs to be seen at the [facility]  in [place]   then the [place] will decide if pt can be seen by Dr Anu Germain reports "my father has UTI" and wishes for antibiotics to be prescribed  Hima Germain states pt experiencing burning with catheter  Denies any fever/chills  No confusion  Pt's fluid intake is poor  Recommended to Hima Germain to encourage pt to increase his fluid intake, mainly water  Will not obtain urine culture at this time  If pt worsens Hima Germain will take pt to ER

## 2018-06-06 ENCOUNTER — APPOINTMENT (EMERGENCY)
Dept: RADIOLOGY | Facility: HOSPITAL | Age: 78
DRG: 698 | End: 2018-06-06
Payer: MEDICARE

## 2018-06-06 ENCOUNTER — HOSPITAL ENCOUNTER (INPATIENT)
Facility: HOSPITAL | Age: 78
LOS: 5 days | DRG: 698 | End: 2018-06-11
Attending: EMERGENCY MEDICINE | Admitting: INTERNAL MEDICINE
Payer: MEDICARE

## 2018-06-06 ENCOUNTER — APPOINTMENT (EMERGENCY)
Dept: CT IMAGING | Facility: HOSPITAL | Age: 78
DRG: 698 | End: 2018-06-06
Payer: MEDICARE

## 2018-06-06 DIAGNOSIS — N39.0 URINARY TRACT INFECTION ASSOCIATED WITH INDWELLING URETHRAL CATHETER, INITIAL ENCOUNTER (HCC): ICD-10-CM

## 2018-06-06 DIAGNOSIS — I48.91 ATRIAL FIBRILLATION WITH RAPID VENTRICULAR RESPONSE (HCC): ICD-10-CM

## 2018-06-06 DIAGNOSIS — Z95.1 S/P CABG X 3: ICD-10-CM

## 2018-06-06 DIAGNOSIS — E11.65 TYPE 2 DIABETES MELLITUS WITH HYPERGLYCEMIA, UNSPECIFIED WHETHER LONG TERM INSULIN USE (HCC): Primary | ICD-10-CM

## 2018-06-06 DIAGNOSIS — E86.9 VOLUME DEPLETION, UNSPECIFIED: ICD-10-CM

## 2018-06-06 DIAGNOSIS — T83.511A URINARY TRACT INFECTION ASSOCIATED WITH INDWELLING URETHRAL CATHETER, INITIAL ENCOUNTER (HCC): ICD-10-CM

## 2018-06-06 DIAGNOSIS — R23.8 SKIN BREAKDOWN: ICD-10-CM

## 2018-06-06 LAB
ACETONE SERPL-MCNC: NEGATIVE MG/DL
ALBUMIN SERPL BCP-MCNC: 3.2 G/DL (ref 3.5–5)
ALP SERPL-CCNC: 95 U/L (ref 46–116)
ALT SERPL W P-5'-P-CCNC: 26 U/L (ref 12–78)
ANION GAP BLD CALC-SCNC: 11 MMOL/L (ref 4–13)
ANION GAP SERPL CALCULATED.3IONS-SCNC: 11 MMOL/L (ref 4–13)
APTT PPP: 30 SECONDS (ref 24–36)
AST SERPL W P-5'-P-CCNC: 14 U/L (ref 5–45)
BACTERIA UR QL AUTO: ABNORMAL /HPF
BASE EX.OXY STD BLDV CALC-SCNC: 40.5 % (ref 60–80)
BASE EXCESS BLDV CALC-SCNC: 0.8 MMOL/L
BASOPHILS # BLD AUTO: 0.04 THOUSANDS/ΜL (ref 0–0.1)
BASOPHILS NFR BLD AUTO: 0 % (ref 0–1)
BILIRUB SERPL-MCNC: 1.1 MG/DL (ref 0.2–1)
BILIRUB UR QL STRIP: NEGATIVE
BUN BLD-MCNC: 19 MG/DL (ref 5–25)
BUN SERPL-MCNC: 13 MG/DL (ref 5–25)
CA-I BLD-SCNC: 0.97 MMOL/L (ref 1.12–1.32)
CALCIUM SERPL-MCNC: 8.7 MG/DL (ref 8.3–10.1)
CHLORIDE BLD-SCNC: 99 MMOL/L (ref 100–108)
CHLORIDE SERPL-SCNC: 97 MMOL/L (ref 100–108)
CLARITY UR: ABNORMAL
CO2 SERPL-SCNC: 26 MMOL/L (ref 21–32)
COLOR UR: YELLOW
CREAT BLD-MCNC: 0.7 MG/DL (ref 0.6–1.3)
CREAT SERPL-MCNC: 1.07 MG/DL (ref 0.6–1.3)
EOSINOPHIL # BLD AUTO: 0 THOUSAND/ΜL (ref 0–0.61)
EOSINOPHIL NFR BLD AUTO: 0 % (ref 0–6)
ERYTHROCYTE [DISTWIDTH] IN BLOOD BY AUTOMATED COUNT: 13.6 % (ref 11.6–15.1)
GFR SERPL CREATININE-BSD FRML MDRD: 67 ML/MIN/1.73SQ M
GFR SERPL CREATININE-BSD FRML MDRD: 91 ML/MIN/1.73SQ M
GLUCOSE SERPL-MCNC: 328 MG/DL (ref 65–140)
GLUCOSE SERPL-MCNC: 338 MG/DL (ref 65–140)
GLUCOSE SERPL-MCNC: 433 MG/DL (ref 65–140)
GLUCOSE UR STRIP-MCNC: ABNORMAL MG/DL
HCO3 BLDV-SCNC: 25.5 MMOL/L (ref 24–30)
HCT VFR BLD AUTO: 45.4 % (ref 36.5–49.3)
HCT VFR BLD CALC: 50 % (ref 36.5–49.3)
HGB BLD-MCNC: 15.4 G/DL (ref 12–17)
HGB BLDA-MCNC: 17 G/DL (ref 12–17)
HGB UR QL STRIP.AUTO: ABNORMAL
INR PPP: 1.19 (ref 0.86–1.17)
KETONES UR STRIP-MCNC: ABNORMAL MG/DL
LEUKOCYTE ESTERASE UR QL STRIP: ABNORMAL
LYMPHOCYTES # BLD AUTO: 0.56 THOUSANDS/ΜL (ref 0.6–4.47)
LYMPHOCYTES NFR BLD AUTO: 4 % (ref 14–44)
MAGNESIUM SERPL-MCNC: 1.7 MG/DL (ref 1.6–2.6)
MCH RBC QN AUTO: 28.8 PG (ref 26.8–34.3)
MCHC RBC AUTO-ENTMCNC: 33.9 G/DL (ref 31.4–37.4)
MCV RBC AUTO: 85 FL (ref 82–98)
MONOCYTES # BLD AUTO: 1.23 THOUSAND/ΜL (ref 0.17–1.22)
MONOCYTES NFR BLD AUTO: 9 % (ref 4–12)
NEUTROPHILS # BLD AUTO: 12.04 THOUSANDS/ΜL (ref 1.85–7.62)
NEUTS SEG NFR BLD AUTO: 87 % (ref 43–75)
NITRITE UR QL STRIP: NEGATIVE
NON-SQ EPI CELLS URNS QL MICRO: ABNORMAL /HPF
O2 CT BLDV-SCNC: 9 ML/DL
PCO2 BLD: 29 MMOL/L (ref 21–32)
PCO2 BLDV: 41.3 MM HG (ref 42–50)
PH BLDV: 7.41 [PH] (ref 7.3–7.4)
PH UR STRIP.AUTO: 6 [PH] (ref 4.5–8)
PLATELET # BLD AUTO: 158 THOUSANDS/UL (ref 149–390)
PMV BLD AUTO: 11.7 FL (ref 8.9–12.7)
PO2 BLDV: 21.5 MM HG (ref 35–45)
POTASSIUM BLD-SCNC: 7.1 MMOL/L (ref 3.5–5.3)
POTASSIUM SERPL-SCNC: 4.1 MMOL/L (ref 3.5–5.3)
PROT SERPL-MCNC: 7.6 G/DL (ref 6.4–8.2)
PROT UR STRIP-MCNC: ABNORMAL MG/DL
PROTHROMBIN TIME: 14.5 SECONDS (ref 11.8–14.2)
RBC # BLD AUTO: 5.35 MILLION/UL (ref 3.88–5.62)
RBC #/AREA URNS AUTO: ABNORMAL /HPF
SODIUM BLD-SCNC: 131 MMOL/L (ref 136–145)
SODIUM SERPL-SCNC: 134 MMOL/L (ref 136–145)
SP GR UR STRIP.AUTO: 1.02 (ref 1–1.03)
SPECIMEN SOURCE: ABNORMAL
UROBILINOGEN UR QL STRIP.AUTO: 0.2 E.U./DL
WBC # BLD AUTO: 13.87 THOUSAND/UL (ref 4.31–10.16)
WBC #/AREA URNS AUTO: ABNORMAL /HPF

## 2018-06-06 PROCEDURE — 96361 HYDRATE IV INFUSION ADD-ON: CPT

## 2018-06-06 PROCEDURE — 83735 ASSAY OF MAGNESIUM: CPT | Performed by: EMERGENCY MEDICINE

## 2018-06-06 PROCEDURE — 96360 HYDRATION IV INFUSION INIT: CPT

## 2018-06-06 PROCEDURE — 82805 BLOOD GASES W/O2 SATURATION: CPT | Performed by: EMERGENCY MEDICINE

## 2018-06-06 PROCEDURE — 80053 COMPREHEN METABOLIC PANEL: CPT | Performed by: EMERGENCY MEDICINE

## 2018-06-06 PROCEDURE — 85610 PROTHROMBIN TIME: CPT | Performed by: EMERGENCY MEDICINE

## 2018-06-06 PROCEDURE — 80047 BASIC METABLC PNL IONIZED CA: CPT

## 2018-06-06 PROCEDURE — 82948 REAGENT STRIP/BLOOD GLUCOSE: CPT

## 2018-06-06 PROCEDURE — 99285 EMERGENCY DEPT VISIT HI MDM: CPT

## 2018-06-06 PROCEDURE — 36415 COLL VENOUS BLD VENIPUNCTURE: CPT | Performed by: EMERGENCY MEDICINE

## 2018-06-06 PROCEDURE — 70450 CT HEAD/BRAIN W/O DYE: CPT

## 2018-06-06 PROCEDURE — 85014 HEMATOCRIT: CPT

## 2018-06-06 PROCEDURE — 81001 URINALYSIS AUTO W/SCOPE: CPT | Performed by: EMERGENCY MEDICINE

## 2018-06-06 PROCEDURE — 82009 KETONE BODYS QUAL: CPT | Performed by: EMERGENCY MEDICINE

## 2018-06-06 PROCEDURE — 87086 URINE CULTURE/COLONY COUNT: CPT | Performed by: EMERGENCY MEDICINE

## 2018-06-06 PROCEDURE — 93005 ELECTROCARDIOGRAM TRACING: CPT

## 2018-06-06 PROCEDURE — 85730 THROMBOPLASTIN TIME PARTIAL: CPT | Performed by: EMERGENCY MEDICINE

## 2018-06-06 PROCEDURE — 71046 X-RAY EXAM CHEST 2 VIEWS: CPT

## 2018-06-06 PROCEDURE — 85025 COMPLETE CBC W/AUTO DIFF WBC: CPT | Performed by: EMERGENCY MEDICINE

## 2018-06-06 RX ORDER — 0.9 % SODIUM CHLORIDE 0.9 %
3 VIAL (ML) INJECTION AS NEEDED
Status: DISCONTINUED | OUTPATIENT
Start: 2018-06-06 | End: 2018-06-11 | Stop reason: HOSPADM

## 2018-06-06 RX ADMIN — SODIUM CHLORIDE 1000 ML: 0.9 INJECTION, SOLUTION INTRAVENOUS at 22:40

## 2018-06-06 RX ADMIN — SODIUM CHLORIDE 1000 ML: 0.9 INJECTION, SOLUTION INTRAVENOUS at 19:38

## 2018-06-07 ENCOUNTER — APPOINTMENT (INPATIENT)
Dept: NON INVASIVE DIAGNOSTICS | Facility: HOSPITAL | Age: 78
DRG: 698 | End: 2018-06-07
Payer: MEDICARE

## 2018-06-07 PROBLEM — E87.2 LACTIC ACIDOSIS: Status: ACTIVE | Noted: 2018-06-07

## 2018-06-07 PROBLEM — A41.9 SEPSIS (HCC): Status: ACTIVE | Noted: 2018-06-07

## 2018-06-07 PROBLEM — Z79.4 TYPE 2 DIABETES MELLITUS WITH HYPERGLYCEMIA, WITH LONG-TERM CURRENT USE OF INSULIN (HCC): Status: ACTIVE | Noted: 2017-06-28

## 2018-06-07 PROBLEM — T83.511A URINARY TRACT INFECTION ASSOCIATED WITH INDWELLING URETHRAL CATHETER (HCC): Status: ACTIVE | Noted: 2018-01-05

## 2018-06-07 PROBLEM — R23.8 SKIN BREAKDOWN: Status: ACTIVE | Noted: 2018-06-07

## 2018-06-07 PROBLEM — E11.65 TYPE 2 DIABETES MELLITUS WITH HYPERGLYCEMIA, WITH LONG-TERM CURRENT USE OF INSULIN (HCC): Status: RESOLVED | Noted: 2017-06-28 | Resolved: 2018-06-07

## 2018-06-07 PROBLEM — Z79.4 TYPE 2 DIABETES MELLITUS WITH HYPERGLYCEMIA, WITH LONG-TERM CURRENT USE OF INSULIN (HCC): Status: RESOLVED | Noted: 2017-06-28 | Resolved: 2018-06-07

## 2018-06-07 PROBLEM — E87.1 HYPONATREMIA: Status: ACTIVE | Noted: 2018-06-07

## 2018-06-07 PROBLEM — I48.91 ATRIAL FIBRILLATION WITH RAPID VENTRICULAR RESPONSE (HCC): Status: ACTIVE | Noted: 2018-06-07

## 2018-06-07 LAB
ALBUMIN SERPL BCP-MCNC: 2.5 G/DL (ref 3.5–5)
ALP SERPL-CCNC: 71 U/L (ref 46–116)
ALT SERPL W P-5'-P-CCNC: 22 U/L (ref 12–78)
ANION GAP SERPL CALCULATED.3IONS-SCNC: 8 MMOL/L (ref 4–13)
AST SERPL W P-5'-P-CCNC: 14 U/L (ref 5–45)
ATRIAL RATE: 78 BPM
ATRIAL RATE: 89 BPM
BASOPHILS # BLD AUTO: 0.03 THOUSANDS/ΜL (ref 0–0.1)
BASOPHILS NFR BLD AUTO: 0 % (ref 0–1)
BILIRUB SERPL-MCNC: 0.5 MG/DL (ref 0.2–1)
BUN SERPL-MCNC: 15 MG/DL (ref 5–25)
CALCIUM SERPL-MCNC: 8.5 MG/DL (ref 8.3–10.1)
CHLORIDE SERPL-SCNC: 101 MMOL/L (ref 100–108)
CK SERPL-CCNC: 73 U/L (ref 39–308)
CO2 SERPL-SCNC: 27 MMOL/L (ref 21–32)
CREAT SERPL-MCNC: 0.87 MG/DL (ref 0.6–1.3)
EOSINOPHIL # BLD AUTO: 0.01 THOUSAND/ΜL (ref 0–0.61)
EOSINOPHIL NFR BLD AUTO: 0 % (ref 0–6)
ERYTHROCYTE [DISTWIDTH] IN BLOOD BY AUTOMATED COUNT: 13.7 % (ref 11.6–15.1)
EST. AVERAGE GLUCOSE BLD GHB EST-MCNC: 306 MG/DL
GFR SERPL CREATININE-BSD FRML MDRD: 83 ML/MIN/1.73SQ M
GLUCOSE SERPL-MCNC: 126 MG/DL (ref 65–140)
GLUCOSE SERPL-MCNC: 141 MG/DL (ref 65–140)
GLUCOSE SERPL-MCNC: 168 MG/DL (ref 65–140)
GLUCOSE SERPL-MCNC: 175 MG/DL (ref 65–140)
GLUCOSE SERPL-MCNC: 193 MG/DL (ref 65–140)
GLUCOSE SERPL-MCNC: 194 MG/DL (ref 65–140)
GLUCOSE SERPL-MCNC: 246 MG/DL (ref 65–140)
GLUCOSE SERPL-MCNC: 249 MG/DL (ref 65–140)
GLUCOSE SERPL-MCNC: 260 MG/DL (ref 65–140)
GLUCOSE SERPL-MCNC: 268 MG/DL (ref 65–140)
GLUCOSE SERPL-MCNC: 314 MG/DL (ref 65–140)
GLUCOSE SERPL-MCNC: 361 MG/DL (ref 65–140)
HBA1C MFR BLD: 12.3 % (ref 4.2–6.3)
HCT VFR BLD AUTO: 39.8 % (ref 36.5–49.3)
HGB BLD-MCNC: 13.6 G/DL (ref 12–17)
LACTATE SERPL-SCNC: 1.5 MMOL/L (ref 0.5–2)
LACTATE SERPL-SCNC: 2.1 MMOL/L (ref 0.5–2)
LYMPHOCYTES # BLD AUTO: 0.99 THOUSANDS/ΜL (ref 0.6–4.47)
LYMPHOCYTES NFR BLD AUTO: 9 % (ref 14–44)
MAGNESIUM SERPL-MCNC: 2.5 MG/DL (ref 1.6–2.6)
MCH RBC QN AUTO: 29.2 PG (ref 26.8–34.3)
MCHC RBC AUTO-ENTMCNC: 34.2 G/DL (ref 31.4–37.4)
MCV RBC AUTO: 86 FL (ref 82–98)
MONOCYTES # BLD AUTO: 1.13 THOUSAND/ΜL (ref 0.17–1.22)
MONOCYTES NFR BLD AUTO: 10 % (ref 4–12)
NEUTROPHILS # BLD AUTO: 8.7 THOUSANDS/ΜL (ref 1.85–7.62)
NEUTS SEG NFR BLD AUTO: 80 % (ref 43–75)
P AXIS: 31 DEGREES
P AXIS: 73 DEGREES
PHOSPHATE SERPL-MCNC: 2.4 MG/DL (ref 2.3–4.1)
PLATELET # BLD AUTO: 144 THOUSANDS/UL (ref 149–390)
PMV BLD AUTO: 11.6 FL (ref 8.9–12.7)
POTASSIUM SERPL-SCNC: 3.4 MMOL/L (ref 3.5–5.3)
PR INTERVAL: 180 MS
PR INTERVAL: 192 MS
PROCALCITONIN SERPL-MCNC: 1.47 NG/ML
PROCALCITONIN SERPL-MCNC: 1.68 NG/ML
PROT SERPL-MCNC: 6.4 G/DL (ref 6.4–8.2)
QRS AXIS: -60 DEGREES
QRS AXIS: -62 DEGREES
QRSD INTERVAL: 112 MS
QRSD INTERVAL: 120 MS
QT INTERVAL: 388 MS
QT INTERVAL: 426 MS
QTC INTERVAL: 472 MS
QTC INTERVAL: 485 MS
RBC # BLD AUTO: 4.65 MILLION/UL (ref 3.88–5.62)
SODIUM SERPL-SCNC: 136 MMOL/L (ref 136–145)
T WAVE AXIS: 44 DEGREES
T WAVE AXIS: 64 DEGREES
TROPONIN I SERPL-MCNC: 0.02 NG/ML
TROPONIN I SERPL-MCNC: 0.03 NG/ML
TROPONIN I SERPL-MCNC: <0.02 NG/ML
TSH SERPL DL<=0.05 MIU/L-ACNC: 1.69 UIU/ML (ref 0.36–3.74)
VENTRICULAR RATE: 78 BPM
VENTRICULAR RATE: 89 BPM
WBC # BLD AUTO: 10.86 THOUSAND/UL (ref 4.31–10.16)

## 2018-06-07 PROCEDURE — G8978 MOBILITY CURRENT STATUS: HCPCS

## 2018-06-07 PROCEDURE — 93306 TTE W/DOPPLER COMPLETE: CPT | Performed by: INTERNAL MEDICINE

## 2018-06-07 PROCEDURE — 83605 ASSAY OF LACTIC ACID: CPT | Performed by: INTERNAL MEDICINE

## 2018-06-07 PROCEDURE — 93005 ELECTROCARDIOGRAM TRACING: CPT

## 2018-06-07 PROCEDURE — 97162 PT EVAL MOD COMPLEX 30 MIN: CPT

## 2018-06-07 PROCEDURE — 80053 COMPREHEN METABOLIC PANEL: CPT | Performed by: INTERNAL MEDICINE

## 2018-06-07 PROCEDURE — 84484 ASSAY OF TROPONIN QUANT: CPT | Performed by: INTERNAL MEDICINE

## 2018-06-07 PROCEDURE — G8987 SELF CARE CURRENT STATUS: HCPCS

## 2018-06-07 PROCEDURE — 92610 EVALUATE SWALLOWING FUNCTION: CPT | Performed by: SPEECH-LANGUAGE PATHOLOGIST

## 2018-06-07 PROCEDURE — 97167 OT EVAL HIGH COMPLEX 60 MIN: CPT

## 2018-06-07 PROCEDURE — 84100 ASSAY OF PHOSPHORUS: CPT | Performed by: INTERNAL MEDICINE

## 2018-06-07 PROCEDURE — 93010 ELECTROCARDIOGRAM REPORT: CPT | Performed by: INTERNAL MEDICINE

## 2018-06-07 PROCEDURE — 84145 PROCALCITONIN (PCT): CPT | Performed by: INTERNAL MEDICINE

## 2018-06-07 PROCEDURE — 84443 ASSAY THYROID STIM HORMONE: CPT | Performed by: INTERNAL MEDICINE

## 2018-06-07 PROCEDURE — 83735 ASSAY OF MAGNESIUM: CPT | Performed by: INTERNAL MEDICINE

## 2018-06-07 PROCEDURE — 99223 1ST HOSP IP/OBS HIGH 75: CPT | Performed by: INTERNAL MEDICINE

## 2018-06-07 PROCEDURE — 93306 TTE W/DOPPLER COMPLETE: CPT

## 2018-06-07 PROCEDURE — 83036 HEMOGLOBIN GLYCOSYLATED A1C: CPT | Performed by: INTERNAL MEDICINE

## 2018-06-07 PROCEDURE — G8988 SELF CARE GOAL STATUS: HCPCS

## 2018-06-07 PROCEDURE — 87040 BLOOD CULTURE FOR BACTERIA: CPT | Performed by: INTERNAL MEDICINE

## 2018-06-07 PROCEDURE — 85025 COMPLETE CBC W/AUTO DIFF WBC: CPT | Performed by: INTERNAL MEDICINE

## 2018-06-07 PROCEDURE — 87081 CULTURE SCREEN ONLY: CPT | Performed by: INTERNAL MEDICINE

## 2018-06-07 PROCEDURE — 82948 REAGENT STRIP/BLOOD GLUCOSE: CPT

## 2018-06-07 PROCEDURE — 97116 GAIT TRAINING THERAPY: CPT

## 2018-06-07 PROCEDURE — 97535 SELF CARE MNGMENT TRAINING: CPT

## 2018-06-07 PROCEDURE — G8979 MOBILITY GOAL STATUS: HCPCS

## 2018-06-07 PROCEDURE — 82550 ASSAY OF CK (CPK): CPT | Performed by: INTERNAL MEDICINE

## 2018-06-07 RX ORDER — DOCUSATE SODIUM 100 MG/1
100 CAPSULE, LIQUID FILLED ORAL 2 TIMES DAILY PRN
Status: DISCONTINUED | OUTPATIENT
Start: 2018-06-07 | End: 2018-06-11

## 2018-06-07 RX ORDER — AMLODIPINE BESYLATE 10 MG/1
10 TABLET ORAL DAILY
Status: DISCONTINUED | OUTPATIENT
Start: 2018-06-07 | End: 2018-06-11 | Stop reason: HOSPADM

## 2018-06-07 RX ORDER — MAGNESIUM SULFATE HEPTAHYDRATE 40 MG/ML
2 INJECTION, SOLUTION INTRAVENOUS ONCE
Status: COMPLETED | OUTPATIENT
Start: 2018-06-07 | End: 2018-06-07

## 2018-06-07 RX ORDER — TAMSULOSIN HYDROCHLORIDE 0.4 MG/1
0.4 CAPSULE ORAL
Status: DISCONTINUED | OUTPATIENT
Start: 2018-06-07 | End: 2018-06-11 | Stop reason: HOSPADM

## 2018-06-07 RX ORDER — NYSTATIN 100000 [USP'U]/G
POWDER TOPICAL 2 TIMES DAILY
Status: DISCONTINUED | OUTPATIENT
Start: 2018-06-07 | End: 2018-06-11 | Stop reason: HOSPADM

## 2018-06-07 RX ORDER — ATORVASTATIN CALCIUM 40 MG/1
40 TABLET, FILM COATED ORAL
Status: DISCONTINUED | OUTPATIENT
Start: 2018-06-07 | End: 2018-06-11 | Stop reason: HOSPADM

## 2018-06-07 RX ORDER — ASPIRIN 81 MG/1
81 TABLET, CHEWABLE ORAL DAILY
Status: DISCONTINUED | OUTPATIENT
Start: 2018-06-07 | End: 2018-06-11 | Stop reason: HOSPADM

## 2018-06-07 RX ORDER — ONDANSETRON 2 MG/ML
4 INJECTION INTRAMUSCULAR; INTRAVENOUS EVERY 6 HOURS PRN
Status: DISCONTINUED | OUTPATIENT
Start: 2018-06-07 | End: 2018-06-11 | Stop reason: HOSPADM

## 2018-06-07 RX ORDER — METOPROLOL TARTRATE 5 MG/5ML
5 INJECTION INTRAVENOUS EVERY 4 HOURS PRN
Status: DISCONTINUED | OUTPATIENT
Start: 2018-06-07 | End: 2018-06-11 | Stop reason: HOSPADM

## 2018-06-07 RX ORDER — SODIUM CHLORIDE 9 MG/ML
75 INJECTION, SOLUTION INTRAVENOUS CONTINUOUS
Status: DISCONTINUED | OUTPATIENT
Start: 2018-06-07 | End: 2018-06-09

## 2018-06-07 RX ORDER — DEXTROSE MONOHYDRATE 25 G/50ML
25 INJECTION, SOLUTION INTRAVENOUS AS NEEDED
Status: DISCONTINUED | OUTPATIENT
Start: 2018-06-07 | End: 2018-06-11 | Stop reason: HOSPADM

## 2018-06-07 RX ORDER — FERROUS SULFATE 325(65) MG
325 TABLET ORAL
Status: DISCONTINUED | OUTPATIENT
Start: 2018-06-07 | End: 2018-06-11 | Stop reason: HOSPADM

## 2018-06-07 RX ORDER — ACETAMINOPHEN 325 MG/1
650 TABLET ORAL EVERY 6 HOURS PRN
Status: DISCONTINUED | OUTPATIENT
Start: 2018-06-07 | End: 2018-06-11 | Stop reason: HOSPADM

## 2018-06-07 RX ORDER — INSULIN GLARGINE 100 [IU]/ML
40 INJECTION, SOLUTION SUBCUTANEOUS EVERY MORNING
Status: DISCONTINUED | OUTPATIENT
Start: 2018-06-07 | End: 2018-06-08

## 2018-06-07 RX ADMIN — INSULIN LISPRO 1 UNITS: 100 INJECTION, SOLUTION INTRAVENOUS; SUBCUTANEOUS at 17:11

## 2018-06-07 RX ADMIN — SODIUM CHLORIDE 75 ML/HR: 0.9 INJECTION, SOLUTION INTRAVENOUS at 01:25

## 2018-06-07 RX ADMIN — ANORECTAL OINTMENT: 15.7; .44; 24; 20.6 OINTMENT TOPICAL at 17:12

## 2018-06-07 RX ADMIN — ASPIRIN 81 MG 81 MG: 81 TABLET ORAL at 08:54

## 2018-06-07 RX ADMIN — NYSTATIN: 100000 POWDER TOPICAL at 17:11

## 2018-06-07 RX ADMIN — ANORECTAL OINTMENT: 15.7; .44; 24; 20.6 OINTMENT TOPICAL at 09:13

## 2018-06-07 RX ADMIN — APIXABAN 5 MG: 5 TABLET, FILM COATED ORAL at 17:11

## 2018-06-07 RX ADMIN — ACETAMINOPHEN 650 MG: 325 TABLET ORAL at 23:49

## 2018-06-07 RX ADMIN — Medication 8 UNITS/HR: at 01:05

## 2018-06-07 RX ADMIN — INSULIN LISPRO 2 UNITS: 100 INJECTION, SOLUTION INTRAVENOUS; SUBCUTANEOUS at 21:05

## 2018-06-07 RX ADMIN — APIXABAN 5 MG: 5 TABLET, FILM COATED ORAL at 02:20

## 2018-06-07 RX ADMIN — MAGNESIUM SULFATE HEPTAHYDRATE 2 G: 40 INJECTION, SOLUTION INTRAVENOUS at 04:25

## 2018-06-07 RX ADMIN — AMLODIPINE BESYLATE 10 MG: 10 TABLET ORAL at 08:55

## 2018-06-07 RX ADMIN — Medication 325 MG: at 08:54

## 2018-06-07 RX ADMIN — SODIUM CHLORIDE 75 ML/HR: 0.9 INJECTION, SOLUTION INTRAVENOUS at 15:14

## 2018-06-07 RX ADMIN — TAMSULOSIN HYDROCHLORIDE 0.4 MG: 0.4 CAPSULE ORAL at 17:11

## 2018-06-07 RX ADMIN — CEFTRIAXONE 1000 MG: 1 INJECTION, SOLUTION INTRAVENOUS at 03:17

## 2018-06-07 RX ADMIN — ATORVASTATIN CALCIUM 40 MG: 40 TABLET, FILM COATED ORAL at 17:11

## 2018-06-07 RX ADMIN — INSULIN GLARGINE 40 UNITS: 100 INJECTION, SOLUTION SUBCUTANEOUS at 14:05

## 2018-06-07 RX ADMIN — APIXABAN 5 MG: 5 TABLET, FILM COATED ORAL at 08:55

## 2018-06-07 RX ADMIN — INSULIN LISPRO 3 UNITS: 100 INJECTION, SOLUTION INTRAVENOUS; SUBCUTANEOUS at 17:11

## 2018-06-07 NOTE — ASSESSMENT & PLAN NOTE
-follow troponin levels through 3 sets  -telemetry monitoring  -p r n  IV metoprolol for heart rate control  -the patient's chads 2 Vasc score is a 5, so he needs full anticoagulation  -initiate Eliquis 5 mg p o  b i d  for full anticoagulation

## 2018-06-07 NOTE — PROGRESS NOTES
I was notified by Radiology the patient has a perforated duodenum with free air  The patient currently has no abdominal pain but his abdomen is distended  I will call General Surgery now for an urgent consultation

## 2018-06-07 NOTE — ASSESSMENT & PLAN NOTE
-likely pseudohyponatremia in the setting of hyperglycemia  -follow the sodium level as the hypoglycemia is treated  -normal saline IV fluids

## 2018-06-07 NOTE — H&P
H&P- Gin Aguirre 1940, 68 y o  male MRN: [id number]    Unit/Bed#: 408-01 Encounter: [id number]    Primary Care Provider: Cara Bowen MD   Date and time admitted to hospital: 6/6/2018  4:43 PM        * Sepsis [facility])   Assessment & Plan    -admit the patient to med/surg level of care with telemetry monitoring  -the sepsis is present on admission and secondary to a urinary tract infection (UTI)/acute cystitis associated with an indwelling urethral bladder catheter  -check blood cultures x 2 sets  -check a urine culture  -the patient received the initial 30 mL/kg normal saline IV fluid bolus/resuscitation per the sepsis protocol  -IV ceftriaxone  -follow the lactic acid level          Atrial fibrillation with rapid ventricular response ([facility] [address] )   Assessment & Plan    -follow troponin levels through 3 sets  -telemetry monitoring  -p r n  IV metoprolol for heart rate control  -the patient's chads 2 Vasc score is a 5, so he needs full anticoagulation  -initiate Eliquis 5 mg p o  b i d  for full anticoagulation        Lactic acidosis   Assessment & Plan    -normal saline IV fluids  -follow the lactic acid level        Hyponatremia   Assessment & Plan    -likely pseudohyponatremia in the setting of hyperglycemia  -follow the sodium level as the hypoglycemia is treated  -normal saline IV fluids        Urinary tract infection associated with indwelling urethral catheter (HCC)   Assessment & Plan    -IV ceftriaxone  -await the urine culture result        Type 2 diabetes mellitus with hyperglycemia, with long-term current use of insulin ([facility] [address] )   Assessment & Plan    -initiate an IV insulin drip/infusion  -monitor the blood glucose levels every 2 hours  -check a hemoglobin A1c level              VTE Prophylaxis: Apixaban (Eliquis)  / sequential compression device   Code Status:  Level 1-Full Code      Anticipated Length of Stay:  Patient will be admitted on an Inpatient basis with an anticipated length of stay of greater than 2 midnights  Justification for Hospital Stay:  The patient requires IV antibiotics, IV fluids, a work-up for the sepsis, and will likely need short-term rehabilitation placement upon discharge  Total Time for Visit, including Counseling / Coordination of Care: 30 minutes  Greater than 50% of this total time spent on direct patient counseling and coordination of care  Chief Complaint:  Generalized weakness    History of Present Illness:    Faiza Villegas is a 68 y o  male who presents to the emergency department complaints of generalized weakness  The patient's symptoms commenced over the last week  The patient developed suprapubic abdominal pain over last, which is similar to when he had previous urinary tract infections  The patient has been experiencing difficulty with ambulation and difficulty with activities of daily living secondary to the generalized weakness  In addition, the patient has been experiencing fatigue and malaise  Nothing seemed to improve his symptoms  The patient's symptoms progressively worsened over the last week  No chest pain  No shortness of breath  The patient was found have rapid atrial fibrillation in the emergency department  Review of Systems:    Review of Systems:  Per HPI, all other systems have been reviewed and were negative        Past Medical and Surgical History:     Past Medical History:   Diagnosis Date    CHF (congestive heart failure) ([facility] )     [facility]) [facility]     Diabetes mellitus ([facility]     ESBL (extended spectrum beta-lactamase) producing bacteria infection     History of BPH     Hyperlipidemia     Hypertension     PE [facility]pulmonary thromboembolism) ([facility]     Poor hygiene     [facility]     Pyelonephritis     Spinal stenosis of lumbar region     Stroke ([facility] )     Systolic and diastolic CHF, chronic ([facility] )     UTI due to extended-spectrum beta lactamase (ESBL) producing Escherichia coli        Past Surgical History:   Procedure Laterality Date    APPENDECTOMY      BACK SURGERY      CARDIAC SURGERY      cabg    HIP SURGERY Right     plate and pin       Meds/Allergies:    Prior to Admission medications    Medication Sig Start Date End Date Taking? Authorizing Provider   amLODIPine (NORVASC) 10 mg tablet Take 10 mg by mouth 11/28/16  Yes Historical Provider, MD   aspirin 325 mg tablet Take 325 mg by mouth daily   Yes Historical Provider, MD   atorvastatin (LIPITOR) 40 mg tablet Take 1 tablet by mouth daily with dinner 7/1/17  Yes Aubree Iyer DO   docusate sodium (COLACE) 100 mg capsule Take 1 capsule by mouth 2 (two) times a day  Patient taking differently: Take 100 mg by mouth 2 (two) times a day as needed   7/1/17  Yes Aubree Iyer DO   ferrous sulfate 325 (65 Fe) mg tablet Take 325 mg by mouth daily with breakfast Take one hour before meal      Yes Historical Provider, MD   insulin aspart (NovoLOG) 100 units/mL injection 1 unit for every 5 gm carbohydrates per endocrine instructions 11/30/16  Yes Historical Provider, MD   insulin glargine (LANTUS) 100 units/mL subcutaneous injection Inject 40 Units under the skin daily at bedtime 1/14/18  Yes Aubree Iyer DO   Potassium Chloride ER 20 MEQ TBCR Take by mouth   Yes Historical Provider, MD   tamsulosin (FLOMAX) 0 4 mg Take 1 capsule (0 4 mg total) by mouth daily with dinner 2/13/18  Yes Joyce Schmidt PA-C   LORazepam (ATIVAN) 0 5 mg tablet Take by mouth    Historical Provider, MD     I have reviewed home medications with patient personally  Allergies:    Allergies   Allergen Reactions    Ace Inhibitors      Hyperkalemia /decline in GFR    Penicillins      States he is unsure, does not believe he is allergic anymore  Has had cephalo's       Social History:     Marital Status: /Civil Union     Substance Use History:   History   Alcohol Use No     History   Smoking Status    Former Smoker    Packs/day: 2 00    Years: 32 00    Quit date: 1/5/1984   Smokeless Tobacco    Never Used     History   Drug Use No       Family History:    non-contributory    Physical Exam:     Vitals:   Blood Pressure: 142/84 (06/07/18 0007)  Pulse: 76 (06/07/18 0007)  Temperature: (!) 100 6 °F (38 1 °C) (06/07/18 0007)  Temp Source: Temporal (06/07/18 0007)  Respirations: 20 (06/07/18 0007)  Height: 5' 9" (175 3 cm) (06/07/18 0007)  Weight - Scale: 106 kg (233 lb 0 4 oz) (06/07/18 0007)  SpO2: 93 % (06/07/18 0007)    Physical Exam    General:  NAD, lethargic, follows commands  HEENT:  NC/AT, mucous membranes dry  Neck:  Supple, No JVP elevation  CV:  + S1, + S2, Tachycardic, Irregularly irregular rhythm  Pulm:  Lung fields are CTA bilaterally  Abd:  Soft, Non-tender, Non-distended  Ext:  No clubbing/cyanosis/edema  Skin:  No rashes    Additional Data:     Lab Results: I have personally reviewed pertinent reports  Results from last 7 days  Lab Units 06/06/18  1700 06/06/18  1653   WBC Thousand/uL  --  13 87*   HEMOGLOBIN g/dL  --  15 4   I STAT HEMOGLOBIN g/dl 17 0  --    HEMATOCRIT %  --  45 4   PLATELETS Thousands/uL  --  158   NEUTROS PCT %  --  87*   LYMPHS PCT %  --  4*   MONOS PCT %  --  9   EOS PCT %  --  0       Results from last 7 days  Lab Units 06/06/18  1700 06/06/18  1653   SODIUM mmol/L  --  134*   POTASSIUM mmol/L  --  4 1   CHLORIDE mmol/L  --  97*   CO2 mmol/L  --  26   BUN mg/dL  --  13   CREATININE mg/dL  --  1 07   CALCIUM mg/dL  --  8 7   TOTAL PROTEIN g/dL  --  7 6   BILIRUBIN TOTAL mg/dL  --  1 10*   ALK PHOS U/L  --  95   ALT U/L  --  26   AST U/L  --  14   GLUCOSE RANDOM mg/dL  --  338*   GLUCOSE, ISTAT mg/dl 328*  --        Results from last 7 days  Lab Units 06/06/18  1653   INR  1 19*       Results from last 7 days  Lab Units 06/07/18  0040 06/06/18  2211   POC GLUCOSE mg/dl 361* 433*           Imaging: I have personally reviewed pertinent reports        CT head wo contrast   Final Result by Hemalatha Navarro DO (06/06 2111) No acute intracranial abnormality  Workstation performed: [id number]         XR chest 2 views   Final Result by Baker Osgood, MD (06/06 1730)      No acute cardiopulmonary disease  Workstation performed: [id number]               Allscripts / [facility] Records Reviewed: Yes     ** Please Note: This note has been constructed using a voice recognition system   **

## 2018-06-07 NOTE — PLAN OF CARE
Problem: DISCHARGE PLANNING - CARE MANAGEMENT  Goal: Discharge to post-acute care or home with appropriate resources  INTERVENTIONS:  - Conduct assessment to determine patient/family and health care team treatment goals, and need for post-acute services based on payer coverage, community resources, and patient preferences, and barriers to discharge  - Address psychosocial, clinical, and financial barriers to discharge as identified in assessment in conjunction with the patient/family and health care team  - Arrange appropriate level of post-acute services according to patient's   needs and preference and payer coverage in collaboration with the physician and health care team  - Communicate with and update the patient/family, physician, and health care team regarding progress on the discharge plan  - Arrange appropriate transportation to post-acute venues  Pt might benefit for short term rehab on discharge/   Pt might need Eliquis on discharge     Outcome: Progressing

## 2018-06-07 NOTE — CASE MANAGEMENT
Initial Clinical Review    Admission: Date/Time/Statement: 6/6/18 @ 2219     Orders Placed This Encounter   Procedures    Inpatient Admission (expected length of stay for this patient is greater than two midnights)     Standing Status:   Standing     Number of Occurrences:   1     Order Specific Question:   Admitting Physician     Answer:   Diana Venegas [id number]     Order Specific Question:   Level of Care     Answer:   Med Surg [16]     Order Specific Question:   Estimated length of stay     Answer:   More than 2 Midnights     Order Specific Question:   Certification     Answer:   I certify that inpatient services are medically necessary for this patient for a duration of greater than two midnights  See H&P and MD Progress Notes for additional information about the patient's course of treatment  ED: Date/Time/Mode of Arrival:   ED Arrival Information     Expected Arrival Acuity Means of Arrival Escorted By Service Admission Type    6/6/2018 16:44 6/6/2018 16:43 Urgent Ambulance [facility] General Medicine Urgent    Arrival Complaint    WEAKNESS          Chief Complaint:   Chief Complaint   Patient presents with    Weakness - Generalized     pt states he is too weak to walk  pt has had multiple falls  denies hitting head  denies LOC  pt asking for orange juice  History of Illness:    68 y o  male who presents to the emergency department complaints of generalized weakness  The patient's symptoms commenced over the last week  The patient developed suprapubic abdominal pain over last, which is similar to when he had previous urinary tract infections  The patient has been experiencing difficulty with ambulation and difficulty with activities of daily living secondary to the generalized weakness  In addition, the patient has been experiencing fatigue and malaise  Nothing seemed to improve his symptoms  The patient's symptoms progressively worsened over the last week   patient was found have rapid atrial fibrillation in the emergency department  Date/Time Temp Pulse Resp BP SpO2 Weight [facility]   18 2300 -- 103 20 142/92 94 % -- JL   18 2215 --  124 20 147/60 -- -- AS   18 2213 --  113 18 157/65 -- -- AS   18 1943 -- 76 20 160/66 95 % -- J   18 1801 -- 100 20  182/71 94 % --    18 1646 98 3 °F (36 8 °C)  108 20 149/70 94 % 109 kg (241 lb 2 9 oz) AF        @ 0007:  Temp 100 6    Hr 76  (Afib)     Abnormal Labs/Diagnostic Test Results:     /  41 3/  21 5/  25 5  Na 131   134     K  7 1   3 4  Albumin  2 5  Wbc   13 87    10 86  Lactic acid  2 1  BS  433   361  328   433    361   314  249   175   141   260   UA:  Glucose  >=1000      trace ketones     innum bacteria + 10-20 wbc     ED Treatment:   Medication Administration from 2018 1643 to 2018 2354       Date/Time Order Dose Route Action Action by Comments     2018 2240 sodium chloride 0 9 % bolus 1,000 mL 0 mL Intravenous Stopped Masood Mares RN      2018 1938 sodium chloride 0 9 % bolus 1,000 mL 1,000 mL Intravenous [address] Duong Borrego RN      2018 2240 sodium chloride 0 9 % bolus 1,000 mL 1,000 mL Intravenous [address] Masood Mares RN      2018 2240 sodium chloride 0 9 % bolus 1,000 mL 1,000 mL Intravenous New Bag Masood Mares RN           Past Medical/Surgical History:    Active Ambulatory Problems     Diagnosis Date Noted    Spinal stenosis of lumbar region     PVD (peripheral vascular disease) ([facility] )     Essential hypertension 2017    Hyperlipidemia 2017    Type 2 diabetes mellitus with hyperglycemia, with long-term current use of insulin ([facility] ) 2017    COPD (chronic obstructive pulmonary disease) ([place] [address] ) 2017    Multiple renal cysts 2017    Vitamin D insufficiency 2017    Constipation 2017    S/P CABG x 3 2016    DM (diabetes mellitus), type 2, uncontrolled [facility] ) 2018    Urinary tract infection associated with indwelling urethral catheter ([facility] ) 01/05/2018    Renal cyst, left 01/07/2018    Low serum prealbumin 01/08/2018    Renal calculus, right 01/09/2018    Candiduria 01/09/2018    BPH with obstruction/lower urinary tract symptoms 02/12/2018     Resolved Ambulatory Problems     Diagnosis Date Noted    UTI due to extended-spectrum beta lactamase (ESBL) producing Escherichia coli     Systolic and diastolic CHF, chronic ([facility] )     Dysuria 06/28/2017    History of ESBL E  coli infection 06/28/2017    History of BPH 06/28/2017    Urinary retention due to benign prostatic hyperplasia 06/28/2017    Elevated lactic acid level 08/08/2017    Kidney disease, chronic, stage III (GFR 30-59 ml/min) 08/08/2017    Gram-negative bacteremia 08/14/2017    Ambulatory dysfunction 09/22/2017    Gait difficulty 10/13/2016    Morbid obesity ([facility] ) 10/13/2016    Hypokalemia 01/06/2018    Acute cystitis 01/06/2018    Abnormal chest x-ray 01/06/2018    Hypocalcemia 01/09/2018    Intractable abdominal pain 01/10/2018     Past Medical History:   Diagnosis Date    [facility]congestive heart failure) ([facility])     COPD (chronic obstructive pulmonary disease) ([facility])     Diabetes mellitus ([facility])     ESBL (extended spectrum beta-lactamase) producing bacteria infection     History of BPH     Hyperlipidemia     Hypertension     PE (pulmonary thromboembolism) ([facility])     Poor hygiene     PVD (peripheral vascular disease) ([facility])     Pyelonephritis     Spinal stenosis of lumbar region     Stroke ([place] [address] )     Systolic and diastolic CHF, chronic ([place] [address] )     UTI due to extended-spectrum beta lactamase (ESBL) producing Escherichia coli        Admitting Diagnosis: Volume depletion, unspecified [E86 9]  Weakness [R53 1]  Type 2 diabetes mellitus with hyperglycemia, unspecified whether long term insulin use ([facility]) [E11 65]    Assessment/Plan:   ([place] [address] )   Assessment & Plan     -admit the patient to med/surg level of care with telemetry monitoring  -the sepsis is present on admission and secondary to a urinary tract infection (UTI)/acute cystitis associated with an indwelling urethral bladder catheter  -check blood cultures x 2 sets  -check a urine culture  -the patient received the initial 30 mL/kg normal saline IV fluid bolus/resuscitation per the sepsis protocol  -IV ceftriaxone  -follow the lactic acid level             Atrial fibrillation with rapid ventricular response (HCC)   Assessment & Plan     -follow troponin levels through 3 sets  -telemetry monitoring  -p r n  IV metoprolol for heart rate control  -the patient's chads 2 Vasc score is a 5, so he needs full anticoagulation  -initiate Eliquis 5 mg p o  b i d  for full anticoagulation          Lactic acidosis   Assessment & Plan     -normal saline IV fluids  -follow the lactic acid level          Hyponatremia   Assessment & Plan     -likely pseudohyponatremia in the setting of hyperglycemia  -follow the sodium level as the hypoglycemia is treated  -normal saline IV fluids          Urinary tract infection associated with indwelling urethral catheter (HCC)   Assessment & Plan     -IV ceftriaxone  -await the urine culture result          Type 2 diabetes mellitus with hyperglycemia, with long-term current use of insulin ([facility])   Assessment & Plan     -initiate an IV insulin drip/infusion  -monitor the blood glucose levels every 2 hours  -check a hemoglobin A1c level                   VTE Prophylaxis: Apixaban (Eliquis)  / sequential compression device   Code Status:  Level 1-Full Code        Anticipated Length of Stay:  Patient will be admitted on an Inpatient basis with an anticipated length of stay of greater than 2 midnights     Justification for Hospital Stay:  The patient requires IV antibiotics, IV fluids, a work-up for the sepsis, and will likely need short-term rehabilitation placement upon discharge        Admission Orders:  Admit tele  Continuous insulin gtt wq 2 hr accucks & titration  PT/OT/Speech evals  Echo  Daily wgt;  I/O;  Orthostatic vs ;  Lo carb diet      Scheduled Meds:   Current Facility-Administered Medications:  acetaminophen 650 mg Oral Q6H PRN Felicia Godinez, DO    amLODIPine 10 mg Oral Daily Felicia Godinez, DO    apixaban 5 mg Oral BID Felicia Godinez, DO    aspirin 81 mg Oral Daily Felicia Godinez, DO    atorvastatin 40 mg Oral Daily With Dane Rios DO    cefTRIAXone 1,000 mg Intravenous Q24H Felicia Godinez DO Last Rate: Stopped (06/07/18 0415)   dextrose 25 mL Intravenous PRN Felicia Godinez, DO    docusate sodium 100 mg Oral BID PRN Felicia Godinez, DO    ferrous sulfate 325 mg Oral Daily With Breakfast Felicia Godinez DO    insulin regular (HumuLIN R,NovoLIN R) infusion 0 3-21 Units/hr Intravenous Titrated Felicia Godinez DO Last Rate: 6 Units/hr (06/07/18 0752)   menthol-zinc oxide  Topical BID Felicia Godinez, DO    metoprolol 5 mg Intravenous Q4H PRN Felicia Godinez, DO    ondansetron 4 mg Intravenous Q6H PRN Felicia Godinez, DO    sodium chloride (PF) 3 mL Intravenous PRN Coty Franklin MD    sodium chloride 75 mL/hr Intravenous Continuous Felicia Godinez DO Last Rate: 75 mL/hr (06/07/18 0125)   tamsulosin 0 4 mg Oral Daily With Dane Rios DO      Continuous Infusions:   insulin regular (HumuLIN R,NovoLIN R) infusion 0 3-21 Units/hr Last Rate: 6 Units/hr (06/07/18 7875)   sodium chloride 75 mL/hr Last Rate: 75 mL/hr (06/07/18 0125)

## 2018-06-07 NOTE — SOCIAL WORK
Met with pt to discuss role as  in helping pt to develop discharge plan and to help pt carry out their plan  Pt lives in an apartment with his wife  Pt has 1 CHARLES  Pt has everything set up on the first floor  Pt has a walker, cane , shower chair and wheel chair  Pt uses the walker to ambulate  Pt has aides that are provided by the [place] for 4 hours a day for 5 days a week  Pt's does not drive but his aides drive him to app  Pt is followed by Dr Noemy Waldron at the [facility] [place]  Pt gets all his meds from the [facility] [place]  Pt uses the [facility] in [facility]  Pt has never had home care  Discharge checklist discussed with patient with a good understanding  Pt might benefit from short term rehab  Pt might need Eliquis will find out how I go about getting from the [facility] [place]

## 2018-06-07 NOTE — ASSESSMENT & PLAN NOTE
-IV ceftriaxone  -await the urine culture result  Patient doesn't feel much better from generalized weakness standpoint - PT/OT consult

## 2018-06-07 NOTE — OCCUPATIONAL THERAPY NOTE
Occupational Therapy Evaluation     Patient Name: Matheus Torrez  Today's Date: 6/7/2018  Problem List  Patient Active Problem List   Diagnosis    Spinal stenosis of lumbar region    [facility]    Essential hypertension    Hyperlipidemia    Type 2 diabetes mellitus with hyperglycemia, with long-term current use of insulin ([facility] )    COPD (chronic obstructive pulmonary disease) [facility] )    Multiple renal cysts    Vitamin D insufficiency    Constipation    S/P CABG x 3    DM (diabetes mellitus), type 2, uncontrolled ([facility] )    Urinary tract infection associated with indwelling urethral catheter ([facility] )    Renal cyst, left    Low serum prealbumin    Renal calculus, right    Candiduria    BPH with obstruction/lower urinary tract symptoms    Sepsis ([facility] )    Hyponatremia    Atrial fibrillation with rapid ventricular response ([facility])    Lactic acidosis    Skin breakdown     Past Medical History  Past Medical History:   Diagnosis Date    CHF (congestive heart failure) ([facility])     COPD (chronic obstructive pulmonary disease) [facility]     Diabetes mellitus [facility] )     ESBL (extended spectrum beta-lactamase) producing bacteria infection     History of BPH     Hyperlipidemia     Hypertension     PE (pulmonary thromboembolism) ([facility])     Poor hygiene     PVD (peripheral vascular disease) [facility] )     Pyelonephritis     Spinal stenosis of lumbar region     Stroke ([facility]     Systolic and diastolic CHF, chronic ([facility] )     UTI due to extended-spectrum beta lactamase (ESBL) producing Escherichia coli      Past Surgical History  Past Surgical History:   Procedure Laterality Date    APPENDECTOMY      BACK SURGERY      CARDIAC SURGERY      cabg    HIP SURGERY Right     plate and pin           06/07/18 0811   Note Type   Note type Eval/Treat   Restrictions/Precautions   Weight Bearing Precautions Per Order No   Other Precautions Contact/isolation; Chair Alarm; Bed Alarm;Multiple lines;Telemetry; Fall Risk   Pain Assessment   Pain Assessment No/denies pain   Home Living   Type of [address] One level;Performs ADLs on one level; Able to live on main level with bedroom/bathroom;Stairs to enter with rails  (2 CHARLES c HR)   Bathroom Shower/Tub Tub/shower unit   Bathroom Toilet Standard   Bathroom Equipment Grab bars in shower; Shower chair   [address] Walker   Additional Comments pt has home health aide that he reports comes 4 hours a day and (A) for ADLs/IADLs   Prior Function   Level of [place] Needs assistance with ADLs and functional mobility; Needs assistance with IADLs   Lives With Spouse   Receives Help From Family;Home health   ADL Assistance Needs assistance   IADLs Needs assistance   Falls in the last 6 months 0   Comments pt's family or aide drives   Psychosocial   Psychosocial (WDL) WDL   ADL   Where Assessed Other (Comment)  (and EOB)   Toileting Assistance  1  Total Assistance   Toileting Deficit Perineal hygiene   Additional Comments pt incontinent of large amount of BM while supine in bed; pt stood EOB in an attempt to perform bharath hygiene; pt requested to utilize toilet    Bed Mobility   Supine to Sit 2  Maximal assistance   Additional items Assist x 2;Bedrails; Increased time required;LE management;Verbal cues   Sit to Supine (seated in chair at end of session)   Additional Comments pt provided little (A) to therapist   Transfers   Sit to Stand 3  Moderate assistance   Additional items Assist x 2;Bedrails; Increased time required;Verbal cues  (RW)   Stand to Sit 3  Moderate assistance   Additional items Assist x 2;Bedrails; Increased time required;Verbal cues  (RW)   Toilet transfer 3  Moderate assistance   Additional items Assist x 2; Increased time required;Verbal cues;Standard toilet   Additional Comments pt performed multiple transfers to and from toilet with use of RW; pt required mod (A) x2 and with limited endurance and standing tolerance   Functional Mobility   Functional Mobility 4  Minimal assistance   Additional Comments x2; pt very rushed during FM c RW due to fatigue and decreased standing tolerance; pt only performed FM to bathroom   Additional items Rolling walker   Balance   Static Sitting Fair   Dynamic Sitting Fair   Static Standing Fair -   Dynamic Standing Fair -   Ambulatory Fair -   Activity Tolerance   Activity Tolerance Patient limited by fatigue   RUE Assessment   RUE Assessment X  (4-/5 grossly; WFL AROM)   LUE Assessment   LUE Assessment X  (3+/5 grossly; WFL AROM; pt with hx of CVA)   Hand Function   Gross Motor Coordination Functional   Fine Motor Coordination Functional   Sensation   Light Touch No apparent deficits   Sharp/Dull No apparent deficits   Cognition   Overall Cognitive Status WFL   Arousal/Participation Alert   Attention Within functional limits   Orientation Level Oriented X4   Memory Within functional limits   Following Commands Follows all commands and directions without difficulty   Assessment   Limitation Decreased ADL status; Decreased UE strength;Decreased Safe judgement during ADL;Decreased endurance;Decreased self-care trans;Decreased high-level ADLs   Assessment pt presents at evaluation performing at overall mod (A) x2 with use of RW during FM; pt will benefit from continued OT intervention during hospital admission and short term rehab on d/c as pt is unable to care for himself at home    Goals   Short Term Goal  pt will perform UE strengthening exercises    Long Term Goal #1 pt will perform UB bathing and grooming tasks at min (A) level   Long Term Goal #2 pt will perform FM c RW at (S) level with increased endurance   Long Term Goal pt will perform toilet transfers at min (A) level    Plan   Treatment Interventions ADL retraining;Functional transfer training;UE strengthening/ROM; Endurance training;Patient/family training; Activityengagement   Goal Expiration Date 06/21/18   OT Frequency 3-5x/wk   Recommendation   OT Discharge Recommendation Short Term Rehab   OT - OK to Discharge No   Barthel Index   Feeding 10   Bathing 0   Grooming Score 0   Dressing Score 5   Bladder Score 10   Bowels Score 10   Toilet Use Score 5   Transfers (Bed/Chair) Score 5   Mobility (Level Surface) Score 10   Stairs Score 0   Barthel Index Score 55       Pt will benefit from continued OT services in order to maximize (I) c ADL performance, FM c RW, and improve overall endurance/strength required to complete functional tasks in preparation for d/c  Pt left seated in chair at end of session; all needs within reach; all lines intact; scds connected and turned on

## 2018-06-07 NOTE — PROGRESS NOTES
Progress Note - Taylor Hernandez 1940, 68 y o  male MRN: [id number]    Unit/Bed#: 408-01 Encounter: [id number]    Primary Care Provider: Jane Singh MD   Date and time admitted to hospital: 6/6/2018  4:43 PM        Urinary tract infection associated with indwelling urethral catheter [facility])   Assessment & Plan    -IV ceftriaxone  -await the urine culture result  Patient doesn't feel much better from generalized weakness standpoint - PT/OT consult  * Sepsis ([address] )   Assessment & Plan    -admit the patient to med/surg level of care with telemetry monitoring  -the sepsis is present on admission and secondary to a urinary tract infection (UTI)/acute cystitis associated with an indwelling urethral bladder catheter  -check blood cultures x 2 sets  -check a urine culture  -the patient received the initial 30 mL/kg normal saline IV fluid bolus/resuscitation per the sepsis protocol  - continue IV ceftriaxone            Lactic acidosis   Assessment & Plan    Resolved with IVF hydration  Atrial fibrillation with rapid ventricular response (HCC)   Assessment & Plan    - troponin levels negative x 3 sets   -telemetry monitoring  -p r n  IV metoprolol for heart rate control  -the patient's chads 2 Vasc score is a 5, so he needs full anticoagulation  -initiate Eliquis 5 mg p o  b i d  for full anticoagulation  Check echocardiogram in the setting of new atrial fibrillation  Hyponatremia   Assessment & Plan    - likely pseudohyponatremia in the setting of hyperglycemia  - now resolved  Type 2 diabetes mellitus with hyperglycemia, with long-term current use of insulin ([facility])   Assessment & Plan    A1c level 12 3  Blood glucose improved on insulin infusion  Will attempt lantus with novolog coverage with meals, and sliding scale coverage for insulin  Skin breakdown   Assessment & Plan    Frequent repositioning, Calmoseptine  Essential hypertension   Assessment & Plan    Stable pressures  Continue Norvasc  VTE Pharmacologic Prophylaxis:   Pharmacologic: Apixaban (Eliquis)  Mechanical VTE Prophylaxis in Place: Yes      Time Spent for Care: 30 minutes  More than 50% of total time spent on counseling and coordination of care as described above  Current Length of Stay: 1 day(s)    Current Patient Status: Inpatient   Certification Statement: The patient will continue to require additional inpatient hospital stay due to need for IV antibiotics    Discharge Plan / Estimated Discharge Date: question STR once medically stable  Code Status: Level 1 - Full Code      Subjective:   Patient still feels generally weak today  No nausea, vomiting, abdominal pain, CP, SOB  Has chronic indwelling Ozuna catheter  Vague historian  Objective:   Vitals:   Temp (24hrs), Av 1 °F (37 3 °C), Min:98 3 °F (36 8 °C), Max:100 6 °F (38 1 °C)    HR:  [] 80  Resp:  [18-21] 21  BP: (118-182)/(60-92) 118/66  SpO2:  [93 %-95 %] 94 %  Body mass index is 34 41 kg/m²  Input and Output Summary (last 24 hours): Intake/Output Summary (Last 24 hours) at 18 1321  Last data filed at 18 1222   Gross per 24 hour   Intake              550 ml   Output              500 ml   Net               50 ml       Physical Exam:   Physical Exam   Constitutional: He is oriented to person, place, and time  He appears well-developed  HENT:   Head: Normocephalic  Cardiovascular: Normal rate and normal heart sounds  Pulmonary/Chest: Effort normal and breath sounds normal  No respiratory distress  He has no wheezes  Abdominal: Soft  Bowel sounds are normal    Genitourinary:   Genitourinary Comments: Ozuna draining clear yellow urine at present   Neurological: He is alert and oriented to person, place, and time  Skin: Skin is warm and dry  Nursing note and vitals reviewed        Additional Data:   Labs:    Results from last 7 days  Lab Units 18  0617   WBC Thousand/uL 10 86*   HEMOGLOBIN g/dL 13 6 HEMATOCRIT % 39 8   PLATELETS Thousands/uL 144*   NEUTROS PCT % 80*   LYMPHS PCT % 9*   MONOS PCT % 10   EOS PCT % 0       Results from last 7 days  Lab Units 06/07/18  0617   SODIUM mmol/L 136   POTASSIUM mmol/L 3 4*   CHLORIDE mmol/L 101   CO2 mmol/L 27   BUN mg/dL 15   CREATININE mg/dL 0 87   CALCIUM mg/dL 8 5   TOTAL PROTEIN g/dL 6 4   BILIRUBIN TOTAL mg/dL 0 50   ALK PHOS U/L 71   ALT U/L 22   AST U/L 14   GLUCOSE RANDOM mg/dL 126       Results from last 7 days  Lab Units 06/06/18  1653   INR  1 19*       * I Have Reviewed All Lab Data Listed Above  * Additional Pertinent Lab Tests Reviewed:  All Labs Within Last 24 Hours Reviewed        Recent Cultures (last 7 days):         Last 24 Hours Medication List:     Current Facility-Administered Medications:  acetaminophen 650 mg Oral Q6H PRN Vera Angela, DO    amLODIPine 10 mg Oral Daily Vera Angela, DO    apixaban 5 mg Oral BID Vera Angela, DO    aspirin 81 mg Oral Daily Vera Angela, DO    atorvastatin 40 mg Oral Daily With Dane & Gabriel, DO    cefTRIAXone 1,000 mg Intravenous Q24H Vera Angela, DO Last Rate: Stopped (06/07/18 0415)   dextrose 25 mL Intravenous PRN Vera Angela, DO    docusate sodium 100 mg Oral BID PRN Vera Angela, DO    ferrous sulfate 325 mg Oral Daily With Breakfast Vera Angela, DO    insulin glargine 40 Units Subcutaneous QAM Kendra Lin PA-C    insulin lispro 1-5 Units Subcutaneous TID AC Kendra Lin PA-C    insulin lispro 1-5 Units Subcutaneous HS Kendra Lin PA-C    [START ON 6/8/2018] insulin lispro 3 Units Subcutaneous Daily With Breakfast Kendra Lin PA-C    insulin lispro 3 Units Subcutaneous Daily With Lunch Kendra Lin PA-C    insulin lispro 3 Units Subcutaneous Daily With Dinner Apollo Rowley PA-C    menthol-zinc oxide  Topical BID Vera Angela, DO    metoprolol 5 mg Intravenous Q4H PRN Vera Angela, DO    ondansetron 4 mg Intravenous Q6H PRN Miller Lobe DO Maria L    sodium chloride (PF) 3 mL Intravenous PRN Lola Carr MD    sodium chloride 75 mL/hr Intravenous Continuous Freda Wolff DO Last Rate: 75 mL/hr (06/07/18 0125)   tamsulosin 0 4 mg Oral Daily With Dinner Freda Wolff DO         Today, Patient Was Seen By: Mary Mondragon PA-C    ** Please Note: Dragon 360 Dictation voice to text software may have been used in the creation of this document   **

## 2018-06-07 NOTE — ASSESSMENT & PLAN NOTE
-admit the patient to med/surg level of care with telemetry monitoring  -the sepsis is present on admission and secondary to a urinary tract infection (UTI)/acute cystitis associated with an indwelling urethral bladder catheter  -check blood cultures x 2 sets  -check a urine culture  -the patient received the initial 30 mL/kg normal saline IV fluid bolus/resuscitation per the sepsis protocol  -IV ceftriaxone  -follow the lactic acid level

## 2018-06-07 NOTE — ASSESSMENT & PLAN NOTE
- troponin levels negative x 3 sets   -telemetry monitoring  -p r n  IV metoprolol for heart rate control  -the patient's chads 2 Vasc score is a 5, so he needs full anticoagulation  -initiate Eliquis 5 mg p o  b i d  for full anticoagulation  Check echocardiogram in the setting of new atrial fibrillation

## 2018-06-07 NOTE — ED PROVIDER NOTES
History  Chief Complaint   Patient presents with    Weakness - Generalized     pt states he is too weak to walk  pt has had multiple falls  denies hitting head  denies LOC  pt asking for orange juice  Patient: Tulio Madrid  77 y o /male  YOB: 1940  MRN: [id number]  PCP: Omer Hu MD  Date of evaluation: 6/6/2018    (N B  Voice-recognition software may have been used in the preparation of this document )  1-2 days of global weakness, malaise, fatigue  History provided by:  Patient  Fatigue   Severity:  Severe  Onset quality:  Gradual  Timing:  Constant  Progression:  Worsening  Chronicity:  New  Context: not alcohol use, not change in medication and not recent infection  Dehydration: thirsty  Relieved by:  Nothing  Worsened by:  Nothing  Associated symptoms: difficulty walking and falls (sliding)    Associated symptoms: no abdominal pain, no chest pain, no cough, no diarrhea, no fever, no loss of consciousness, no shortness of breath, no syncope, no urgency and no vomiting        Prior to Admission Medications   Prescriptions Last Dose Informant Patient Reported? Taking?    LORazepam (ATIVAN) 0 5 mg tablet Unknown at Unknown time Self Yes No   Sig: Take by mouth   Potassium Chloride ER 20 MEQ TBCR 6/6/2018 at 0800 Self Yes Yes   Sig: Take by mouth   amLODIPine (NORVASC) 10 mg tablet 6/6/2018 at 0800 Self Yes Yes   Sig: Take 10 mg by mouth   aspirin 325 mg tablet 6/6/2018 at 0800 Self Yes Yes   Sig: Take 325 mg by mouth daily   atorvastatin (LIPITOR) 40 mg tablet 6/6/2018 at 1700 Self No Yes   Sig: Take 1 tablet by mouth daily with dinner   docusate sodium (COLACE) 100 mg capsule 6/6/2018 at 0800 Self No Yes   Sig: Take 1 capsule by mouth 2 (two) times a day   Patient taking differently: Take 100 mg by mouth 2 (two) times a day as needed     ferrous sulfate 325 (65 Fe) mg tablet 6/6/2018 at 0800 Self Yes Yes   Sig: Take 325 mg by mouth daily with breakfast Take one hour before meal  insulin aspart (NovoLOG) 100 units/mL injection 2018 at 1200 Self Yes Yes   Si unit for every 5 gm carbohydrates per endocrine instructions   insulin glargine (LANTUS) 100 units/mL subcutaneous injection 2100 at Unknown time Self No Yes   Sig: Inject 40 Units under the skin daily at bedtime   tamsulosin (FLOMAX) 0 4 mg 2018 at 1700 Self No Yes   Sig: Take 1 capsule (0 4 mg total) by mouth daily with dinner      Facility-Administered Medications: None       Past Medical History:   Diagnosis Date    [facility]congest[facility] failure) [facility])     COPD (chronic obstructive pulmonary disease) [place] [address] [place]     Diabetes mellitus ([facility])     ESBL (extended spectrum beta-lactamase) producing bacteria infection     History of BPH     Hyperlipidemia     Hypertension     PE [facility]pulmonary thromboembolism) ([facility])     Poor hygiene     PVD (peripheral vascular disease) ([place] [address] )     Pyelonephritis     Spinal stenosis of lumbar region     Stroke ([place] [address] )     Systolic and diastolic CHF, chronic ([place] [address] )     UTI due to extended-spectrum beta lactamase (ESBL) producing Escherichia coli        Past Surgical History:   Procedure Laterality Date    APPENDECTOMY      BACK SURGERY      CARDIAC SURGERY      cabg    HIP SURGERY Right     plate and pin       Family History   Problem Relation Age of Onset    Coronary artery disease Mother     Cancer Father      I have reviewed and agree with the history as documented  Social History   Substance Use Topics    Smoking status: Former Smoker     Packs/day: 2 00     Years: 32 00     Quit date: 1984    Smokeless tobacco: Never Used    Alcohol use No        Review of Systems   Constitutional: Positive for fatigue  Negative for chills and fever  HENT: Negative  Negative for trouble swallowing and voice change  Eyes: Negative for photophobia and visual disturbance  Respiratory: Negative  Negative for cough and shortness of breath  Cardiovascular: Negative    Negative for chest pain, palpitations and syncope  Gastrointestinal: Negative  Negative for abdominal pain, diarrhea and vomiting  Endocrine: Negative  Negative for polydipsia and polyuria  Genitourinary: Negative  Negative for difficulty urinating and urgency  Musculoskeletal: Positive for falls (sliding)  Negative for back pain and neck pain  Skin: Negative  Negative for rash and wound  Allergic/Immunologic: Negative for immunocompromised state  Neurological: Negative  Negative for loss of consciousness, speech difficulty and weakness  Hematological: Negative  Negative for adenopathy  Does not bruise/bleed easily  All other systems reviewed and are negative  Physical Exam  Physical Exam   Constitutional: He is oriented to person, place, and time  He appears well-developed and well-nourished  He is cooperative  Non-toxic appearance  He appears ill  HENT:   Head: Normocephalic and atraumatic  Mouth/Throat: Oropharynx is clear and moist and mucous membranes are normal    Voice normal   Eyes: EOM are normal  Pupils are equal, round, and reactive to light  Cardiovascular: Normal rate and regular rhythm  Pulmonary/Chest: Effort normal    Abdominal: Soft  Bowel sounds are normal    Neurological: He is alert and oriented to person, place, and time  GCS eye subscore is 4  GCS verbal subscore is 5  GCS motor subscore is 6  Skin: Skin is warm and dry  Psychiatric: He has a normal mood and affect  His speech is normal and behavior is normal    Nursing note and vitals reviewed        Vital Signs  ED Triage Vitals [06/06/18 1646]   Temperature Pulse Respirations Blood Pressure SpO2   98 3 °F (36 8 °C) (!) 108 20 149/70 94 %      Temp Source Heart Rate Source Patient Position - Orthostatic VS BP Location FiO2 (%)   Oral Monitor Lying Left arm --      Pain Score       No Pain           Vitals:    06/06/18 2213 06/06/18 2215 06/06/18 2300 06/07/18 0007   BP: 157/65 147/60 142/92 142/84   Pulse: (!) 113 (!) 124 103 76   Patient Position - Orthostatic VS: Lying - Orthostatic VS Sitting - Orthostatic VS Lying Lying       Visual Acuity  Visual Acuity      Most Recent Value   L Pupil Size (mm)  3   R Pupil Size (mm)  3          ED Medications  Medications   sodium chloride (PF) 0 9 % injection 3 mL (not administered)   magnesium sulfate 2 g/50 mL IVPB (premix) 2 g (not administered)   cefTRIAXone (ROCEPHIN) IVPB (premix) 1,000 mg (not administered)   insulin regular (HumuLIN R,NovoLIN R) 1 Units/mL in sodium chloride 0 9 % 100 mL infusion (8 Units/hr Intravenous New Bag 6/7/18 0105)   dextrose 50 % IV solution 25 mL (not administered)   acetaminophen (TYLENOL) tablet 650 mg (not administered)   apixaban (ELIQUIS) tablet 5 mg (not administered)   atorvastatin (LIPITOR) tablet 40 mg (not administered)   aspirin chewable tablet 81 mg (not administered)   docusate sodium (COLACE) capsule 100 mg (not administered)   amLODIPine (NORVASC) tablet 10 mg (not administered)   tamsulosin (FLOMAX) capsule 0 4 mg (not administered)   ferrous sulfate tablet 325 mg (not administered)   ondansetron (ZOFRAN) injection 4 mg (not administered)   sodium chloride 0 9 % infusion (75 mL/hr Intravenous New Bag 6/7/18 0125)   sodium chloride 0 9 % bolus 1,000 mL (0 mL Intravenous Stopped 6/6/18 2240)   sodium chloride 0 9 % bolus 1,000 mL (1,000 mL Intravenous New Bag 6/6/18 2240)   sodium chloride 0 9 % bolus 1,000 mL (1,000 mL Intravenous New Bag 6/6/18 2240)       Diagnostic Studies  Results Reviewed     Procedure Component Value Units Date/Time    Fingerstick Glucose (POCT) [[id number]]  (Abnormal) Collected:  06/06/18 2211    Lab Status:  Final result Updated:  06/06/18 2214     POC Glucose 433 (H) mg/dl     Urine Microscopic [[id number]]  (Abnormal) Collected:  06/06/18 1755    Lab Status:  Final result Specimen:  Urine from Urine, Indwelling Ozuna Catheter Updated:  06/06/18 1847     RBC, UA 4-10 (A) /hpf      WBC, UA 10-20 (A) /hpf Epithelial Cells Occasional /hpf      Bacteria, UA Innumerable (A) /hpf     Urine culture [[id number]] Collected:  06/06/18 1755    Lab Status:   In process Specimen:  Urine from Urine, Indwelling Ozuna Catheter Updated:  06/06/18 1847    Urinalysis with culture and sensitivity reflex [[id number]]  (Abnormal) Collected:  06/06/18 1755    Lab Status:  Final result Specimen:  Urine from Urine, Indwelling Ozuna Catheter Updated:  06/06/18 1807     Color, UA Yellow     Clarity, UA Cloudy     Specific [place], UA 1 020     pH, UA 6 0     Leukocytes, UA Trace (A)     Nitrite, UA Negative     Protein,  (2+) (A) mg/dl      Glucose, UA >=1000 (1%) (A) mg/dl      Ketones, UA Trace (A) mg/dl      Urobilinogen, UA 0 2 E U /dl      Bilirubin, UA Negative     Blood, UA Moderate (A)    Protime-INR [[id number]]  (Abnormal) Collected:  06/06/18 1653    Lab Status:  Final result Specimen:  Blood from Arm, Right Updated:  06/06/18 1723     Protime 14 5 (H) seconds      INR 1 19 (H)    APTT [[id number]]  (Normal) Collected:  06/06/18 1653    Lab Status:  Final result Specimen:  Blood from Arm, Right Updated:  06/06/18 1723     PTT 30 seconds     Comprehensive metabolic panel [[id number]]  (Abnormal) Collected:  06/06/18 1653    Lab Status:  Final result Specimen:  Blood from Arm, Right Updated:  06/06/18 1719     Sodium 134 (L) mmol/L      Potassium 4 1 mmol/L      Chloride 97 (L) mmol/L      CO2 26 mmol/L      Anion Gap 11 mmol/L      BUN 13 mg/dL      Creatinine 1 07 mg/dL      Glucose 338 (H) mg/dL      Calcium 8 7 mg/dL      AST 14 U/L      ALT 26 U/L      Alkaline Phosphatase 95 U/L      Total Protein 7 6 g/dL      Albumin 3 2 (L) g/dL      Total Bilirubin 1 10 (H) mg/dL      eGFR 67 ml/min/1 73sq m     Narrative:         National Kidney Disease Education Program recommendations are as follows:  GFR calculation is accurate only with a steady state creatinine  Chronic Kidney disease less than 60 ml/min/1 73 sq  meters  Kidney failure less than 15 ml/min/1 73 sq  meters      Magnesium [[id number]]  (Normal) Collected:  06/06/18 1653    Lab Status:  Final result Specimen:  Blood from Arm, Right Updated:  06/06/18 1719     Magnesium 1 7 mg/dL     Acetone [[id number]]  (Normal) Collected:  06/06/18 1653    Lab Status:  Final result Specimen:  Blood from Arm, Right Updated:  06/06/18 1709     Acetone, Bld Negative    POCT Chem 8+ [[id number]]  (Abnormal) Collected:  06/06/18 1700    Lab Status:  Final result Updated:  06/06/18 1704     SODIUM, I-STAT 131 (L) mmol/l      Potassium, i-STAT 7 1 (HH) mmol/L      Chloride, istat 99 (L) mmol/L      CO2, i-STAT 29 mmol/L      Anion Gap, Istat 11 mmol/L      Calcium, Ionized i-STAT 0 97 (L) mmol/L      BUN, I-STAT 19 mg/dl      Creatinine, i-STAT 0 7 mg/dl      eGFR 91 ml/min/1 73sq m      Glucose, i-STAT 328 (H) mg/dl      Hct, i-STAT 50 (H) %      Hgb, i-STAT 17 0 g/dl      Specimen Type VENOUS    Blood gas, venous [[id number]]  (Abnormal) Collected:  06/06/18 1653    Lab Status:  Final result Specimen:  Blood from Arm, Right Updated:  06/06/18 1703     pH, Julian 7 409 (H)     pCO2, Julian 41 3 (L) mm Hg      pO2, Julian 21 5 (L) mm Hg      HCO3, Julian 25 5 mmol/L      Base Excess, Julian 0 8 mmol/L      O2 Content, Julian 9 0 ml/dL      O2 HGB, VENOUS 40 5 (L) %     CBC and differential [[id number]]  (Abnormal) Collected:  06/06/18 1653    Lab Status:  Final result Specimen:  Blood from Arm, Right Updated:  06/06/18 1703     WBC 13 87 (H) Thousand/uL      RBC 5 35 Million/uL      Hemoglobin 15 4 g/dL      Hematocrit 45 4 %      MCV 85 fL      MCH 28 8 pg      MCHC 33 9 g/dL      RDW 13 6 %      MPV 11 7 fL      Platelets 891 Thousands/uL      Neutrophils Relative 87 (H) %      Lymphocytes Relative 4 (L) %      Monocytes Relative 9 %      Eosinophils Relative 0 %      Basophils Relative 0 %      Neutrophils Absolute 12 04 (H) Thousands/µL      Lymphocytes Absolute 0 56 (L) Thousands/µL      Monocytes Absolute 1 23 (H) Thousand/µL Eosinophils Absolute 0 00 Thousand/µL      Basophils Absolute 0 04 Thousands/µL                  CT head wo contrast   Final Result by Barbara Lino DO (06/06 2111)      No acute intracranial abnormality  Workstation performed: [id number]         XR chest 2 views   Final Result by Rito Reyes MD (06/06 1730)      No acute cardiopulmonary disease  Workstation performed: [id number]                    Procedures  Procedures       Phone Contacts  ED Phone Contact    ED Course  ED Course as of Jun 07 0134 Wed Jun 06, 2018   1746 Followup from lab is normal  POTASSIUM,I-STAT: (!!) 7 1   1747 XR chest 2 views   2217 Dr Antwan Gilbert [DO] for [facility]  coming to see pt now; he will decide on insulin therapy when he sees him  2231 Pt advised of lab results, need for admission  He agrees  Initial Sepsis Screening     Row Name 06/07/18 0018                Is the patient's history suggestive of a new or worsening infection? (!)  Yes (Proceed)  LIFESCAPE        Suspected source of infection urinary tract infection  LIFESCAPE        Are two or more of the following signs & symptoms of infection both present and new to the patient?         Indicate SIRS criteria Leukocytosis (WBC > 99648 IJL); Tachycardia > 90 bpm  LIFESCAPE        If the answer is yes to both questions, suspicion of sepsis is present          If severe sepsis is present AND tissue hypoperfusion perists in the hour after fluid resuscitation or lactate > 4, the patient meets criteria for SEPTIC SHOCK          Are any of the following organ dysfunction criteria present within 6 hours of suspected infection and SIRS criteria that are NOT considered to be chronic conditions?  No  LIFESCAPE        Organ dysfunction          Date of presentation of severe sepsis          Time of presentation of severe sepsis          Tissue hypoperfusion persists in the hour after crystalloid fluid administration, evidenced, by either:   Was hypotension present within one hour of the conclusion of crystalloid fluid administration?  No  LIFESCAPE        Date of presentation of septic shock          Time of presentation of septic shock            User Key  (r) = Recorded By, (t) = Taken By, (c) = Cosigned By    Initials Name Provider Type    Orquidea Oliveros DO Physician           Default Flowsheet Data (last 720 hours)      Sepsis Reassessment     Row Name 06/07/18 0018                   Volume Status and Tissue Perfusion Post Fluid Resuscitation- Must Document ALL of the Following:    Vital Signs Reviewed Yes  LIFESCAPE        Cardio (!)  Irregular rhythm  LIFESCAPE        Pulmonary Normal effort  -JH        Capillary Refill Brisk  -JH        Peripheral Pulses Radial  -        Peripheral Pulse +2  -JH        Skin Warm  -JH           *OR*   Intensive Monitoring- Must Document Two * of the Following Four *:    Vital Signs Reviewed Yes  -JH        * Central Venous Pressure (CVP or RAP)          * Central Venous Oxygen (SVO2, ScvO2 or Oxygen saturation via central catheter)          * Bedside Cardiovascular US in IVC diameter and % collapse          * Passive Leg Raise OR Crystalloid Challenge            User Key  (r) = Recorded By, (t) = Taken By, (c) = Cosigned By    Initials Name Provider Type    Orquidea Oliveros DO Physician                MDM  CritCare Time    Disposition  Final diagnoses:   Type 2 diabetes mellitus with hyperglycemia, unspecified whether long term insulin use ([facility] [address] )   Volume depletion, unspecified     Time reflects when diagnosis was documented in both MDM as applicable and the Disposition within this note     Time User Action Codes Description Comment    6/6/2018 10:20 PM Renée SCHWAB Add [E11 65] Type 2 diabetes mellitus with hyperglycemia, unspecified whether long term insulin use ([address] )     6/6/2018 10:21 PM Genny Rubio Add [E86 9] Volume depletion, unspecified       ED Disposition     ED Disposition Condition Comment    Admit  Case was discussed with Dr Leanne Choudhary [DO] for [facility]  and the patient's admission status was agreed to be Admission Status: inpatient status to the service of Dr Nicholas Scott   Follow-up Information    None         Current Discharge Medication List      CONTINUE these medications which have NOT CHANGED    Details   amLODIPine (NORVASC) 10 mg tablet Take 10 mg by mouth      aspirin 325 mg tablet Take 325 mg by mouth daily      atorvastatin (LIPITOR) 40 mg tablet Take 1 tablet by mouth daily with dinner  Qty: 30 tablet, Refills: 0      docusate sodium (COLACE) 100 mg capsule Take 1 capsule by mouth 2 (two) times a day  Qty: 10 capsule, Refills: 0      ferrous sulfate 325 (65 Fe) mg tablet Take 325 mg by mouth daily with breakfast Take one hour before meal         insulin aspart (NovoLOG) 100 units/mL injection 1 unit for every 5 gm carbohydrates per endocrine instructions      insulin glargine (LANTUS) 100 units/mL subcutaneous injection Inject 40 Units under the skin daily at bedtime  Qty: 10 mL, Refills: 0      Potassium Chloride ER 20 MEQ TBCR Take by mouth      tamsulosin (FLOMAX) 0 4 mg Take 1 capsule (0 4 mg total) by mouth daily with dinner  Qty: 30 capsule, Refills: 11    Associated Diagnoses: BPH with obstruction/lower urinary tract symptoms      LORazepam (ATIVAN) 0 5 mg tablet Take by mouth           No discharge procedures on file      ED Provider  Electronically Signed by           Denny Fortune MD  06/07/18 0873

## 2018-06-07 NOTE — ASSESSMENT & PLAN NOTE
-the sepsis is present on admission and secondary to a urinary tract infection (UTI)/acute cystitis associated with an indwelling urethral bladder catheter  -blood cultures shows no growth at 24 hours  -urine culture pending   -the patient received the initial 30 mL/kg normal saline IV fluid bolus/resuscitation per the sepsis protocol  - continue IV ceftriaxone

## 2018-06-07 NOTE — ED PROCEDURE NOTE
PROCEDURE  ECG 12 Lead Documentation  Date/Time: 6/6/2018 5:22 PM  Performed by: Leigh Cruz by: Bernardino Mckinley     Indications / Diagnosis:  Weak  ECG reviewed by me, the ED Provider: yes    Patient location:  ED  Previous ECG:     Comparison to cardiac monitor: Yes    Interpretation:     Interpretation: abnormal    Rate:     ECG rate:  89    ECG rate assessment: normal    Rhythm:     Rhythm: sinus rhythm    Ectopy:     Ectopy: PAC    QRS:     QRS axis:  Left    QRS intervals:   Wide  Conduction:     Conduction: normal    ST segments:     ST segments:  Normal  T waves:     T waves: normal           Dot Bustos MD  06/07/18 8361

## 2018-06-07 NOTE — PHYSICAL THERAPY NOTE
PT EVAL     06/07/18 1600   Note Type   Note type Eval/Treat   Pain Assessment   Pain Assessment No/denies pain   Pain Score No Pain   Home Living   Type of [address] One level   Bathroom Shower/Tub Tub/shower unit   Bathroom Toilet Standard   Bathroom Equipment Grab bars in shower   [facility]   Additional [address] 5 days a week for 5 hours   Prior Function   Level of [place] Needs assistance with ADLs and functional mobility   Lives With [facility] Help From Home health; Family   ADL Assistance Needs assistance   IADLs Needs assistance   Comments Pt reports that he was only walking short distances inconsistently  Restrictions/Precautions   Weight Bearing Precautions Per Order No   Other Precautions Contact/isolation; Fall Risk;Bed Alarm; Chair Alarm   Cognition   Overall Cognitive Status WFL   Attention Within functional limits   Orientation Level Oriented X4   Memory Within functional limits   Following Commands Follows multistep commands with increased time or repetition   RUE Assessment   RUE Assessment (See OT eval for details)   LUE Assessment   LUE Assessment (See OT eval for details)   RLE Assessment   RLE Assessment WFL   Strength RLE   RLE Overall Strength 4-/5   LLE Assessment   LLE Assessment WFL   Strength LLE   LLE Overall Strength 4-/5   Coordination   Movements are Fluid and Coordinated 1   Bed Mobility   Supine to Sit 2  Maximal assistance   Additional items Assist x 2;Verbal cues   Transfers   Sit to Stand (mod x 2 to min x 2)   Stand to Sit (min x 2)   Ambulation/Elevation   Gait pattern (Decreased step length, flexed posture)   Gait Assistance (min x 2)   Assistive Device Rolling walker   Distance (7', 4')   Balance   Static Sitting Fair +   Dynamic Sitting Fair +   Static Standing Fair -   Dynamic Standing Fair -   Ambulatory Fair -   Activity Tolerance   Activity Tolerance Patient limited by fatigue Assessment   Prognosis Good   Problem List Decreased range of motion;Decreased strength;Decreased endurance; Impaired balance;Decreased mobility; Decreased safety awareness   Assessment Pt is a 69 yo male admitted due to UTI, acute cystitis, weakness  Pt with chronic indwelling siddiqui  Pt required cues to increase participation  Max assist x2 for bed mobility with decreased patient participation  Pt incontinent of bowel   gait trained into bathroom, 7' with RW wtih min assist x 2  Cues needed for safety and to walk closer to RW  Pt required assist for turns and to safely sit on toilet  Pt stood 3x with RW with CGa for standing in order to have assist with hygiene  Pt tolerated standing for 15-25 secs  Poor tolerance for standing  Pt reported that he wanted to sit in the chair  Pt demonstrated impulsiveness during the transfer  Pt will benefit from continued PT to progress gait, transfers, balance, standing tolerance, strengthening, and safety in order to maximize function and decrease risk for falls  Goals   Patient Goals To get stronger   LTG Expiration Date 06/21/18   Long Term Goal #1 Pt will require no more than CGA for tranfsers and bed mobility  Long Term Goal #2 Gait - 22' with RW with CGA  Plan   Treatment/Interventions Functional transfer training; Therapeutic exercise;LE strengthening/ROM; Endurance training;Patient/family training;Equipment eval/education; Bed mobility;Gait training   PT Frequency (3-5 days per week, 1-2 times per day)   Recommendation   Recommendation Short-term skilled PT   PT - OK to Discharge No   Additional Comments Pt seen for PT eval/tx from 8:32-9:03   Pt OOB in chair with LEs elevated  Call bell in reach  Nsg present  Chair alarm in place

## 2018-06-07 NOTE — SPEECH THERAPY NOTE
Speech Language/Pathology  Speech/Language Pathology  Assessment    Patient Name: Michelle Saenz  Today's Date: 6/7/2018     Problem List  Patient Active Problem List   Diagnosis    Spinal stenosis of lumbar region    PVD (peripheral vascular disease) [facility] [address] [facility]    Essential hypertension    Hyperlipidemia    Type 2 diabetes mellitus with hyperglycemia, with long-term current use of insulin ([facility] [address] [facility]    COPD (chronic obstructive pulmonary disease) ([facility] [address] [facility]    Multiple renal cysts    Vitamin D insufficiency    Constipation    S/P CABG x 3    DM (diabetes mellitus), type 2, uncontrolled ([address] )    Urinary tract infection associated with indwelling urethral catheter ([facility] [address] [facility]    Renal cyst, left    Low serum prealbumin    Renal calculus, right    Candiduria    BPH with obstruction/lower urinary tract symptoms    Sepsis ([facility] [address] [facility]    Hyponatremia    Atrial fibrillation with rapid ventricular response ([facility]    Lactic acidosis    Skin breakdown     Past Medical History  Past Medical History:   Diagnosis Date    CHF (congestive heart failure) ([facility])     COPD (chronic obstructive pulmonary disease) ([facility]     Diabetes mellitus ([facility]     ESBL (extended spectrum beta-lactamase) producing bacteria infection     History of BPH     Hyperlipidemia     Hypertension     PE (pulmonary thromboembolism) ([facility]     Poor hygiene     PVD (peripheral vascular disease) ([facility] [address] [facility]     Pyelonephritis     Spinal stenosis of lumbar region     Stroke ([facility] [address] [facility]     Systolic and diastolic CHF, chronic ([facility] [address] [facility]     UTI due to extended-spectrum beta lactamase (ESBL) producing Escherichia coli      Past Surgical History  Past Surgical History:   Procedure Laterality Date    APPENDECTOMY      BACK SURGERY      CARDIAC SURGERY      cabg    HIP SURGERY Right     plate and pin        06/07/18 1135   Swallow Information   Current Risks for Dysphagia & Aspiration General debilitation   Current Symptoms/Concerns Decreased oral intake   Current Diet Regular; Thin liquid   Baseline Diet Regular; Thin liquids   Baseline Assessment   Behavior/Cognition Alert; Cooperative; Interactive   Speech/Language Status WFL   Patient Positioning Upright in chair   Swallow Mechanism Exam   Labial Symmetry WFL   Labial Strength WFL   Labial ROM WFL   Labial Sensation WFL   Facial Symmetry WFL   Facial Strength WFL   Facial ROM WFL   Facial Sensation WFL   Lingual Symmetry WFL   Lingual Strength WFL   Lingual ROM WFL   Lingual Sensation WFL   Velum WFL   Gag WFL   Mandible WFL   Dentition Partial dentition   Volitional Cough Strong   Consistencies Assessed and Performance   Materials Admnistered Puree/Level 1;Regular/Solid; Thin liquid   Oral Stage WFL   Phargngeal Stage WFL   Swallow Mechanics WFL;Swallow initation; Appears prompt;Good Larygneal rise   Esophageal Concerns No s/s reported   Summary   Swallow Summary Patient presents for dysphagia evaluation awake, alert and oriented  He is upright in his chair waiting for his lunch   Nursing reports he ate 100% of his breakfast without s/s  Patient states he has never had trouble swallowing; just has a reduced appetite of recent; without pertaining to any specific episode  Patient has WNL oral mech; and he trialed puree, regular and thin liquids independently  He has no oropharyngeal dysphagia at this time  He is functional for continued regular diet and thin liquids  Recommendations   Risk for Aspiration None   Recommendations Consider oral diet   Diet Solid Recommendation Regular consistency   Diet Liquid Recommendation Thin liquid   Recommended Form of Meds As desired; As tolerated   General Precautions Feed only when alert;Upright as possible for all oral intake   Further Evaluations Dietician   Results Reviewed with RN;PT/Family/Caregiver

## 2018-06-07 NOTE — ASSESSMENT & PLAN NOTE
-admit the patient to med/surg level of care with telemetry monitoring  -the sepsis is present on admission and secondary to a urinary tract infection (UTI)/acute cystitis associated with an indwelling urethral bladder catheter  -check blood cultures x 2 sets  -check a urine culture  -the patient received the initial 30 mL/kg normal saline IV fluid bolus/resuscitation per the sepsis protocol  - continue IV ceftriaxone

## 2018-06-07 NOTE — ASSESSMENT & PLAN NOTE
A1c level 12 3  Blood glucose improved on insulin infusion  Will attempt lantus with novolog coverage with meals, and sliding scale coverage for insulin

## 2018-06-08 LAB
ANION GAP SERPL CALCULATED.3IONS-SCNC: 6 MMOL/L (ref 4–13)
BACTERIA UR CULT: NORMAL
BASOPHILS # BLD AUTO: 0.04 THOUSANDS/ΜL (ref 0–0.1)
BASOPHILS NFR BLD AUTO: 1 % (ref 0–1)
BUN SERPL-MCNC: 21 MG/DL (ref 5–25)
CALCIUM SERPL-MCNC: 7.9 MG/DL (ref 8.3–10.1)
CHLORIDE SERPL-SCNC: 101 MMOL/L (ref 100–108)
CO2 SERPL-SCNC: 26 MMOL/L (ref 21–32)
CREAT SERPL-MCNC: 0.96 MG/DL (ref 0.6–1.3)
EOSINOPHIL # BLD AUTO: 0.17 THOUSAND/ΜL (ref 0–0.61)
EOSINOPHIL NFR BLD AUTO: 2 % (ref 0–6)
ERYTHROCYTE [DISTWIDTH] IN BLOOD BY AUTOMATED COUNT: 13.8 % (ref 11.6–15.1)
GFR SERPL CREATININE-BSD FRML MDRD: 76 ML/MIN/1.73SQ M
GLUCOSE SERPL-MCNC: 292 MG/DL (ref 65–140)
GLUCOSE SERPL-MCNC: 297 MG/DL (ref 65–140)
GLUCOSE SERPL-MCNC: 303 MG/DL (ref 65–140)
GLUCOSE SERPL-MCNC: 306 MG/DL (ref 65–140)
GLUCOSE SERPL-MCNC: 309 MG/DL (ref 65–140)
HCT VFR BLD AUTO: 38.9 % (ref 36.5–49.3)
HGB BLD-MCNC: 13 G/DL (ref 12–17)
LYMPHOCYTES # BLD AUTO: 0.98 THOUSANDS/ΜL (ref 0.6–4.47)
LYMPHOCYTES NFR BLD AUTO: 13 % (ref 14–44)
MCH RBC QN AUTO: 28.8 PG (ref 26.8–34.3)
MCHC RBC AUTO-ENTMCNC: 33.4 G/DL (ref 31.4–37.4)
MCV RBC AUTO: 86 FL (ref 82–98)
MONOCYTES # BLD AUTO: 1.33 THOUSAND/ΜL (ref 0.17–1.22)
MONOCYTES NFR BLD AUTO: 17 % (ref 4–12)
MRSA NOSE QL CULT: NORMAL
NEUTROPHILS # BLD AUTO: 5.13 THOUSANDS/ΜL (ref 1.85–7.62)
NEUTS SEG NFR BLD AUTO: 67 % (ref 43–75)
PLATELET # BLD AUTO: 131 THOUSANDS/UL (ref 149–390)
PMV BLD AUTO: 11.7 FL (ref 8.9–12.7)
POTASSIUM SERPL-SCNC: 3.5 MMOL/L (ref 3.5–5.3)
RBC # BLD AUTO: 4.51 MILLION/UL (ref 3.88–5.62)
SODIUM SERPL-SCNC: 133 MMOL/L (ref 136–145)
WBC # BLD AUTO: 7.65 THOUSAND/UL (ref 4.31–10.16)

## 2018-06-08 PROCEDURE — 85025 COMPLETE CBC W/AUTO DIFF WBC: CPT | Performed by: PHYSICIAN ASSISTANT

## 2018-06-08 PROCEDURE — 80048 BASIC METABOLIC PNL TOTAL CA: CPT | Performed by: PHYSICIAN ASSISTANT

## 2018-06-08 PROCEDURE — 99253 IP/OBS CNSLTJ NEW/EST LOW 45: CPT

## 2018-06-08 PROCEDURE — 97530 THERAPEUTIC ACTIVITIES: CPT

## 2018-06-08 PROCEDURE — 82948 REAGENT STRIP/BLOOD GLUCOSE: CPT

## 2018-06-08 PROCEDURE — 51702 INSERT TEMP BLADDER CATH: CPT

## 2018-06-08 PROCEDURE — 97116 GAIT TRAINING THERAPY: CPT

## 2018-06-08 PROCEDURE — 99232 SBSQ HOSP IP/OBS MODERATE 35: CPT | Performed by: PHYSICIAN ASSISTANT

## 2018-06-08 RX ORDER — INSULIN GLARGINE 100 [IU]/ML
45 INJECTION, SOLUTION SUBCUTANEOUS EVERY MORNING
Status: DISCONTINUED | OUTPATIENT
Start: 2018-06-09 | End: 2018-06-10

## 2018-06-08 RX ADMIN — NYSTATIN: 100000 POWDER TOPICAL at 09:33

## 2018-06-08 RX ADMIN — SODIUM CHLORIDE 75 ML/HR: 0.9 INJECTION, SOLUTION INTRAVENOUS at 18:00

## 2018-06-08 RX ADMIN — INSULIN GLARGINE 40 UNITS: 100 INJECTION, SOLUTION SUBCUTANEOUS at 09:33

## 2018-06-08 RX ADMIN — APIXABAN 5 MG: 5 TABLET, FILM COATED ORAL at 17:16

## 2018-06-08 RX ADMIN — ANORECTAL OINTMENT 1 APPLICATION: 15.7; .44; 24; 20.6 OINTMENT TOPICAL at 17:16

## 2018-06-08 RX ADMIN — Medication 325 MG: at 07:26

## 2018-06-08 RX ADMIN — INSULIN LISPRO 2 UNITS: 100 INJECTION, SOLUTION INTRAVENOUS; SUBCUTANEOUS at 16:21

## 2018-06-08 RX ADMIN — INSULIN LISPRO 3 UNITS: 100 INJECTION, SOLUTION INTRAVENOUS; SUBCUTANEOUS at 21:33

## 2018-06-08 RX ADMIN — INSULIN LISPRO 3 UNITS: 100 INJECTION, SOLUTION INTRAVENOUS; SUBCUTANEOUS at 11:37

## 2018-06-08 RX ADMIN — NYSTATIN 1 APPLICATION: 100000 POWDER TOPICAL at 17:16

## 2018-06-08 RX ADMIN — ANORECTAL OINTMENT: 15.7; .44; 24; 20.6 OINTMENT TOPICAL at 09:33

## 2018-06-08 RX ADMIN — CEFTRIAXONE 1000 MG: 1 INJECTION, SOLUTION INTRAVENOUS at 01:51

## 2018-06-08 RX ADMIN — AMLODIPINE BESYLATE 10 MG: 10 TABLET ORAL at 09:33

## 2018-06-08 RX ADMIN — INSULIN LISPRO 3 UNITS: 100 INJECTION, SOLUTION INTRAVENOUS; SUBCUTANEOUS at 07:26

## 2018-06-08 RX ADMIN — ATORVASTATIN CALCIUM 40 MG: 40 TABLET, FILM COATED ORAL at 15:48

## 2018-06-08 RX ADMIN — ASPIRIN 81 MG 81 MG: 81 TABLET ORAL at 09:33

## 2018-06-08 RX ADMIN — APIXABAN 5 MG: 5 TABLET, FILM COATED ORAL at 09:33

## 2018-06-08 RX ADMIN — INSULIN LISPRO 3 UNITS: 100 INJECTION, SOLUTION INTRAVENOUS; SUBCUTANEOUS at 07:25

## 2018-06-08 RX ADMIN — ONDANSETRON 4 MG: 2 INJECTION INTRAMUSCULAR; INTRAVENOUS at 18:13

## 2018-06-08 RX ADMIN — TAMSULOSIN HYDROCHLORIDE 0.4 MG: 0.4 CAPSULE ORAL at 15:48

## 2018-06-08 RX ADMIN — SODIUM CHLORIDE 75 ML/HR: 0.9 INJECTION, SOLUTION INTRAVENOUS at 05:10

## 2018-06-08 NOTE — PROGRESS NOTES
Progress Note - Tuilo Law 1940, 68 y o  male MRN: [id number]    Unit/Bed#: 408-01 Encounter: [id number]    Primary Care Provider: Omer Hu MD   Date and time admitted to hospital: 6/6/2018  4:43 PM        * Sepsis [facility])   Assessment & Plan    -the sepsis is present on admission and secondary to a urinary tract infection (UTI)/acute cystitis associated with an indwelling urethral bladder catheter  -blood cultures shows no growth at 24 hours  -urine culture pending   -the patient received the initial 30 mL/kg normal saline IV fluid bolus/resuscitation per the sepsis protocol  - continue IV ceftriaxone            Urinary tract infection associated with indwelling urethral catheter ([facility] [address] )   Assessment & Plan    -IV ceftriaxone  -await the urine culture result  -suprapubic catheter exchanged today 6/8/18 by Urology  Lactic acidosis   Assessment & Plan    Resolved with IVF hydration  Atrial fibrillation with rapid ventricular response (HCC)   Assessment & Plan    - troponin levels negative x 3 sets   -telemetry monitoring  -p r n  IV metoprolol for heart rate control  -the patient's chads 2 Vasc score is a 5, so he needs full anticoagulation  -continue Eliquis 5 mg p o  b i d  for full anticoagulation  Check echocardiogram in the setting of new atrial fibrillation  Hyponatremia   Assessment & Plan    - likely pseudohyponatremia in the setting of hyperglycemia  - continue to monitor  Type 2 diabetes mellitus with hyperglycemia, with long-term current use of insulin ([facility])   Assessment & Plan    -A1c level 12 3   - insulin infusion discontinued 6/7/18  -started on lantus with humalog coverage with meals, and sliding scale coverage on 6/8/18   -Blood sugars are elevated into the 300's today  -increase Lantus to 45 units every morning and increase humalog meal coverage to 5 units, continue sliding scale coverage           Skin breakdown   Assessment & Plan    Frequent repositioning, Calmoseptine  Essential hypertension   Assessment & Plan    Stable pressures  Continue Norvasc  Hypokalemia   Assessment & Plan    -potassium noted to be 3 4 on admission   -improved to 3 5 this am   -continue to monitor  VTE Pharmacologic Prophylaxis:   Pharmacologic: Apixaban (Eliquis)  Mechanical VTE Prophylaxis in Place: Yes    Patient Centered Rounds: I have performed bedside rounds with nursing staff today  Discussions with Specialists or Other Care Team Provider: nursing, CM, and attending    Education and Discussions with Family / Patient: n/a    Time Spent for Care: 20 minutes  More than 50% of total time spent on counseling and coordination of care as described above  Current Length of Stay: 2 day(s)    Current Patient Status: Inpatient   Certification Statement: The patient will continue to require additional inpatient hospital stay due to continued need for IV antibiotics  Code Status: Level 1 - Full Code      Subjective: The patient was seen and examined  The patient states he continues to feel weak/tired  He denies any pain  Objective:     Vitals:   Temp (24hrs), Av 1 °F (37 3 °C), Min:98 4 °F (36 9 °C), Max:100 3 °F (37 9 °C)    HR:  [72-93] 79  Resp:  [18-28] 25  BP: ()/(54-65) 144/65  SpO2:  [92 %-97 %] 97 %  Body mass index is 35 32 kg/m²  Input and Output Summary (last 24 hours): Intake/Output Summary (Last 24 hours) at 18 1315  Last data filed at 18 1300   Gross per 24 hour   Intake          2511 25 ml   Output             1150 ml   Net          1361 25 ml       Physical Exam:     Physical Exam   Constitutional: He is oriented to person, place, and time  Vital signs are normal  He appears well-developed and well-nourished  He is active and cooperative  Cardiovascular: Normal rate  An irregularly irregular rhythm present  Pulmonary/Chest: Effort normal and breath sounds normal  He has no wheezes  He has no rhonchi   He has no rales  Abdominal: Soft  Normal appearance and bowel sounds are normal  There is no tenderness  Neurological: He is alert and oriented to person, place, and time  No cranial nerve deficit  Skin: Skin is warm, dry and intact  Nursing note and vitals reviewed  Additional Data:     Labs:      Results from last 7 days  Lab Units 06/08/18  0613   WBC Thousand/uL 7 65   HEMOGLOBIN g/dL 13 0   HEMATOCRIT % 38 9   PLATELETS Thousands/uL 131*   NEUTROS PCT % 67   LYMPHS PCT % 13*   MONOS PCT % 17*   EOS PCT % 2       Results from last 7 days  Lab Units 06/08/18  0613 06/07/18  0617   SODIUM mmol/L 133* 136   POTASSIUM mmol/L 3 5 3 4*   CHLORIDE mmol/L 101 101   CO2 mmol/L 26 27   BUN mg/dL 21 15   CREATININE mg/dL 0 96 0 87   CALCIUM mg/dL 7 9* 8 5   TOTAL PROTEIN g/dL  --  6 4   BILIRUBIN TOTAL mg/dL  --  0 50   ALK PHOS U/L  --  71   ALT U/L  --  22   AST U/L  --  14   GLUCOSE RANDOM mg/dL 292* 126       Results from last 7 days  Lab Units 06/06/18  1653   INR  1 19*       Results from last 7 days  Lab Units 06/08/18  1136 06/08/18  0724 06/07/18  2057 06/07/18  1709 06/07/18  1408 06/07/18  1153 06/07/18  1002 06/07/18  0844 06/07/18  0604 06/07/18  0504 06/07/18  0314 06/07/18  0210   POC GLUCOSE mg/dl 303* 306* 268* 194* 193* 168* 246* 260* 141* 175* 249* 314*       Results from last 7 days  Lab Units 06/07/18  0617   HEMOGLOBIN A1C % 12 3*         * I Have Reviewed All Lab Data Listed Above  * Additional Pertinent Lab Tests Reviewed: All Labs Within Last 24 Hours Reviewed    Imaging:    Imaging Reports Reviewed Today Include: none  Imaging Personally Reviewed by Myself Includes:  none    Recent Cultures (last 7 days):       Results from last 7 days  Lab Units 06/07/18  0328 06/07/18  0135 06/06/18  1755   BLOOD CULTURE  No Growth at 24 hrs  No Growth at 24 hrs   --    URINE CULTURE   --   --  Culture results to follow         Last 24 Hours Medication List:     Current Facility-Administered Medications:  acetaminophen 650 mg Oral Q6H PRN Sidonie Jayden, DO    amLODIPine 10 mg Oral Daily Sidonie Jayden, DO    apixaban 5 mg Oral BID Sidonie Jayden, DO    aspirin 81 mg Oral Daily Sidonie Jayden, DO    atorvastatin 40 mg Oral Daily With Vela & Gabriel, DO    cefTRIAXone 1,000 mg Intravenous Q24H Sidonie Jayden, DO Last Rate: 1,000 mg (06/08/18 0151)   dextrose 25 mL Intravenous PRN Sidonie Jayden, DO    docusate sodium 100 mg Oral BID PRN Sidonie Jayden, DO    ferrous sulfate 325 mg Oral Daily With Breakfast Sidonie Jayden, DO    [START ON 6/9/2018] insulin glargine 45 Units Subcutaneous QAM Dragan Ordonez PA-C    insulin lispro 1-5 Units Subcutaneous TID AC Kendra Lin PA-C    insulin lispro 1-5 Units Subcutaneous HS Kendra Lin PA-C    [START ON 6/9/2018] insulin lispro 5 Units Subcutaneous Daily With Breakfast Adriana Sousa PA-C    [START ON 6/9/2018] insulin lispro 5 Units Subcutaneous Daily With Lunch Dragan Ordonez PA-C    insulin lispro 5 Units Subcutaneous Daily With Dinner Adriana Sousa PA-C    menthol-zinc oxide  Topical BID Sidonie Jayden, DO    metoprolol 5 mg Intravenous Q4H PRN Sidonie Jayden, DO    nystatin  Topical BID Kendra Lin PA-C    ondansetron 4 mg Intravenous Q6H PRN Sidonie Jayden, DO    sodium chloride (PF) 3 mL Intravenous PRN Iona Winston MD    sodium chloride 75 mL/hr Intravenous Continuous Sidonie Jayden, DO Last Rate: 75 mL/hr (06/08/18 0510)   tamsulosin 0 4 mg Oral Daily With Dinner Sidonie Jayden, DO         Today, Patient Was Seen By: Adriana Sousa PA-C    ** Please Note: Dictation voice to text software may have been used in the creation of this document   **

## 2018-06-08 NOTE — CONSULTS
Progress Note - Wound   Matheus Punches 68 y o  male MRN: [id number]  Unit/Bed#: 408-01 Encounter: [id number]      Assessment:   Left abdomen epithelialized, pink, dry  Nystatin powder ordered  Heels and buttocks intact  Agustín 16-Mild risk for pressure injury  Albumin-2 5-Nutrition on consult  Suprapubic tube  Plan:   1  Continue Nystatin powder as ordered  Discontinue when fungal rash resolved  2   Turn and reposition q 2 hours  3   Elevate heels off bed  4  Notify wound care for decline in skin/wound status    Vitals: Blood pressure 144/65, pulse 79, temperature 98 4 °F (36 9 °C), temperature source Temporal, resp  rate (!) 25, height 5' 9" (1 753 m), weight 109 kg (239 lb 3 2 oz), SpO2 97 %  ,Body mass index is 35 32 kg/m²  Wound 06/07/18 Moisture associated skin damage Abdomen Left red/excoriated (Active)   Wound Description Epithelialization;Pink 6/8/2018  9:00 AM   Drainage Amount None 6/8/2018  9:00 AM   Dressing Open to air 6/7/2018 11:30 PM   Dressing Status Open to air 6/7/2018 11:30 PM       Tomasa Lama RN   CWOCN  6/8/2018 10:03

## 2018-06-08 NOTE — CONSULTS
Consultation - Urology   Kelley Eid 68 y o  male MRN: [id number]  Unit/Bed#: 408-01 Encounter: [id number]    Assessment/Plan     Assessment:  Chronic indwelling suprapubic catheter  Acute urinary tract infection, recurrent  Possible sepsis, awaiting urine culture and blood cultures x2  Likely secondary to urinary tract infection  Resolved lactic acidosis    Other medical problems include chronic atrial fibrillation, on Eliquis  Type 2 diabetes mellitus, requiring insulin therapy   Left lower abdominal fungal rash  Morbid obesity, BMI calculated 35 59  Hypertension, stable      Plan:  Maintain the patient on current IV antibiotics, cefepime  Await urine and blood cultures, obtain prior to starting IV antibiotic therapy  Suprapubic catheter was exchanged by this provider, see separate procedure note  Patient needs to be seen for monthly catheter exchange  History of Present Illness      HPI:  Kelley Eid is a 68 y o  diabetic, obese white male who presents with complaints of generalized weakness  The patient develops suprapubic abdominal pain over the past several days  He lives at home and had difficulty with independent ambulation and providing activities of daily living  Patient has been having increased fatigue and generalized malaise  He is found to have atrial fibrillation in the emergency department  The patient has a chronic indwelling Ozuna catheter  He was last seen upon review of the notes by Dr Racheal Tavarez back in January of this year when the patient was admitted at that time also because of urinary tract infection and sepsis likely secondary to UTI  The patient was to follow up in the office for monthly catheter exchanges  His last appointment with Urology was scheduled for May 9th however the patient did not show up  Urology consultation has been requested at this time  The patient is unsure of when the last time he had a catheter exchange performed   The only documentation that this provider can find was the replacement performed by Dr Janell Murguia as an inpatient on January 9th  Patient currently is afebrile  Temp 98 4°  Patient's tachycardia of 108  He is in atrial fibrillation at present  Urinalysis shows innumerable bacteria  His white blood cell count is normal at 7  65     Urine culture and 2 separate blood cultures have been obtained prior to initiation of antibiotic therapy  The patient currently is being treated with IV Rocephin  Consults    Review of Systems as described above in the HPI  Denies chest pain  Nurse shortness of breath  No cough recent cold symptoms  Urinary reviews described above  Denies abdominal pain  He is passing gas  No nausea or vomiting  Otherwise 12 point review of systems conducted with the patient any essentially denies all significant        Historical Information   Past Medical History:   Diagnosis Date    [facility]estive [facility] failure) [facility]     COPD [facility]chronic obstructive pulmonary disease) [facility]     Diabetes mellitus ([facility]     ESBL (extended spectrum beta-lactamase) producing bacteria infection     History of BPH     Hyperlipidemia     Hypertension     PE (pulmonary thromboembolism) ([facility]     Poor hygiene     [facility]peripheral [facility]     Pyelonephritis     Spinal stenosis of lumbar region     [facility]     Systolic and diastolic CHF, chronic [facility]     UTI due to extended-spectrum beta lactamase (ESBL) producing Escherichia coli      Past Surgical History:   Procedure Laterality Date    APPENDECTOMY      BACK SURGERY      CARDIAC SURGERY      cabg    HIP SURGERY Right     plate and pin     Social History   History   Alcohol Use No     History   Drug Use No     History   Smoking Status    Former Smoker    Packs/day: 2 00    Years: 32 00    Quit date: 1/5/1984   Smokeless Tobacco    Never Used     Family History: non-contributory    Meds/Allergies   current meds:   Current Facility-Administered Medications   Medication Dose Route Frequency    acetaminophen (TYLENOL) tablet 650 mg  650 mg Oral Q6H PRN    amLODIPine (NORVASC) tablet 10 mg  10 mg Oral Daily    apixaban (ELIQUIS) tablet 5 mg  5 mg Oral BID    aspirin chewable tablet 81 mg  81 mg Oral Daily    atorvastatin (LIPITOR) tablet 40 mg  40 mg Oral Daily With Dinner    cefTRIAXone (ROCEPHIN) IVPB (premix) 1,000 mg  1,000 mg Intravenous Q24H    dextrose 50 % IV solution 25 mL  25 mL Intravenous PRN    docusate sodium (COLACE) capsule 100 mg  100 mg Oral BID PRN    ferrous sulfate tablet 325 mg  325 mg Oral Daily With Breakfast    insulin glargine (LANTUS) subcutaneous injection 40 Units 0 4 mL  40 Units Subcutaneous QAM    insulin lispro (HumaLOG) 100 units/mL subcutaneous injection 1-5 Units  1-5 Units Subcutaneous TID AC    insulin lispro (HumaLOG) 100 units/mL subcutaneous injection 1-5 Units  1-5 Units Subcutaneous HS    insulin lispro (HumaLOG) 100 units/mL subcutaneous injection 3 Units  3 Units Subcutaneous Daily With Breakfast    insulin lispro (HumaLOG) 100 units/mL subcutaneous injection 3 Units  3 Units Subcutaneous Daily With Lunch    insulin lispro (HumaLOG) 100 units/mL subcutaneous injection 3 Units  3 Units Subcutaneous Daily With Dinner    menthol-zinc oxide (CALMOSEPTINE) 0 44-20 6 % ointment   Topical BID    metoprolol (LOPRESSOR) injection 5 mg  5 mg Intravenous Q4H PRN    nystatin (MYCOSTATIN) powder   Topical BID    ondansetron (ZOFRAN) injection 4 mg  4 mg Intravenous Q6H PRN    sodium chloride (PF) 0 9 % injection 3 mL  3 mL Intravenous PRN    sodium chloride 0 9 % infusion  75 mL/hr Intravenous Continuous    tamsulosin (FLOMAX) capsule 0 4 mg  0 4 mg Oral Daily With Dinner     Allergies   Allergen Reactions    Ace Inhibitors      Hyperkalemia /decline in GFR    Penicillins      States he is unsure, does not believe he is allergic anymore  Has had cephalo's       Objective   First Vitals:   Blood Pressure: 149/70 (06/06/18 1646)  Pulse: (!) 108 (06/06/18 1646)  Temperature: 98 3 °F (36 8 °C) (06/06/18 1646)  Temp Source: Oral (06/06/18 1646)  Respirations: 20 (06/06/18 1646)  Height: 5' 9" (175 3 cm) (06/07/18 0007)  Weight - Scale: 109 kg (241 lb 2 9 oz) (06/06/18 1646)  SpO2: 94 % (06/06/18 1646)    Current Vitals:   Blood Pressure: 144/65 (06/08/18 0740)  Pulse: 79 (06/08/18 0740)  Temperature: 98 4 °F (36 9 °C) (06/08/18 0740)  Temp Source: Temporal (06/08/18 0740)  Respirations: (!) 25 (06/08/18 0740)  Height: 5' 9" (175 3 cm) (06/07/18 0007)  Weight - Scale: 109 kg (239 lb 3 2 oz) (06/08/18 0558)  SpO2: 97 % (06/08/18 0740)      Intake/Output Summary (Last 24 hours) at 06/08/18 1013  Last data filed at 06/08/18 0558   Gross per 24 hour   Intake          2816 25 ml   Output             1000 ml   Net          1816 25 ml       Invasive Devices     Peripheral Intravenous Line            Peripheral IV 06/06/18 Right Antecubital 1 day    Peripheral IV 06/07/18 Right Hand 1 day          Drain            Suprapubic Catheter Non-latex 18 Fr  153 days                Physical Exam     Patient is awake and alert  No acute distress  He is morbidly obese  He is lying supine in bed  Head normocephalic  Poor natural dentition  Skin warm and dry to touch  Heart irregular rate and rhythm  Lungs poor inspiratory effort  Essentially clear  Back no CVA tenderness  Abdomen wide abdominal girth  Positive bowel sounds  Abdomen is soft  No suprapubic tenderness to palpation  Indwelling suprapubic catheter with some scant amount of yellow mucus gas like drainage around the stoma site  There is no evidence of any skin erythema or infection  Ozuna catheter draining 200 mL of yellow colored urine  No sediment or blood clot seen  Normal male penis  Skin rash left inguinal area resembles a fungal like skin infection and is currently covered with nystatin powder      Lab Results:   I have personally reviewed pertinent lab results  , CBC:   Lab Results   Component Value Date    WBC 7 65 06/08/2018    HGB 13 0 06/08/2018    HCT 38 9 06/08/2018    MCV 86 06/08/2018     (L) 06/08/2018    MCH 28 8 06/08/2018    MCHC 33 4 06/08/2018    RDW 13 8 06/08/2018    MPV 11 7 06/08/2018   , CMP:   Lab Results   Component Value Date     (L) 06/08/2018    K 3 5 06/08/2018     06/08/2018    CO2 26 06/08/2018    ANIONGAP 6 06/08/2018    BUN 21 06/08/2018    CREATININE 0 96 06/08/2018    GLUCOSE 292 (H) 06/08/2018    CALCIUM 7 9 (L) 06/08/2018    EGFR 76 06/08/2018     Urine Microscopic   Order: [id number] - Reflex for Order [id number]   Status:  Final result   Visible to patient:  No (Inaccessible in [address])   Next appt:  None    Ref Range & Units 6/6/18 1755 Flag   RBC, UA None Seen, 0-5 /hpf 4-10   A    WBC, UA None Seen, 0-5, 5-55, 5-65 /hpf 10-20   A    Epithelial Cells None Seen, Occasional /hpf Occasional     Bacteria, UA None Seen, Occasional /hpf Innumerable   A       Specimen Collected: 06/06/18 17:55   Last Resulted: 06/06/18 18:47                  Related Result Highlights         Urine culture  Preliminary result 6/6/2018                     Urinalysis with culture and sensitivity reflex   Order: [id number]   Status:  Final result   Visible to patient:  No (Inaccessible in [address])   Next appt:  None    Ref Range & Units 6/6/18 1755 Flag   Color, UA  Yellow     Clarity, UA  Cloudy     Specific [place], UA 1 003 - 1 030 1 020     pH, UA 4 5 - 8 0 6 0     Leukocytes, UA Negative Trace   A    Nitrite, UA Negative Negative     Protein, UA Negative mg/dl 100 (2+)   A    Glucose, UA Negative mg/dl >=1000 (1%)   A    Ketones, UA Negative mg/dl Trace   A    Urobilinogen, UA 0 2, 1 0 E U /dl E U /dl 0 2     Bilirubin, UA Negative Negative     Blood, UA Negative, Trace-Intact Moderate   A       Specimen Collected: 06/06/18 17:55   Last Resulted: 06/06/18 18:07                  Imaging: I have personally reviewed pertinent reports  EKG, Pathology, and Other Studies: I have personally reviewed pertinent reports  Counseling / Coordination of Care  Total floor / unit time spent today 30 minutes  Greater than 50% of total time was spent with the patient and / or family counseling and / or coordination of care  A description of the counseling / coordination of care:  Review of existing medical documentation, laboratory studies, and imaging studies  Personal examination the patient as well as separate procedure note for exchange of the suprapubic catheter done at bedside by this provider under sterile technique and will be documented as a separate procedure note        Caro Sagastume PA-C

## 2018-06-08 NOTE — DISCHARGE INSTRUCTIONS
PER UROLOGY------------Cath Care instructions---Maintain  catheter to straight drainage  May use leg bag and shower  May flush daily prn using Kenisha syringe and 120 ml NSS  May use more saline ad abdirashid to prevent/treat cath obstruction  Call for Urologist follow-up  Information above  SPT can remain in place for up to 4 weeks at a time  Then exchange using 18 FR    SPT exchanged/ placed last at [facility] on June 8, 2018

## 2018-06-08 NOTE — TREATMENT PLAN
Suprapubic catheter was exchanged at bedside  #18 [place] urinary catheter was replaced, 5 mL balloon  Procedure performed under sterile technique  Good flash of urine back in the tubing  Catheter was attached to the Ozuna bag  Patient tolerated procedure without any problem  No complications were noted      Sasha Mcmahan PA-C

## 2018-06-08 NOTE — PHYSICAL THERAPY NOTE
PT treatment Note      06/08/18 1042   Restrictions/Precautions   Other Precautions (Contact/isolation; Fall Risk;Bed Alarm; Chair Alarm)   Transfers   Sit to Stand 4  Minimal assistance   Additional items Assist x 2;Verbal cues  (rail)   Stand to Sit 4  Minimal assistance   Additional items Assist x 2;Armrests; Impulsive;Verbal cues   Stand pivot 4  Minimal assistance   Additional items Assist x 2;Verbal cues   Toilet transfer 3  Moderate assistance   Additional items Assist x 2   Additional Comments requires increased assist from low surface   Ambulation/Elevation   Gait pattern Forward Flexion   Gait Assistance 4  Minimal assist   Additional items Assist x 2;Verbal cues   Assistive Device Rolling walker   Distance 20', 15'   Balance   Static Sitting Fair +   Dynamic Sitting Fair +   Static Standing Fair   Dynamic Standing Fair   Ambulatory Fair  (with RW)   Endurance Deficit   Endurance Deficit Yes   Activity Tolerance   Activity Tolerance Patient limited by fatigue   Assessment   Prognosis Good   Problem List Decreased strength; Impaired balance;Decreased endurance;Decreased mobility; Decreased safety awareness   Assessment Performing tx/ambulation with (min/mod) x 2 assist  Increased assist from toilet  Fatiques quickly  Decreased safety awareness and impulsive to sit increasing risk for falls  Pt will benefit from continued PT to progress gait, transfers, balance, standing tolerance, strengthening, and safety in order to maximize function and decrease risk for falls  Plan   Treatment/Interventions Functional transfer training;LE strengthening/ROM; Therapeutic exercise; Endurance training;Bed mobility;Gait training   Progress Progressing toward goals   Recommendation   Recommendation Short-term skilled PT   Pt  OOB with call bell within reach and alarm on at end of PT session

## 2018-06-08 NOTE — ASSESSMENT & PLAN NOTE
-IV ceftriaxone  -await the urine culture result  -suprapubic catheter exchanged today 6/8/18 by Urology

## 2018-06-08 NOTE — ASSESSMENT & PLAN NOTE
-A1c level 12 3   - insulin infusion discontinued 6/7/18  -started on lantus with humalog coverage with meals, and sliding scale coverage on 6/8/18   -Blood sugars are elevated into the 300's today  -increase Lantus to 45 units every morning and increase humalog meal coverage to 5 units, continue sliding scale coverage

## 2018-06-08 NOTE — ASSESSMENT & PLAN NOTE
- troponin levels negative x 3 sets   -telemetry monitoring  -p r n  IV metoprolol for heart rate control  -the patient's chads 2 Vasc score is a 5, so he needs full anticoagulation  -continue Eliquis 5 mg p o  b i d  for full anticoagulation  Check echocardiogram in the setting of new atrial fibrillation

## 2018-06-08 NOTE — SOCIAL WORK
Physical Therapy is recommending that pt goes to short term rehab  Pt is agreeable to going to short term rehab and would like me to make a referral to 1) 5th floor and 2) [address] [place] [address]  Will make a referral to 5th floor

## 2018-06-09 PROBLEM — R53.1 GENERALIZED WEAKNESS: Status: ACTIVE | Noted: 2018-06-09

## 2018-06-09 LAB
ANION GAP SERPL CALCULATED.3IONS-SCNC: 7 MMOL/L (ref 4–13)
BASOPHILS # BLD AUTO: 0.05 THOUSANDS/ΜL (ref 0–0.1)
BASOPHILS NFR BLD AUTO: 1 % (ref 0–1)
BUN SERPL-MCNC: 13 MG/DL (ref 5–25)
CALCIUM SERPL-MCNC: 8 MG/DL (ref 8.3–10.1)
CHLORIDE SERPL-SCNC: 103 MMOL/L (ref 100–108)
CO2 SERPL-SCNC: 28 MMOL/L (ref 21–32)
CREAT SERPL-MCNC: 0.64 MG/DL (ref 0.6–1.3)
EOSINOPHIL # BLD AUTO: 0.33 THOUSAND/ΜL (ref 0–0.61)
EOSINOPHIL NFR BLD AUTO: 5 % (ref 0–6)
ERYTHROCYTE [DISTWIDTH] IN BLOOD BY AUTOMATED COUNT: 13.4 % (ref 11.6–15.1)
GFR SERPL CREATININE-BSD FRML MDRD: 94 ML/MIN/1.73SQ M
GLUCOSE SERPL-MCNC: 194 MG/DL (ref 65–140)
GLUCOSE SERPL-MCNC: 194 MG/DL (ref 65–140)
GLUCOSE SERPL-MCNC: 259 MG/DL (ref 65–140)
GLUCOSE SERPL-MCNC: 301 MG/DL (ref 65–140)
GLUCOSE SERPL-MCNC: 311 MG/DL (ref 65–140)
HCT VFR BLD AUTO: 36.9 % (ref 36.5–49.3)
HGB BLD-MCNC: 12.2 G/DL (ref 12–17)
LYMPHOCYTES # BLD AUTO: 1.4 THOUSANDS/ΜL (ref 0.6–4.47)
LYMPHOCYTES NFR BLD AUTO: 19 % (ref 14–44)
MCH RBC QN AUTO: 28.4 PG (ref 26.8–34.3)
MCHC RBC AUTO-ENTMCNC: 33.1 G/DL (ref 31.4–37.4)
MCV RBC AUTO: 86 FL (ref 82–98)
MONOCYTES # BLD AUTO: 1.21 THOUSAND/ΜL (ref 0.17–1.22)
MONOCYTES NFR BLD AUTO: 17 % (ref 4–12)
NEUTROPHILS # BLD AUTO: 4.29 THOUSANDS/ΜL (ref 1.85–7.62)
NEUTS SEG NFR BLD AUTO: 59 % (ref 43–75)
PLATELET # BLD AUTO: 150 THOUSANDS/UL (ref 149–390)
PMV BLD AUTO: 12 FL (ref 8.9–12.7)
POTASSIUM SERPL-SCNC: 3.6 MMOL/L (ref 3.5–5.3)
RBC # BLD AUTO: 4.29 MILLION/UL (ref 3.88–5.62)
SODIUM SERPL-SCNC: 138 MMOL/L (ref 136–145)
WBC # BLD AUTO: 7.28 THOUSAND/UL (ref 4.31–10.16)

## 2018-06-09 PROCEDURE — 99232 SBSQ HOSP IP/OBS MODERATE 35: CPT | Performed by: PHYSICIAN ASSISTANT

## 2018-06-09 PROCEDURE — 80048 BASIC METABOLIC PNL TOTAL CA: CPT | Performed by: PHYSICIAN ASSISTANT

## 2018-06-09 PROCEDURE — 85025 COMPLETE CBC W/AUTO DIFF WBC: CPT | Performed by: PHYSICIAN ASSISTANT

## 2018-06-09 PROCEDURE — 94760 N-INVAS EAR/PLS OXIMETRY 1: CPT

## 2018-06-09 PROCEDURE — 94640 AIRWAY INHALATION TREATMENT: CPT

## 2018-06-09 PROCEDURE — 82948 REAGENT STRIP/BLOOD GLUCOSE: CPT

## 2018-06-09 PROCEDURE — 94664 DEMO&/EVAL PT USE INHALER: CPT

## 2018-06-09 RX ORDER — LEVALBUTEROL INHALATION SOLUTION 0.63 MG/3ML
0.63 SOLUTION RESPIRATORY (INHALATION) EVERY 6 HOURS PRN
Status: DISCONTINUED | OUTPATIENT
Start: 2018-06-09 | End: 2018-06-11 | Stop reason: HOSPADM

## 2018-06-09 RX ADMIN — INSULIN LISPRO 2 UNITS: 100 INJECTION, SOLUTION INTRAVENOUS; SUBCUTANEOUS at 11:28

## 2018-06-09 RX ADMIN — CEFTRIAXONE 1000 MG: 1 INJECTION, SOLUTION INTRAVENOUS at 05:32

## 2018-06-09 RX ADMIN — NYSTATIN 1 APPLICATION: 100000 POWDER TOPICAL at 17:15

## 2018-06-09 RX ADMIN — AMLODIPINE BESYLATE 10 MG: 10 TABLET ORAL at 08:20

## 2018-06-09 RX ADMIN — ASPIRIN 81 MG 81 MG: 81 TABLET ORAL at 08:20

## 2018-06-09 RX ADMIN — TAMSULOSIN HYDROCHLORIDE 0.4 MG: 0.4 CAPSULE ORAL at 15:37

## 2018-06-09 RX ADMIN — Medication 325 MG: at 07:24

## 2018-06-09 RX ADMIN — INSULIN LISPRO 1 UNITS: 100 INJECTION, SOLUTION INTRAVENOUS; SUBCUTANEOUS at 07:23

## 2018-06-09 RX ADMIN — NYSTATIN 1 APPLICATION: 100000 POWDER TOPICAL at 08:20

## 2018-06-09 RX ADMIN — ATORVASTATIN CALCIUM 40 MG: 40 TABLET, FILM COATED ORAL at 15:37

## 2018-06-09 RX ADMIN — SODIUM CHLORIDE 75 ML/HR: 0.9 INJECTION, SOLUTION INTRAVENOUS at 07:28

## 2018-06-09 RX ADMIN — INSULIN LISPRO 3 UNITS: 100 INJECTION, SOLUTION INTRAVENOUS; SUBCUTANEOUS at 21:01

## 2018-06-09 RX ADMIN — APIXABAN 5 MG: 5 TABLET, FILM COATED ORAL at 17:15

## 2018-06-09 RX ADMIN — LEVALBUTEROL HYDROCHLORIDE 0.63 MG: 0.63 SOLUTION RESPIRATORY (INHALATION) at 07:50

## 2018-06-09 RX ADMIN — APIXABAN 5 MG: 5 TABLET, FILM COATED ORAL at 08:20

## 2018-06-09 RX ADMIN — ANORECTAL OINTMENT 1 APPLICATION: 15.7; .44; 24; 20.6 OINTMENT TOPICAL at 08:20

## 2018-06-09 RX ADMIN — INSULIN LISPRO 3 UNITS: 100 INJECTION, SOLUTION INTRAVENOUS; SUBCUTANEOUS at 16:20

## 2018-06-09 RX ADMIN — INSULIN GLARGINE 45 UNITS: 100 INJECTION, SOLUTION SUBCUTANEOUS at 08:20

## 2018-06-09 RX ADMIN — ANORECTAL OINTMENT 1 APPLICATION: 15.7; .44; 24; 20.6 OINTMENT TOPICAL at 17:15

## 2018-06-09 NOTE — PROGRESS NOTES
Progress Note - Kenneth Richey 1940, 66 y o  male MRN: [id number]    Unit/Bed#: 408-01 Encounter: [id number]    Primary Care Provider: Johnny Negrete MD   Date and time admitted to hospital: 6/6/2018  4:43 PM        * Sepsis [facility])   Assessment & Plan    -the sepsis is present on admission that was thought to be secondary to a urinary tract infection (UTI)/acute cystitis associated with an indwelling urethral bladder catheter  -blood cultures shows no growth to date   -urine culture shows mixed contaminates   -the patient received the initial 30 mL/kg normal saline IV fluid bolus/resuscitation per the sepsis protocol  - will discontinue IV ceftriaxone  Urinary tract infection associated with indwelling urethral catheter ([address] )   Assessment & Plan    -discontinue IV ceftriaxone  -urine culture shows mixed contaminates  -suprapubic catheter exchanged today 6/8/18 by Urology  Lactic acidosis   Assessment & Plan    Resolved with IVF hydration  Atrial fibrillation with rapid ventricular response (HCC)   Assessment & Plan    - troponin levels negative x 3 sets   -telemetry monitoring  -p r n  IV metoprolol for heart rate control  -the patient's chads 2 Vasc score is a 5, so he needs full anticoagulation  -continue Eliquis 5 mg p o  b i d  for full anticoagulation          Hyponatremia   Assessment & Plan    - likely pseudohyponatremia in the setting of hyperglycemia  - resolved  Type 2 diabetes mellitus with hyperglycemia, with long-term current use of insulin ([facility])   Assessment & Plan    -A1c level 12 3   - insulin infusion discontinued 6/7/18  -started on lantus with humalog coverage with meals, and sliding scale coverage on 6/8/18   -Blood sugars are improved but are still elevated  -continue Lantus 45 units every morning  Increase humalog meal coverage to 7 units, continue sliding scale coverage  Skin breakdown   Assessment & Plan    Frequent repositioning, Calmoseptine  Essential hypertension   Assessment & Plan    Stable pressures  Continue Norvasc  Hypokalemia   Assessment & Plan    -resolved with supplementation   -continue to monitor  Generalized weakness   Assessment & Plan    -patient had complaints of generalized weakness on admission   -continue PT/OT consulted and recommended STR, patient is agreeable   -Case management consulted and making referrals  VTE Pharmacologic Prophylaxis:   Pharmacologic: Apixaban (Eliquis)  Mechanical VTE Prophylaxis in Place: Yes    Patient Centered Rounds: I have performed bedside rounds with nursing staff today  Discussions with Specialists or Other Care Team Provider: nursing, CM, and attending    Education and Discussions with Family / Patient: n/a    Time Spent for Care: 20 minutes  More than 50% of total time spent on counseling and coordination of care as described above  Current Length of Stay: 3 day(s)    Current Patient Status: Inpatient     Discharge Plan: placement to STR when bed available  Code Status: Level 1 - Full Code      Subjective: The patient was seen and examined  The patient states he continues to feel weak/tired  He is complaining of abdominal discomfort due to his catheter  Objective:     Vitals:   Temp (24hrs), Av 6 °F (37 °C), Min:98 2 °F (36 8 °C), Max:98 8 °F (37 1 °C)    HR:  [63-80] 80  Resp:  [20-30] 30  BP: (125-156)/(68-79) 156/71  SpO2:  [90 %-93 %] 92 %  Body mass index is 35 32 kg/m²  Input and Output Summary (last 24 hours): Intake/Output Summary (Last 24 hours) at 18 1347  Last data filed at 18 1322   Gross per 24 hour   Intake             1140 ml   Output             3200 ml   Net            -2060 ml       Physical Exam:     Physical Exam   Constitutional: He is oriented to person, place, and time  He appears well-developed and well-nourished  He is active and cooperative  Cardiovascular: Normal rate    An irregularly irregular rhythm present  Pulmonary/Chest: Effort normal  He has decreased breath sounds  He has no wheezes  He has no rhonchi  He has no rales  Abdominal: Soft  Normal appearance and bowel sounds are normal    Neurological: He is alert and oriented to person, place, and time  No cranial nerve deficit  Skin: Skin is warm, dry and intact  Nursing note and vitals reviewed  Additional Data:     Labs:      Results from last 7 days  Lab Units 06/09/18  0608   WBC Thousand/uL 7 28   HEMOGLOBIN g/dL 12 2   HEMATOCRIT % 36 9   PLATELETS Thousands/uL 150   NEUTROS PCT % 59   LYMPHS PCT % 19   MONOS PCT % 17*   EOS PCT % 5       Results from last 7 days  Lab Units 06/09/18  0608  06/07/18  0617   SODIUM mmol/L 138  < > 136   POTASSIUM mmol/L 3 6  < > 3 4*   CHLORIDE mmol/L 103  < > 101   CO2 mmol/L 28  < > 27   BUN mg/dL 13  < > 15   CREATININE mg/dL 0 64  < > 0 87   CALCIUM mg/dL 8 0*  < > 8 5   TOTAL PROTEIN g/dL  --   --  6 4   BILIRUBIN TOTAL mg/dL  --   --  0 50   ALK PHOS U/L  --   --  71   ALT U/L  --   --  22   AST U/L  --   --  14   GLUCOSE RANDOM mg/dL 194*  < > 126   < > = values in this interval not displayed  Results from last 7 days  Lab Units 06/06/18  1653   INR  1 19*       Results from last 7 days  Lab Units 06/09/18  1126 06/09/18  0719 06/08/18  2131 06/08/18  1548 06/08/18  1136 06/08/18  0724 06/07/18  2057 06/07/18  1709 06/07/18  1408 06/07/18  1153 06/07/18  1002 06/07/18  0844   POC GLUCOSE mg/dl 259* 194* 309* 297* 303* 306* 268* 194* 193* 168* 246* 260*       Results from last 7 days  Lab Units 06/07/18  0617   HEMOGLOBIN A1C % 12 3*         * I Have Reviewed All Lab Data Listed Above  * Additional Pertinent Lab Tests Reviewed:  All Labs Within Last 24 Hours Reviewed    Imaging:    Imaging Reports Reviewed Today Include: none  Imaging Personally Reviewed by Myself Includes:  none    Recent Cultures (last 7 days):       Results from last 7 days  Lab Units 06/07/18  0328 06/07/18  0135 06/06/18  1755   BLOOD CULTURE  No Growth at 48 hrs  No Growth at 48 hrs  --    URINE CULTURE   --   --  >100,000 cfu/ml        Last 24 Hours Medication List:     Current Facility-Administered Medications:  acetaminophen 650 mg Oral Q6H PRN Karma Dunbarton, DO   amLODIPine 10 mg Oral Daily Karma Dunbarton, DO   apixaban 5 mg Oral BID Karma Dunbarton, DO   aspirin 81 mg Oral Daily Karma Dunbarton, DO   atorvastatin 40 mg Oral Daily With Dane Rios DO   dextrose 25 mL Intravenous PRN Karma Dunbarton, DO   docusate sodium 100 mg Oral BID PRN Karmayasmine Hinojosa, DO   ferrous sulfate 325 mg Oral Daily With Breakfast Karma Hinojosa, DO   insulin glargine 45 Units Subcutaneous QAM Dragan Ordonez PA-C   insulin lispro 1-5 Units Subcutaneous TID AC Kendra Lin PA-C   insulin lispro 1-5 Units Subcutaneous HS Kendra Lin PA-C   [START ON 6/10/2018] insulin lispro 7 Units Subcutaneous Daily With Breakfast Dragan Ordonez PA-C   [START ON 6/10/2018] insulin lispro 7 Units Subcutaneous Daily With Lunch Dragan Ordonez PA-C   insulin lispro 7 Units Subcutaneous Daily With [facility]   levalbuterol 0 63 mg Nebulization Q6H PRN Karma Hinojosa, DO   menthol-zinc oxide  Topical BID Karma Dunbarton, DO   metoprolol 5 mg Intravenous Q4H PRN Karma Hinojosa, DO   nystatin  Topical BID Kendra Lin PA-C   ondansetron 4 mg Intravenous Q6H PRN Karmayasmine Hinojosa, DO   sodium chloride (PF) 3 mL Intravenous PRN Arely Carrion MD   tamsulosin 0 4 mg Oral Daily With Dane Rios DO        Today, Patient Was Seen By: Robert Moran PA-C    ** Please Note: Dictation voice to text software may have been used in the creation of this document   **

## 2018-06-09 NOTE — ASSESSMENT & PLAN NOTE
- troponin levels negative x 3 sets   -telemetry monitoring  -p r n  IV metoprolol for heart rate control  -the patient's chads 2 Vasc score is a 5, so he needs full anticoagulation  -continue Eliquis 5 mg p o  b i d  for full anticoagulation

## 2018-06-09 NOTE — ASSESSMENT & PLAN NOTE
-patient had complaints of generalized weakness on admission   -continue PT/OT consulted and recommended STR, patient is agreeable   -Case management consulted and making referrals

## 2018-06-09 NOTE — RESPIRATORY THERAPY NOTE
RT Protocol Note  Tao Francis 66 y o  male MRN: [id number]  Unit/Bed#: 408-01 Encounter: [id number]    Assessment    Principal Problem:    Sepsis ([place] [address] )  Active Problems:    Essential hypertension    Type 2 diabetes mellitus with hyperglycemia, with long-term current use of insulin ([facility])    Urinary tract infection associated with indwelling urethral catheter ([facility])    Hypokalemia    Hyponatremia    Atrial fibrillation with rapid ventricular response ([facility])    Lactic acidosis    Skin breakdown      Home Pulmonary Medications:  None         Past Medical History:   Diagnosis Date    CHF (congestive heart failure) ([facility])     COPD (chronic obstructive pulmonary disease) ([place] [address] )     Diabetes mellitus ([facility])     ESBL (extended spectrum beta-lactamase) producing bacteria infection     History of BPH     Hyperlipidemia     Hypertension     PE (pulmonary thromboembolism) ([facility])     Poor hygiene     PVD (peripheral vascular disease) ([place] [address] )     Pyelonephritis     Spinal stenosis of lumbar region     Stroke ([place] [address] )     Systolic and diastolic CHF, chronic ([place] [address] )     UTI due to extended-spectrum beta lactamase (ESBL) producing Escherichia coli      Social History     Social History    Marital status: /Civil Union     Spouse name: N/A    Number of children: N/A    Years of education: N/A     Social History Main Topics    Smoking status: Former Smoker     Packs/day: 2 00     Years: 32 00     Quit date: 1/5/1984    Smokeless tobacco: Never Used    Alcohol use No    Drug use: No    Sexual activity: No     Other Topics Concern    None     Social History Narrative    None       Subjective      I feel SOB    Objective    Physical Exam:   Assessment Type: Assess only  General Appearance: Awake, Alert  Respiratory Pattern: Dyspnea at rest, Tachypneic  Chest Assessment: Chest expansion symmetrical  Bilateral Breath Sounds: Diminished, Expiratory wheezes    Vitals:  Blood pressure 156/71, pulse 80, temperature 98 2 °F (36 8 °C), temperature source Temporal, resp  rate (!) 30, height 5' 9" (1 753 m), weight 109 kg (239 lb 3 2 oz), SpO2 92 %  Imaging and other studies: I have personally reviewed pertinent reports  Plan    Respiratory Plan: Mild Distress pathway          Q6prn  0 63 xopenex for SOB/ Wheezing  0 63 due to PVC and uncontrolled A-fib

## 2018-06-09 NOTE — ASSESSMENT & PLAN NOTE
-A1c level 12 3   - insulin infusion discontinued 6/7/18  -started on lantus with humalog coverage with meals, and sliding scale coverage on 6/8/18   -Blood sugars are improved but are still elevated  -continue Lantus 45 units every morning  Increase humalog meal coverage to 7 units, continue sliding scale coverage

## 2018-06-09 NOTE — ASSESSMENT & PLAN NOTE
-discontinue IV ceftriaxone  -urine culture shows mixed contaminates  -suprapubic catheter exchanged today 6/8/18 by Urology

## 2018-06-09 NOTE — ASSESSMENT & PLAN NOTE
-the sepsis is present on admission that was thought to be secondary to a urinary tract infection (UTI)/acute cystitis associated with an indwelling urethral bladder catheter  -blood cultures shows no growth to date   -urine culture shows mixed contaminates   -the patient received the initial 30 mL/kg normal saline IV fluid bolus/resuscitation per the sepsis protocol  - will discontinue IV ceftriaxone

## 2018-06-10 LAB
ANION GAP SERPL CALCULATED.3IONS-SCNC: 5 MMOL/L (ref 4–13)
BASOPHILS # BLD AUTO: 0.05 THOUSANDS/ΜL (ref 0–0.1)
BASOPHILS NFR BLD AUTO: 1 % (ref 0–1)
BUN SERPL-MCNC: 12 MG/DL (ref 5–25)
CALCIUM SERPL-MCNC: 8.1 MG/DL (ref 8.3–10.1)
CHLORIDE SERPL-SCNC: 103 MMOL/L (ref 100–108)
CO2 SERPL-SCNC: 28 MMOL/L (ref 21–32)
CREAT SERPL-MCNC: 0.65 MG/DL (ref 0.6–1.3)
EOSINOPHIL # BLD AUTO: 0.4 THOUSAND/ΜL (ref 0–0.61)
EOSINOPHIL NFR BLD AUTO: 6 % (ref 0–6)
ERYTHROCYTE [DISTWIDTH] IN BLOOD BY AUTOMATED COUNT: 13.5 % (ref 11.6–15.1)
GFR SERPL CREATININE-BSD FRML MDRD: 93 ML/MIN/1.73SQ M
GLUCOSE SERPL-MCNC: 208 MG/DL (ref 65–140)
GLUCOSE SERPL-MCNC: 211 MG/DL (ref 65–140)
GLUCOSE SERPL-MCNC: 261 MG/DL (ref 65–140)
GLUCOSE SERPL-MCNC: 296 MG/DL (ref 65–140)
GLUCOSE SERPL-MCNC: 318 MG/DL (ref 65–140)
HCT VFR BLD AUTO: 39.1 % (ref 36.5–49.3)
HGB BLD-MCNC: 13.1 G/DL (ref 12–17)
LYMPHOCYTES # BLD AUTO: 1.43 THOUSANDS/ΜL (ref 0.6–4.47)
LYMPHOCYTES NFR BLD AUTO: 22 % (ref 14–44)
MCH RBC QN AUTO: 28.8 PG (ref 26.8–34.3)
MCHC RBC AUTO-ENTMCNC: 33.5 G/DL (ref 31.4–37.4)
MCV RBC AUTO: 86 FL (ref 82–98)
MONOCYTES # BLD AUTO: 1.02 THOUSAND/ΜL (ref 0.17–1.22)
MONOCYTES NFR BLD AUTO: 16 % (ref 4–12)
NEUTROPHILS # BLD AUTO: 3.67 THOUSANDS/ΜL (ref 1.85–7.62)
NEUTS SEG NFR BLD AUTO: 56 % (ref 43–75)
PLATELET # BLD AUTO: 165 THOUSANDS/UL (ref 149–390)
PMV BLD AUTO: 11.6 FL (ref 8.9–12.7)
POTASSIUM SERPL-SCNC: 3.8 MMOL/L (ref 3.5–5.3)
RBC # BLD AUTO: 4.55 MILLION/UL (ref 3.88–5.62)
SODIUM SERPL-SCNC: 136 MMOL/L (ref 136–145)
WBC # BLD AUTO: 6.57 THOUSAND/UL (ref 4.31–10.16)

## 2018-06-10 PROCEDURE — 80048 BASIC METABOLIC PNL TOTAL CA: CPT | Performed by: PHYSICIAN ASSISTANT

## 2018-06-10 PROCEDURE — 85025 COMPLETE CBC W/AUTO DIFF WBC: CPT | Performed by: PHYSICIAN ASSISTANT

## 2018-06-10 PROCEDURE — 82948 REAGENT STRIP/BLOOD GLUCOSE: CPT

## 2018-06-10 PROCEDURE — 99232 SBSQ HOSP IP/OBS MODERATE 35: CPT | Performed by: PHYSICIAN ASSISTANT

## 2018-06-10 RX ORDER — INSULIN GLARGINE 100 [IU]/ML
50 INJECTION, SOLUTION SUBCUTANEOUS EVERY MORNING
Status: DISCONTINUED | OUTPATIENT
Start: 2018-06-11 | End: 2018-06-11 | Stop reason: HOSPADM

## 2018-06-10 RX ADMIN — AMLODIPINE BESYLATE 10 MG: 10 TABLET ORAL at 08:24

## 2018-06-10 RX ADMIN — INSULIN LISPRO 2 UNITS: 100 INJECTION, SOLUTION INTRAVENOUS; SUBCUTANEOUS at 21:55

## 2018-06-10 RX ADMIN — APIXABAN 5 MG: 5 TABLET, FILM COATED ORAL at 17:49

## 2018-06-10 RX ADMIN — INSULIN LISPRO 2 UNITS: 100 INJECTION, SOLUTION INTRAVENOUS; SUBCUTANEOUS at 11:59

## 2018-06-10 RX ADMIN — TAMSULOSIN HYDROCHLORIDE 0.4 MG: 0.4 CAPSULE ORAL at 16:29

## 2018-06-10 RX ADMIN — ANORECTAL OINTMENT: 15.7; .44; 24; 20.6 OINTMENT TOPICAL at 17:52

## 2018-06-10 RX ADMIN — INSULIN LISPRO 3 UNITS: 100 INJECTION, SOLUTION INTRAVENOUS; SUBCUTANEOUS at 16:30

## 2018-06-10 RX ADMIN — INSULIN LISPRO 1 UNITS: 100 INJECTION, SOLUTION INTRAVENOUS; SUBCUTANEOUS at 08:25

## 2018-06-10 RX ADMIN — INSULIN GLARGINE 45 UNITS: 100 INJECTION, SOLUTION SUBCUTANEOUS at 08:26

## 2018-06-10 RX ADMIN — Medication 325 MG: at 08:24

## 2018-06-10 RX ADMIN — ANORECTAL OINTMENT: 15.7; .44; 24; 20.6 OINTMENT TOPICAL at 08:28

## 2018-06-10 RX ADMIN — NYSTATIN: 100000 POWDER TOPICAL at 08:28

## 2018-06-10 RX ADMIN — ASPIRIN 81 MG 81 MG: 81 TABLET ORAL at 08:24

## 2018-06-10 RX ADMIN — DOCUSATE SODIUM 100 MG: 100 CAPSULE, LIQUID FILLED ORAL at 17:49

## 2018-06-10 RX ADMIN — NYSTATIN: 100000 POWDER TOPICAL at 17:52

## 2018-06-10 RX ADMIN — APIXABAN 5 MG: 5 TABLET, FILM COATED ORAL at 08:24

## 2018-06-10 RX ADMIN — ATORVASTATIN CALCIUM 40 MG: 40 TABLET, FILM COATED ORAL at 16:29

## 2018-06-10 NOTE — ASSESSMENT & PLAN NOTE
-resolved   -the sepsis is present on admission that was thought to be secondary to a urinary tract infection (UTI)/acute cystitis associated with an indwelling urethral bladder catheter  -blood cultures shows no growth to date   -urine culture shows mixed contaminates   -the patient received the initial 30 mL/kg normal saline IV fluid bolus/resuscitation per the sepsis protocol  - IV ceftriaxone discontinued on 6/9/18

## 2018-06-10 NOTE — PROGRESS NOTES
Progress Note - Franca Magallon 1940, 66 y o  male MRN: [id number]    Unit/Bed#: 408-01 Encounter: [id number]    Primary Care Provider: Alessio Feliz MD   Date and time admitted to hospital: 6/6/2018  4:43 PM        * Sepsis [facility])   Assessment & Plan    -resolved   -the sepsis is present on admission that was thought to be secondary to a urinary tract infection (UTI)/acute cystitis associated with an indwelling urethral bladder catheter  -blood cultures shows no growth to date   -urine culture shows mixed contaminates   -the patient received the initial 30 mL/kg normal saline IV fluid bolus/resuscitation per the sepsis protocol  - IV ceftriaxone discontinued on 6/9/18  Urinary tract infection associated with indwelling urethral catheter ([facility] [address] )   Assessment & Plan    -urine culture shows mixed contaminates, IV Ceftriaxone discontinued on 6/9/18   -suprapubic catheter exchanged today 6/8/18 by Urology  Lactic acidosis   Assessment & Plan    Resolved with IVF hydration  Atrial fibrillation with rapid ventricular response (HCC)   Assessment & Plan    - troponin levels negative x 3 sets   -p r n  IV metoprolol for heart rate control  -the patient's chads 2 Vasc score is a 5, so he needs full anticoagulation  -continue Eliquis 5 mg p o  b i d  for full anticoagulation          Hyponatremia   Assessment & Plan    - likely pseudohyponatremia in the setting of hyperglycemia  - resolved  Type 2 diabetes mellitus with hyperglycemia, with long-term current use of insulin (HCC)   Assessment & Plan    -A1c level 12 3   - insulin infusion discontinued 6/7/18  -started on lantus with humalog coverage with meals, and sliding scale coverage on 6/8/18   -Blood sugars continue to be elevated  -increase Lantus to 50 units every morning  Increase humalog meal coverage to 10 units, continue sliding scale coverage  Skin breakdown   Assessment & Plan    Frequent repositioning, Calmoseptine  Essential hypertension   Assessment & Plan    Stable pressures  Continue Norvasc  Hypokalemia   Assessment & Plan    -resolved with supplementation   -continue to monitor  Generalized weakness   Assessment & Plan    -patient had complaints of generalized weakness on admission   -continue PT/OT consulted and recommended STR, patient is agreeable   -Case management consulted and making referrals  VTE Pharmacologic Prophylaxis:   Pharmacologic: Apixaban (Eliquis)  Mechanical VTE Prophylaxis in Place: Yes    Patient Centered Rounds: I have performed bedside rounds with nursing staff today  Discussions with Specialists or Other Care Team Provider: nursing, CM, and attending    Education and Discussions with Family / Patient: n/a    Time Spent for Care: 20 minutes  More than 50% of total time spent on counseling and coordination of care as described above  Current Length of Stay: 4 day(s)    Current Patient Status: Inpatient   Certification Statement: The patient will continue to require additional inpatient hospital stay due to waiting placement to short-term rehab  Discharge Plan: patient is medically cleared and waiting placement to short-term rehab  Code Status: Level 1 - Full Code      Subjective: The patient was seen and examined  The patient states he's sleepy today due to not sleeping well overnight  He denies any additional complaints  Objective:     Vitals:   Temp (24hrs), Av 6 °F (37 °C), Min:98 3 °F (36 8 °C), Max:99 °F (37 2 °C)    HR:  [59-69] 59  Resp:  [18-25] 18  BP: (130-155)/(63-68) 155/66  SpO2:  [92 %-93 %] 92 %  Body mass index is 35 32 kg/m²  Input and Output Summary (last 24 hours):        Intake/Output Summary (Last 24 hours) at 06/10/18 1020  Last data filed at 06/10/18 0713   Gross per 24 hour   Intake              840 ml   Output             2600 ml   Net            -1760 ml       Physical Exam:     Physical Exam   Constitutional: He is oriented to person, place, and time  He appears well-developed and well-nourished  He is sleeping and cooperative  He is easily aroused  Cardiovascular: Normal rate, regular rhythm and normal heart sounds  Pulmonary/Chest: Effort normal and breath sounds normal  He has no wheezes  He has no rhonchi  He has no rales  Abdominal: Soft  Normal appearance and bowel sounds are normal  There is no tenderness  Neurological: He is oriented to person, place, and time and easily aroused  No cranial nerve deficit  Skin: Skin is warm, dry and intact  Nursing note and vitals reviewed  Additional Data:     Labs:      Results from last 7 days  Lab Units 06/10/18  0654   WBC Thousand/uL 6 57   HEMOGLOBIN g/dL 13 1   HEMATOCRIT % 39 1   PLATELETS Thousands/uL 165   NEUTROS PCT % 56   LYMPHS PCT % 22   MONOS PCT % 16*   EOS PCT % 6       Results from last 7 days  Lab Units 06/10/18  0654  06/07/18  0617   SODIUM mmol/L 136  < > 136   POTASSIUM mmol/L 3 8  < > 3 4*   CHLORIDE mmol/L 103  < > 101   CO2 mmol/L 28  < > 27   BUN mg/dL 12  < > 15   CREATININE mg/dL 0 65  < > 0 87   CALCIUM mg/dL 8 1*  < > 8 5   TOTAL PROTEIN g/dL  --   --  6 4   BILIRUBIN TOTAL mg/dL  --   --  0 50   ALK PHOS U/L  --   --  71   ALT U/L  --   --  22   AST U/L  --   --  14   GLUCOSE RANDOM mg/dL 208*  < > 126   < > = values in this interval not displayed  Results from last 7 days  Lab Units 06/06/18  1653   INR  1 19*       Results from last 7 days  Lab Units 06/10/18  0726 06/09/18  2048 06/09/18  1540 06/09/18  1126 06/09/18  0719 06/08/18  2131 06/08/18  1548 06/08/18  1136 06/08/18  0724 06/07/18  2057 06/07/18  1709 06/07/18  1408   POC GLUCOSE mg/dl 211* 311* 301* 259* 194* 309* 297* 303* 306* 268* 194* 193*       Results from last 7 days  Lab Units 06/07/18  0617   HEMOGLOBIN A1C % 12 3*         * I Have Reviewed All Lab Data Listed Above  * Additional Pertinent Lab Tests Reviewed:  All Labs Within Last 24 Hours Reviewed    Imaging:    Imaging Reports Reviewed Today Include: none  Imaging Personally Reviewed by Myself Includes:  none    Recent Cultures (last 7 days):       Results from last 7 days  Lab Units 06/07/18  0328 06/07/18  0135 06/06/18  1755   BLOOD CULTURE  No Growth at 72 hrs  No Growth at 72 hrs   --    URINE CULTURE   --   --  >100,000 cfu/ml        Last 24 Hours Medication List:     Current Facility-Administered Medications:  acetaminophen 650 mg Oral Q6H PRN Karma [place], DO   amLODIPine 10 mg Oral Daily Karma [place], DO   apixaban 5 mg Oral BID Karma [place], DO   aspirin 81 mg Oral Daily Karma [place], DO   atorvastatin 40 mg Oral Daily With Dane Rios DO   dextrose 25 mL Intravenous PRN Karmayasmine Hinojosa, DO   docusate sodium 100 mg Oral BID PRN Karmayasmine Hinojosa, DO   ferrous sulfate 325 mg Oral Daily With [employer],    [START ON 6/11/2018] insulin glargine 50 Units Subcutaneous QAM Dragan Ordonez PA-C   insulin lispro 1-5 Units Subcutaneous TID AC Kendra Lin PA-C   insulin lispro 1-5 Units Subcutaneous HS Kendra Lin PA-C   [START ON 6/11/2018] insulin lispro 10 Units Subcutaneous Daily With Breakfast Dragan Ordonez PA-C   insulin lispro 10 Units Subcutaneous Daily With Lunch Dragan Ordonez PA-C   insulin lispro 10 Units Subcutaneous Daily With [facility] holcomb   levalbuterol 0 63 mg Nebulization Q6H PRN Karmayasmine Hinojosa, DO   menthol-zinc oxide  Topical BID Karmayasmine Hinojosa, DO   metoprolol 5 mg Intravenous Q4H PRN Karmayasmine Hinojosa, DO   nystatin  Topical BID Kendra Lin PA-C   ondansetron 4 mg Intravenous Q6H PRN Karma Hinojosa, DO   sodium chloride (PF) 3 mL Intravenous PRN Arely Carrion MD   tamsulosin 0 4 mg Oral Daily With Dane Rios DO        Today, Patient Was Seen By: Robert Moran PA-C    ** Please Note: Dictation voice to text software may have been used in the creation of this document  **

## 2018-06-10 NOTE — ASSESSMENT & PLAN NOTE
-urine culture shows mixed contaminates, IV Ceftriaxone discontinued on 6/9/18   -suprapubic catheter exchanged today 6/8/18 by Urology

## 2018-06-10 NOTE — SOCIAL WORK
I spoke with the patient about rehab and his first choice is the fifth floor I did discuss acute rehab and he is agreeable to this too  I sent referrals to the fifth floor [facility] and [facility] I have had no response as of today

## 2018-06-10 NOTE — ASSESSMENT & PLAN NOTE
- troponin levels negative x 3 sets   -p r n  IV metoprolol for heart rate control  -the patient's chads 2 Vasc score is a 5, so he needs full anticoagulation  -continue Eliquis 5 mg p o  b i d  for full anticoagulation

## 2018-06-10 NOTE — ASSESSMENT & PLAN NOTE
-A1c level 12 3   - insulin infusion discontinued 6/7/18  -started on lantus with humalog coverage with meals, and sliding scale coverage on 6/8/18   -Blood sugars continue to be elevated  -increase Lantus to 50 units every morning  Increase humalog meal coverage to 10 units, continue sliding scale coverage

## 2018-06-11 VITALS
DIASTOLIC BLOOD PRESSURE: 98 MMHG | OXYGEN SATURATION: 96 % | RESPIRATION RATE: 18 BRPM | HEIGHT: 69 IN | SYSTOLIC BLOOD PRESSURE: 136 MMHG | TEMPERATURE: 97.8 F | HEART RATE: 62 BPM | WEIGHT: 242.95 LBS | BODY MASS INDEX: 35.98 KG/M2

## 2018-06-11 LAB
ANION GAP SERPL CALCULATED.3IONS-SCNC: 4 MMOL/L (ref 4–13)
BASOPHILS # BLD AUTO: 0.05 THOUSANDS/ΜL (ref 0–0.1)
BASOPHILS NFR BLD AUTO: 1 % (ref 0–1)
BUN SERPL-MCNC: 17 MG/DL (ref 5–25)
CALCIUM SERPL-MCNC: 8.4 MG/DL (ref 8.3–10.1)
CHLORIDE SERPL-SCNC: 102 MMOL/L (ref 100–108)
CO2 SERPL-SCNC: 30 MMOL/L (ref 21–32)
CREAT SERPL-MCNC: 0.76 MG/DL (ref 0.6–1.3)
EOSINOPHIL # BLD AUTO: 0.39 THOUSAND/ΜL (ref 0–0.61)
EOSINOPHIL NFR BLD AUTO: 5 % (ref 0–6)
ERYTHROCYTE [DISTWIDTH] IN BLOOD BY AUTOMATED COUNT: 13.1 % (ref 11.6–15.1)
GFR SERPL CREATININE-BSD FRML MDRD: 87 ML/MIN/1.73SQ M
GLUCOSE SERPL-MCNC: 267 MG/DL (ref 65–140)
GLUCOSE SERPL-MCNC: 276 MG/DL (ref 65–140)
GLUCOSE SERPL-MCNC: 315 MG/DL (ref 65–140)
HCT VFR BLD AUTO: 37.7 % (ref 36.5–49.3)
HGB BLD-MCNC: 12.6 G/DL (ref 12–17)
LYMPHOCYTES # BLD AUTO: 1.52 THOUSANDS/ΜL (ref 0.6–4.47)
LYMPHOCYTES NFR BLD AUTO: 21 % (ref 14–44)
MCH RBC QN AUTO: 28.7 PG (ref 26.8–34.3)
MCHC RBC AUTO-ENTMCNC: 33.4 G/DL (ref 31.4–37.4)
MCV RBC AUTO: 86 FL (ref 82–98)
MONOCYTES # BLD AUTO: 0.87 THOUSAND/ΜL (ref 0.17–1.22)
MONOCYTES NFR BLD AUTO: 12 % (ref 4–12)
NEUTROPHILS # BLD AUTO: 4.51 THOUSANDS/ΜL (ref 1.85–7.62)
NEUTS SEG NFR BLD AUTO: 61 % (ref 43–75)
PLATELET # BLD AUTO: 187 THOUSANDS/UL (ref 149–390)
PMV BLD AUTO: 11.2 FL (ref 8.9–12.7)
POTASSIUM SERPL-SCNC: 3.9 MMOL/L (ref 3.5–5.3)
RBC # BLD AUTO: 4.39 MILLION/UL (ref 3.88–5.62)
SODIUM SERPL-SCNC: 136 MMOL/L (ref 136–145)
WBC # BLD AUTO: 7.34 THOUSAND/UL (ref 4.31–10.16)

## 2018-06-11 PROCEDURE — 82948 REAGENT STRIP/BLOOD GLUCOSE: CPT

## 2018-06-11 PROCEDURE — 85025 COMPLETE CBC W/AUTO DIFF WBC: CPT | Performed by: PHYSICIAN ASSISTANT

## 2018-06-11 PROCEDURE — 97535 SELF CARE MNGMENT TRAINING: CPT

## 2018-06-11 PROCEDURE — 97530 THERAPEUTIC ACTIVITIES: CPT

## 2018-06-11 PROCEDURE — 99238 HOSP IP/OBS DSCHRG MGMT 30/<: CPT | Performed by: PHYSICIAN ASSISTANT

## 2018-06-11 PROCEDURE — 80048 BASIC METABOLIC PNL TOTAL CA: CPT | Performed by: PHYSICIAN ASSISTANT

## 2018-06-11 RX ORDER — ASPIRIN 81 MG/1
81 TABLET, CHEWABLE ORAL DAILY
Qty: 30 TABLET | Refills: 0 | Status: SHIPPED | OUTPATIENT
Start: 2018-06-12

## 2018-06-11 RX ORDER — NYSTATIN 100000 [USP'U]/G
POWDER TOPICAL 2 TIMES DAILY
Qty: 15 G | Refills: 0 | Status: SHIPPED | OUTPATIENT
Start: 2018-06-11 | End: 2018-01-01 | Stop reason: ALTCHOICE

## 2018-06-11 RX ORDER — POLYETHYLENE GLYCOL 3350 17 G/17G
17 POWDER, FOR SOLUTION ORAL DAILY PRN
Status: DISCONTINUED | OUTPATIENT
Start: 2018-06-11 | End: 2018-06-11 | Stop reason: HOSPADM

## 2018-06-11 RX ORDER — POLYETHYLENE GLYCOL 3350 17 G/17G
17 POWDER, FOR SOLUTION ORAL ONCE
Status: COMPLETED | OUTPATIENT
Start: 2018-06-11 | End: 2018-06-11

## 2018-06-11 RX ADMIN — Medication 325 MG: at 08:51

## 2018-06-11 RX ADMIN — POLYETHYLENE GLYCOL (3350) 17 G: 17 POWDER, FOR SOLUTION ORAL at 13:28

## 2018-06-11 RX ADMIN — INSULIN LISPRO 2 UNITS: 100 INJECTION, SOLUTION INTRAVENOUS; SUBCUTANEOUS at 08:54

## 2018-06-11 RX ADMIN — AMLODIPINE BESYLATE 10 MG: 10 TABLET ORAL at 08:51

## 2018-06-11 RX ADMIN — NYSTATIN: 100000 POWDER TOPICAL at 09:00

## 2018-06-11 RX ADMIN — INSULIN GLARGINE 50 UNITS: 100 INJECTION, SOLUTION SUBCUTANEOUS at 08:52

## 2018-06-11 RX ADMIN — ASPIRIN 81 MG 81 MG: 81 TABLET ORAL at 08:52

## 2018-06-11 RX ADMIN — APIXABAN 5 MG: 5 TABLET, FILM COATED ORAL at 08:51

## 2018-06-11 RX ADMIN — INSULIN LISPRO 3 UNITS: 100 INJECTION, SOLUTION INTRAVENOUS; SUBCUTANEOUS at 12:01

## 2018-06-11 RX ADMIN — DOCUSATE SODIUM 100 MG: 100 CAPSULE, LIQUID FILLED ORAL at 08:52

## 2018-06-11 RX ADMIN — ANORECTAL OINTMENT: 15.7; .44; 24; 20.6 OINTMENT TOPICAL at 09:00

## 2018-06-11 RX ADMIN — POLYETHYLENE GLYCOL (3350) 17 G: 17 POWDER, FOR SOLUTION ORAL at 01:24

## 2018-06-11 NOTE — ASSESSMENT & PLAN NOTE
On admission the patient was noted to be septic which was thought to be secondary to a urinary tract infection (UTI)/acute cystitis associated with an indwelling urethral bladder catheter  He was started on IV Ceftrixone and received the initial 30 mL/kg normal saline IV fluid bolus/resuscitation per the sepsis protocol  The patient's sepsis resolved  Blood cultures showed no growth to date  Urine culture showed mixed contaminates  IV ceftriaxone was discontinued on 6/9/18  The patient was doing well and was [facility]  The patient was instructed to follow up with his PCP once discharged home

## 2018-06-11 NOTE — OCCUPATIONAL THERAPY NOTE
OT Note     06/11/18 1039   Restrictions/Precautions   Other Precautions Fall Risk;O2;Chair Alarm;Contact/isolation   ADL   Where Assessed Edge of bed   Grooming Assistance 4  Minimal Assistance   Grooming Deficit (For thoroughness for hair combing,)   Grooming Comments Encouragement to attempt hair, S oral care   UB Bathing Assistance 5  Supervision/Setup   UB Bathing Deficit Setup;Verbal cueing; Increased time to complete   UB Bathing Comments A with back   LB Bathing Assistance (Completed by nsg)   UB Dressing Assistance 4  Minimal Assistance   UB Dressing Comments A for direction and fasteners   LB Dressing Assistance (Not assessed)   Bed Mobility   Rolling L 4  Minimal assistance   Additional items Verbal cues; Bedrails;Assist x 1   Supine to Sit 4  Minimal assistance   Additional items Assist x 2; Increased time required;Verbal cues   Transfers   Sit to Stand 4  Minimal assistance   Additional items Verbal cues; Assist x 1   Stand to Sit 5  Supervision   Additional items Armrests; Verbal cues   Functional Mobility   Functional Mobility 4  Minimal assistance   Additional Comments Completes approx  20' x 2   Additional items Rolling walker   Toilet Transfers   Toilet Transfer From [employer] Transfer Type To and from   Toilet Transfer to Raised toilet seat with rails   Toilet Transfer Technique Ambulating   Toilet Transfers Minimal assistance   Toilet Transfers Comments Cues to utilize grab bar for stand>sit>stand   Activity Tolerance   Activity Tolerance (Tolerates tx session)   Assessment   Assessment Presents supine, agreeable to participate  Nsg completes bharath/buttocks in supine  Txfred EOB, completes UB, grooming as per above  Completes func mobility as per above, c/o urgency to toilet self, then unable have BM  Demonstrates safety concerns secondary urgency to toilet  Preoccupied with "constipation"  OOB in recliner following mobility  O2 @ 2 liters thruout  Call bell and phone in reach  Recommendation   OT Discharge Recommendation Short Term Rehab

## 2018-06-11 NOTE — ASSESSMENT & PLAN NOTE
On admission the patient was noted to be in atrial fibrillation with RVR in the ER  Troponin levels were negative x 3 sets  He was ordered p r n  IV metoprolol for heart rate control  Due to the patient's chads 2 Vasc score was 5, he was started on  Eliquis 5 mg p o  b i d  for full anticoagulation  This was continued at discharge

## 2018-06-11 NOTE — ASSESSMENT & PLAN NOTE
-patient had complaints of generalized weakness on admission   -continue PT/OT consulted and recommended STR, patient is agreeable  - the [facility] was [facility]

## 2018-06-11 NOTE — DISCHARGE SUMMARY
Discharge- Abel Domínguez 1940, 66 y o  male MRN: [id number]    Unit/Bed#: 408-01 Encounter: [id number]    Primary Care Provider: Fauzia Christensen MD   Date and time admitted to hospital: 6/6/2018  4:43 PM        * Sepsis [facility])   Assessment & Plan    On admission the patient was noted to be septic which was thought to be secondary to a urinary tract infection (UTI)/acute cystitis associated with an indwelling urethral bladder catheter  He was started on IV Ceftrixone and received the initial 30 mL/kg normal saline IV fluid bolus/resuscitation per the sepsis protocol  The patient's sepsis resolved  Blood cultures showed no growth to date  Urine culture showed mixed contaminates  IV ceftriaxone was discontinued on 6/9/18  The patient was doing well and was discharged to [facility]  The patient was instructed to follow up with his PCP once discharged home  Urinary tract infection associated with indwelling urethral catheter ([facility] [address] )   Assessment & Plan    On admission the patient's UA was positive  He was started on IV Ceftriaxone  Urine culture showed mixed contaminates and IV Ceftriaxone was discontinued on 6/9/18  The patient's suprapubic catheter was exchanged 6/8/18 by Urology  Lactic acidosis   Assessment & Plan    The patient was noted to have a lactic acid of 2 1 on admission which resolved with IV fluids  Atrial fibrillation with rapid ventricular response ([facility] [address] )   Assessment & Plan    On admission the patient was noted to be in atrial fibrillation with RVR in the ER  Troponin levels were negative x 3 sets  He was ordered p r n  IV metoprolol for heart rate control  Due to the patient's chads 2 Vasc score was 5, he was started on  Eliquis 5 mg p o  b i d  for full anticoagulation  This was continued at discharge  Hyponatremia   Assessment & Plan    - likely pseudohyponatremia in the setting of hyperglycemia  - resolved          Type 2 diabetes mellitus with hyperglycemia, with long-term current use of insulin ([facility])   Assessment & Plan    -A1c level 12 3   - insulin infusion discontinued 6/7/18  -started on lantus with humalog coverage with meals, and sliding scale coverage on 6/8/18   -Blood sugars continue to be elevated  -increase Lantus to 50 units every morning  Increase humalog meal coverage to 10 units, continue sliding scale coverage  Skin breakdown   Assessment & Plan    Frequent repositioning, Calmoseptine  Essential hypertension   Assessment & Plan    Stable pressures  Continue Norvasc  Hypokalemia   Assessment & Plan    -resolved with supplementation   -continue to monitor  Generalized weakness   Assessment & Plan    -patient had complaints of generalized weakness on admission   -continue PT/OT consulted and recommended STR, patient is agreeable  - the [facility]  Resolved Problems  Date Reviewed: 6/11/2018    None          Admission Date:   Admission Orders     Ordered        06/06/18 2219  Inpatient Admission (expected length of stay for this patient is greater than two midnights)  Once               Admitting Diagnosis: Volume depletion, unspecified [E86 9]  Weakness [R53 1]  Type 2 diabetes mellitus with hyperglycemia, unspecified whether long term [facility] [E11 65]    HPI: Felisha Downs is a 68 y o  male who presents to the emergency department complaints of generalized weakness  The patient's symptoms commenced over the last week  The patient developed suprapubic abdominal pain over last, which is similar to when he had previous urinary tract infections  The patient has been experiencing difficulty with ambulation and difficulty with activities of daily living secondary to the generalized weakness  In addition, the patient has been experiencing fatigue and malaise  Nothing seemed to improve his symptoms    The patient's symptoms progressively worsened over the last week  No chest pain  No shortness of breath  The patient was found have rapid atrial fibrillation in the emergency department  Procedures Performed:   Orders Placed This Encounter   Procedures    ED ECG Documentation Only       Summary of Hospital Course: Please see above assessment and plan    Significant Findings, Care, Treatment and Services Provided: lactic acid 2 1, Y9Q 25 2    Complications: none    Condition at Discharge: good         Discharge instructions/Information to patient and family:   See after visit summary for information provided to patient and family  Provisions for Follow-Up Care:  See after visit summary for information related to follow-up care and any pertinent home health orders  PCP: Arian De Souza MD    Disposition: [facility]  Planned Readmission: No    Discharge Statement   I spent 25 minutes discharging the patient  This time was spent on the day of discharge  I had direct contact with the patient on the day of discharge  Additional documentation is required if more than 30 minutes were spent on discharge  Discharge Medications:  See after visit summary for reconciled discharge medications provided to patient and family

## 2018-06-11 NOTE — SOCIAL WORK
5th floor unable to accept the pt   I will send a referral to [facility] [place] as requested by pt

## 2018-06-11 NOTE — SOCIAL WORK
Pt was accepted at [facility]  Pt notified of same  [address] chair Cherelle Buckley will pick the pt up at 3pm  Reviewed with pt that he will be responsible for the cost of the wheel chair Cherelle Buckley which will be approx  $50 - 75 and is agreeable to same  Offered to call pt's family and notify them of same  Case Management reviewed discharge planning process including the following: identifying help that is needed at home, pt's preference for discharge needs and Meds at [facility]  Reviewed with Pt that any member of the healthcare team can answer questions regarding : medications, jmportance of recognizing  Signs and symptoms of any  medical problems  Case Management also encouraged pt to follow up with all recommended appointments after discharge

## 2018-06-11 NOTE — ASSESSMENT & PLAN NOTE
On admission the patient's UA was positive  He was started on IV Ceftriaxone  Urine culture showed mixed contaminates and IV Ceftriaxone was discontinued on 6/9/18  The patient's suprapubic catheter was exchanged 6/8/18 by Urology

## 2018-06-11 NOTE — NURSING NOTE
Patient discharged to [facility] in stable condition  Patient transported to that facility by Juan Garcia  All discharge paperwork sent along with transport members  Report called in to RN at Receiving facility by Yolanda Tim RN

## 2018-06-12 LAB
BACTERIA BLD CULT: NORMAL
BACTERIA BLD CULT: NORMAL

## 2018-07-05 NOTE — TELEPHONE ENCOUNTER
Caregiver Layne Moreno called to schedule appt for Dayne's SPT changed -per Layne Moreno he has not had it changed since April when he was here for his last appointment

## 2018-07-09 NOTE — PROGRESS NOTES
7/9/2018    Amanda Charter  1940  [id number]    Diagnosis  Chief Complaint     Urinary Retention; chronic suprapubic catheter          Patient presents for routine spt change managed by Dr Johana Hart, delayed due to difficulty getting referral from [place], last changed in our office 3/12/18, patient no showed 5/2/18 and cannot say whether it has been changed in the interim  Plan  · Patient will follow up in 4 weeks for next SPT change  · There is some discussion of the [facility] home nursing for the patient, if this happens the patient will call the office to discuss routine catheter changes by V[facility]  · Nystatin powder prescribed for ABD fold, instructions provided to patient and caregiver  · Abtibiotics per Trevin STAUFFER  · Wound check in one week per Trevin STAUFFER    Procedure: Suprapubic Tube Change        Cystostomy tube change     Date/Time 7/9/2018 10:58 AM     Performed by  Genet Roche     Authorized by Johana Echeverria                Current catheter removed without difficulty after deflation of an intact balloon, no sediment noted but there was some white mucous noted at the tip of removed catheter  Site prepped with betadine, new 18F  latex spt change via aseptic technique without incident, 10 ml balloon inflated with sterile water  irrigated easily for clear return, mild spasm noted  Patient tolerated well  Attached to large drainage bag per patient preference  Extra large drainage bag provided, patient denied the need for additional supplies including leg bags  Patient and caregiver deny complaint with catheter at this time  They do have authorization from the [place] at this time to continue seeing our practice for urological and catheter maintenance  Of note, spt placed in abd fold beneath pannus, area reddened and moist   Additionally a superficial, circular wound noted on the underside of pannus above and to the left of spt site, reddened ring, dark center    Site assessed by XIOMY Zamarripa  See her documentation      Vitals:    07/09/18 1029   BP: 138/78   Pulse: 72   Height: 5' 11 5" (1 816 m)     DEL Gaytan BSN

## 2018-07-09 NOTE — PROGRESS NOTES
Patient here for siddiqui change with our diagnostic nurse  Was called in to see the patient secondary to an abdominal wound  Superior to the patients SPT on the underside of his pannus is a dime sized ulcer with no fluctuance or drainage but mild erythema  Will treat with Bactrim x 5 days and reassess wound in 1 week  Should notify us with worsening  Also has a fungal rash for which he has nystatin powder for but has not been using  Stressed used of this

## 2018-07-19 NOTE — PROGRESS NOTES
UROLOGY ROUTINE FOLLOW UP NOTE     CHIEF COMPLAINT   Rochelle Major is a 66 y o  male with a complaint of   Chief Complaint   Patient presents with    wound ck     History of Present Illness:     66 y o  male with urinary retention, s/p SPT placement in the fall of 2017  Patient has been doing well and undergoing regular SPT changes  During recent office visit, ulceration was noted on his abdomen, superior to the SPT  He was given a course of Bactrim and returns today for reevaluation of the wound  Patient has some occasional leakage from his penis when he has bowel movements or bears down  This is not significant  Patient does expressed desire to remove the catheter  He would be interested in surgery  We have discussed this in the past the patient has multiple significant comorbidities that might prevent surgical intervention      Past Medical History:     Past Medical History:   Diagnosis Date    [facility]congestive heart failure) ([facility] )     [facility]     Diabetes mellitus ([facility])     ESBL (extended spectrum beta-lactamase) producing bacteria infection     History of BPH     Hyperlipidemia     Hypertension     PE (pulmonary thromboembolism) ([facility])     Poor hygiene     PVD (peripheral vascular disease) ([facility])     Pyelonephritis     Spinal stenosis of lumbar region     Stroke ([facility] )     Systolic and diastolic CHF, chronic [facility] )     UTI due to extended-spectrum beta lactamase (ESBL) producing Escherichia coli        PAST SURGICAL HISTORY:     Past Surgical History:   Procedure Laterality Date    APPENDECTOMY      BACK SURGERY      CARDIAC SURGERY      cabg    HIP SURGERY Right     plate and pin       CURRENT MEDICATIONS:     Current Outpatient Prescriptions   Medication Sig Dispense Refill    amLODIPine (NORVASC) 10 mg tablet Take 10 mg by mouth      aspirin 81 mg chewable tablet Chew 1 tablet (81 mg total) daily 30 tablet 0    docusate sodium (COLACE) 100 mg capsule Take 1 capsule by mouth 2 (two) times a day (Patient taking differently: Take 100 mg by mouth 2 (two) times a day as needed  ) 10 capsule 0    ferrous sulfate 325 (65 Fe) mg tablet Take 325 mg by mouth daily with breakfast Take one hour before meal         insulin aspart (NovoLOG) 100 units/mL injection 1 unit for every 5 gm carbohydrates per endocrine instructions      insulin glargine (LANTUS) 100 units/mL subcutaneous injection Inject 40 Units under the skin daily at bedtime 10 mL 0    Potassium Chloride ER 20 MEQ TBCR Take by mouth      tamsulosin (FLOMAX) 0 4 mg Take 1 capsule (0 4 mg total) by mouth daily with dinner 30 capsule 11     No current facility-administered medications for this visit  ALLERGIES:     Allergies   Allergen Reactions    Ace Inhibitors      Hyperkalemia /decline in GFR    Penicillins      States he is unsure, does not believe he is allergic anymore  Has had cephalo's       SOCIAL HISTORY:     Social History     Social History    Marital status: /Civil Union     Spouse name: N/A    Number of children: N/A    Years of education: N/A     Social History Main Topics    Smoking status: Former Smoker     Packs/day: 2 00     Years: 32 00     Quit date: 1/5/1984    Smokeless tobacco: Never Used    Alcohol use No    Drug use: No    Sexual activity: No     Other Topics Concern    None     Social History Narrative    None       SOCIAL HISTORY:     Family History   Problem Relation Age of Onset    Coronary artery disease Mother     Cancer Father        REVIEW OF SYSTEMS:     Review of Systems   Constitutional: Positive for activity change  Respiratory: Positive for shortness of breath  Cardiovascular: Negative  Gastrointestinal: Negative  Genitourinary: Negative  Musculoskeletal: Positive for gait problem  Skin: Positive for wound  Negative for rash  Neurological: Positive for weakness  Psychiatric/Behavioral: Negative            PHYSICAL EXAM:     /70   Pulse 72     General:  Morbidly obese male in no acute distress  HEENT:  Normocephalic, atraumatic  Neck is supple without any palpable lymphadenopathy  Cardiovascular:  Patient has normal palpable distal radial pulses  There is no significant peripheral edema  No JVD is noted  Respiratory:  Course audible respiratory sounds  Abdomen: Morbidly obese abdomen  On the left side underneath the patient's pannus, there is a small less than 1 cm ulceration with some fibrinous material   There is no surrounding erythema or fluctuance  There is no appearance of cellulitis  Suprapubic tube is medial to this, more than 5 cm  Musculoskeletal:  Wheelchair-bound  Dermatologic:  Patient has no skin abnormalities or rashes  LABS:     CBC:   Lab Results   Component Value Date    WBC 7 34 2018    HGB 12 6 2018    HCT 37 7 2018    MCV 86 2018     2018       BMP:   Lab Results   Component Value Date    GLUCOSE 276 (H) 2018    CALCIUM 8 4 2018     2018    K 3 9 2018    CO2 30 2018     2018    BUN 17 2018    CREATININE 0 76 2018       IMAGIN/8/18  RENAL ULTRASOUND     INDICATION: URINARY TRACT INFECTION  UTI  History taken directly from the electronic ordering system          COMPARISON: 2017     TECHNIQUE:   Ultrasound of the retroperitoneum was performed with a curvilinear transducer utilizing volumetric sweeps and still imaging techniques       FINDINGS:     KIDNEYS:  Symmetric and normal size      Right kidney:  12 8 x 7 2 cm  Normal echogenicity and contour  No suspicious masses detected  No hydronephrosis  9 mm subcapsular calcification corresponds with the CAT scan findings of 2017  No perinephric fluid collections      Left kidney:  11 1 x 6 4 cm  Normal echogenicity and contour  No suspicious masses detected      2 simple cysts, right kidney: Largest upper pole measures 3 7 cm, midpole measures 1 6 cm  No hydronephrosis  No shadowing calculi  No perinephric fluid collections      URETERS:  Nonvisualized      BLADDER:   Not distended (Ozuna catheter present)             IMPRESSION:     Right renal cortical calculus (no obstructive uropathy)      Left renal cysts  ASSESSMENT:     66 y o  male with chronic urinary retention and chronic SPT    PLAN:     Patient's abdominal wound has improved  There does not appear to be any worsening erythema or fluctuance  Given his propensity to multi drug-resistant organisms I have recommended 1 additional check the wound care center to ensure this lesion completely resolved  Patient has again inquired about surgery on his prostate  I would need medical and cardiology clearance given his history of open-heart surgery and other disease  I also worry about his breathing mechanics under anesthesia given his morbidly obese and very large abdominal pannus  Lastly, the patient has a propensity towards significant infections and multidrug resistance  These would all be risk factors that would make surgery more complicated than dangerous  Should the patient be able to receive a go ahead from his medical teams, would consider repeat cystourethroscopy in the office and potential discussion of a transurethral resection  However, more likely than not, I think the suprapubic tube will be a destination device for the patient

## 2018-08-01 NOTE — PROGRESS NOTES
8/1/2018    Ulus Cava  1940  [id number]    Diagnosis  Chief Complaint     Urinary Retention; neurogenic bladder; chronic suprapubic catheter          Patient presents for routine spt change managed by Dr Nicole Anderson  · Patient encouraged to hydrate well, monitor symptoms and call if he develops systemic symptoms of UTI including fever/chills/mental status change/hematuria/persistent spasms/pain/etc   · Patient will follow up in 4 weeks for next spt change Unless they call in the meantime for VNA orders to change catheter  Procedure: Suprapubic Tube Change        Cystostomy tube change     Date/Time 8/1/2018 12:45 PM     Performed by  Katharina Miranda     Authorized by Shoaib May          Current catheter removed without difficulty after deflation of an intact balloon  Site prepped with Betadine, new 18F  latex spt change via aseptic technique without incident, 10 ml balloon inflated with sterile water  irrigated easily for clear return, mild spasm noted  Patient tolerated well  Attached to large drainage bag per patient preference  Extra large drainage bag provided  Wound on the underside of pannus still present above spt site, with dressing on site  Per caregiver they are still seeing wound care and it is healing well  Also concerned about rash in abd fold, area slightly reddened with some red/brown mucous at spt site  It appears the nystatin powder has been working well at reducing this rash and patient encouraged to continue using it  Reviewed that the mucous at the site can be normal      Patient concerned about UTI   "I know I have a UTI "  He was very nonspecific when asked for specific symptoms he is experiencing  He seems concerned about cloudy urine and an "uncomfortable feeling" in his bladder  When asked specifically he denies pain, fever, chills, mental status change, hematuria, and increased bladder spasm   His caregiver present today also agrees he has not complained of those symptoms  Reviewed colonization with patient and caregiver and stressed that without symptoms we usually do not check cultures or treat with antibiotics  Reviewed that symptoms warranting treatment include fever, chills, mental status change, hematuria, persistent pain or spasms in the bladder, etc   Reviewed with Anup Lee Cluster agrees that since patient is not complaining of any of these symptoms currently he should continue oral hydration and monitoring symptoms  If he develops any of these symptoms he is to call the office back  No need to check culture at this time or treat with antibiotic  Caregiver agrees to this plan  Patient expressed frustration, but ultimately agreed to plan  Caregiver also reports the patient have visiting nursing through the [place]  They will discuss with them if they are able to take over spt changes and call the office for orders if that is the case       Vitals:    08/01/18 1112   BP: 130/60   Pulse: 68   Weight: 104 kg (229 lb 8 oz)   Height: 5' 9" (1 753 m)         DEL Goodman BSN

## 2018-08-03 NOTE — TELEPHONE ENCOUNTER
[facility] sent request for medical records from 7/19/18-present   Records faxed to [phone]

## 2018-09-04 NOTE — TELEPHONE ENCOUNTER
Received phone call from, Wolfgang Foy, at  [address] [phone]   [place] informed office if the [facility] is providing nurses for pt's care, [facility] cannot go into home  Left message for Aris Meade to contact office  [phone]

## 2018-09-04 NOTE — TELEPHONE ENCOUNTER
Spoke with pt's daughter, Nichole Dinh, re appointment for her father  Pt scheduled 09/05/2018 for SPT baum  Nichole Fabrizio states pt is homebound now and needs visiting nurse service  Referral made to [facility]  For SPT change

## 2018-09-04 NOTE — TELEPHONE ENCOUNTER
Patients daughter Castro Gallagher called and said that patients catheter needs to be changed this week  She said that the office was supposed to get it set up with home healthcare to come to the house and change it, Castro Gallagher said she hasn't heard anything back on the status of the request  Please advise   [phone]

## 2018-09-05 NOTE — TELEPHONE ENCOUNTER
Spoke with Lily Merida re VNA service for pt  Lily Merida spoke with Marnie at the [facility] [phone] and asked if office could contact [place]  I spoke with Marnie and informed her [facility] not provide [facility] if [facility]  I asked [place] to contact [facility] to discuss situation   [place] at [phone]  Received call from Nate Miranda at [address]  Pt known to [facility] and Nate Miranda is asking if  Referral be sent to them  Spoke with Shayne Prader at [facility]  Shayne Prader will reinstate  Service for monthly SPT changes  Left message for Lily Merida  [facility] will begin visiting pt for monthly SPT changes

## 2018-10-01 NOTE — TELEPHONE ENCOUNTER
Patient managed by Dr Amy Interiano at the [facility] office  Patient has SPT for urinary retention  Jud from [facility] called to report patient's SPT blocking every 2 weeks  Large amount of sediment noted with  tube change  Jud requesting order for urine culture  Discussed increasing water intake, Jud admits patient drinks coffee, poor water intake  Patient has aides 5 days a week for personal care ( different agency) and Jud will investigate if aide is allowed to irrigate SPT  Order for urine culture given to Jud  Await results

## 2018-10-05 NOTE — TELEPHONE ENCOUNTER
Left message for patient's daughter, Macrina James, on her voicemail to contact office about urine culture results and antibiotics

## 2018-10-05 NOTE — TELEPHONE ENCOUNTER
Sediment causing occlusion of the patient's catheter is not indication to treat for urinary tract infection  His urine culture results reveals [place] and is susceptible to fluoroquinolones, cephalosporins, and Bactrim  The patient is allergic to penicillins and can therefore be given ciprofloxacin 500 mg twice a day for 3 days if they wish to give him a short course of antibiotics to see if this helps improve his sediment  This has been E prescribed to the [employer] in [place]

## 2018-10-10 NOTE — TELEPHONE ENCOUNTER
[facility] [phone] asking for Jud, patient's visiting nurse, discuss blockage artur  Spoke with supervisor and she reports artur continues to block almost weekly  Unsure if patient ever received Cipro prescription per [facility]  Supervisor will have [address] contact office regarding situation  Phone numbers listed for patient are disconnected and Leila's ( daughter) disconnected  Await Jud's phone call

## 2019-01-01 ENCOUNTER — APPOINTMENT (INPATIENT)
Dept: RADIOLOGY | Facility: HOSPITAL | Age: 79
DRG: 189 | End: 2019-01-01
Payer: MEDICARE

## 2019-01-01 ENCOUNTER — APPOINTMENT (EMERGENCY)
Dept: RADIOLOGY | Facility: HOSPITAL | Age: 79
DRG: 189 | End: 2019-01-01
Payer: MEDICARE

## 2019-01-01 ENCOUNTER — OFFICE VISIT (OUTPATIENT)
Dept: UROLOGY | Facility: CLINIC | Age: 79
End: 2019-01-01
Payer: MEDICARE

## 2019-01-01 ENCOUNTER — TELEPHONE (OUTPATIENT)
Dept: UROLOGY | Facility: MEDICAL CENTER | Age: 79
End: 2019-01-01

## 2019-01-01 ENCOUNTER — APPOINTMENT (INPATIENT)
Dept: CT IMAGING | Facility: HOSPITAL | Age: 79
DRG: 291 | End: 2019-01-01
Payer: MEDICARE

## 2019-01-01 ENCOUNTER — HOSPITAL ENCOUNTER (INPATIENT)
Facility: HOSPITAL | Age: 79
LOS: 5 days | Discharge: NURSING, REHAB OR LONG-TERM CARE | DRG: 291 | End: 2019-05-02
Attending: EMERGENCY MEDICINE | Admitting: FAMILY MEDICINE
Payer: MEDICARE

## 2019-01-01 ENCOUNTER — TELEPHONE (OUTPATIENT)
Dept: UROLOGY | Facility: AMBULATORY SURGERY CENTER | Age: 79
End: 2019-01-01

## 2019-01-01 ENCOUNTER — TELEPHONE (OUTPATIENT)
Dept: EMERGENCY DEPT | Facility: HOSPITAL | Age: 79
End: 2019-01-01

## 2019-01-01 ENCOUNTER — HOSPITAL ENCOUNTER (INPATIENT)
Facility: HOSPITAL | Age: 79
LOS: 3 days | DRG: 189 | End: 2019-06-15
Attending: EMERGENCY MEDICINE | Admitting: INTERNAL MEDICINE
Payer: MEDICARE

## 2019-01-01 ENCOUNTER — HOSPITAL ENCOUNTER (EMERGENCY)
Facility: HOSPITAL | Age: 79
Discharge: HOME | End: 2019-01-23
Attending: EMERGENCY MEDICINE | Admitting: EMERGENCY MEDICINE
Payer: MEDICARE

## 2019-01-01 ENCOUNTER — HOSPITAL ENCOUNTER (INPATIENT)
Facility: HOSPITAL | Age: 79
LOS: 7 days | Discharge: NURSING, REHAB OR LONG-TERM CARE | DRG: 699 | End: 2019-04-15
Attending: EMERGENCY MEDICINE | Admitting: FAMILY MEDICINE
Payer: MEDICARE

## 2019-01-01 ENCOUNTER — APPOINTMENT (EMERGENCY)
Dept: CT IMAGING | Facility: HOSPITAL | Age: 79
DRG: 699 | End: 2019-01-01
Payer: MEDICARE

## 2019-01-01 ENCOUNTER — HOSPITAL ENCOUNTER (INPATIENT)
Facility: HOSPITAL | Age: 79
LOS: 10 days | Discharge: HOME HEALTH | DRG: 950 | End: 2019-04-25
Attending: INTERNAL MEDICINE | Admitting: INTERNAL MEDICINE
Payer: MEDICARE

## 2019-01-01 ENCOUNTER — APPOINTMENT (INPATIENT)
Dept: CT IMAGING | Facility: HOSPITAL | Age: 79
DRG: 189 | End: 2019-01-01
Payer: MEDICARE

## 2019-01-01 ENCOUNTER — APPOINTMENT (EMERGENCY)
Dept: RADIOLOGY | Facility: HOSPITAL | Age: 79
DRG: 291 | End: 2019-01-01
Payer: MEDICARE

## 2019-01-01 ENCOUNTER — APPOINTMENT (EMERGENCY)
Dept: INTERVENTIONAL RADIOLOGY/VASCULAR | Facility: HOSPITAL | Age: 79
DRG: 189 | End: 2019-01-01
Attending: EMERGENCY MEDICINE
Payer: MEDICARE

## 2019-01-01 ENCOUNTER — APPOINTMENT (INPATIENT)
Dept: NON INVASIVE DIAGNOSTICS | Facility: HOSPITAL | Age: 79
DRG: 189 | End: 2019-01-01
Payer: MEDICARE

## 2019-01-01 ENCOUNTER — APPOINTMENT (EMERGENCY)
Dept: ULTRASOUND IMAGING | Facility: HOSPITAL | Age: 79
End: 2019-01-01
Payer: MEDICARE

## 2019-01-01 VITALS
HEART RATE: 67 BPM | SYSTOLIC BLOOD PRESSURE: 192 MMHG | RESPIRATION RATE: 22 BRPM | DIASTOLIC BLOOD PRESSURE: 91 MMHG | WEIGHT: 251.99 LBS | HEIGHT: 69 IN | BODY MASS INDEX: 37.32 KG/M2 | OXYGEN SATURATION: 97 % | TEMPERATURE: 97.9 F

## 2019-01-01 VITALS
OXYGEN SATURATION: 94 % | BODY MASS INDEX: 34.61 KG/M2 | DIASTOLIC BLOOD PRESSURE: 71 MMHG | RESPIRATION RATE: 18 BRPM | HEART RATE: 65 BPM | HEIGHT: 69 IN | TEMPERATURE: 97.5 F | WEIGHT: 233.69 LBS | SYSTOLIC BLOOD PRESSURE: 156 MMHG

## 2019-01-01 VITALS
SYSTOLIC BLOOD PRESSURE: 151 MMHG | RESPIRATION RATE: 18 BRPM | HEART RATE: 63 BPM | HEIGHT: 69 IN | BODY MASS INDEX: 36.21 KG/M2 | DIASTOLIC BLOOD PRESSURE: 69 MMHG | WEIGHT: 244.49 LBS | OXYGEN SATURATION: 97 % | TEMPERATURE: 97.3 F

## 2019-01-01 VITALS
OXYGEN SATURATION: 98 % | BODY MASS INDEX: 36.22 KG/M2 | DIASTOLIC BLOOD PRESSURE: 65 MMHG | HEART RATE: 93 BPM | RESPIRATION RATE: 29 BRPM | TEMPERATURE: 98.4 F | WEIGHT: 267.42 LBS | HEIGHT: 72 IN | SYSTOLIC BLOOD PRESSURE: 150 MMHG

## 2019-01-01 VITALS
DIASTOLIC BLOOD PRESSURE: 100 MMHG | WEIGHT: 244 LBS | HEIGHT: 69 IN | HEART RATE: 92 BPM | BODY MASS INDEX: 36.14 KG/M2 | SYSTOLIC BLOOD PRESSURE: 160 MMHG

## 2019-01-01 DIAGNOSIS — I49.3 VENTRICULAR ECTOPY: ICD-10-CM

## 2019-01-01 DIAGNOSIS — E11.65 TYPE 2 DIABETES MELLITUS WITH HYPERGLYCEMIA, WITH LONG-TERM CURRENT USE OF INSULIN (HCC): ICD-10-CM

## 2019-01-01 DIAGNOSIS — I50.23 ACUTE ON CHRONIC SYSTOLIC (CONGESTIVE) HEART FAILURE (HCC): ICD-10-CM

## 2019-01-01 DIAGNOSIS — L30.4 INTERTRIGO: ICD-10-CM

## 2019-01-01 DIAGNOSIS — Z95.1 S/P CABG X 3: ICD-10-CM

## 2019-01-01 DIAGNOSIS — E55.9 VITAMIN D INSUFFICIENCY: ICD-10-CM

## 2019-01-01 DIAGNOSIS — T83.010S SUPRAPUBIC CATHETER DYSFUNCTION, SEQUELA: ICD-10-CM
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
## 2019-01-01 DIAGNOSIS — J96.00 ACUTE RESPIRATORY FAILURE, UNSPECIFIED WHETHER WITH HYPOXIA OR HYPERCAPNIA (HCC): ICD-10-CM

## 2019-01-01 DIAGNOSIS — E87.6 HYPOKALEMIA: ICD-10-CM

## 2019-01-01 DIAGNOSIS — Z79.4 TYPE 2 DIABETES MELLITUS WITH HYPERGLYCEMIA, WITH LONG-TERM CURRENT USE OF INSULIN (HCC): ICD-10-CM

## 2019-01-01 DIAGNOSIS — N39.0 UTI DUE TO EXTENDED-SPECTRUM BETA LACTAMASE (ESBL) PRODUCING ESCHERICHIA COLI: Primary | ICD-10-CM

## 2019-01-01 DIAGNOSIS — J44.1 COPD EXACERBATION (HCC): ICD-10-CM

## 2019-01-01 DIAGNOSIS — R77.8 ELEVATED TROPONIN: ICD-10-CM

## 2019-01-01 DIAGNOSIS — I50.9 CHF (CONGESTIVE HEART FAILURE) (HCC): Primary | ICD-10-CM

## 2019-01-01 DIAGNOSIS — Z16.12 UTI DUE TO EXTENDED-SPECTRUM BETA LACTAMASE (ESBL) PRODUCING ESCHERICHIA COLI: Primary | ICD-10-CM

## 2019-01-01 DIAGNOSIS — Z22.322 MRSA COLONIZATION: ICD-10-CM

## 2019-01-01 DIAGNOSIS — A49.02 MRSA INFECTION: ICD-10-CM

## 2019-01-01 DIAGNOSIS — B96.29 UTI DUE TO EXTENDED-SPECTRUM BETA LACTAMASE (ESBL) PRODUCING ESCHERICHIA COLI: Primary | ICD-10-CM
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
## 2019-01-01 DIAGNOSIS — I10 ESSENTIAL HYPERTENSION: ICD-10-CM

## 2019-01-01 DIAGNOSIS — E11.65 HYPERGLYCEMIA DUE TO TYPE 2 DIABETES MELLITUS (HCC): ICD-10-CM

## 2019-01-01 DIAGNOSIS — K59.00 CONSTIPATION, UNSPECIFIED CONSTIPATION TYPE: ICD-10-CM

## 2019-01-01 DIAGNOSIS — J96.01 ACUTE RESPIRATORY FAILURE WITH HYPOXIA (HCC): ICD-10-CM

## 2019-01-01 DIAGNOSIS — Z93.59 CHRONIC SUPRAPUBIC CATHETER (HCC): Primary | ICD-10-CM

## 2019-01-01 DIAGNOSIS — N39.0 URINARY TRACT INFECTION ASSOCIATED WITH CYSTOSTOMY CATHETER, INITIAL ENCOUNTER (HCC): Primary | ICD-10-CM

## 2019-01-01 DIAGNOSIS — J96.00 ACUTE RESPIRATORY FAILURE (HCC): Primary | ICD-10-CM

## 2019-01-01 DIAGNOSIS — T83.510A URINARY TRACT INFECTION ASSOCIATED WITH CYSTOSTOMY CATHETER, INITIAL ENCOUNTER (HCC): Primary | ICD-10-CM

## 2019-01-01 LAB
ACETONE SERPL-MCNC: NEGATIVE MG/DL
ALBUMIN SERPL BCP-MCNC: 2.4 G/DL (ref 3.5–5)
ALBUMIN SERPL BCP-MCNC: 2.5 G/DL (ref 3.5–5)
ALBUMIN SERPL BCP-MCNC: 2.6 G/DL (ref 3.5–5)
ALBUMIN SERPL BCP-MCNC: 2.8 G/DL (ref 3.5–5)
ALBUMIN SERPL BCP-MCNC: 2.9 G/DL (ref 3.5–5)
ALBUMIN SERPL BCP-MCNC: 3.1 G/DL (ref 3.5–5)
ALP SERPL-CCNC: 119 U/L (ref 46–116)
ALP SERPL-CCNC: 60 U/L (ref 46–116)
ALP SERPL-CCNC: 68 U/L (ref 46–116)
ALP SERPL-CCNC: 68 U/L (ref 46–116)
ALP SERPL-CCNC: 76 U/L (ref 46–116)
ALP SERPL-CCNC: 79 U/L (ref 46–116)
ALP SERPL-CCNC: 96 U/L (ref 46–116)
ALP SERPL-CCNC: 98 U/L (ref 46–116)
ALT SERPL W P-5'-P-CCNC: 15 U/L (ref 12–78)
ALT SERPL W P-5'-P-CCNC: 15 U/L (ref 12–78)
ALT SERPL W P-5'-P-CCNC: 16 U/L (ref 12–78)
ALT SERPL W P-5'-P-CCNC: 17 U/L (ref 12–78)
ALT SERPL W P-5'-P-CCNC: 22 U/L (ref 12–78)
ALT SERPL W P-5'-P-CCNC: 24 U/L (ref 12–78)
ALT SERPL W P-5'-P-CCNC: 24 U/L (ref 12–78)
ALT SERPL W P-5'-P-CCNC: 43 U/L (ref 12–78)
ANION GAP SERPL CALCULATED.3IONS-SCNC: 10 MMOL/L (ref 4–13)
ANION GAP SERPL CALCULATED.3IONS-SCNC: 11 MMOL/L (ref 4–13)
ANION GAP SERPL CALCULATED.3IONS-SCNC: 11 MMOL/L (ref 4–13)
ANION GAP SERPL CALCULATED.3IONS-SCNC: 2 MMOL/L (ref 4–13)
ANION GAP SERPL CALCULATED.3IONS-SCNC: 3 MMOL/L (ref 4–13)
ANION GAP SERPL CALCULATED.3IONS-SCNC: 4 MMOL/L (ref 4–13)
ANION GAP SERPL CALCULATED.3IONS-SCNC: 5 MMOL/L (ref 4–13)
ANION GAP SERPL CALCULATED.3IONS-SCNC: 6 MMOL/L (ref 4–13)
ANION GAP SERPL CALCULATED.3IONS-SCNC: 7 MMOL/L (ref 4–13)
ANION GAP SERPL CALCULATED.3IONS-SCNC: 8 MMOL/L (ref 4–13)
ANION GAP SERPL CALCULATED.3IONS-SCNC: 9 MMOL/L (ref 4–13)
ANION GAP SERPL CALCULATED.3IONS-SCNC: 9 MMOL/L (ref 4–13)
APTT PPP: 20 SECONDS (ref 26–38)
APTT PPP: 23 SECONDS (ref 26–38)
ARTERIAL PATENCY WRIST A: YES
AST SERPL W P-5'-P-CCNC: 10 U/L (ref 5–45)
AST SERPL W P-5'-P-CCNC: 11 U/L (ref 5–45)
AST SERPL W P-5'-P-CCNC: 11 U/L (ref 5–45)
AST SERPL W P-5'-P-CCNC: 12 U/L (ref 5–45)
AST SERPL W P-5'-P-CCNC: 15 U/L (ref 5–45)
AST SERPL W P-5'-P-CCNC: 16 U/L (ref 5–45)
AST SERPL W P-5'-P-CCNC: 20 U/L (ref 5–45)
AST SERPL W P-5'-P-CCNC: 28 U/L (ref 5–45)
ATRIAL RATE: 111 BPM
ATRIAL RATE: 84 BPM
BACTERIA BLD CULT: NORMAL
BACTERIA BLD CULT: NORMAL
BACTERIA UR CULT: ABNORMAL
BACTERIA UR QL AUTO: ABNORMAL /HPF
BASE EXCESS BLDA CALC-SCNC: 1.2 MMOL/L
BASE EXCESS BLDA CALC-SCNC: 4.1 MMOL/L
BASE EXCESS BLDA CALC-SCNC: 5.8 MMOL/L
BASE EXCESS BLDA CALC-SCNC: 7.9 MMOL/L
BASOPHILS # BLD AUTO: 0.02 THOUSANDS/ΜL (ref 0–0.1)
BASOPHILS # BLD AUTO: 0.02 THOUSANDS/ΜL (ref 0–0.1)
BASOPHILS # BLD AUTO: 0.03 THOUSANDS/ΜL (ref 0–0.1)
BASOPHILS # BLD AUTO: 0.04 THOUSANDS/ΜL (ref 0–0.1)
BASOPHILS # BLD AUTO: 0.06 THOUSANDS/ΜL (ref 0–0.1)
BASOPHILS # BLD AUTO: 0.07 THOUSANDS/ΜL (ref 0–0.1)
BASOPHILS # BLD AUTO: 0.07 THOUSANDS/ΜL (ref 0–0.1)
BASOPHILS # BLD AUTO: 0.08 THOUSANDS/ΜL (ref 0–0.1)
BASOPHILS # BLD AUTO: 0.09 THOUSANDS/ΜL (ref 0–0.1)
BASOPHILS # BLD AUTO: 0.09 THOUSANDS/ΜL (ref 0–0.1)
BASOPHILS # BLD AUTO: 0.1 THOUSANDS/ΜL (ref 0–0.1)
BASOPHILS # BLD AUTO: 0.12 THOUSANDS/ΜL (ref 0–0.1)
BASOPHILS NFR BLD AUTO: 0 % (ref 0–1)
BASOPHILS NFR BLD AUTO: 1 % (ref 0–1)
BILIRUB DIRECT SERPL-MCNC: 0.13 MG/DL (ref 0–0.2)
BILIRUB SERPL-MCNC: 0.3 MG/DL (ref 0.2–1)
BILIRUB SERPL-MCNC: 0.4 MG/DL (ref 0.2–1)
BILIRUB SERPL-MCNC: 0.5 MG/DL (ref 0.2–1)
BILIRUB SERPL-MCNC: 0.8 MG/DL (ref 0.2–1)
BILIRUB UR QL STRIP: NEGATIVE
BUN SERPL-MCNC: 16 MG/DL (ref 5–25)
BUN SERPL-MCNC: 17 MG/DL (ref 5–25)
BUN SERPL-MCNC: 17 MG/DL (ref 5–25)
BUN SERPL-MCNC: 18 MG/DL (ref 5–25)
BUN SERPL-MCNC: 20 MG/DL (ref 5–25)
BUN SERPL-MCNC: 21 MG/DL (ref 5–25)
BUN SERPL-MCNC: 21 MG/DL (ref 5–25)
BUN SERPL-MCNC: 22 MG/DL (ref 5–25)
BUN SERPL-MCNC: 24 MG/DL (ref 5–25)
BUN SERPL-MCNC: 25 MG/DL (ref 5–25)
BUN SERPL-MCNC: 28 MG/DL (ref 5–25)
BUN SERPL-MCNC: 31 MG/DL (ref 5–25)
BUN SERPL-MCNC: 32 MG/DL (ref 5–25)
BUN SERPL-MCNC: 34 MG/DL (ref 5–25)
BUN SERPL-MCNC: 34 MG/DL (ref 5–25)
BUN SERPL-MCNC: 37 MG/DL (ref 5–25)
CALCIUM SERPL-MCNC: 7.9 MG/DL (ref 8.3–10.1)
CALCIUM SERPL-MCNC: 8 MG/DL (ref 8.3–10.1)
CALCIUM SERPL-MCNC: 8.1 MG/DL (ref 8.3–10.1)
CALCIUM SERPL-MCNC: 8.2 MG/DL (ref 8.3–10.1)
CALCIUM SERPL-MCNC: 8.2 MG/DL (ref 8.3–10.1)
CALCIUM SERPL-MCNC: 8.3 MG/DL (ref 8.3–10.1)
CALCIUM SERPL-MCNC: 8.4 MG/DL (ref 8.3–10.1)
CALCIUM SERPL-MCNC: 8.5 MG/DL (ref 8.3–10.1)
CALCIUM SERPL-MCNC: 8.6 MG/DL (ref 8.3–10.1)
CALCIUM SERPL-MCNC: 8.6 MG/DL (ref 8.3–10.1)
CALCIUM SERPL-MCNC: 8.7 MG/DL (ref 8.3–10.1)
CALCIUM SERPL-MCNC: 8.8 MG/DL (ref 8.3–10.1)
CALCIUM SERPL-MCNC: 8.9 MG/DL (ref 8.3–10.1)
CHLORIDE SERPL-SCNC: 100 MMOL/L (ref 100–108)
CHLORIDE SERPL-SCNC: 101 MMOL/L (ref 100–108)
CHLORIDE SERPL-SCNC: 102 MMOL/L (ref 100–108)
CHLORIDE SERPL-SCNC: 103 MMOL/L (ref 100–108)
CHLORIDE SERPL-SCNC: 104 MMOL/L (ref 100–108)
CHLORIDE SERPL-SCNC: 105 MMOL/L (ref 100–108)
CHLORIDE SERPL-SCNC: 106 MMOL/L (ref 100–108)
CHLORIDE SERPL-SCNC: 106 MMOL/L (ref 100–108)
CHLORIDE SERPL-SCNC: 99 MMOL/L (ref 100–108)
CLARITY UR: ABNORMAL
CLARITY UR: ABNORMAL
CLARITY UR: CLEAR
CO2 SERPL-SCNC: 25 MMOL/L (ref 21–32)
CO2 SERPL-SCNC: 26 MMOL/L (ref 21–32)
CO2 SERPL-SCNC: 26 MMOL/L (ref 21–32)
CO2 SERPL-SCNC: 27 MMOL/L (ref 21–32)
CO2 SERPL-SCNC: 27 MMOL/L (ref 21–32)
CO2 SERPL-SCNC: 28 MMOL/L (ref 21–32)
CO2 SERPL-SCNC: 29 MMOL/L (ref 21–32)
CO2 SERPL-SCNC: 29 MMOL/L (ref 21–32)
CO2 SERPL-SCNC: 30 MMOL/L (ref 21–32)
CO2 SERPL-SCNC: 30 MMOL/L (ref 21–32)
CO2 SERPL-SCNC: 31 MMOL/L (ref 21–32)
CO2 SERPL-SCNC: 32 MMOL/L (ref 21–32)
CO2 SERPL-SCNC: 33 MMOL/L (ref 21–32)
CO2 SERPL-SCNC: 35 MMOL/L (ref 21–32)
CO2 SERPL-SCNC: 36 MMOL/L (ref 21–32)
COLOR UR: YELLOW
CREAT SERPL-MCNC: 0.67 MG/DL (ref 0.6–1.3)
CREAT SERPL-MCNC: 0.71 MG/DL (ref 0.6–1.3)
CREAT SERPL-MCNC: 0.76 MG/DL (ref 0.6–1.3)
CREAT SERPL-MCNC: 0.76 MG/DL (ref 0.6–1.3)
CREAT SERPL-MCNC: 0.79 MG/DL (ref 0.6–1.3)
CREAT SERPL-MCNC: 0.87 MG/DL (ref 0.6–1.3)
CREAT SERPL-MCNC: 0.89 MG/DL (ref 0.6–1.3)
CREAT SERPL-MCNC: 0.9 MG/DL (ref 0.6–1.3)
CREAT SERPL-MCNC: 0.91 MG/DL (ref 0.6–1.3)
CREAT SERPL-MCNC: 0.92 MG/DL (ref 0.6–1.3)
CREAT SERPL-MCNC: 0.95 MG/DL (ref 0.6–1.3)
CREAT SERPL-MCNC: 0.96 MG/DL (ref 0.6–1.3)
CREAT SERPL-MCNC: 0.99 MG/DL (ref 0.6–1.3)
CREAT SERPL-MCNC: 1.01 MG/DL (ref 0.6–1.3)
CREAT SERPL-MCNC: 1.04 MG/DL (ref 0.6–1.3)
CREAT SERPL-MCNC: 1.07 MG/DL (ref 0.6–1.3)
CREAT SERPL-MCNC: 1.09 MG/DL (ref 0.6–1.3)
CREAT SERPL-MCNC: 1.11 MG/DL (ref 0.6–1.3)
CREAT SERPL-MCNC: 1.15 MG/DL (ref 0.6–1.3)
CREAT SERPL-MCNC: 1.17 MG/DL (ref 0.6–1.3)
CREAT SERPL-MCNC: 1.18 MG/DL (ref 0.6–1.3)
DEPRECATED D DIMER PPP: 1246 NG/ML (FEU)
EOSINOPHIL # BLD AUTO: 0 THOUSAND/ΜL (ref 0–0.61)
EOSINOPHIL # BLD AUTO: 0.01 THOUSAND/ΜL (ref 0–0.61)
EOSINOPHIL # BLD AUTO: 0.02 THOUSAND/ΜL (ref 0–0.61)
EOSINOPHIL # BLD AUTO: 0.03 THOUSAND/ΜL (ref 0–0.61)
EOSINOPHIL # BLD AUTO: 0.03 THOUSAND/ΜL (ref 0–0.61)
EOSINOPHIL # BLD AUTO: 0.06 THOUSAND/ΜL (ref 0–0.61)
EOSINOPHIL # BLD AUTO: 0.17 THOUSAND/ΜL (ref 0–0.61)
EOSINOPHIL # BLD AUTO: 0.21 THOUSAND/ΜL (ref 0–0.61)
EOSINOPHIL # BLD AUTO: 0.39 THOUSAND/ΜL (ref 0–0.61)
EOSINOPHIL # BLD AUTO: 0.41 THOUSAND/ΜL (ref 0–0.61)
EOSINOPHIL # BLD AUTO: 0.44 THOUSAND/ΜL (ref 0–0.61)
EOSINOPHIL # BLD AUTO: 0.52 THOUSAND/ΜL (ref 0–0.61)
EOSINOPHIL # BLD AUTO: 0.54 THOUSAND/ΜL (ref 0–0.61)
EOSINOPHIL # BLD AUTO: 0.58 THOUSAND/ΜL (ref 0–0.61)
EOSINOPHIL # BLD AUTO: 0.59 THOUSAND/ΜL (ref 0–0.61)
EOSINOPHIL NFR BLD AUTO: 0 % (ref 0–6)
EOSINOPHIL NFR BLD AUTO: 1 % (ref 0–6)
EOSINOPHIL NFR BLD AUTO: 2 % (ref 0–6)
EOSINOPHIL NFR BLD AUTO: 2 % (ref 0–6)
EOSINOPHIL NFR BLD AUTO: 4 % (ref 0–6)
EOSINOPHIL NFR BLD AUTO: 5 % (ref 0–6)
EOSINOPHIL NFR BLD AUTO: 6 % (ref 0–6)
EOSINOPHIL NFR BLD AUTO: 6 % (ref 0–6)
EOSINOPHIL NFR BLD AUTO: 7 % (ref 0–6)
ERYTHROCYTE [DISTWIDTH] IN BLOOD BY AUTOMATED COUNT: 13.3 % (ref 11.6–15.1)
ERYTHROCYTE [DISTWIDTH] IN BLOOD BY AUTOMATED COUNT: 13.6 % (ref 11.6–15.1)
ERYTHROCYTE [DISTWIDTH] IN BLOOD BY AUTOMATED COUNT: 13.6 % (ref 11.6–15.1)
ERYTHROCYTE [DISTWIDTH] IN BLOOD BY AUTOMATED COUNT: 13.7 % (ref 11.6–15.1)
ERYTHROCYTE [DISTWIDTH] IN BLOOD BY AUTOMATED COUNT: 13.8 % (ref 11.6–15.1)
ERYTHROCYTE [DISTWIDTH] IN BLOOD BY AUTOMATED COUNT: 13.9 % (ref 11.6–15.1)
ERYTHROCYTE [DISTWIDTH] IN BLOOD BY AUTOMATED COUNT: 14 % (ref 11.6–15.1)
ERYTHROCYTE [DISTWIDTH] IN BLOOD BY AUTOMATED COUNT: 14 % (ref 11.6–15.1)
ERYTHROCYTE [DISTWIDTH] IN BLOOD BY AUTOMATED COUNT: 14.1 % (ref 11.6–15.1)
ERYTHROCYTE [DISTWIDTH] IN BLOOD BY AUTOMATED COUNT: 14.2 % (ref 11.6–15.1)
ERYTHROCYTE [DISTWIDTH] IN BLOOD BY AUTOMATED COUNT: 14.2 % (ref 11.6–15.1)
ERYTHROCYTE [DISTWIDTH] IN BLOOD BY AUTOMATED COUNT: 14.4 % (ref 11.6–15.1)
EST. AVERAGE GLUCOSE BLD GHB EST-MCNC: 189 MG/DL
EST. AVERAGE GLUCOSE BLD GHB EST-MCNC: 303 MG/DL
GFR SERPL CREATININE-BSD FRML MDRD: 59 ML/MIN/1.73SQ M
GFR SERPL CREATININE-BSD FRML MDRD: 59 ML/MIN/1.73SQ M
GFR SERPL CREATININE-BSD FRML MDRD: 61 ML/MIN/1.73SQ M
GFR SERPL CREATININE-BSD FRML MDRD: 63 ML/MIN/1.73SQ M
GFR SERPL CREATININE-BSD FRML MDRD: 65 ML/MIN/1.73SQ M
GFR SERPL CREATININE-BSD FRML MDRD: 66 ML/MIN/1.73SQ M
GFR SERPL CREATININE-BSD FRML MDRD: 68 ML/MIN/1.73SQ M
GFR SERPL CREATININE-BSD FRML MDRD: 70 ML/MIN/1.73SQ M
GFR SERPL CREATININE-BSD FRML MDRD: 73 ML/MIN/1.73SQ M
GFR SERPL CREATININE-BSD FRML MDRD: 75 ML/MIN/1.73SQ M
GFR SERPL CREATININE-BSD FRML MDRD: 76 ML/MIN/1.73SQ M
GFR SERPL CREATININE-BSD FRML MDRD: 79 ML/MIN/1.73SQ M
GFR SERPL CREATININE-BSD FRML MDRD: 80 ML/MIN/1.73SQ M
GFR SERPL CREATININE-BSD FRML MDRD: 81 ML/MIN/1.73SQ M
GFR SERPL CREATININE-BSD FRML MDRD: 82 ML/MIN/1.73SQ M
GFR SERPL CREATININE-BSD FRML MDRD: 83 ML/MIN/1.73SQ M
GFR SERPL CREATININE-BSD FRML MDRD: 86 ML/MIN/1.73SQ M
GFR SERPL CREATININE-BSD FRML MDRD: 87 ML/MIN/1.73SQ M
GFR SERPL CREATININE-BSD FRML MDRD: 87 ML/MIN/1.73SQ M
GFR SERPL CREATININE-BSD FRML MDRD: 90 ML/MIN/1.73SQ M
GFR SERPL CREATININE-BSD FRML MDRD: 92 ML/MIN/1.73SQ M
GLUCOSE SERPL-MCNC: 102 MG/DL (ref 65–140)
GLUCOSE SERPL-MCNC: 112 MG/DL (ref 65–140)
GLUCOSE SERPL-MCNC: 116 MG/DL (ref 65–140)
GLUCOSE SERPL-MCNC: 116 MG/DL (ref 65–140)
GLUCOSE SERPL-MCNC: 117 MG/DL (ref 65–140)
GLUCOSE SERPL-MCNC: 118 MG/DL (ref 65–140)
GLUCOSE SERPL-MCNC: 119 MG/DL (ref 65–140)
GLUCOSE SERPL-MCNC: 120 MG/DL (ref 65–140)
GLUCOSE SERPL-MCNC: 125 MG/DL (ref 65–140)
GLUCOSE SERPL-MCNC: 130 MG/DL (ref 65–140)
GLUCOSE SERPL-MCNC: 133 MG/DL (ref 65–140)
GLUCOSE SERPL-MCNC: 134 MG/DL (ref 65–140)
GLUCOSE SERPL-MCNC: 135 MG/DL (ref 65–140)
GLUCOSE SERPL-MCNC: 136 MG/DL (ref 65–140)
GLUCOSE SERPL-MCNC: 138 MG/DL (ref 65–140)
GLUCOSE SERPL-MCNC: 139 MG/DL (ref 65–140)
GLUCOSE SERPL-MCNC: 139 MG/DL (ref 65–140)
GLUCOSE SERPL-MCNC: 141 MG/DL (ref 65–140)
GLUCOSE SERPL-MCNC: 142 MG/DL (ref 65–140)
GLUCOSE SERPL-MCNC: 143 MG/DL (ref 65–140)
GLUCOSE SERPL-MCNC: 144 MG/DL (ref 65–140)
GLUCOSE SERPL-MCNC: 145 MG/DL (ref 65–140)
GLUCOSE SERPL-MCNC: 156 MG/DL (ref 65–140)
GLUCOSE SERPL-MCNC: 156 MG/DL (ref 65–140)
GLUCOSE SERPL-MCNC: 157 MG/DL (ref 65–140)
GLUCOSE SERPL-MCNC: 158 MG/DL (ref 65–140)
GLUCOSE SERPL-MCNC: 159 MG/DL (ref 65–140)
GLUCOSE SERPL-MCNC: 160 MG/DL (ref 65–140)
GLUCOSE SERPL-MCNC: 161 MG/DL (ref 65–140)
GLUCOSE SERPL-MCNC: 163 MG/DL (ref 65–140)
GLUCOSE SERPL-MCNC: 164 MG/DL (ref 65–140)
GLUCOSE SERPL-MCNC: 164 MG/DL (ref 65–140)
GLUCOSE SERPL-MCNC: 165 MG/DL (ref 65–140)
GLUCOSE SERPL-MCNC: 167 MG/DL (ref 65–140)
GLUCOSE SERPL-MCNC: 168 MG/DL (ref 65–140)
GLUCOSE SERPL-MCNC: 169 MG/DL (ref 65–140)
GLUCOSE SERPL-MCNC: 171 MG/DL (ref 65–140)
GLUCOSE SERPL-MCNC: 173 MG/DL (ref 65–140)
GLUCOSE SERPL-MCNC: 173 MG/DL (ref 65–140)
GLUCOSE SERPL-MCNC: 174 MG/DL (ref 65–140)
GLUCOSE SERPL-MCNC: 175 MG/DL (ref 65–140)
GLUCOSE SERPL-MCNC: 177 MG/DL (ref 65–140)
GLUCOSE SERPL-MCNC: 177 MG/DL (ref 65–140)
GLUCOSE SERPL-MCNC: 178 MG/DL (ref 65–140)
GLUCOSE SERPL-MCNC: 181 MG/DL (ref 65–140)
GLUCOSE SERPL-MCNC: 184 MG/DL (ref 65–140)
GLUCOSE SERPL-MCNC: 185 MG/DL (ref 65–140)
GLUCOSE SERPL-MCNC: 188 MG/DL (ref 65–140)
GLUCOSE SERPL-MCNC: 191 MG/DL (ref 65–140)
GLUCOSE SERPL-MCNC: 197 MG/DL (ref 65–140)
GLUCOSE SERPL-MCNC: 200 MG/DL (ref 65–140)
GLUCOSE SERPL-MCNC: 203 MG/DL (ref 65–140)
GLUCOSE SERPL-MCNC: 206 MG/DL (ref 65–140)
GLUCOSE SERPL-MCNC: 207 MG/DL (ref 65–140)
GLUCOSE SERPL-MCNC: 208 MG/DL (ref 65–140)
GLUCOSE SERPL-MCNC: 210 MG/DL (ref 65–140)
GLUCOSE SERPL-MCNC: 214 MG/DL (ref 65–140)
GLUCOSE SERPL-MCNC: 215 MG/DL (ref 65–140)
GLUCOSE SERPL-MCNC: 216 MG/DL (ref 65–140)
GLUCOSE SERPL-MCNC: 219 MG/DL (ref 65–140)
GLUCOSE SERPL-MCNC: 220 MG/DL (ref 65–140)
GLUCOSE SERPL-MCNC: 221 MG/DL (ref 65–140)
GLUCOSE SERPL-MCNC: 222 MG/DL (ref 65–140)
GLUCOSE SERPL-MCNC: 225 MG/DL (ref 65–140)
GLUCOSE SERPL-MCNC: 228 MG/DL (ref 65–140)
GLUCOSE SERPL-MCNC: 235 MG/DL (ref 65–140)
GLUCOSE SERPL-MCNC: 239 MG/DL (ref 65–140)
GLUCOSE SERPL-MCNC: 244 MG/DL (ref 65–140)
GLUCOSE SERPL-MCNC: 248 MG/DL (ref 65–140)
GLUCOSE SERPL-MCNC: 251 MG/DL (ref 65–140)
GLUCOSE SERPL-MCNC: 252 MG/DL (ref 65–140)
GLUCOSE SERPL-MCNC: 256 MG/DL (ref 65–140)
GLUCOSE SERPL-MCNC: 257 MG/DL (ref 65–140)
GLUCOSE SERPL-MCNC: 259 MG/DL (ref 65–140)
GLUCOSE SERPL-MCNC: 267 MG/DL (ref 65–140)
GLUCOSE SERPL-MCNC: 267 MG/DL (ref 65–140)
GLUCOSE SERPL-MCNC: 269 MG/DL (ref 65–140)
GLUCOSE SERPL-MCNC: 280 MG/DL (ref 65–140)
GLUCOSE SERPL-MCNC: 288 MG/DL (ref 65–140)
GLUCOSE SERPL-MCNC: 288 MG/DL (ref 65–140)
GLUCOSE SERPL-MCNC: 290 MG/DL (ref 65–140)
GLUCOSE SERPL-MCNC: 294 MG/DL (ref 65–140)
GLUCOSE SERPL-MCNC: 294 MG/DL (ref 65–140)
GLUCOSE SERPL-MCNC: 296 MG/DL (ref 65–140)
GLUCOSE SERPL-MCNC: 299 MG/DL (ref 65–140)
GLUCOSE SERPL-MCNC: 303 MG/DL (ref 65–140)
GLUCOSE SERPL-MCNC: 308 MG/DL (ref 65–140)
GLUCOSE SERPL-MCNC: 309 MG/DL (ref 65–140)
GLUCOSE SERPL-MCNC: 314 MG/DL (ref 65–140)
GLUCOSE SERPL-MCNC: 318 MG/DL (ref 65–140)
GLUCOSE SERPL-MCNC: 325 MG/DL (ref 65–140)
GLUCOSE SERPL-MCNC: 326 MG/DL (ref 65–140)
GLUCOSE SERPL-MCNC: 328 MG/DL (ref 65–140)
GLUCOSE SERPL-MCNC: 331 MG/DL (ref 65–140)
GLUCOSE SERPL-MCNC: 331 MG/DL (ref 65–140)
GLUCOSE SERPL-MCNC: 333 MG/DL (ref 65–140)
GLUCOSE SERPL-MCNC: 335 MG/DL (ref 65–140)
GLUCOSE SERPL-MCNC: 336 MG/DL (ref 65–140)
GLUCOSE SERPL-MCNC: 338 MG/DL (ref 65–140)
GLUCOSE SERPL-MCNC: 347 MG/DL (ref 65–140)
GLUCOSE SERPL-MCNC: 348 MG/DL (ref 65–140)
GLUCOSE SERPL-MCNC: 348 MG/DL (ref 65–140)
GLUCOSE SERPL-MCNC: 355 MG/DL (ref 65–140)
GLUCOSE SERPL-MCNC: 358 MG/DL (ref 65–140)
GLUCOSE SERPL-MCNC: 362 MG/DL (ref 65–140)
GLUCOSE SERPL-MCNC: 369 MG/DL (ref 65–140)
GLUCOSE SERPL-MCNC: 371 MG/DL (ref 65–140)
GLUCOSE SERPL-MCNC: 385 MG/DL (ref 65–140)
GLUCOSE SERPL-MCNC: 387 MG/DL (ref 65–140)
GLUCOSE SERPL-MCNC: 387 MG/DL (ref 65–140)
GLUCOSE SERPL-MCNC: 392 MG/DL (ref 65–140)
GLUCOSE SERPL-MCNC: 397 MG/DL (ref 65–140)
GLUCOSE SERPL-MCNC: 399 MG/DL (ref 65–140)
GLUCOSE SERPL-MCNC: 425 MG/DL (ref 65–140)
GLUCOSE SERPL-MCNC: 481 MG/DL (ref 65–140)
GLUCOSE SERPL-MCNC: 55 MG/DL (ref 65–140)
GLUCOSE SERPL-MCNC: 59 MG/DL (ref 65–140)
GLUCOSE SERPL-MCNC: 61 MG/DL (ref 65–140)
GLUCOSE SERPL-MCNC: 73 MG/DL (ref 65–140)
GLUCOSE SERPL-MCNC: 78 MG/DL (ref 65–140)
GLUCOSE SERPL-MCNC: >500 MG/DL (ref 65–140)
GLUCOSE SERPL-MCNC: >500 MG/DL (ref 65–140)
GLUCOSE UR STRIP-MCNC: ABNORMAL MG/DL
GLUCOSE UR STRIP-MCNC: ABNORMAL MG/DL
GLUCOSE UR STRIP-MCNC: NEGATIVE MG/DL
HBA1C MFR BLD: 12.2 % (ref 4.2–6.3)
HBA1C MFR BLD: 8.2 % (ref 4.2–6.3)
HCO3 BLDA-SCNC: 27.4 MMOL/L (ref 22–28)
HCO3 BLDA-SCNC: 30.4 MMOL/L (ref 22–28)
HCO3 BLDA-SCNC: 34.2 MMOL/L (ref 22–28)
HCO3 BLDA-SCNC: 36.1 MMOL/L (ref 22–28)
HCT VFR BLD AUTO: 36.5 % (ref 36.5–49.3)
HCT VFR BLD AUTO: 38 % (ref 36.5–49.3)
HCT VFR BLD AUTO: 38.5 % (ref 36.5–49.3)
HCT VFR BLD AUTO: 38.9 % (ref 36.5–49.3)
HCT VFR BLD AUTO: 40 % (ref 36.5–49.3)
HCT VFR BLD AUTO: 40 % (ref 36.5–49.3)
HCT VFR BLD AUTO: 40.2 % (ref 36.5–49.3)
HCT VFR BLD AUTO: 40.3 % (ref 36.5–49.3)
HCT VFR BLD AUTO: 41.2 % (ref 36.5–49.3)
HCT VFR BLD AUTO: 41.4 % (ref 36.5–49.3)
HCT VFR BLD AUTO: 41.5 % (ref 36.5–49.3)
HCT VFR BLD AUTO: 42.4 % (ref 36.5–49.3)
HCT VFR BLD AUTO: 42.4 % (ref 36.5–49.3)
HCT VFR BLD AUTO: 42.6 % (ref 36.5–49.3)
HCT VFR BLD AUTO: 43 % (ref 36.5–49.3)
HCT VFR BLD AUTO: 43.2 % (ref 36.5–49.3)
HCT VFR BLD AUTO: 45.7 % (ref 36.5–49.3)
HCT VFR BLD AUTO: 49.5 % (ref 36.5–49.3)
HFNC FLOW LPM: 35
HGB BLD-MCNC: 11.4 G/DL (ref 12–17)
HGB BLD-MCNC: 11.9 G/DL (ref 12–17)
HGB BLD-MCNC: 12 G/DL (ref 12–17)
HGB BLD-MCNC: 12 G/DL (ref 12–17)
HGB BLD-MCNC: 12.1 G/DL (ref 12–17)
HGB BLD-MCNC: 12.4 G/DL (ref 12–17)
HGB BLD-MCNC: 12.8 G/DL (ref 12–17)
HGB BLD-MCNC: 13 G/DL (ref 12–17)
HGB BLD-MCNC: 13.2 G/DL (ref 12–17)
HGB BLD-MCNC: 13.2 G/DL (ref 12–17)
HGB BLD-MCNC: 13.5 G/DL (ref 12–17)
HGB BLD-MCNC: 13.6 G/DL (ref 12–17)
HGB BLD-MCNC: 13.7 G/DL (ref 12–17)
HGB BLD-MCNC: 13.7 G/DL (ref 12–17)
HGB BLD-MCNC: 13.8 G/DL (ref 12–17)
HGB BLD-MCNC: 13.8 G/DL (ref 12–17)
HGB BLD-MCNC: 14.9 G/DL (ref 12–17)
HGB BLD-MCNC: 16 G/DL (ref 12–17)
HGB UR QL STRIP.AUTO: ABNORMAL
HOLD SPECIMEN: NORMAL
IMM GRANULOCYTES # BLD AUTO: 0.02 THOUSAND/UL (ref 0–0.2)
IMM GRANULOCYTES # BLD AUTO: 0.03 THOUSAND/UL (ref 0–0.2)
IMM GRANULOCYTES # BLD AUTO: 0.04 THOUSAND/UL (ref 0–0.2)
IMM GRANULOCYTES # BLD AUTO: 0.05 THOUSAND/UL (ref 0–0.2)
IMM GRANULOCYTES # BLD AUTO: 0.05 THOUSAND/UL (ref 0–0.2)
IMM GRANULOCYTES # BLD AUTO: 0.07 THOUSAND/UL (ref 0–0.2)
IMM GRANULOCYTES NFR BLD AUTO: 0 % (ref 0–2)
IMM GRANULOCYTES NFR BLD AUTO: 1 % (ref 0–2)
INR PPP: 0.93 (ref 0.86–1.17)
INR PPP: 0.98 (ref 0.86–1.17)
INR PPP: 1.22 (ref 0.86–1.17)
INR PPP: 1.22 (ref 0.86–1.17)
IPAP: 16
KETONES UR STRIP-MCNC: ABNORMAL MG/DL
KETONES UR STRIP-MCNC: NEGATIVE MG/DL
KETONES UR STRIP-MCNC: NEGATIVE MG/DL
LACTATE SERPL-SCNC: 1.1 MMOL/L (ref 0.5–2)
LACTATE SERPL-SCNC: 1.6 MMOL/L (ref 0.5–2)
LEUKOCYTE ESTERASE UR QL STRIP: ABNORMAL
LIPASE SERPL-CCNC: 597 U/L (ref 73–393)
LYMPHOCYTES # BLD AUTO: 0.44 THOUSANDS/ΜL (ref 0.6–4.47)
LYMPHOCYTES # BLD AUTO: 0.48 THOUSANDS/ΜL (ref 0.6–4.47)
LYMPHOCYTES # BLD AUTO: 0.55 THOUSANDS/ΜL (ref 0.6–4.47)
LYMPHOCYTES # BLD AUTO: 0.85 THOUSANDS/ΜL (ref 0.6–4.47)
LYMPHOCYTES # BLD AUTO: 1.31 THOUSANDS/ΜL (ref 0.6–4.47)
LYMPHOCYTES # BLD AUTO: 1.45 THOUSANDS/ΜL (ref 0.6–4.47)
LYMPHOCYTES # BLD AUTO: 1.47 THOUSANDS/ΜL (ref 0.6–4.47)
LYMPHOCYTES # BLD AUTO: 1.58 THOUSANDS/ΜL (ref 0.6–4.47)
LYMPHOCYTES # BLD AUTO: 1.64 THOUSANDS/ΜL (ref 0.6–4.47)
LYMPHOCYTES # BLD AUTO: 1.71 THOUSANDS/ΜL (ref 0.6–4.47)
LYMPHOCYTES # BLD AUTO: 1.81 THOUSANDS/ΜL (ref 0.6–4.47)
LYMPHOCYTES # BLD AUTO: 1.83 THOUSANDS/ΜL (ref 0.6–4.47)
LYMPHOCYTES # BLD AUTO: 1.94 THOUSANDS/ΜL (ref 0.6–4.47)
LYMPHOCYTES # BLD AUTO: 2.01 THOUSANDS/ΜL (ref 0.6–4.47)
LYMPHOCYTES # BLD AUTO: 2.58 THOUSANDS/ΜL (ref 0.6–4.47)
LYMPHOCYTES NFR BLD AUTO: 14 % (ref 14–44)
LYMPHOCYTES NFR BLD AUTO: 15 % (ref 14–44)
LYMPHOCYTES NFR BLD AUTO: 17 % (ref 14–44)
LYMPHOCYTES NFR BLD AUTO: 18 % (ref 14–44)
LYMPHOCYTES NFR BLD AUTO: 19 % (ref 14–44)
LYMPHOCYTES NFR BLD AUTO: 20 % (ref 14–44)
LYMPHOCYTES NFR BLD AUTO: 21 % (ref 14–44)
LYMPHOCYTES NFR BLD AUTO: 4 % (ref 14–44)
LYMPHOCYTES NFR BLD AUTO: 5 % (ref 14–44)
LYMPHOCYTES NFR BLD AUTO: 5 % (ref 14–44)
LYMPHOCYTES NFR BLD AUTO: 8 % (ref 14–44)
MAGNESIUM SERPL-MCNC: 1.8 MG/DL (ref 1.6–2.6)
MAGNESIUM SERPL-MCNC: 1.9 MG/DL (ref 1.6–2.6)
MAGNESIUM SERPL-MCNC: 2 MG/DL (ref 1.6–2.6)
MAGNESIUM SERPL-MCNC: 2 MG/DL (ref 1.6–2.6)
MAGNESIUM SERPL-MCNC: 2.1 MG/DL (ref 1.6–2.6)
MAGNESIUM SERPL-MCNC: 2.1 MG/DL (ref 1.6–2.6)
MCH RBC QN AUTO: 27.8 PG (ref 26.8–34.3)
MCH RBC QN AUTO: 28.1 PG (ref 26.8–34.3)
MCH RBC QN AUTO: 28.5 PG (ref 26.8–34.3)
MCH RBC QN AUTO: 28.6 PG (ref 26.8–34.3)
MCH RBC QN AUTO: 28.7 PG (ref 26.8–34.3)
MCH RBC QN AUTO: 28.8 PG (ref 26.8–34.3)
MCH RBC QN AUTO: 28.9 PG (ref 26.8–34.3)
MCH RBC QN AUTO: 29 PG (ref 26.8–34.3)
MCH RBC QN AUTO: 29.1 PG (ref 26.8–34.3)
MCHC RBC AUTO-ENTMCNC: 29.1 G/DL (ref 31.4–37.4)
MCHC RBC AUTO-ENTMCNC: 30 G/DL (ref 31.4–37.4)
MCHC RBC AUTO-ENTMCNC: 30.6 G/DL (ref 31.4–37.4)
MCHC RBC AUTO-ENTMCNC: 31.2 G/DL (ref 31.4–37.4)
MCHC RBC AUTO-ENTMCNC: 31.7 G/DL (ref 31.4–37.4)
MCHC RBC AUTO-ENTMCNC: 31.8 G/DL (ref 31.4–37.4)
MCHC RBC AUTO-ENTMCNC: 32.1 G/DL (ref 31.4–37.4)
MCHC RBC AUTO-ENTMCNC: 32.1 G/DL (ref 31.4–37.4)
MCHC RBC AUTO-ENTMCNC: 32.2 G/DL (ref 31.4–37.4)
MCHC RBC AUTO-ENTMCNC: 32.2 G/DL (ref 31.4–37.4)
MCHC RBC AUTO-ENTMCNC: 32.3 G/DL (ref 31.4–37.4)
MCHC RBC AUTO-ENTMCNC: 32.5 G/DL (ref 31.4–37.4)
MCHC RBC AUTO-ENTMCNC: 32.5 G/DL (ref 31.4–37.4)
MCHC RBC AUTO-ENTMCNC: 32.6 G/DL (ref 31.4–37.4)
MCHC RBC AUTO-ENTMCNC: 32.6 G/DL (ref 31.4–37.4)
MCHC RBC AUTO-ENTMCNC: 32.8 G/DL (ref 31.4–37.4)
MCV RBC AUTO: 88 FL (ref 82–98)
MCV RBC AUTO: 89 FL (ref 82–98)
MCV RBC AUTO: 90 FL (ref 82–98)
MCV RBC AUTO: 91 FL (ref 82–98)
MCV RBC AUTO: 91 FL (ref 82–98)
MCV RBC AUTO: 92 FL (ref 82–98)
MCV RBC AUTO: 92 FL (ref 82–98)
MCV RBC AUTO: 94 FL (ref 82–98)
MCV RBC AUTO: 94 FL (ref 82–98)
MCV RBC AUTO: 96 FL (ref 82–98)
MONOCYTES # BLD AUTO: 0.72 THOUSAND/ΜL (ref 0.17–1.22)
MONOCYTES # BLD AUTO: 0.83 THOUSAND/ΜL (ref 0.17–1.22)
MONOCYTES # BLD AUTO: 0.84 THOUSAND/ΜL (ref 0.17–1.22)
MONOCYTES # BLD AUTO: 0.89 THOUSAND/ΜL (ref 0.17–1.22)
MONOCYTES # BLD AUTO: 0.93 THOUSAND/ΜL (ref 0.17–1.22)
MONOCYTES # BLD AUTO: 0.95 THOUSAND/ΜL (ref 0.17–1.22)
MONOCYTES # BLD AUTO: 0.95 THOUSAND/ΜL (ref 0.17–1.22)
MONOCYTES # BLD AUTO: 0.96 THOUSAND/ΜL (ref 0.17–1.22)
MONOCYTES # BLD AUTO: 1 THOUSAND/ΜL (ref 0.17–1.22)
MONOCYTES # BLD AUTO: 1.02 THOUSAND/ΜL (ref 0.17–1.22)
MONOCYTES # BLD AUTO: 1.14 THOUSAND/ΜL (ref 0.17–1.22)
MONOCYTES # BLD AUTO: 1.16 THOUSAND/ΜL (ref 0.17–1.22)
MONOCYTES # BLD AUTO: 1.18 THOUSAND/ΜL (ref 0.17–1.22)
MONOCYTES # BLD AUTO: 1.23 THOUSAND/ΜL (ref 0.17–1.22)
MONOCYTES # BLD AUTO: 1.26 THOUSAND/ΜL (ref 0.17–1.22)
MONOCYTES NFR BLD AUTO: 10 % (ref 4–12)
MONOCYTES NFR BLD AUTO: 11 % (ref 4–12)
MONOCYTES NFR BLD AUTO: 11 % (ref 4–12)
MONOCYTES NFR BLD AUTO: 7 % (ref 4–12)
MONOCYTES NFR BLD AUTO: 8 % (ref 4–12)
MONOCYTES NFR BLD AUTO: 8 % (ref 4–12)
MONOCYTES NFR BLD AUTO: 9 % (ref 4–12)
MONOCYTES NFR BLD AUTO: 9 % (ref 4–12)
MRSA NOSE QL CULT: ABNORMAL
MRSA NOSE QL CULT: ABNORMAL
NASAL CANNULA: 3
NASAL CANNULA: ABNORMAL
NEUTROPHILS # BLD AUTO: 12.05 THOUSANDS/ΜL (ref 1.85–7.62)
NEUTROPHILS # BLD AUTO: 5.19 THOUSANDS/ΜL (ref 1.85–7.62)
NEUTROPHILS # BLD AUTO: 6.25 THOUSANDS/ΜL (ref 1.85–7.62)
NEUTROPHILS # BLD AUTO: 6.28 THOUSANDS/ΜL (ref 1.85–7.62)
NEUTROPHILS # BLD AUTO: 6.32 THOUSANDS/ΜL (ref 1.85–7.62)
NEUTROPHILS # BLD AUTO: 6.59 THOUSANDS/ΜL (ref 1.85–7.62)
NEUTROPHILS # BLD AUTO: 6.62 THOUSANDS/ΜL (ref 1.85–7.62)
NEUTROPHILS # BLD AUTO: 7.22 THOUSANDS/ΜL (ref 1.85–7.62)
NEUTROPHILS # BLD AUTO: 8.07 THOUSANDS/ΜL (ref 1.85–7.62)
NEUTROPHILS # BLD AUTO: 8.1 THOUSANDS/ΜL (ref 1.85–7.62)
NEUTROPHILS # BLD AUTO: 8.49 THOUSANDS/ΜL (ref 1.85–7.62)
NEUTROPHILS # BLD AUTO: 8.49 THOUSANDS/ΜL (ref 1.85–7.62)
NEUTROPHILS # BLD AUTO: 8.58 THOUSANDS/ΜL (ref 1.85–7.62)
NEUTROPHILS # BLD AUTO: 8.58 THOUSANDS/ΜL (ref 1.85–7.62)
NEUTROPHILS # BLD AUTO: 9.32 THOUSANDS/ΜL (ref 1.85–7.62)
NEUTS SEG NFR BLD AUTO: 63 % (ref 43–75)
NEUTS SEG NFR BLD AUTO: 63 % (ref 43–75)
NEUTS SEG NFR BLD AUTO: 66 % (ref 43–75)
NEUTS SEG NFR BLD AUTO: 67 % (ref 43–75)
NEUTS SEG NFR BLD AUTO: 70 % (ref 43–75)
NEUTS SEG NFR BLD AUTO: 71 % (ref 43–75)
NEUTS SEG NFR BLD AUTO: 73 % (ref 43–75)
NEUTS SEG NFR BLD AUTO: 74 % (ref 43–75)
NEUTS SEG NFR BLD AUTO: 76 % (ref 43–75)
NEUTS SEG NFR BLD AUTO: 81 % (ref 43–75)
NEUTS SEG NFR BLD AUTO: 84 % (ref 43–75)
NEUTS SEG NFR BLD AUTO: 85 % (ref 43–75)
NEUTS SEG NFR BLD AUTO: 87 % (ref 43–75)
NITRITE UR QL STRIP: NEGATIVE
NITRITE UR QL STRIP: POSITIVE
NITRITE UR QL STRIP: POSITIVE
NON VENT HFNC FIO2: 50
NON VENT TYPE HFNC: ABNORMAL
NON VENT- BIPAP: ABNORMAL
NON-SQ EPI CELLS URNS QL MICRO: ABNORMAL /HPF
NRBC BLD AUTO-RTO: 0 /100 WBCS
NT-PROBNP SERPL-MCNC: 2970 PG/ML
NT-PROBNP SERPL-MCNC: 4338 PG/ML
O2 CT BLDA-SCNC: 16.5 ML/DL (ref 16–23)
O2 CT BLDA-SCNC: 16.7 ML/DL (ref 16–23)
O2 CT BLDA-SCNC: 16.8 ML/DL (ref 16–23)
O2 CT BLDA-SCNC: 17 ML/DL (ref 16–23)
OTHER STN SPEC: ABNORMAL
OXYHGB MFR BLDA: 93.5 % (ref 94–97)
OXYHGB MFR BLDA: 93.6 % (ref 94–97)
OXYHGB MFR BLDA: 94.6 % (ref 94–97)
OXYHGB MFR BLDA: 95 % (ref 94–97)
P AXIS: 54 DEGREES
PCO2 BLDA: 50 MM HG (ref 36–44)
PCO2 BLDA: 52.8 MM HG (ref 36–44)
PCO2 BLDA: 56 MM HG (ref 36–44)
PCO2 BLDA: 86.5 MM HG (ref 36–44)
PEEP MAX SETTING VENT: 8 CM[H2O]
PH BLDA: 7.24 [PH] (ref 7.35–7.45)
PH BLDA: 7.36 [PH] (ref 7.35–7.45)
PH BLDA: 7.38 [PH] (ref 7.35–7.45)
PH BLDA: 7.4 [PH] (ref 7.35–7.45)
PH BLDV: 7.34 [PH] (ref 7.3–7.4)
PH UR STRIP.AUTO: 5.5 [PH]
PH UR STRIP.AUTO: 7.5 [PH] (ref 4.5–8)
PH UR STRIP.AUTO: 8 [PH]
PHOSPHATE SERPL-MCNC: 3 MG/DL (ref 2.3–4.1)
PHOSPHATE SERPL-MCNC: 3.1 MG/DL (ref 2.3–4.1)
PHOSPHATE SERPL-MCNC: 3.6 MG/DL (ref 2.3–4.1)
PHOSPHATE SERPL-MCNC: 4.4 MG/DL (ref 2.3–4.1)
PHOSPHATE SERPL-MCNC: 5.2 MG/DL (ref 2.3–4.1)
PLATELET # BLD AUTO: 153 THOUSANDS/UL (ref 149–390)
PLATELET # BLD AUTO: 159 THOUSANDS/UL (ref 149–390)
PLATELET # BLD AUTO: 160 THOUSANDS/UL (ref 149–390)
PLATELET # BLD AUTO: 160 THOUSANDS/UL (ref 149–390)
PLATELET # BLD AUTO: 165 THOUSANDS/UL (ref 149–390)
PLATELET # BLD AUTO: 169 THOUSANDS/UL (ref 149–390)
PLATELET # BLD AUTO: 172 THOUSANDS/UL (ref 149–390)
PLATELET # BLD AUTO: 174 THOUSANDS/UL (ref 149–390)
PLATELET # BLD AUTO: 174 THOUSANDS/UL (ref 149–390)
PLATELET # BLD AUTO: 175 THOUSANDS/UL (ref 149–390)
PLATELET # BLD AUTO: 176 THOUSANDS/UL (ref 149–390)
PLATELET # BLD AUTO: 179 THOUSANDS/UL (ref 149–390)
PLATELET # BLD AUTO: 179 THOUSANDS/UL (ref 149–390)
PLATELET # BLD AUTO: 187 THOUSANDS/UL (ref 149–390)
PLATELET # BLD AUTO: 188 THOUSANDS/UL (ref 149–390)
PLATELET # BLD AUTO: 203 THOUSANDS/UL (ref 149–390)
PLATELET # BLD AUTO: 207 THOUSANDS/UL (ref 149–390)
PLATELET # BLD AUTO: 209 THOUSANDS/UL (ref 149–390)
PLATELET # BLD AUTO: 225 THOUSANDS/UL (ref 149–390)
PMV BLD AUTO: 11 FL (ref 8.9–12.7)
PMV BLD AUTO: 11 FL (ref 8.9–12.7)
PMV BLD AUTO: 11.1 FL (ref 8.9–12.7)
PMV BLD AUTO: 11.2 FL (ref 8.9–12.7)
PMV BLD AUTO: 11.2 FL (ref 8.9–12.7)
PMV BLD AUTO: 11.4 FL (ref 8.9–12.7)
PMV BLD AUTO: 11.5 FL (ref 8.9–12.7)
PMV BLD AUTO: 11.5 FL (ref 8.9–12.7)
PMV BLD AUTO: 11.7 FL (ref 8.9–12.7)
PMV BLD AUTO: 11.8 FL (ref 8.9–12.7)
PMV BLD AUTO: 11.8 FL (ref 8.9–12.7)
PMV BLD AUTO: 11.9 FL (ref 8.9–12.7)
PMV BLD AUTO: 12.3 FL (ref 8.9–12.7)
PMV BLD AUTO: 12.4 FL (ref 8.9–12.7)
PMV BLD AUTO: 12.4 FL (ref 8.9–12.7)
PMV BLD AUTO: 12.5 FL (ref 8.9–12.7)
PMV BLD AUTO: 12.5 FL (ref 8.9–12.7)
PMV BLD AUTO: 12.6 FL (ref 8.9–12.7)
PMV BLD AUTO: 12.7 FL (ref 8.9–12.7)
PO2 BLDA: 73.6 MM HG (ref 75–129)
PO2 BLDA: 78.5 MM HG (ref 75–129)
PO2 BLDA: 80.5 MM HG (ref 75–129)
PO2 BLDA: 83.5 MM HG (ref 75–129)
POTASSIUM SERPL-SCNC: 3.2 MMOL/L (ref 3.5–5.3)
POTASSIUM SERPL-SCNC: 3.4 MMOL/L (ref 3.5–5.3)
POTASSIUM SERPL-SCNC: 3.4 MMOL/L (ref 3.5–5.3)
POTASSIUM SERPL-SCNC: 3.5 MMOL/L (ref 3.5–5.3)
POTASSIUM SERPL-SCNC: 3.6 MMOL/L (ref 3.5–5.3)
POTASSIUM SERPL-SCNC: 3.7 MMOL/L (ref 3.5–5.3)
POTASSIUM SERPL-SCNC: 3.8 MMOL/L (ref 3.5–5.3)
POTASSIUM SERPL-SCNC: 3.9 MMOL/L (ref 3.5–5.3)
POTASSIUM SERPL-SCNC: 3.9 MMOL/L (ref 3.5–5.3)
POTASSIUM SERPL-SCNC: 4 MMOL/L (ref 3.5–5.3)
POTASSIUM SERPL-SCNC: 4 MMOL/L (ref 3.5–5.3)
POTASSIUM SERPL-SCNC: 4.2 MMOL/L (ref 3.5–5.3)
POTASSIUM SERPL-SCNC: 4.8 MMOL/L (ref 3.5–5.3)
PROCALCITONIN SERPL-MCNC: 0.08 NG/ML
PROCALCITONIN SERPL-MCNC: 0.47 NG/ML
PROCALCITONIN SERPL-MCNC: 0.75 NG/ML
PROCALCITONIN SERPL-MCNC: <0.05 NG/ML
PROT SERPL-MCNC: 6.2 G/DL (ref 6.4–8.2)
PROT SERPL-MCNC: 6.6 G/DL (ref 6.4–8.2)
PROT SERPL-MCNC: 6.8 G/DL (ref 6.4–8.2)
PROT SERPL-MCNC: 6.8 G/DL (ref 6.4–8.2)
PROT SERPL-MCNC: 6.9 G/DL (ref 6.4–8.2)
PROT SERPL-MCNC: 7 G/DL (ref 6.4–8.2)
PROT SERPL-MCNC: 7.3 G/DL (ref 6.4–8.2)
PROT SERPL-MCNC: 7.5 G/DL (ref 6.4–8.2)
PROT UR STRIP-MCNC: ABNORMAL MG/DL
PROTHROMBIN TIME: 12 SECONDS (ref 11.8–14.2)
PROTHROMBIN TIME: 12.5 SECONDS (ref 11.8–14.2)
PROTHROMBIN TIME: 14.8 SECONDS (ref 11.8–14.2)
PROTHROMBIN TIME: 14.8 SECONDS (ref 11.8–14.2)
QRS AXIS: -57 DEGREES
QRS AXIS: -72 DEGREES
QRSD INTERVAL: 116 MS
QRSD INTERVAL: 124 MS
QT INTERVAL: 334 MS
QT INTERVAL: 418 MS
QTC INTERVAL: 465 MS
QTC INTERVAL: 493 MS
RBC # BLD AUTO: 3.95 MILLION/UL (ref 3.88–5.62)
RBC # BLD AUTO: 4.16 MILLION/UL (ref 3.88–5.62)
RBC # BLD AUTO: 4.18 MILLION/UL (ref 3.88–5.62)
RBC # BLD AUTO: 4.27 MILLION/UL (ref 3.88–5.62)
RBC # BLD AUTO: 4.29 MILLION/UL (ref 3.88–5.62)
RBC # BLD AUTO: 4.31 MILLION/UL (ref 3.88–5.62)
RBC # BLD AUTO: 4.4 MILLION/UL (ref 3.88–5.62)
RBC # BLD AUTO: 4.51 MILLION/UL (ref 3.88–5.62)
RBC # BLD AUTO: 4.54 MILLION/UL (ref 3.88–5.62)
RBC # BLD AUTO: 4.6 MILLION/UL (ref 3.88–5.62)
RBC # BLD AUTO: 4.68 MILLION/UL (ref 3.88–5.62)
RBC # BLD AUTO: 4.7 MILLION/UL (ref 3.88–5.62)
RBC # BLD AUTO: 4.74 MILLION/UL (ref 3.88–5.62)
RBC # BLD AUTO: 4.78 MILLION/UL (ref 3.88–5.62)
RBC # BLD AUTO: 4.79 MILLION/UL (ref 3.88–5.62)
RBC # BLD AUTO: 4.85 MILLION/UL (ref 3.88–5.62)
RBC # BLD AUTO: 5.17 MILLION/UL (ref 3.88–5.62)
RBC # BLD AUTO: 5.51 MILLION/UL (ref 3.88–5.62)
RBC #/AREA URNS AUTO: ABNORMAL /HPF
SODIUM SERPL-SCNC: 136 MMOL/L (ref 136–145)
SODIUM SERPL-SCNC: 137 MMOL/L (ref 136–145)
SODIUM SERPL-SCNC: 138 MMOL/L (ref 136–145)
SODIUM SERPL-SCNC: 139 MMOL/L (ref 136–145)
SODIUM SERPL-SCNC: 140 MMOL/L (ref 136–145)
SODIUM SERPL-SCNC: 141 MMOL/L (ref 136–145)
SODIUM SERPL-SCNC: 142 MMOL/L (ref 136–145)
SODIUM SERPL-SCNC: 142 MMOL/L (ref 136–145)
SP GR UR STRIP.AUTO: 1.01 (ref 1–1.03)
SP GR UR STRIP.AUTO: 1.02 (ref 1–1.03)
SP GR UR STRIP.AUTO: 1.02 (ref 1–1.03)
SPECIMEN SOURCE: ABNORMAL
T WAVE AXIS: 48 DEGREES
T WAVE AXIS: 93 DEGREES
TROPONIN I SERPL-MCNC: 0.03 NG/ML
TROPONIN I SERPL-MCNC: 0.14 NG/ML
TROPONIN I SERPL-MCNC: 0.61 NG/ML
TROPONIN I SERPL-MCNC: 0.63 NG/ML
TSH SERPL DL<=0.05 MIU/L-ACNC: 2.94 UIU/ML (ref 0.36–3.74)
TSH SERPL DL<=0.05 MIU/L-ACNC: 4.25 UIU/ML (ref 0.36–3.74)
UROBILINOGEN UR QL STRIP.AUTO: 0.2 E.U./DL
VANCOMYCIN TROUGH SERPL-MCNC: 16.2 UG/ML (ref 10–20)
VANCOMYCIN TROUGH SERPL-MCNC: 24.6 UG/ML (ref 10–20)
VENT BIPAP FIO2: ABNORMAL %
VENTRICULAR RATE: 117 BPM
VENTRICULAR RATE: 84 BPM
WBC # BLD AUTO: 10.06 THOUSAND/UL (ref 4.31–10.16)
WBC # BLD AUTO: 10.23 THOUSAND/UL (ref 4.31–10.16)
WBC # BLD AUTO: 10.38 THOUSAND/UL (ref 4.31–10.16)
WBC # BLD AUTO: 10.73 THOUSAND/UL (ref 4.31–10.16)
WBC # BLD AUTO: 10.75 THOUSAND/UL (ref 4.31–10.16)
WBC # BLD AUTO: 11.46 THOUSAND/UL (ref 4.31–10.16)
WBC # BLD AUTO: 11.7 THOUSAND/UL (ref 4.31–10.16)
WBC # BLD AUTO: 12.13 THOUSAND/UL (ref 4.31–10.16)
WBC # BLD AUTO: 12.42 THOUSAND/UL (ref 4.31–10.16)
WBC # BLD AUTO: 12.89 THOUSAND/UL (ref 4.31–10.16)
WBC # BLD AUTO: 13.79 THOUSAND/UL (ref 4.31–10.16)
WBC # BLD AUTO: 8.18 THOUSAND/UL (ref 4.31–10.16)
WBC # BLD AUTO: 8.58 THOUSAND/UL (ref 4.31–10.16)
WBC # BLD AUTO: 9.37 THOUSAND/UL (ref 4.31–10.16)
WBC # BLD AUTO: 9.41 THOUSAND/UL (ref 4.31–10.16)
WBC # BLD AUTO: 9.44 THOUSAND/UL (ref 4.31–10.16)
WBC # BLD AUTO: 9.54 THOUSAND/UL (ref 4.31–10.16)
WBC # BLD AUTO: 9.97 THOUSAND/UL (ref 4.31–10.16)
WBC #/AREA URNS AUTO: ABNORMAL /HPF

## 2019-01-01 PROCEDURE — 93306 TTE W/DOPPLER COMPLETE: CPT | Performed by: INTERNAL MEDICINE

## 2019-01-01 PROCEDURE — 82948 REAGENT STRIP/BLOOD GLUCOSE: CPT

## 2019-01-01 PROCEDURE — 83690 ASSAY OF LIPASE: CPT | Performed by: EMERGENCY MEDICINE

## 2019-01-01 PROCEDURE — G8978 MOBILITY CURRENT STATUS: HCPCS | Performed by: PHYSICAL THERAPIST

## 2019-01-01 PROCEDURE — 97110 THERAPEUTIC EXERCISES: CPT

## 2019-01-01 PROCEDURE — 85730 THROMBOPLASTIN TIME PARTIAL: CPT | Performed by: PHYSICIAN ASSISTANT

## 2019-01-01 PROCEDURE — 84145 PROCALCITONIN (PCT): CPT | Performed by: INTERNAL MEDICINE

## 2019-01-01 PROCEDURE — 36600 WITHDRAWAL OF ARTERIAL BLOOD: CPT

## 2019-01-01 PROCEDURE — 83735 ASSAY OF MAGNESIUM: CPT | Performed by: INTERNAL MEDICINE

## 2019-01-01 PROCEDURE — 99223 1ST HOSP IP/OBS HIGH 75: CPT | Performed by: FAMILY MEDICINE

## 2019-01-01 PROCEDURE — 83880 ASSAY OF NATRIURETIC PEPTIDE: CPT | Performed by: PHYSICIAN ASSISTANT

## 2019-01-01 PROCEDURE — 85730 THROMBOPLASTIN TIME PARTIAL: CPT | Performed by: EMERGENCY MEDICINE

## 2019-01-01 PROCEDURE — 93010 ELECTROCARDIOGRAM REPORT: CPT | Performed by: INTERNAL MEDICINE

## 2019-01-01 PROCEDURE — 85610 PROTHROMBIN TIME: CPT | Performed by: EMERGENCY MEDICINE

## 2019-01-01 PROCEDURE — 80202 ASSAY OF VANCOMYCIN: CPT | Performed by: INTERNAL MEDICINE

## 2019-01-01 PROCEDURE — 94644 CONT INHLJ TX 1ST HOUR: CPT

## 2019-01-01 PROCEDURE — 97530 THERAPEUTIC ACTIVITIES: CPT

## 2019-01-01 PROCEDURE — 36415 COLL VENOUS BLD VENIPUNCTURE: CPT

## 2019-01-01 PROCEDURE — 80048 BASIC METABOLIC PNL TOTAL CA: CPT | Performed by: PHYSICIAN ASSISTANT

## 2019-01-01 PROCEDURE — 99232 SBSQ HOSP IP/OBS MODERATE 35: CPT | Performed by: INTERNAL MEDICINE

## 2019-01-01 PROCEDURE — 84443 ASSAY THYROID STIM HORMONE: CPT | Performed by: EMERGENCY MEDICINE

## 2019-01-01 PROCEDURE — 87186 SC STD MICRODIL/AGAR DIL: CPT | Performed by: PHYSICIAN ASSISTANT

## 2019-01-01 PROCEDURE — 99232 SBSQ HOSP IP/OBS MODERATE 35: CPT | Performed by: FAMILY MEDICINE

## 2019-01-01 PROCEDURE — 99285 EMERGENCY DEPT VISIT HI MDM: CPT

## 2019-01-01 PROCEDURE — 87081 CULTURE SCREEN ONLY: CPT | Performed by: INTERNAL MEDICINE

## 2019-01-01 PROCEDURE — 97167 OT EVAL HIGH COMPLEX 60 MIN: CPT

## 2019-01-01 PROCEDURE — 85025 COMPLETE CBC W/AUTO DIFF WBC: CPT | Performed by: PHYSICIAN ASSISTANT

## 2019-01-01 PROCEDURE — 82009 KETONE BODYS QUAL: CPT | Performed by: EMERGENCY MEDICINE

## 2019-01-01 PROCEDURE — 84100 ASSAY OF PHOSPHORUS: CPT | Performed by: INTERNAL MEDICINE

## 2019-01-01 PROCEDURE — 82805 BLOOD GASES W/O2 SATURATION: CPT | Performed by: INTERNAL MEDICINE

## 2019-01-01 PROCEDURE — 80053 COMPREHEN METABOLIC PANEL: CPT | Performed by: EMERGENCY MEDICINE

## 2019-01-01 PROCEDURE — 99232 SBSQ HOSP IP/OBS MODERATE 35: CPT | Performed by: PHYSICIAN ASSISTANT

## 2019-01-01 PROCEDURE — 94660 CPAP INITIATION&MGMT: CPT

## 2019-01-01 PROCEDURE — 70450 CT HEAD/BRAIN W/O DYE: CPT

## 2019-01-01 PROCEDURE — 85027 COMPLETE CBC AUTOMATED: CPT | Performed by: NURSE PRACTITIONER

## 2019-01-01 PROCEDURE — 94640 AIRWAY INHALATION TREATMENT: CPT

## 2019-01-01 PROCEDURE — 83735 ASSAY OF MAGNESIUM: CPT | Performed by: FAMILY MEDICINE

## 2019-01-01 PROCEDURE — 80048 BASIC METABOLIC PNL TOTAL CA: CPT | Performed by: FAMILY MEDICINE

## 2019-01-01 PROCEDURE — 93005 ELECTROCARDIOGRAM TRACING: CPT

## 2019-01-01 PROCEDURE — 99223 1ST HOSP IP/OBS HIGH 75: CPT | Performed by: INTERNAL MEDICINE

## 2019-01-01 PROCEDURE — 84484 ASSAY OF TROPONIN QUANT: CPT | Performed by: EMERGENCY MEDICINE

## 2019-01-01 PROCEDURE — 97535 SELF CARE MNGMENT TRAINING: CPT

## 2019-01-01 PROCEDURE — 85610 PROTHROMBIN TIME: CPT | Performed by: INTERNAL MEDICINE

## 2019-01-01 PROCEDURE — 85025 COMPLETE CBC W/AUTO DIFF WBC: CPT | Performed by: INTERNAL MEDICINE

## 2019-01-01 PROCEDURE — 87086 URINE CULTURE/COLONY COUNT: CPT | Performed by: PHYSICIAN ASSISTANT

## 2019-01-01 PROCEDURE — 71045 X-RAY EXAM CHEST 1 VIEW: CPT

## 2019-01-01 PROCEDURE — 99284 EMERGENCY DEPT VISIT MOD MDM: CPT | Performed by: EMERGENCY MEDICINE

## 2019-01-01 PROCEDURE — G8988 SELF CARE GOAL STATUS: HCPCS

## 2019-01-01 PROCEDURE — 85027 COMPLETE CBC AUTOMATED: CPT | Performed by: PHYSICIAN ASSISTANT

## 2019-01-01 PROCEDURE — 94664 DEMO&/EVAL PT USE INHALER: CPT

## 2019-01-01 PROCEDURE — 84145 PROCALCITONIN (PCT): CPT | Performed by: NURSE PRACTITIONER

## 2019-01-01 PROCEDURE — 97166 OT EVAL MOD COMPLEX 45 MIN: CPT

## 2019-01-01 PROCEDURE — 36415 COLL VENOUS BLD VENIPUNCTURE: CPT | Performed by: EMERGENCY MEDICINE

## 2019-01-01 PROCEDURE — 84100 ASSAY OF PHOSPHORUS: CPT | Performed by: PHYSICIAN ASSISTANT

## 2019-01-01 PROCEDURE — NC001 PR NO CHARGE: Performed by: RADIOLOGY

## 2019-01-01 PROCEDURE — 71275 CT ANGIOGRAPHY CHEST: CPT

## 2019-01-01 PROCEDURE — 80076 HEPATIC FUNCTION PANEL: CPT | Performed by: EMERGENCY MEDICINE

## 2019-01-01 PROCEDURE — 85025 COMPLETE CBC W/AUTO DIFF WBC: CPT | Performed by: EMERGENCY MEDICINE

## 2019-01-01 PROCEDURE — 97163 PT EVAL HIGH COMPLEX 45 MIN: CPT | Performed by: PHYSICAL THERAPIST

## 2019-01-01 PROCEDURE — 94760 N-INVAS EAR/PLS OXIMETRY 1: CPT

## 2019-01-01 PROCEDURE — 80053 COMPREHEN METABOLIC PANEL: CPT | Performed by: INTERNAL MEDICINE

## 2019-01-01 PROCEDURE — 87077 CULTURE AEROBIC IDENTIFY: CPT | Performed by: PHYSICIAN ASSISTANT

## 2019-01-01 PROCEDURE — 83735 ASSAY OF MAGNESIUM: CPT | Performed by: PHYSICIAN ASSISTANT

## 2019-01-01 PROCEDURE — 94761 N-INVAS EAR/PLS OXIMETRY MLT: CPT

## 2019-01-01 PROCEDURE — 97163 PT EVAL HIGH COMPLEX 45 MIN: CPT

## 2019-01-01 PROCEDURE — 82805 BLOOD GASES W/O2 SATURATION: CPT | Performed by: NURSE PRACTITIONER

## 2019-01-01 PROCEDURE — 02HV33Z INSERTION OF INFUSION DEVICE INTO SUPERIOR VENA CAVA, PERCUTANEOUS APPROACH: ICD-10-PCS | Performed by: RADIOLOGY

## 2019-01-01 PROCEDURE — 99233 SBSQ HOSP IP/OBS HIGH 50: CPT | Performed by: INTERNAL MEDICINE

## 2019-01-01 PROCEDURE — 81001 URINALYSIS AUTO W/SCOPE: CPT | Performed by: EMERGENCY MEDICINE

## 2019-01-01 PROCEDURE — 80053 COMPREHEN METABOLIC PANEL: CPT | Performed by: PHYSICIAN ASSISTANT

## 2019-01-01 PROCEDURE — 84484 ASSAY OF TROPONIN QUANT: CPT | Performed by: FAMILY MEDICINE

## 2019-01-01 PROCEDURE — 83036 HEMOGLOBIN GLYCOSYLATED A1C: CPT | Performed by: NURSE PRACTITIONER

## 2019-01-01 PROCEDURE — 96374 THER/PROPH/DIAG INJ IV PUSH: CPT

## 2019-01-01 PROCEDURE — 83735 ASSAY OF MAGNESIUM: CPT | Performed by: EMERGENCY MEDICINE

## 2019-01-01 PROCEDURE — 83880 ASSAY OF NATRIURETIC PEPTIDE: CPT | Performed by: EMERGENCY MEDICINE

## 2019-01-01 PROCEDURE — 83735 ASSAY OF MAGNESIUM: CPT | Performed by: NURSE PRACTITIONER

## 2019-01-01 PROCEDURE — G8978 MOBILITY CURRENT STATUS: HCPCS

## 2019-01-01 PROCEDURE — 74176 CT ABD & PELVIS W/O CONTRAST: CPT

## 2019-01-01 PROCEDURE — 99284 EMERGENCY DEPT VISIT MOD MDM: CPT

## 2019-01-01 PROCEDURE — 96372 THER/PROPH/DIAG INJ SC/IM: CPT

## 2019-01-01 PROCEDURE — 87040 BLOOD CULTURE FOR BACTERIA: CPT | Performed by: PHYSICIAN ASSISTANT

## 2019-01-01 PROCEDURE — 87147 CULTURE TYPE IMMUNOLOGIC: CPT | Performed by: INTERNAL MEDICINE

## 2019-01-01 PROCEDURE — G8987 SELF CARE CURRENT STATUS: HCPCS

## 2019-01-01 PROCEDURE — 80048 BASIC METABOLIC PNL TOTAL CA: CPT | Performed by: NURSE PRACTITIONER

## 2019-01-01 PROCEDURE — 85025 COMPLETE CBC W/AUTO DIFF WBC: CPT | Performed by: FAMILY MEDICINE

## 2019-01-01 PROCEDURE — 96367 TX/PROPH/DG ADDL SEQ IV INF: CPT

## 2019-01-01 PROCEDURE — 76937 US GUIDE VASCULAR ACCESS: CPT

## 2019-01-01 PROCEDURE — 99214 OFFICE O/P EST MOD 30 MIN: CPT | Performed by: PHYSICIAN ASSISTANT

## 2019-01-01 PROCEDURE — 83605 ASSAY OF LACTIC ACID: CPT | Performed by: EMERGENCY MEDICINE

## 2019-01-01 PROCEDURE — 84484 ASSAY OF TROPONIN QUANT: CPT | Performed by: PHYSICIAN ASSISTANT

## 2019-01-01 PROCEDURE — 36556 INSERT NON-TUNNEL CV CATH: CPT

## 2019-01-01 PROCEDURE — 85049 AUTOMATED PLATELET COUNT: CPT | Performed by: FAMILY MEDICINE

## 2019-01-01 PROCEDURE — 94668 MNPJ CHEST WALL SBSQ: CPT

## 2019-01-01 PROCEDURE — 99285 EMERGENCY DEPT VISIT HI MDM: CPT | Performed by: PHYSICIAN ASSISTANT

## 2019-01-01 PROCEDURE — 99222 1ST HOSP IP/OBS MODERATE 55: CPT | Performed by: UROLOGY

## 2019-01-01 PROCEDURE — 83036 HEMOGLOBIN GLYCOSYLATED A1C: CPT | Performed by: INTERNAL MEDICINE

## 2019-01-01 PROCEDURE — G8979 MOBILITY GOAL STATUS: HCPCS | Performed by: PHYSICAL THERAPIST

## 2019-01-01 PROCEDURE — 76937 US GUIDE VASCULAR ACCESS: CPT | Performed by: RADIOLOGY

## 2019-01-01 PROCEDURE — 81001 URINALYSIS AUTO W/SCOPE: CPT | Performed by: PHYSICIAN ASSISTANT

## 2019-01-01 PROCEDURE — 87040 BLOOD CULTURE FOR BACTERIA: CPT | Performed by: EMERGENCY MEDICINE

## 2019-01-01 PROCEDURE — 80048 BASIC METABOLIC PNL TOTAL CA: CPT | Performed by: EMERGENCY MEDICINE

## 2019-01-01 PROCEDURE — 84443 ASSAY THYROID STIM HORMONE: CPT | Performed by: INTERNAL MEDICINE

## 2019-01-01 PROCEDURE — 97116 GAIT TRAINING THERAPY: CPT

## 2019-01-01 PROCEDURE — 85610 PROTHROMBIN TIME: CPT | Performed by: PHYSICIAN ASSISTANT

## 2019-01-01 PROCEDURE — 99239 HOSP IP/OBS DSCHRG MGMT >30: CPT | Performed by: FAMILY MEDICINE

## 2019-01-01 PROCEDURE — 85379 FIBRIN DEGRADATION QUANT: CPT | Performed by: EMERGENCY MEDICINE

## 2019-01-01 PROCEDURE — 99220 PR INITIAL OBSERVATION CARE/DAY 70 MINUTES: CPT | Performed by: NURSE PRACTITIONER

## 2019-01-01 PROCEDURE — 99239 HOSP IP/OBS DSCHRG MGMT >30: CPT | Performed by: INTERNAL MEDICINE

## 2019-01-01 PROCEDURE — 76770 US EXAM ABDO BACK WALL COMP: CPT

## 2019-01-01 PROCEDURE — 82800 BLOOD PH: CPT | Performed by: EMERGENCY MEDICINE

## 2019-01-01 PROCEDURE — G8979 MOBILITY GOAL STATUS: HCPCS

## 2019-01-01 PROCEDURE — 99222 1ST HOSP IP/OBS MODERATE 55: CPT | Performed by: INTERNAL MEDICINE

## 2019-01-01 PROCEDURE — 99285 EMERGENCY DEPT VISIT HI MDM: CPT | Performed by: EMERGENCY MEDICINE

## 2019-01-01 PROCEDURE — 96365 THER/PROPH/DIAG IV INF INIT: CPT

## 2019-01-01 PROCEDURE — 96366 THER/PROPH/DIAG IV INF ADDON: CPT

## 2019-01-01 PROCEDURE — 97162 PT EVAL MOD COMPLEX 30 MIN: CPT

## 2019-01-01 PROCEDURE — 83605 ASSAY OF LACTIC ACID: CPT | Performed by: PHYSICIAN ASSISTANT

## 2019-01-01 PROCEDURE — 84100 ASSAY OF PHOSPHORUS: CPT | Performed by: NURSE PRACTITIONER

## 2019-01-01 PROCEDURE — 36415 COLL VENOUS BLD VENIPUNCTURE: CPT | Performed by: PHYSICIAN ASSISTANT

## 2019-01-01 PROCEDURE — 84484 ASSAY OF TROPONIN QUANT: CPT | Performed by: INTERNAL MEDICINE

## 2019-01-01 PROCEDURE — 71250 CT THORAX DX C-: CPT

## 2019-01-01 PROCEDURE — 93306 TTE W/DOPPLER COMPLETE: CPT

## 2019-01-01 PROCEDURE — C1751 CATH, INF, PER/CENT/MIDLINE: HCPCS

## 2019-01-01 PROCEDURE — 36556 INSERT NON-TUNNEL CV CATH: CPT | Performed by: RADIOLOGY

## 2019-01-01 PROCEDURE — 80202 ASSAY OF VANCOMYCIN: CPT | Performed by: PHYSICIAN ASSISTANT

## 2019-01-01 PROCEDURE — 99238 HOSP IP/OBS DSCHRG MGMT 30/<: CPT | Performed by: FAMILY MEDICINE

## 2019-01-01 RX ORDER — SODIUM CHLORIDE FOR INHALATION 0.9 %
12 VIAL, NEBULIZER (ML) INHALATION ONCE
Status: COMPLETED | OUTPATIENT
Start: 2019-01-01 | End: 2019-01-01

## 2019-01-01 RX ORDER — CHLORHEXIDINE GLUCONATE 4 G/100ML
1 SOLUTION TOPICAL DAILY
Qty: 120 ML | Refills: 0
Start: 2019-01-01 | End: 2019-01-01

## 2019-01-01 RX ORDER — POTASSIUM CHLORIDE 20 MEQ/1
20 TABLET, EXTENDED RELEASE ORAL
Status: DISCONTINUED | OUTPATIENT
Start: 2019-01-01 | End: 2019-01-01 | Stop reason: HOSPADM

## 2019-01-01 RX ORDER — ACETAMINOPHEN 325 MG/1
650 TABLET ORAL EVERY 6 HOURS PRN
Refills: 0
Start: 2019-01-01

## 2019-01-01 RX ORDER — ASPIRIN 81 MG/1
81 TABLET, CHEWABLE ORAL DAILY
Status: DISCONTINUED | OUTPATIENT
Start: 2019-01-01 | End: 2019-01-01 | Stop reason: HOSPADM

## 2019-01-01 RX ORDER — INSULIN GLARGINE 100 [IU]/ML
15 INJECTION, SOLUTION SUBCUTANEOUS EVERY MORNING
Refills: 0
Start: 2019-01-01 | End: 2019-01-01

## 2019-01-01 RX ORDER — ACETAMINOPHEN 325 MG/1
650 TABLET ORAL EVERY 6 HOURS PRN
Qty: 30 TABLET | Refills: 0 | Status: SHIPPED | OUTPATIENT
Start: 2019-01-01 | End: 2019-01-01 | Stop reason: HOSPADM

## 2019-01-01 RX ORDER — ATORVASTATIN CALCIUM 40 MG/1
40 TABLET, FILM COATED ORAL
Refills: 0
Start: 2019-01-01

## 2019-01-01 RX ORDER — GLIMEPIRIDE 2 MG/1
2 TABLET ORAL
Status: ON HOLD | COMMUNITY
End: 2019-01-01

## 2019-01-01 RX ORDER — LISINOPRIL 10 MG/1
10 TABLET ORAL DAILY
Status: DISCONTINUED | OUTPATIENT
Start: 2019-01-01 | End: 2019-01-01

## 2019-01-01 RX ORDER — HYDRALAZINE HYDROCHLORIDE 25 MG/1
25 TABLET, FILM COATED ORAL EVERY 8 HOURS SCHEDULED
Refills: 0 | Status: ON HOLD
Start: 2019-01-01 | End: 2019-01-01

## 2019-01-01 RX ORDER — FLUCONAZOLE 100 MG/1
200 TABLET ORAL DAILY
Status: DISCONTINUED | OUTPATIENT
Start: 2019-01-01 | End: 2019-01-01

## 2019-01-01 RX ORDER — LISINOPRIL 5 MG/1
2.5 TABLET ORAL DAILY
Status: DISCONTINUED | OUTPATIENT
Start: 2019-01-01 | End: 2019-01-01

## 2019-01-01 RX ORDER — PHENAZOPYRIDINE HYDROCHLORIDE 200 MG/1
200 TABLET, FILM COATED ORAL 3 TIMES DAILY
Qty: 6 TABLET | Refills: 0 | Status: SHIPPED | OUTPATIENT
Start: 2019-01-01 | End: 2019-01-01 | Stop reason: HOSPADM

## 2019-01-01 RX ORDER — LABETALOL 20 MG/4 ML (5 MG/ML) INTRAVENOUS SYRINGE
10 EVERY 4 HOURS PRN
Status: DISCONTINUED | OUTPATIENT
Start: 2019-01-01 | End: 2019-01-01 | Stop reason: HOSPADM

## 2019-01-01 RX ORDER — DOXYCYCLINE HYCLATE 100 MG/1
100 CAPSULE ORAL EVERY 12 HOURS SCHEDULED
Status: DISCONTINUED | OUTPATIENT
Start: 2019-01-01 | End: 2019-01-01 | Stop reason: HOSPADM

## 2019-01-01 RX ORDER — ONDANSETRON 2 MG/ML
4 INJECTION INTRAMUSCULAR; INTRAVENOUS EVERY 6 HOURS PRN
Status: DISCONTINUED | OUTPATIENT
Start: 2019-01-01 | End: 2019-01-01 | Stop reason: HOSPADM

## 2019-01-01 RX ORDER — PREDNISONE 20 MG/1
40 TABLET ORAL DAILY
Status: DISCONTINUED | OUTPATIENT
Start: 2019-01-01 | End: 2019-01-01

## 2019-01-01 RX ORDER — FUROSEMIDE 10 MG/ML
40 INJECTION INTRAMUSCULAR; INTRAVENOUS ONCE
Status: COMPLETED | OUTPATIENT
Start: 2019-01-01 | End: 2019-01-01

## 2019-01-01 RX ORDER — IPRATROPIUM BROMIDE AND ALBUTEROL SULFATE 2.5; .5 MG/3ML; MG/3ML
3 SOLUTION RESPIRATORY (INHALATION)
Status: DISCONTINUED | OUTPATIENT
Start: 2019-01-01 | End: 2019-01-01

## 2019-01-01 RX ORDER — ASPIRIN 81 MG/1
81 TABLET, CHEWABLE ORAL DAILY
Status: DISCONTINUED | OUTPATIENT
Start: 2019-01-01 | End: 2019-01-01

## 2019-01-01 RX ORDER — FUROSEMIDE 10 MG/ML
40 INJECTION INTRAMUSCULAR; INTRAVENOUS
Status: DISCONTINUED | OUTPATIENT
Start: 2019-01-01 | End: 2019-01-01

## 2019-01-01 RX ORDER — INSULIN GLARGINE 100 [IU]/ML
40 INJECTION, SOLUTION SUBCUTANEOUS
Status: DISCONTINUED | OUTPATIENT
Start: 2019-01-01 | End: 2019-01-01

## 2019-01-01 RX ORDER — HYDRALAZINE HYDROCHLORIDE 20 MG/ML
5 INJECTION INTRAMUSCULAR; INTRAVENOUS ONCE
Status: COMPLETED | OUTPATIENT
Start: 2019-01-01 | End: 2019-01-01

## 2019-01-01 RX ORDER — DOCUSATE SODIUM 100 MG/1
100 CAPSULE, LIQUID FILLED ORAL 2 TIMES DAILY PRN
Start: 2019-01-01

## 2019-01-01 RX ORDER — NYSTATIN 100000 [USP'U]/G
POWDER TOPICAL 2 TIMES DAILY
Qty: 15 G | Refills: 0
Start: 2019-01-01

## 2019-01-01 RX ORDER — OXYBUTYNIN CHLORIDE 5 MG/1
10 TABLET, EXTENDED RELEASE ORAL DAILY
Status: DISCONTINUED | OUTPATIENT
Start: 2019-01-01 | End: 2019-01-01

## 2019-01-01 RX ORDER — FUROSEMIDE 10 MG/ML
80 INJECTION INTRAMUSCULAR; INTRAVENOUS
Status: DISCONTINUED | OUTPATIENT
Start: 2019-01-01 | End: 2019-01-01

## 2019-01-01 RX ORDER — ACETAMINOPHEN 325 MG/1
650 TABLET ORAL ONCE
Status: COMPLETED | OUTPATIENT
Start: 2019-01-01 | End: 2019-01-01

## 2019-01-01 RX ORDER — INSULIN GLARGINE 100 [IU]/ML
45 INJECTION, SOLUTION SUBCUTANEOUS
Status: DISCONTINUED | OUTPATIENT
Start: 2019-01-01 | End: 2019-01-01

## 2019-01-01 RX ORDER — FUROSEMIDE 10 MG/ML
40 INJECTION INTRAMUSCULAR; INTRAVENOUS
Status: DISCONTINUED | OUTPATIENT
Start: 2019-01-01 | End: 2019-01-01 | Stop reason: HOSPADM

## 2019-01-01 RX ORDER — ATORVASTATIN CALCIUM 40 MG/1
40 TABLET, FILM COATED ORAL
Status: DISCONTINUED | OUTPATIENT
Start: 2019-01-01 | End: 2019-01-01 | Stop reason: HOSPADM

## 2019-01-01 RX ORDER — LIDOCAINE HYDROCHLORIDE 10 MG/ML
INJECTION, SOLUTION INFILTRATION; PERINEURAL CODE/TRAUMA/SEDATION MEDICATION
Status: COMPLETED | OUTPATIENT
Start: 2019-01-01 | End: 2019-01-01

## 2019-01-01 RX ORDER — LORAZEPAM 2 MG/ML
0.5 INJECTION INTRAMUSCULAR EVERY 4 HOURS PRN
Status: DISCONTINUED | OUTPATIENT
Start: 2019-01-01 | End: 2019-01-01

## 2019-01-01 RX ORDER — INSULIN GLARGINE 100 [IU]/ML
20 INJECTION, SOLUTION SUBCUTANEOUS EVERY MORNING
Status: DISCONTINUED | OUTPATIENT
Start: 2019-01-01 | End: 2019-01-01 | Stop reason: HOSPADM

## 2019-01-01 RX ORDER — NYSTATIN 100000 U/G
CREAM TOPICAL
Qty: 30 G | Refills: 0 | Status: SHIPPED | OUTPATIENT
Start: 2019-01-01 | End: 2019-01-01 | Stop reason: HOSPADM

## 2019-01-01 RX ORDER — CEFEPIME HYDROCHLORIDE 2 G/50ML
2000 INJECTION, SOLUTION INTRAVENOUS ONCE
Status: COMPLETED | OUTPATIENT
Start: 2019-01-01 | End: 2019-01-01

## 2019-01-01 RX ORDER — POTASSIUM CHLORIDE 20 MEQ/1
40 TABLET, EXTENDED RELEASE ORAL ONCE
Status: COMPLETED | OUTPATIENT
Start: 2019-01-01 | End: 2019-01-01

## 2019-01-01 RX ORDER — TAMSULOSIN HYDROCHLORIDE 0.4 MG/1
0.4 CAPSULE ORAL
Status: DISCONTINUED | OUTPATIENT
Start: 2019-01-01 | End: 2019-01-01 | Stop reason: HOSPADM

## 2019-01-01 RX ORDER — IPRATROPIUM BROMIDE AND ALBUTEROL SULFATE 2.5; .5 MG/3ML; MG/3ML
3 SOLUTION RESPIRATORY (INHALATION) ONCE
Status: COMPLETED | OUTPATIENT
Start: 2019-01-01 | End: 2019-01-01

## 2019-01-01 RX ORDER — ALBUTEROL SULFATE 2.5 MG/3ML
2.5 SOLUTION RESPIRATORY (INHALATION) EVERY 4 HOURS PRN
Status: DISCONTINUED | OUTPATIENT
Start: 2019-01-01 | End: 2019-01-01 | Stop reason: HOSPADM

## 2019-01-01 RX ORDER — BISACODYL 10 MG
10 SUPPOSITORY, RECTAL RECTAL DAILY PRN
COMMUNITY

## 2019-01-01 RX ORDER — MELATONIN
1000 DAILY
Qty: 30 TABLET | Refills: 0 | Status: ON HOLD
Start: 2019-01-01 | End: 2019-01-01 | Stop reason: ALTCHOICE

## 2019-01-01 RX ORDER — CARVEDILOL 3.12 MG/1
6.25 TABLET ORAL 2 TIMES DAILY WITH MEALS
Status: DISCONTINUED | OUTPATIENT
Start: 2019-01-01 | End: 2019-01-01

## 2019-01-01 RX ORDER — AMLODIPINE BESYLATE 10 MG/1
10 TABLET ORAL DAILY
Status: DISCONTINUED | OUTPATIENT
Start: 2019-01-01 | End: 2019-01-01 | Stop reason: HOSPADM

## 2019-01-01 RX ORDER — DOCUSATE SODIUM 100 MG/1
100 CAPSULE, LIQUID FILLED ORAL 2 TIMES DAILY PRN
Status: DISCONTINUED | OUTPATIENT
Start: 2019-01-01 | End: 2019-01-01

## 2019-01-01 RX ORDER — FUROSEMIDE 10 MG/ML
20 INJECTION INTRAMUSCULAR; INTRAVENOUS ONCE
Status: COMPLETED | OUTPATIENT
Start: 2019-01-01 | End: 2019-01-01

## 2019-01-01 RX ORDER — FERROUS SULFATE 325(65) MG
325 TABLET ORAL
Status: DISCONTINUED | OUTPATIENT
Start: 2019-01-01 | End: 2019-01-01 | Stop reason: HOSPADM

## 2019-01-01 RX ORDER — DEXTROSE MONOHYDRATE 25 G/50ML
50 INJECTION, SOLUTION INTRAVENOUS ONCE
Status: COMPLETED | OUTPATIENT
Start: 2019-01-01 | End: 2019-01-01

## 2019-01-01 RX ORDER — HYDRALAZINE HYDROCHLORIDE 25 MG/1
25 TABLET, FILM COATED ORAL EVERY 8 HOURS SCHEDULED
Status: DISCONTINUED | OUTPATIENT
Start: 2019-01-01 | End: 2019-01-01 | Stop reason: HOSPADM

## 2019-01-01 RX ORDER — INSULIN GLARGINE 100 [IU]/ML
20 INJECTION, SOLUTION SUBCUTANEOUS EVERY MORNING
Status: DISCONTINUED | OUTPATIENT
Start: 2019-01-01 | End: 2019-01-01

## 2019-01-01 RX ORDER — INSULIN GLARGINE 100 [IU]/ML
30 INJECTION, SOLUTION SUBCUTANEOUS
Status: DISCONTINUED | OUTPATIENT
Start: 2019-01-01 | End: 2019-01-01

## 2019-01-01 RX ORDER — NYSTATIN 100000 [USP'U]/G
POWDER TOPICAL 2 TIMES DAILY
Status: DISCONTINUED | OUTPATIENT
Start: 2019-01-01 | End: 2019-01-01 | Stop reason: HOSPADM

## 2019-01-01 RX ORDER — INSULIN GLARGINE 100 [IU]/ML
10 INJECTION, SOLUTION SUBCUTANEOUS EVERY MORNING
Status: DISCONTINUED | OUTPATIENT
Start: 2019-01-01 | End: 2019-01-01

## 2019-01-01 RX ORDER — ATORVASTATIN CALCIUM 40 MG/1
40 TABLET, FILM COATED ORAL DAILY
Status: ON HOLD | COMMUNITY
End: 2019-01-01 | Stop reason: SDUPTHER

## 2019-01-01 RX ORDER — AMOXICILLIN AND CLAVULANATE POTASSIUM 875; 125 MG/1; MG/1
1 TABLET, FILM COATED ORAL EVERY 12 HOURS SCHEDULED
Qty: 14 TABLET | Refills: 0 | Status: SHIPPED | OUTPATIENT
Start: 2019-01-01 | End: 2019-01-01

## 2019-01-01 RX ORDER — ACETAMINOPHEN 325 MG/1
650 TABLET ORAL EVERY 6 HOURS PRN
Status: DISCONTINUED | OUTPATIENT
Start: 2019-01-01 | End: 2019-01-01 | Stop reason: HOSPADM

## 2019-01-01 RX ORDER — FUROSEMIDE 40 MG/1
40 TABLET ORAL DAILY
Refills: 0 | Status: ON HOLD
Start: 2019-01-01 | End: 2019-01-01

## 2019-01-01 RX ORDER — INSULIN GLARGINE 100 [IU]/ML
15 INJECTION, SOLUTION SUBCUTANEOUS EVERY MORNING
Status: DISCONTINUED | OUTPATIENT
Start: 2019-01-01 | End: 2019-01-01

## 2019-01-01 RX ORDER — ONDANSETRON 2 MG/ML
4 INJECTION INTRAMUSCULAR; INTRAVENOUS EVERY 4 HOURS PRN
Status: DISCONTINUED | OUTPATIENT
Start: 2019-01-01 | End: 2019-01-01 | Stop reason: HOSPADM

## 2019-01-01 RX ORDER — INSULIN GLARGINE 100 [IU]/ML
50 INJECTION, SOLUTION SUBCUTANEOUS
Status: DISCONTINUED | OUTPATIENT
Start: 2019-01-01 | End: 2019-01-01

## 2019-01-01 RX ORDER — FUROSEMIDE 10 MG/ML
20 INJECTION INTRAMUSCULAR; INTRAVENOUS
Status: DISCONTINUED | OUTPATIENT
Start: 2019-01-01 | End: 2019-01-01

## 2019-01-01 RX ORDER — INSULIN GLARGINE 100 [IU]/ML
50 INJECTION, SOLUTION SUBCUTANEOUS
Refills: 0
Start: 2019-01-01

## 2019-01-01 RX ORDER — ATORVASTATIN CALCIUM 40 MG/1
40 TABLET, FILM COATED ORAL
Status: DISCONTINUED | OUTPATIENT
Start: 2019-01-01 | End: 2019-01-01

## 2019-01-01 RX ORDER — INSULIN GLARGINE 100 [IU]/ML
50 INJECTION, SOLUTION SUBCUTANEOUS
Status: DISCONTINUED | OUTPATIENT
Start: 2019-01-01 | End: 2019-01-01 | Stop reason: HOSPADM

## 2019-01-01 RX ORDER — LEVALBUTEROL 1.25 MG/.5ML
1.25 SOLUTION, CONCENTRATE RESPIRATORY (INHALATION)
Status: DISCONTINUED | OUTPATIENT
Start: 2019-01-01 | End: 2019-01-01

## 2019-01-01 RX ORDER — AMOXICILLIN AND CLAVULANATE POTASSIUM 875; 125 MG/1; MG/1
1 TABLET, FILM COATED ORAL 2 TIMES DAILY
Qty: 20 TABLET | Refills: 0 | Status: SHIPPED | OUTPATIENT
Start: 2019-01-01 | End: 2019-01-01

## 2019-01-01 RX ORDER — CIPROFLOXACIN 500 MG/1
500 TABLET, FILM COATED ORAL ONCE
Status: COMPLETED | OUTPATIENT
Start: 2019-01-01 | End: 2019-01-01

## 2019-01-01 RX ORDER — DEXTROSE MONOHYDRATE 25 G/50ML
INJECTION, SOLUTION INTRAVENOUS
Status: COMPLETED
Start: 2019-01-01 | End: 2019-01-01

## 2019-01-01 RX ORDER — DOXYCYCLINE HYCLATE 100 MG/1
100 CAPSULE ORAL EVERY 12 HOURS SCHEDULED
Refills: 0
Start: 2019-01-01 | End: 2019-01-01

## 2019-01-01 RX ORDER — POTASSIUM CHLORIDE 20 MEQ/1
20 TABLET, EXTENDED RELEASE ORAL DAILY
Refills: 0
Start: 2019-01-01

## 2019-01-01 RX ORDER — AMLODIPINE BESYLATE 10 MG/1
10 TABLET ORAL DAILY
Refills: 0 | Status: ON HOLD
Start: 2019-01-01 | End: 2019-01-01

## 2019-01-01 RX ORDER — INSULIN GLARGINE 100 [IU]/ML
30 INJECTION, SOLUTION SUBCUTANEOUS EVERY MORNING
Status: DISCONTINUED | OUTPATIENT
Start: 2019-01-01 | End: 2019-01-01 | Stop reason: HOSPADM

## 2019-01-01 RX ORDER — ONDANSETRON 2 MG/ML
4 INJECTION INTRAMUSCULAR; INTRAVENOUS ONCE
Status: COMPLETED | OUTPATIENT
Start: 2019-01-01 | End: 2019-01-01

## 2019-01-01 RX ORDER — LISINOPRIL 5 MG/1
5 TABLET ORAL DAILY
Status: DISCONTINUED | OUTPATIENT
Start: 2019-01-01 | End: 2019-01-01

## 2019-01-01 RX ORDER — CIPROFLOXACIN 500 MG/1
500 TABLET, FILM COATED ORAL 2 TIMES DAILY
Qty: 20 TABLET | Refills: 0 | Status: SHIPPED | OUTPATIENT
Start: 2019-01-01 | End: 2019-01-01

## 2019-01-01 RX ORDER — NYSTATIN 100000 [USP'U]/G
POWDER TOPICAL 2 TIMES DAILY
Status: DISCONTINUED | OUTPATIENT
Start: 2019-01-01 | End: 2019-01-01

## 2019-01-01 RX ORDER — DEXTROSE MONOHYDRATE 25 G/50ML
25 INJECTION, SOLUTION INTRAVENOUS ONCE
Status: COMPLETED | OUTPATIENT
Start: 2019-01-01 | End: 2019-01-01

## 2019-01-01 RX ORDER — HYDRALAZINE HYDROCHLORIDE 20 MG/ML
10 INJECTION INTRAMUSCULAR; INTRAVENOUS EVERY 6 HOURS PRN
Status: DISCONTINUED | OUTPATIENT
Start: 2019-01-01 | End: 2019-01-01

## 2019-01-01 RX ORDER — CHLORHEXIDINE GLUCONATE 4 G/100ML
1 SOLUTION TOPICAL DAILY
Qty: 120 ML | Refills: 0 | Status: ON HOLD
Start: 2019-01-01 | End: 2019-01-01

## 2019-01-01 RX ORDER — ATORVASTATIN CALCIUM 40 MG/1
40 TABLET, FILM COATED ORAL DAILY
Status: DISCONTINUED | OUTPATIENT
Start: 2019-01-01 | End: 2019-01-01 | Stop reason: HOSPADM

## 2019-01-01 RX ORDER — INSULIN GLARGINE 100 [IU]/ML
20 INJECTION, SOLUTION SUBCUTANEOUS EVERY MORNING
Refills: 0
Start: 2019-01-01

## 2019-01-01 RX ORDER — ERGOCALCIFEROL (VITAMIN D2) 1250 MCG
50000 CAPSULE ORAL WEEKLY
COMMUNITY
End: 2019-01-01 | Stop reason: HOSPADM

## 2019-01-01 RX ORDER — FLUCONAZOLE 100 MG/1
200 TABLET ORAL DAILY
Status: COMPLETED | OUTPATIENT
Start: 2019-01-01 | End: 2019-01-01

## 2019-01-01 RX ORDER — DEXTROSE MONOHYDRATE 25 G/50ML
25 INJECTION, SOLUTION INTRAVENOUS AS NEEDED
Status: DISCONTINUED | OUTPATIENT
Start: 2019-01-01 | End: 2019-01-01 | Stop reason: HOSPADM

## 2019-01-01 RX ADMIN — ASPIRIN 81 MG 81 MG: 81 TABLET ORAL at 08:47

## 2019-01-01 RX ADMIN — VANCOMYCIN HYDROCHLORIDE 2000 MG: 1 INJECTION, POWDER, LYOPHILIZED, FOR SOLUTION INTRAVENOUS at 10:52

## 2019-01-01 RX ADMIN — NYSTATIN: 100000 POWDER TOPICAL at 09:22

## 2019-01-01 RX ADMIN — IPRATROPIUM BROMIDE 0.5 MG: 0.5 SOLUTION RESPIRATORY (INHALATION) at 14:00

## 2019-01-01 RX ADMIN — FUROSEMIDE 80 MG: 10 INJECTION, SOLUTION INTRAMUSCULAR; INTRAVENOUS at 09:50

## 2019-01-01 RX ADMIN — MUPIROCIN 1 APPLICATION: 20 OINTMENT TOPICAL at 10:52

## 2019-01-01 RX ADMIN — TIOTROPIUM BROMIDE 18 MCG: 18 CAPSULE ORAL; RESPIRATORY (INHALATION) at 08:47

## 2019-01-01 RX ADMIN — INSULIN LISPRO 4 UNITS: 100 INJECTION, SOLUTION INTRAVENOUS; SUBCUTANEOUS at 16:54

## 2019-01-01 RX ADMIN — ASPIRIN 81 MG 81 MG: 81 TABLET ORAL at 09:13

## 2019-01-01 RX ADMIN — CEFEPIME HYDROCHLORIDE 2000 MG: 2 INJECTION, POWDER, FOR SOLUTION INTRAVENOUS at 21:40

## 2019-01-01 RX ADMIN — ACETAMINOPHEN 650 MG: 325 TABLET, FILM COATED ORAL at 23:14

## 2019-01-01 RX ADMIN — POTASSIUM CHLORIDE 20 MEQ: 1500 TABLET, EXTENDED RELEASE ORAL at 17:02

## 2019-01-01 RX ADMIN — INSULIN LISPRO 4 UNITS: 100 INJECTION, SOLUTION INTRAVENOUS; SUBCUTANEOUS at 07:53

## 2019-01-01 RX ADMIN — PREDNISONE 40 MG: 20 TABLET ORAL at 14:38

## 2019-01-01 RX ADMIN — FUROSEMIDE 40 MG: 10 INJECTION, SOLUTION INTRAMUSCULAR; INTRAVENOUS at 16:35

## 2019-01-01 RX ADMIN — LEVALBUTEROL 1.25 MG: 1.25 SOLUTION, CONCENTRATE RESPIRATORY (INHALATION) at 14:52

## 2019-01-01 RX ADMIN — INSULIN LISPRO 1 UNITS: 100 INJECTION, SOLUTION INTRAVENOUS; SUBCUTANEOUS at 16:33

## 2019-01-01 RX ADMIN — INSULIN LISPRO 6 UNITS: 100 INJECTION, SOLUTION INTRAVENOUS; SUBCUTANEOUS at 07:40

## 2019-01-01 RX ADMIN — MUPIROCIN 1 APPLICATION: 20 OINTMENT TOPICAL at 21:32

## 2019-01-01 RX ADMIN — FUROSEMIDE 40 MG: 10 INJECTION, SOLUTION INTRAMUSCULAR; INTRAVENOUS at 15:48

## 2019-01-01 RX ADMIN — INSULIN LISPRO 4 UNITS: 100 INJECTION, SOLUTION INTRAVENOUS; SUBCUTANEOUS at 11:51

## 2019-01-01 RX ADMIN — TIOTROPIUM BROMIDE 18 MCG: 18 CAPSULE ORAL; RESPIRATORY (INHALATION) at 08:49

## 2019-01-01 RX ADMIN — ENOXAPARIN SODIUM 40 MG: 40 INJECTION SUBCUTANEOUS at 09:20

## 2019-01-01 RX ADMIN — AMLODIPINE BESYLATE 10 MG: 10 TABLET ORAL at 08:08

## 2019-01-01 RX ADMIN — IPRATROPIUM BROMIDE 0.5 MG: 0.5 SOLUTION RESPIRATORY (INHALATION) at 13:49

## 2019-01-01 RX ADMIN — IPRATROPIUM BROMIDE 0.5 MG: 0.5 SOLUTION RESPIRATORY (INHALATION) at 20:50

## 2019-01-01 RX ADMIN — FLUCONAZOLE 200 MG: 100 TABLET ORAL at 09:21

## 2019-01-01 RX ADMIN — NYSTATIN: 100000 POWDER TOPICAL at 18:59

## 2019-01-01 RX ADMIN — ATORVASTATIN CALCIUM 40 MG: 40 TABLET, FILM COATED ORAL at 17:37

## 2019-01-01 RX ADMIN — LEVALBUTEROL 1.25 MG: 1.25 SOLUTION, CONCENTRATE RESPIRATORY (INHALATION) at 20:18

## 2019-01-01 RX ADMIN — ASPIRIN 81 MG 81 MG: 81 TABLET ORAL at 09:20

## 2019-01-01 RX ADMIN — HYDRALAZINE HYDROCHLORIDE 25 MG: 25 TABLET ORAL at 05:15

## 2019-01-01 RX ADMIN — ALBUTEROL SULFATE 10 MG: 2.5 SOLUTION RESPIRATORY (INHALATION) at 09:21

## 2019-01-01 RX ADMIN — INSULIN LISPRO 4 UNITS: 100 INJECTION, SOLUTION INTRAVENOUS; SUBCUTANEOUS at 17:02

## 2019-01-01 RX ADMIN — ACETAMINOPHEN 650 MG: 325 TABLET, FILM COATED ORAL at 07:41

## 2019-01-01 RX ADMIN — INSULIN GLARGINE 10 UNITS: 100 INJECTION, SOLUTION SUBCUTANEOUS at 13:41

## 2019-01-01 RX ADMIN — MUPIROCIN 1 APPLICATION: 20 OINTMENT TOPICAL at 21:59

## 2019-01-01 RX ADMIN — INSULIN LISPRO 6 UNITS: 100 INJECTION, SOLUTION INTRAVENOUS; SUBCUTANEOUS at 11:55

## 2019-01-01 RX ADMIN — NYSTATIN: 100000 POWDER TOPICAL at 09:21

## 2019-01-01 RX ADMIN — INSULIN GLARGINE 20 UNITS: 100 INJECTION, SOLUTION SUBCUTANEOUS at 10:46

## 2019-01-01 RX ADMIN — FERROUS SULFATE TAB 325 MG (65 MG ELEMENTAL FE) 325 MG: 325 (65 FE) TAB at 08:48

## 2019-01-01 RX ADMIN — ENOXAPARIN SODIUM 40 MG: 40 INJECTION SUBCUTANEOUS at 08:48

## 2019-01-01 RX ADMIN — ATORVASTATIN CALCIUM 40 MG: 40 TABLET, FILM COATED ORAL at 09:20

## 2019-01-01 RX ADMIN — HYDRALAZINE HYDROCHLORIDE 25 MG: 25 TABLET ORAL at 14:39

## 2019-01-01 RX ADMIN — ENOXAPARIN SODIUM 40 MG: 40 INJECTION SUBCUTANEOUS at 08:06

## 2019-01-01 RX ADMIN — AMLODIPINE BESYLATE 10 MG: 10 TABLET ORAL at 09:28

## 2019-01-01 RX ADMIN — NYSTATIN: 100000 POWDER TOPICAL at 11:55

## 2019-01-01 RX ADMIN — ASPIRIN 81 MG 81 MG: 81 TABLET ORAL at 09:22

## 2019-01-01 RX ADMIN — ENOXAPARIN SODIUM 40 MG: 40 INJECTION SUBCUTANEOUS at 08:52

## 2019-01-01 RX ADMIN — ACETAMINOPHEN 650 MG: 325 TABLET, FILM COATED ORAL at 09:28

## 2019-01-01 RX ADMIN — INSULIN LISPRO 2 UNITS: 100 INJECTION, SOLUTION INTRAVENOUS; SUBCUTANEOUS at 07:50

## 2019-01-01 RX ADMIN — CEFEPIME HYDROCHLORIDE 2000 MG: 2 INJECTION, POWDER, FOR SOLUTION INTRAVENOUS at 09:50

## 2019-01-01 RX ADMIN — INSULIN LISPRO 8 UNITS: 100 INJECTION, SOLUTION INTRAVENOUS; SUBCUTANEOUS at 07:38

## 2019-01-01 RX ADMIN — INSULIN LISPRO 2 UNITS: 100 INJECTION, SOLUTION INTRAVENOUS; SUBCUTANEOUS at 11:38

## 2019-01-01 RX ADMIN — INSULIN LISPRO 4 UNITS: 100 INJECTION, SOLUTION INTRAVENOUS; SUBCUTANEOUS at 17:09

## 2019-01-01 RX ADMIN — HYDRALAZINE HYDROCHLORIDE 25 MG: 25 TABLET ORAL at 05:31

## 2019-01-01 RX ADMIN — HYDRALAZINE HYDROCHLORIDE 25 MG: 25 TABLET ORAL at 05:41

## 2019-01-01 RX ADMIN — INSULIN GLARGINE 30 UNITS: 100 INJECTION, SOLUTION SUBCUTANEOUS at 08:07

## 2019-01-01 RX ADMIN — LEVALBUTEROL 1.25 MG: 1.25 SOLUTION, CONCENTRATE RESPIRATORY (INHALATION) at 20:50

## 2019-01-01 RX ADMIN — HYDRALAZINE HYDROCHLORIDE 25 MG: 25 TABLET ORAL at 22:00

## 2019-01-01 RX ADMIN — INSULIN LISPRO 2 UNITS: 100 INJECTION, SOLUTION INTRAVENOUS; SUBCUTANEOUS at 21:28

## 2019-01-01 RX ADMIN — VANCOMYCIN HYDROCHLORIDE 1750 MG: 1 INJECTION, POWDER, LYOPHILIZED, FOR SOLUTION INTRAVENOUS at 16:39

## 2019-01-01 RX ADMIN — HYDRALAZINE HYDROCHLORIDE 25 MG: 25 TABLET ORAL at 21:59

## 2019-01-01 RX ADMIN — TAMSULOSIN HYDROCHLORIDE 0.4 MG: 0.4 CAPSULE ORAL at 16:37

## 2019-01-01 RX ADMIN — LEVALBUTEROL 1.25 MG: 1.25 SOLUTION, CONCENTRATE RESPIRATORY (INHALATION) at 08:34

## 2019-01-01 RX ADMIN — INSULIN LISPRO 2 UNITS: 100 INJECTION, SOLUTION INTRAVENOUS; SUBCUTANEOUS at 17:37

## 2019-01-01 RX ADMIN — ACETAMINOPHEN 650 MG: 325 TABLET, FILM COATED ORAL at 20:21

## 2019-01-01 RX ADMIN — IPRATROPIUM BROMIDE 0.5 MG: 0.5 SOLUTION RESPIRATORY (INHALATION) at 07:55

## 2019-01-01 RX ADMIN — INSULIN LISPRO 4 UNITS: 100 INJECTION, SOLUTION INTRAVENOUS; SUBCUTANEOUS at 16:36

## 2019-01-01 RX ADMIN — POTASSIUM CHLORIDE 20 MEQ: 1500 TABLET, EXTENDED RELEASE ORAL at 11:49

## 2019-01-01 RX ADMIN — AMLODIPINE BESYLATE 10 MG: 10 TABLET ORAL at 09:20

## 2019-01-01 RX ADMIN — ISODIUM CHLORIDE 12 ML: 0.03 SOLUTION RESPIRATORY (INHALATION) at 09:21

## 2019-01-01 RX ADMIN — ATORVASTATIN CALCIUM 40 MG: 40 TABLET, FILM COATED ORAL at 17:02

## 2019-01-01 RX ADMIN — ASPIRIN 81 MG 81 MG: 81 TABLET ORAL at 09:10

## 2019-01-01 RX ADMIN — ENOXAPARIN SODIUM 40 MG: 40 INJECTION SUBCUTANEOUS at 10:46

## 2019-01-01 RX ADMIN — INSULIN LISPRO 1 UNITS: 100 INJECTION, SOLUTION INTRAVENOUS; SUBCUTANEOUS at 11:24

## 2019-01-01 RX ADMIN — PREDNISONE 40 MG: 20 TABLET ORAL at 09:28

## 2019-01-01 RX ADMIN — NYSTATIN 1 APPLICATION: 100000 POWDER TOPICAL at 11:54

## 2019-01-01 RX ADMIN — ATORVASTATIN CALCIUM 40 MG: 40 TABLET, FILM COATED ORAL at 09:19

## 2019-01-01 RX ADMIN — NYSTATIN: 100000 POWDER TOPICAL at 17:26

## 2019-01-01 RX ADMIN — INSULIN LISPRO 2 UNITS: 100 INJECTION, SOLUTION INTRAVENOUS; SUBCUTANEOUS at 09:51

## 2019-01-01 RX ADMIN — TIOTROPIUM BROMIDE 18 MCG: 18 CAPSULE ORAL; RESPIRATORY (INHALATION) at 10:47

## 2019-01-01 RX ADMIN — INSULIN LISPRO 6 UNITS: 100 INJECTION, SOLUTION INTRAVENOUS; SUBCUTANEOUS at 12:00

## 2019-01-01 RX ADMIN — INSULIN LISPRO 4 UNITS: 100 INJECTION, SOLUTION INTRAVENOUS; SUBCUTANEOUS at 11:56

## 2019-01-01 RX ADMIN — FERROUS SULFATE TAB 325 MG (65 MG ELEMENTAL FE) 325 MG: 325 (65 FE) TAB at 07:48

## 2019-01-01 RX ADMIN — FERROUS SULFATE TAB 325 MG (65 MG ELEMENTAL FE) 325 MG: 325 (65 FE) TAB at 07:37

## 2019-01-01 RX ADMIN — NYSTATIN: 100000 POWDER TOPICAL at 18:18

## 2019-01-01 RX ADMIN — ENOXAPARIN SODIUM 40 MG: 40 INJECTION SUBCUTANEOUS at 08:07

## 2019-01-01 RX ADMIN — FUROSEMIDE 20 MG: 10 INJECTION, SOLUTION INTRAMUSCULAR; INTRAVENOUS at 08:46

## 2019-01-01 RX ADMIN — CIPROFLOXACIN 500 MG: 500 TABLET, FILM COATED ORAL at 13:19

## 2019-01-01 RX ADMIN — IOHEXOL 85 ML: 350 INJECTION, SOLUTION INTRAVENOUS at 16:34

## 2019-01-01 RX ADMIN — INSULIN GLARGINE 50 UNITS: 100 INJECTION, SOLUTION SUBCUTANEOUS at 22:24

## 2019-01-01 RX ADMIN — NYSTATIN: 100000 POWDER TOPICAL at 09:33

## 2019-01-01 RX ADMIN — HYDRALAZINE HYDROCHLORIDE 25 MG: 25 TABLET ORAL at 05:37

## 2019-01-01 RX ADMIN — CARVEDILOL 6.25 MG: 3.12 TABLET, FILM COATED ORAL at 12:10

## 2019-01-01 RX ADMIN — FUROSEMIDE 40 MG: 10 INJECTION, SOLUTION INTRAVENOUS at 16:02

## 2019-01-01 RX ADMIN — INSULIN GLARGINE 50 UNITS: 100 INJECTION, SOLUTION SUBCUTANEOUS at 21:18

## 2019-01-01 RX ADMIN — INSULIN LISPRO 2 UNITS: 100 INJECTION, SOLUTION INTRAVENOUS; SUBCUTANEOUS at 21:31

## 2019-01-01 RX ADMIN — CEFEPIME HYDROCHLORIDE 2000 MG: 2 INJECTION, SOLUTION INTRAVENOUS at 10:17

## 2019-01-01 RX ADMIN — ACETAMINOPHEN 650 MG: 325 TABLET, FILM COATED ORAL at 13:19

## 2019-01-01 RX ADMIN — ASPIRIN 81 MG 81 MG: 81 TABLET ORAL at 09:28

## 2019-01-01 RX ADMIN — NYSTATIN: 100000 POWDER TOPICAL at 21:19

## 2019-01-01 RX ADMIN — FUROSEMIDE 40 MG: 10 INJECTION, SOLUTION INTRAMUSCULAR; INTRAVENOUS at 09:15

## 2019-01-01 RX ADMIN — MUPIROCIN 1 APPLICATION: 20 OINTMENT TOPICAL at 20:23

## 2019-01-01 RX ADMIN — INSULIN GLARGINE 50 UNITS: 100 INJECTION, SOLUTION SUBCUTANEOUS at 21:58

## 2019-01-01 RX ADMIN — INSULIN LISPRO 4 UNITS: 100 INJECTION, SOLUTION INTRAVENOUS; SUBCUTANEOUS at 11:57

## 2019-01-01 RX ADMIN — TIOTROPIUM BROMIDE 18 MCG: 18 CAPSULE ORAL; RESPIRATORY (INHALATION) at 09:20

## 2019-01-01 RX ADMIN — LEVALBUTEROL 1.25 MG: 1.25 SOLUTION, CONCENTRATE RESPIRATORY (INHALATION) at 07:50

## 2019-01-01 RX ADMIN — VANCOMYCIN HYDROCHLORIDE 1750 MG: 1 INJECTION, POWDER, LYOPHILIZED, FOR SOLUTION INTRAVENOUS at 04:22

## 2019-01-01 RX ADMIN — IPRATROPIUM BROMIDE 0.5 MG: 0.5 SOLUTION RESPIRATORY (INHALATION) at 20:18

## 2019-01-01 RX ADMIN — DEXTROSE 50 % IN WATER (D50W) INTRAVENOUS SYRINGE 50 ML: at 22:42

## 2019-01-01 RX ADMIN — AMLODIPINE BESYLATE 10 MG: 10 TABLET ORAL at 08:12

## 2019-01-01 RX ADMIN — ATORVASTATIN CALCIUM 40 MG: 40 TABLET, FILM COATED ORAL at 17:39

## 2019-01-01 RX ADMIN — INSULIN LISPRO 6 UNITS: 100 INJECTION, SOLUTION INTRAVENOUS; SUBCUTANEOUS at 11:57

## 2019-01-01 RX ADMIN — AMLODIPINE BESYLATE 10 MG: 10 TABLET ORAL at 09:40

## 2019-01-01 RX ADMIN — CARVEDILOL 6.25 MG: 3.12 TABLET, FILM COATED ORAL at 17:39

## 2019-01-01 RX ADMIN — VANCOMYCIN HYDROCHLORIDE 1750 MG: 1 INJECTION, POWDER, LYOPHILIZED, FOR SOLUTION INTRAVENOUS at 04:23

## 2019-01-01 RX ADMIN — INSULIN LISPRO 5 UNITS: 100 INJECTION, SOLUTION INTRAVENOUS; SUBCUTANEOUS at 07:11

## 2019-01-01 RX ADMIN — HYDRALAZINE HYDROCHLORIDE 25 MG: 25 TABLET ORAL at 22:13

## 2019-01-01 RX ADMIN — INSULIN LISPRO 1 UNITS: 100 INJECTION, SOLUTION INTRAVENOUS; SUBCUTANEOUS at 16:16

## 2019-01-01 RX ADMIN — INSULIN LISPRO 3 UNITS: 100 INJECTION, SOLUTION INTRAVENOUS; SUBCUTANEOUS at 07:49

## 2019-01-01 RX ADMIN — OXYBUTYNIN CHLORIDE 10 MG: 5 TABLET, EXTENDED RELEASE ORAL at 13:20

## 2019-01-01 RX ADMIN — FERROUS SULFATE TAB 325 MG (65 MG ELEMENTAL FE) 325 MG: 325 (65 FE) TAB at 09:22

## 2019-01-01 RX ADMIN — ATORVASTATIN CALCIUM 40 MG: 40 TABLET, FILM COATED ORAL at 09:29

## 2019-01-01 RX ADMIN — FUROSEMIDE 20 MG: 10 INJECTION, SOLUTION INTRAMUSCULAR; INTRAVENOUS at 16:26

## 2019-01-01 RX ADMIN — INSULIN LISPRO 3 UNITS: 100 INJECTION, SOLUTION INTRAVENOUS; SUBCUTANEOUS at 07:27

## 2019-01-01 RX ADMIN — TIOTROPIUM BROMIDE 18 MCG: 18 CAPSULE ORAL; RESPIRATORY (INHALATION) at 09:41

## 2019-01-01 RX ADMIN — MUPIROCIN 1 APPLICATION: 20 OINTMENT TOPICAL at 14:28

## 2019-01-01 RX ADMIN — VANCOMYCIN HYDROCHLORIDE 2000 MG: 1 INJECTION, POWDER, LYOPHILIZED, FOR SOLUTION INTRAVENOUS at 23:18

## 2019-01-01 RX ADMIN — INSULIN LISPRO 5 UNITS: 100 INJECTION, SOLUTION INTRAVENOUS; SUBCUTANEOUS at 07:59

## 2019-01-01 RX ADMIN — ATORVASTATIN CALCIUM 40 MG: 40 TABLET, FILM COATED ORAL at 17:01

## 2019-01-01 RX ADMIN — TIOTROPIUM BROMIDE 18 MCG: 18 CAPSULE ORAL; RESPIRATORY (INHALATION) at 14:37

## 2019-01-01 RX ADMIN — FERROUS SULFATE TAB 325 MG (65 MG ELEMENTAL FE) 325 MG: 325 (65 FE) TAB at 08:12

## 2019-01-01 RX ADMIN — VANCOMYCIN HYDROCHLORIDE 2000 MG: 1 INJECTION, POWDER, LYOPHILIZED, FOR SOLUTION INTRAVENOUS at 22:46

## 2019-01-01 RX ADMIN — PREDNISONE 40 MG: 20 TABLET ORAL at 08:06

## 2019-01-01 RX ADMIN — INSULIN GLARGINE 20 UNITS: 100 INJECTION, SOLUTION SUBCUTANEOUS at 09:10

## 2019-01-01 RX ADMIN — HYDRALAZINE HYDROCHLORIDE 25 MG: 25 TABLET ORAL at 05:32

## 2019-01-01 RX ADMIN — INSULIN LISPRO 6 UNITS: 100 INJECTION, SOLUTION INTRAVENOUS; SUBCUTANEOUS at 11:56

## 2019-01-01 RX ADMIN — FUROSEMIDE 40 MG: 10 INJECTION, SOLUTION INTRAMUSCULAR; INTRAVENOUS at 07:59

## 2019-01-01 RX ADMIN — ATORVASTATIN CALCIUM 40 MG: 40 TABLET, FILM COATED ORAL at 17:52

## 2019-01-01 RX ADMIN — IPRATROPIUM BROMIDE AND ALBUTEROL SULFATE 3 ML: 2.5; .5 SOLUTION RESPIRATORY (INHALATION) at 08:42

## 2019-01-01 RX ADMIN — INSULIN LISPRO 4 UNITS: 100 INJECTION, SOLUTION INTRAVENOUS; SUBCUTANEOUS at 15:48

## 2019-01-01 RX ADMIN — INSULIN GLARGINE 45 UNITS: 100 INJECTION, SOLUTION SUBCUTANEOUS at 21:21

## 2019-01-01 RX ADMIN — FUROSEMIDE 20 MG: 10 INJECTION, SOLUTION INTRAMUSCULAR; INTRAVENOUS at 09:25

## 2019-01-01 RX ADMIN — INSULIN LISPRO 4 UNITS: 100 INJECTION, SOLUTION INTRAVENOUS; SUBCUTANEOUS at 07:40

## 2019-01-01 RX ADMIN — VANCOMYCIN HYDROCHLORIDE 2000 MG: 1 INJECTION, POWDER, LYOPHILIZED, FOR SOLUTION INTRAVENOUS at 11:44

## 2019-01-01 RX ADMIN — INSULIN LISPRO 5 UNITS: 100 INJECTION, SOLUTION INTRAVENOUS; SUBCUTANEOUS at 21:21

## 2019-01-01 RX ADMIN — ASPIRIN 81 MG 81 MG: 81 TABLET ORAL at 08:12

## 2019-01-01 RX ADMIN — DOXYCYCLINE HYCLATE 100 MG: 100 CAPSULE ORAL at 10:45

## 2019-01-01 RX ADMIN — HYDRALAZINE HYDROCHLORIDE 25 MG: 25 TABLET ORAL at 14:43

## 2019-01-01 RX ADMIN — AMLODIPINE BESYLATE 10 MG: 10 TABLET ORAL at 10:45

## 2019-01-01 RX ADMIN — HYDRALAZINE HYDROCHLORIDE 25 MG: 25 TABLET ORAL at 16:37

## 2019-01-01 RX ADMIN — HYDRALAZINE HYDROCHLORIDE 25 MG: 25 TABLET ORAL at 13:53

## 2019-01-01 RX ADMIN — INSULIN LISPRO 3 UNITS: 100 INJECTION, SOLUTION INTRAVENOUS; SUBCUTANEOUS at 11:50

## 2019-01-01 RX ADMIN — ASPIRIN 81 MG 81 MG: 81 TABLET ORAL at 09:14

## 2019-01-01 RX ADMIN — INSULIN LISPRO 2 UNITS: 100 INJECTION, SOLUTION INTRAVENOUS; SUBCUTANEOUS at 07:47

## 2019-01-01 RX ADMIN — HYDRALAZINE HYDROCHLORIDE 25 MG: 25 TABLET ORAL at 13:37

## 2019-01-01 RX ADMIN — FUROSEMIDE 40 MG: 10 INJECTION, SOLUTION INTRAVENOUS at 07:42

## 2019-01-01 RX ADMIN — TAMSULOSIN HYDROCHLORIDE 0.4 MG: 0.4 CAPSULE ORAL at 16:33

## 2019-01-01 RX ADMIN — ATORVASTATIN CALCIUM 40 MG: 40 TABLET, FILM COATED ORAL at 08:48

## 2019-01-01 RX ADMIN — TIOTROPIUM BROMIDE 18 MCG: 18 CAPSULE ORAL; RESPIRATORY (INHALATION) at 08:13

## 2019-01-01 RX ADMIN — TIOTROPIUM BROMIDE 18 MCG: 18 CAPSULE ORAL; RESPIRATORY (INHALATION) at 09:19

## 2019-01-01 RX ADMIN — IPRATROPIUM BROMIDE 0.5 MG: 0.5 SOLUTION RESPIRATORY (INHALATION) at 07:50

## 2019-01-01 RX ADMIN — ATORVASTATIN CALCIUM 40 MG: 40 TABLET, FILM COATED ORAL at 17:12

## 2019-01-01 RX ADMIN — INSULIN LISPRO 6 UNITS: 100 INJECTION, SOLUTION INTRAVENOUS; SUBCUTANEOUS at 08:10

## 2019-01-01 RX ADMIN — ENOXAPARIN SODIUM 40 MG: 40 INJECTION SUBCUTANEOUS at 09:21

## 2019-01-01 RX ADMIN — FUROSEMIDE 80 MG: 10 INJECTION, SOLUTION INTRAMUSCULAR; INTRAVENOUS at 17:01

## 2019-01-01 RX ADMIN — IPRATROPIUM BROMIDE 0.5 MG: 0.5 SOLUTION RESPIRATORY (INHALATION) at 08:34

## 2019-01-01 RX ADMIN — POTASSIUM CHLORIDE 20 MEQ: 1500 TABLET, EXTENDED RELEASE ORAL at 13:54

## 2019-01-01 RX ADMIN — ENOXAPARIN SODIUM 40 MG: 40 INJECTION SUBCUTANEOUS at 09:29

## 2019-01-01 RX ADMIN — ACETAMINOPHEN 650 MG: 325 TABLET, FILM COATED ORAL at 14:05

## 2019-01-01 RX ADMIN — PREDNISONE 40 MG: 20 TABLET ORAL at 09:10

## 2019-01-01 RX ADMIN — ATORVASTATIN CALCIUM 40 MG: 40 TABLET, FILM COATED ORAL at 08:52

## 2019-01-01 RX ADMIN — NYSTATIN 1 APPLICATION: 100000 POWDER TOPICAL at 08:17

## 2019-01-01 RX ADMIN — TIOTROPIUM BROMIDE 18 MCG: 18 CAPSULE ORAL; RESPIRATORY (INHALATION) at 11:24

## 2019-01-01 RX ADMIN — INSULIN LISPRO 6 UNITS: 100 INJECTION, SOLUTION INTRAVENOUS; SUBCUTANEOUS at 21:14

## 2019-01-01 RX ADMIN — ENOXAPARIN SODIUM 40 MG: 40 INJECTION SUBCUTANEOUS at 16:02

## 2019-01-01 RX ADMIN — TIOTROPIUM BROMIDE 18 MCG: 18 CAPSULE ORAL; RESPIRATORY (INHALATION) at 09:17

## 2019-01-01 RX ADMIN — FERROUS SULFATE TAB 325 MG (65 MG ELEMENTAL FE) 325 MG: 325 (65 FE) TAB at 07:20

## 2019-01-01 RX ADMIN — NYSTATIN: 100000 POWDER TOPICAL at 11:50

## 2019-01-01 RX ADMIN — MUPIROCIN 1 APPLICATION: 20 OINTMENT TOPICAL at 09:20

## 2019-01-01 RX ADMIN — POTASSIUM CHLORIDE 20 MEQ: 1500 TABLET, EXTENDED RELEASE ORAL at 11:44

## 2019-01-01 RX ADMIN — TIOTROPIUM BROMIDE 18 MCG: 18 CAPSULE ORAL; RESPIRATORY (INHALATION) at 09:11

## 2019-01-01 RX ADMIN — HYDRALAZINE HYDROCHLORIDE 5 MG: 20 INJECTION INTRAMUSCULAR; INTRAVENOUS at 21:53

## 2019-01-01 RX ADMIN — AMLODIPINE BESYLATE 10 MG: 10 TABLET ORAL at 09:09

## 2019-01-01 RX ADMIN — INSULIN GLARGINE 10 UNITS: 100 INJECTION, SOLUTION SUBCUTANEOUS at 09:20

## 2019-01-01 RX ADMIN — AMLODIPINE BESYLATE 10 MG: 10 TABLET ORAL at 08:47

## 2019-01-01 RX ADMIN — VANCOMYCIN HYDROCHLORIDE 1750 MG: 1 INJECTION, POWDER, LYOPHILIZED, FOR SOLUTION INTRAVENOUS at 17:19

## 2019-01-01 RX ADMIN — AMLODIPINE BESYLATE 10 MG: 10 TABLET ORAL at 09:14

## 2019-01-01 RX ADMIN — TAMSULOSIN HYDROCHLORIDE 0.4 MG: 0.4 CAPSULE ORAL at 16:38

## 2019-01-01 RX ADMIN — NYSTATIN 1 APPLICATION: 100000 POWDER TOPICAL at 17:18

## 2019-01-01 RX ADMIN — ENOXAPARIN SODIUM 40 MG: 40 INJECTION SUBCUTANEOUS at 09:49

## 2019-01-01 RX ADMIN — ATORVASTATIN CALCIUM 40 MG: 40 TABLET, FILM COATED ORAL at 09:21

## 2019-01-01 RX ADMIN — ENOXAPARIN SODIUM 40 MG: 40 INJECTION SUBCUTANEOUS at 08:12

## 2019-01-01 RX ADMIN — ASPIRIN 81 MG 81 MG: 81 TABLET ORAL at 16:03

## 2019-01-01 RX ADMIN — ASPIRIN 81 MG 81 MG: 81 TABLET ORAL at 14:39

## 2019-01-01 RX ADMIN — INSULIN LISPRO 4 UNITS: 100 INJECTION, SOLUTION INTRAVENOUS; SUBCUTANEOUS at 22:03

## 2019-01-01 RX ADMIN — LORAZEPAM 0.5 MG: 2 INJECTION INTRAMUSCULAR; INTRAVENOUS at 18:19

## 2019-01-01 RX ADMIN — FUROSEMIDE 40 MG: 10 INJECTION, SOLUTION INTRAMUSCULAR; INTRAVENOUS at 08:07

## 2019-01-01 RX ADMIN — IPRATROPIUM BROMIDE 1 MG: 0.5 SOLUTION RESPIRATORY (INHALATION) at 09:21

## 2019-01-01 RX ADMIN — VANCOMYCIN HYDROCHLORIDE 1750 MG: 1 INJECTION, POWDER, LYOPHILIZED, FOR SOLUTION INTRAVENOUS at 03:46

## 2019-01-01 RX ADMIN — INSULIN GLARGINE 15 UNITS: 100 INJECTION, SOLUTION SUBCUTANEOUS at 08:13

## 2019-01-01 RX ADMIN — HYDRALAZINE HYDROCHLORIDE 25 MG: 25 TABLET ORAL at 21:06

## 2019-01-01 RX ADMIN — TAMSULOSIN HYDROCHLORIDE 0.4 MG: 0.4 CAPSULE ORAL at 18:15

## 2019-01-01 RX ADMIN — ATORVASTATIN CALCIUM 40 MG: 40 TABLET, FILM COATED ORAL at 10:45

## 2019-01-01 RX ADMIN — LISINOPRIL 2.5 MG: 5 TABLET ORAL at 09:14

## 2019-01-01 RX ADMIN — HYDRALAZINE HYDROCHLORIDE 25 MG: 25 TABLET ORAL at 21:17

## 2019-01-01 RX ADMIN — HYDRALAZINE HYDROCHLORIDE 25 MG: 25 TABLET ORAL at 14:23

## 2019-01-01 RX ADMIN — FLUCONAZOLE 200 MG: 100 TABLET ORAL at 08:52

## 2019-01-01 RX ADMIN — AMLODIPINE BESYLATE 10 MG: 10 TABLET ORAL at 09:29

## 2019-01-01 RX ADMIN — ASPIRIN 81 MG 81 MG: 81 TABLET ORAL at 10:45

## 2019-01-01 RX ADMIN — CEFEPIME HYDROCHLORIDE 2000 MG: 2 INJECTION, POWDER, FOR SOLUTION INTRAVENOUS at 10:52

## 2019-01-01 RX ADMIN — HYDRALAZINE HYDROCHLORIDE 25 MG: 25 TABLET ORAL at 14:27

## 2019-01-01 RX ADMIN — CARVEDILOL 6.25 MG: 3.12 TABLET, FILM COATED ORAL at 08:30

## 2019-01-01 RX ADMIN — INSULIN GLARGINE 20 UNITS: 100 INJECTION, SOLUTION SUBCUTANEOUS at 09:19

## 2019-01-01 RX ADMIN — IOHEXOL 85 ML: 350 INJECTION, SOLUTION INTRAVENOUS at 10:24

## 2019-01-01 RX ADMIN — INSULIN GLARGINE 50 UNITS: 100 INJECTION, SOLUTION SUBCUTANEOUS at 21:27

## 2019-01-01 RX ADMIN — FUROSEMIDE 40 MG: 10 INJECTION, SOLUTION INTRAMUSCULAR; INTRAVENOUS at 16:58

## 2019-01-01 RX ADMIN — INSULIN LISPRO 4 UNITS: 100 INJECTION, SOLUTION INTRAVENOUS; SUBCUTANEOUS at 11:55

## 2019-01-01 RX ADMIN — HYDRALAZINE HYDROCHLORIDE 25 MG: 25 TABLET ORAL at 21:30

## 2019-01-01 RX ADMIN — INSULIN GLARGINE 50 UNITS: 100 INJECTION, SOLUTION SUBCUTANEOUS at 21:06

## 2019-01-01 RX ADMIN — INSULIN GLARGINE 50 UNITS: 100 INJECTION, SOLUTION SUBCUTANEOUS at 22:13

## 2019-01-01 RX ADMIN — INSULIN LISPRO 2 UNITS: 100 INJECTION, SOLUTION INTRAVENOUS; SUBCUTANEOUS at 11:49

## 2019-01-01 RX ADMIN — IPRATROPIUM BROMIDE 0.5 MG: 0.5 SOLUTION RESPIRATORY (INHALATION) at 14:52

## 2019-01-01 RX ADMIN — POTASSIUM CHLORIDE 40 MEQ: 1500 TABLET, EXTENDED RELEASE ORAL at 10:46

## 2019-01-01 RX ADMIN — IPRATROPIUM BROMIDE 0.5 MG: 0.5 SOLUTION RESPIRATORY (INHALATION) at 20:01

## 2019-01-01 RX ADMIN — FUROSEMIDE 40 MG: 10 INJECTION, SOLUTION INTRAMUSCULAR; INTRAVENOUS at 02:28

## 2019-01-01 RX ADMIN — FERROUS SULFATE TAB 325 MG (65 MG ELEMENTAL FE) 325 MG: 325 (65 FE) TAB at 07:47

## 2019-01-01 RX ADMIN — INSULIN LISPRO 12 UNITS: 100 INJECTION, SOLUTION INTRAVENOUS; SUBCUTANEOUS at 13:20

## 2019-01-01 RX ADMIN — LEVALBUTEROL 1.25 MG: 1.25 SOLUTION, CONCENTRATE RESPIRATORY (INHALATION) at 20:01

## 2019-01-01 RX ADMIN — INSULIN GLARGINE 15 UNITS: 100 INJECTION, SOLUTION SUBCUTANEOUS at 08:48

## 2019-01-01 RX ADMIN — CARVEDILOL 6.25 MG: 3.12 TABLET, FILM COATED ORAL at 17:38

## 2019-01-01 RX ADMIN — VANCOMYCIN HYDROCHLORIDE 1500 MG: 1 INJECTION, POWDER, LYOPHILIZED, FOR SOLUTION INTRAVENOUS at 22:41

## 2019-01-01 RX ADMIN — FUROSEMIDE 20 MG: 10 INJECTION, SOLUTION INTRAMUSCULAR; INTRAVENOUS at 09:10

## 2019-01-01 RX ADMIN — INSULIN LISPRO 8 UNITS: 100 INJECTION, SOLUTION INTRAVENOUS; SUBCUTANEOUS at 08:12

## 2019-01-01 RX ADMIN — LEVALBUTEROL 1.25 MG: 1.25 SOLUTION, CONCENTRATE RESPIRATORY (INHALATION) at 07:55

## 2019-01-01 RX ADMIN — ONDANSETRON 4 MG: 2 INJECTION INTRAMUSCULAR; INTRAVENOUS at 00:21

## 2019-01-01 RX ADMIN — ASPIRIN 81 MG 81 MG: 81 TABLET ORAL at 08:52

## 2019-01-01 RX ADMIN — TIOTROPIUM BROMIDE 18 MCG: 18 CAPSULE ORAL; RESPIRATORY (INHALATION) at 08:15

## 2019-01-01 RX ADMIN — INSULIN LISPRO 5 UNITS: 100 INJECTION, SOLUTION INTRAVENOUS; SUBCUTANEOUS at 17:07

## 2019-01-01 RX ADMIN — TAMSULOSIN HYDROCHLORIDE 0.4 MG: 0.4 CAPSULE ORAL at 17:09

## 2019-01-01 RX ADMIN — VANCOMYCIN HYDROCHLORIDE 1500 MG: 1 INJECTION, POWDER, LYOPHILIZED, FOR SOLUTION INTRAVENOUS at 10:39

## 2019-01-01 RX ADMIN — HYDRALAZINE HYDROCHLORIDE 25 MG: 25 TABLET ORAL at 14:28

## 2019-01-01 RX ADMIN — ASPIRIN 81 MG 81 MG: 81 TABLET ORAL at 09:29

## 2019-01-01 RX ADMIN — INSULIN GLARGINE 45 UNITS: 100 INJECTION, SOLUTION SUBCUTANEOUS at 21:14

## 2019-01-01 RX ADMIN — INSULIN LISPRO 6 UNITS: 100 INJECTION, SOLUTION INTRAVENOUS; SUBCUTANEOUS at 14:44

## 2019-01-01 RX ADMIN — ENOXAPARIN SODIUM 40 MG: 40 INJECTION SUBCUTANEOUS at 09:15

## 2019-01-01 RX ADMIN — TAMSULOSIN HYDROCHLORIDE 0.4 MG: 0.4 CAPSULE ORAL at 16:16

## 2019-01-01 RX ADMIN — INSULIN GLARGINE 45 UNITS: 100 INJECTION, SOLUTION SUBCUTANEOUS at 21:40

## 2019-01-01 RX ADMIN — AMLODIPINE BESYLATE 10 MG: 10 TABLET ORAL at 08:52

## 2019-01-01 RX ADMIN — ASPIRIN 81 MG 81 MG: 81 TABLET ORAL at 08:08

## 2019-01-01 RX ADMIN — HYDRALAZINE HYDROCHLORIDE 25 MG: 25 TABLET ORAL at 13:33

## 2019-01-01 RX ADMIN — INSULIN GLARGINE 50 UNITS: 100 INJECTION, SOLUTION SUBCUTANEOUS at 21:30

## 2019-01-01 RX ADMIN — FUROSEMIDE 80 MG: 10 INJECTION, SOLUTION INTRAMUSCULAR; INTRAVENOUS at 09:00

## 2019-01-01 RX ADMIN — AMLODIPINE BESYLATE 10 MG: 10 TABLET ORAL at 14:39

## 2019-01-01 RX ADMIN — AMLODIPINE BESYLATE 10 MG: 10 TABLET ORAL at 09:19

## 2019-01-01 RX ADMIN — NYSTATIN 1 APPLICATION: 100000 POWDER TOPICAL at 17:38

## 2019-01-01 RX ADMIN — VANCOMYCIN HYDROCHLORIDE 1750 MG: 1 INJECTION, POWDER, LYOPHILIZED, FOR SOLUTION INTRAVENOUS at 04:49

## 2019-01-01 RX ADMIN — NYSTATIN: 100000 POWDER TOPICAL at 09:20

## 2019-01-01 RX ADMIN — ATORVASTATIN CALCIUM 40 MG: 40 TABLET, FILM COATED ORAL at 17:46

## 2019-01-01 RX ADMIN — VANCOMYCIN HYDROCHLORIDE 1750 MG: 1 INJECTION, POWDER, LYOPHILIZED, FOR SOLUTION INTRAVENOUS at 16:33

## 2019-01-01 RX ADMIN — HYDRALAZINE HYDROCHLORIDE 25 MG: 25 TABLET ORAL at 22:24

## 2019-01-01 RX ADMIN — HYDRALAZINE HYDROCHLORIDE 25 MG: 25 TABLET ORAL at 21:28

## 2019-01-01 RX ADMIN — ENOXAPARIN SODIUM 40 MG: 40 INJECTION SUBCUTANEOUS at 09:26

## 2019-01-01 RX ADMIN — ASPIRIN 81 MG 81 MG: 81 TABLET ORAL at 09:19

## 2019-01-01 RX ADMIN — INSULIN LISPRO 2 UNITS: 100 INJECTION, SOLUTION INTRAVENOUS; SUBCUTANEOUS at 16:40

## 2019-01-01 RX ADMIN — INSULIN GLARGINE 50 UNITS: 100 INJECTION, SOLUTION SUBCUTANEOUS at 21:59

## 2019-01-01 RX ADMIN — LORAZEPAM 0.5 MG: 2 INJECTION INTRAMUSCULAR; INTRAVENOUS at 22:37

## 2019-01-01 RX ADMIN — VANCOMYCIN HYDROCHLORIDE 1750 MG: 1 INJECTION, POWDER, LYOPHILIZED, FOR SOLUTION INTRAVENOUS at 04:10

## 2019-01-01 RX ADMIN — INSULIN LISPRO 6 UNITS: 100 INJECTION, SOLUTION INTRAVENOUS; SUBCUTANEOUS at 16:27

## 2019-01-01 RX ADMIN — POTASSIUM CHLORIDE 20 MEQ: 1500 TABLET, EXTENDED RELEASE ORAL at 07:59

## 2019-01-01 RX ADMIN — INSULIN GLARGINE 20 UNITS: 100 INJECTION, SOLUTION SUBCUTANEOUS at 09:29

## 2019-01-01 RX ADMIN — NYSTATIN: 100000 POWDER TOPICAL at 09:52

## 2019-01-01 RX ADMIN — INSULIN LISPRO 6 UNITS: 100 INJECTION, SOLUTION INTRAVENOUS; SUBCUTANEOUS at 16:37

## 2019-01-01 RX ADMIN — DEXTROSE MONOHYDRATE 25 ML: 25 INJECTION, SOLUTION INTRAVENOUS at 21:36

## 2019-01-01 RX ADMIN — ATORVASTATIN CALCIUM 40 MG: 40 TABLET, FILM COATED ORAL at 17:43

## 2019-01-01 RX ADMIN — DEXTROSE 50 % IN WATER (D50W) INTRAVENOUS SYRINGE 25 ML: at 21:36

## 2019-01-01 RX ADMIN — ASPIRIN 81 MG 81 MG: 81 TABLET ORAL at 09:40

## 2019-01-01 RX ADMIN — PREDNISONE 40 MG: 20 TABLET ORAL at 09:15

## 2019-01-01 RX ADMIN — FUROSEMIDE 80 MG: 10 INJECTION, SOLUTION INTRAMUSCULAR; INTRAVENOUS at 17:00

## 2019-01-01 RX ADMIN — NYSTATIN: 100000 POWDER TOPICAL at 18:01

## 2019-01-01 RX ADMIN — FUROSEMIDE 40 MG: 10 INJECTION, SOLUTION INTRAVENOUS at 11:50

## 2019-01-01 RX ADMIN — HYDRALAZINE HYDROCHLORIDE 10 MG: 20 INJECTION INTRAMUSCULAR; INTRAVENOUS at 01:00

## 2019-01-01 RX ADMIN — INSULIN LISPRO 10 UNITS: 100 INJECTION, SOLUTION INTRAVENOUS; SUBCUTANEOUS at 17:37

## 2019-01-01 RX ADMIN — TIOTROPIUM BROMIDE 18 MCG: 18 CAPSULE ORAL; RESPIRATORY (INHALATION) at 08:06

## 2019-01-01 RX ADMIN — POTASSIUM CHLORIDE 20 MEQ: 1500 TABLET, EXTENDED RELEASE ORAL at 16:52

## 2019-01-01 RX ADMIN — ENOXAPARIN SODIUM 40 MG: 40 INJECTION SUBCUTANEOUS at 09:40

## 2019-01-01 RX ADMIN — HYDRALAZINE HYDROCHLORIDE 25 MG: 25 TABLET ORAL at 06:24

## 2019-01-01 RX ADMIN — ATORVASTATIN CALCIUM 40 MG: 40 TABLET, FILM COATED ORAL at 08:12

## 2019-01-01 RX ADMIN — INSULIN LISPRO 4 UNITS: 100 INJECTION, SOLUTION INTRAVENOUS; SUBCUTANEOUS at 21:41

## 2019-01-01 RX ADMIN — INSULIN LISPRO 4 UNITS: 100 INJECTION, SOLUTION INTRAVENOUS; SUBCUTANEOUS at 16:26

## 2019-01-01 RX ADMIN — INSULIN GLARGINE 30 UNITS: 100 INJECTION, SOLUTION SUBCUTANEOUS at 09:15

## 2019-01-01 RX ADMIN — INSULIN LISPRO 3 UNITS: 100 INJECTION, SOLUTION INTRAVENOUS; SUBCUTANEOUS at 22:15

## 2019-01-01 RX ADMIN — INSULIN LISPRO 10 UNITS: 100 INJECTION, SOLUTION INTRAVENOUS; SUBCUTANEOUS at 17:51

## 2019-01-01 RX ADMIN — LEVALBUTEROL 1.25 MG: 1.25 SOLUTION, CONCENTRATE RESPIRATORY (INHALATION) at 13:49

## 2019-01-01 RX ADMIN — ALBUTEROL SULFATE 2.5 MG: 2.5 SOLUTION RESPIRATORY (INHALATION) at 01:21

## 2019-01-01 RX ADMIN — TIOTROPIUM BROMIDE 18 MCG: 18 CAPSULE ORAL; RESPIRATORY (INHALATION) at 09:22

## 2019-01-01 RX ADMIN — INSULIN LISPRO 6 UNITS: 100 INJECTION, SOLUTION INTRAVENOUS; SUBCUTANEOUS at 07:42

## 2019-01-01 RX ADMIN — CEFEPIME HYDROCHLORIDE 2000 MG: 2 INJECTION, POWDER, FOR SOLUTION INTRAVENOUS at 22:25

## 2019-01-01 RX ADMIN — INSULIN LISPRO 6 UNITS: 100 INJECTION, SOLUTION INTRAVENOUS; SUBCUTANEOUS at 17:51

## 2019-01-01 RX ADMIN — INSULIN LISPRO 6 UNITS: 100 INJECTION, SOLUTION INTRAVENOUS; SUBCUTANEOUS at 17:00

## 2019-01-01 RX ADMIN — LEVALBUTEROL 1.25 MG: 1.25 SOLUTION, CONCENTRATE RESPIRATORY (INHALATION) at 14:00

## 2019-01-01 RX ADMIN — HYDRALAZINE HYDROCHLORIDE 25 MG: 25 TABLET ORAL at 05:21

## 2019-01-01 RX ADMIN — ENOXAPARIN SODIUM 40 MG: 40 INJECTION SUBCUTANEOUS at 14:38

## 2019-01-01 RX ADMIN — INSULIN GLARGINE 50 UNITS: 100 INJECTION, SOLUTION SUBCUTANEOUS at 21:57

## 2019-01-01 RX ADMIN — ALBUTEROL SULFATE 2.5 MG: 2.5 SOLUTION RESPIRATORY (INHALATION) at 03:21

## 2019-01-01 RX ADMIN — INSULIN LISPRO 2 UNITS: 100 INJECTION, SOLUTION INTRAVENOUS; SUBCUTANEOUS at 16:37

## 2019-01-01 RX ADMIN — ENOXAPARIN SODIUM 40 MG: 40 INJECTION SUBCUTANEOUS at 09:13

## 2019-01-01 RX ADMIN — FUROSEMIDE 40 MG: 10 INJECTION, SOLUTION INTRAMUSCULAR; INTRAVENOUS at 16:52

## 2019-01-01 RX ADMIN — ACETAMINOPHEN 650 MG: 325 TABLET, FILM COATED ORAL at 16:02

## 2019-01-01 RX ADMIN — NYSTATIN: 100000 POWDER TOPICAL at 09:25

## 2019-01-01 RX ADMIN — NYSTATIN: 100000 POWDER TOPICAL at 17:43

## 2019-01-01 RX ADMIN — TAMSULOSIN HYDROCHLORIDE 0.4 MG: 0.4 CAPSULE ORAL at 15:47

## 2019-01-01 RX ADMIN — ASPIRIN 81 MG 81 MG: 81 TABLET ORAL at 08:06

## 2019-01-01 RX ADMIN — LIDOCAINE HYDROCHLORIDE 5 ML: 10 INJECTION, SOLUTION INFILTRATION; PERINEURAL at 11:56

## 2019-01-01 RX ADMIN — VANCOMYCIN HYDROCHLORIDE 1750 MG: 1 INJECTION, POWDER, LYOPHILIZED, FOR SOLUTION INTRAVENOUS at 16:17

## 2019-01-01 RX ADMIN — ACETAMINOPHEN 650 MG: 325 TABLET, FILM COATED ORAL at 05:41

## 2019-01-01 RX ADMIN — FERROUS SULFATE TAB 325 MG (65 MG ELEMENTAL FE) 325 MG: 325 (65 FE) TAB at 07:27

## 2019-01-01 RX ADMIN — INSULIN LISPRO 2 UNITS: 100 INJECTION, SOLUTION INTRAVENOUS; SUBCUTANEOUS at 07:31

## 2019-01-01 RX ADMIN — INSULIN GLARGINE 30 UNITS: 100 INJECTION, SOLUTION SUBCUTANEOUS at 09:40

## 2019-01-01 RX ADMIN — NYSTATIN: 100000 POWDER TOPICAL at 17:50

## 2019-01-01 RX ADMIN — FUROSEMIDE 20 MG: 10 INJECTION, SOLUTION INTRAMUSCULAR; INTRAVENOUS at 15:47

## 2019-01-01 RX ADMIN — INSULIN LISPRO 4 UNITS: 100 INJECTION, SOLUTION INTRAVENOUS; SUBCUTANEOUS at 07:31

## 2019-01-01 RX ADMIN — HYDRALAZINE HYDROCHLORIDE 25 MG: 25 TABLET ORAL at 14:08

## 2019-01-01 RX ADMIN — NYSTATIN: 100000 POWDER TOPICAL at 10:47

## 2019-01-01 RX ADMIN — FLUCONAZOLE 200 MG: 100 TABLET ORAL at 09:20

## 2019-01-01 RX ADMIN — HYDRALAZINE HYDROCHLORIDE 25 MG: 25 TABLET ORAL at 05:28

## 2019-01-01 RX ADMIN — AMLODIPINE BESYLATE 10 MG: 10 TABLET ORAL at 09:22

## 2019-01-01 RX ADMIN — CEFEPIME HYDROCHLORIDE 2000 MG: 2 INJECTION, POWDER, FOR SOLUTION INTRAVENOUS at 10:55

## 2019-01-01 RX ADMIN — CEFEPIME HYDROCHLORIDE 2000 MG: 2 INJECTION, POWDER, FOR SOLUTION INTRAVENOUS at 22:01

## 2019-01-01 RX ADMIN — ENOXAPARIN SODIUM 40 MG: 40 INJECTION SUBCUTANEOUS at 09:10

## 2019-01-01 RX ADMIN — INSULIN GLARGINE 20 UNITS: 100 INJECTION, SOLUTION SUBCUTANEOUS at 09:26

## 2019-01-08 NOTE — TELEPHONE ENCOUNTER
Patient managed by Dr Anjel Varela at the [facility] office  Patient with history urinary retention, s/p SPT placement 2017  Spoke with Stacey [place] from [facility] has been changing patient's siddiqui every 2 weeks and unsure if the reason is a blocked catheter or penile leakage  Discussed with Stacey [place] would not change siddiqui because patient is experiencing leakage from the penis  If siddiqui is draining no need to change catheter despite the leakage  If siddiqui is not draining irrigate to determine blockage  Stacey [place] is visiting patient tomorrow  She will speak with wife and patient about the frequency of the leakage  If the leakage is bothersome will discuss with Emi STAUFFER about options    Stacey [place] will contact office tomorrow to speak with clinical

## 2019-01-09 NOTE — TELEPHONE ENCOUNTER
Rubén Gonzalez from homecare called and said the patient's tube was clogged and they had to change it again  She would like to speak with nurse

## 2019-01-10 NOTE — TELEPHONE ENCOUNTER
Jan Perez from homecare wants to speak to nurse regarding update on patient  She was out to home and would like to ask some questions  Please call back

## 2019-01-11 NOTE — TELEPHONE ENCOUNTER
Patient needs to ensure he is drinking adequate amounts of fluid to prevent sediment  If need be can increase the size of the Ozuna

## 2019-01-11 NOTE — TELEPHONE ENCOUNTER
Spoke with Joaquin Stahl [phone] from [facility] about Jayne's recommendations  Joaquin Stahl needs to visit patient today, patient's aide called Joaquin Stahl and informed her patient's  SPT blocked again  Patient has 16fr SPT, will increase to 18fr  SPT  Reviewed with Joaquin Stahl patient needs to aggressively hydrate with water  Joaquin Stahl feels patient drinks adequate amount of water  Observe for now

## 2019-01-11 NOTE — TELEPHONE ENCOUNTER
Claus Been from [facility] called yesterday reporting VNA changing patient's SPT every 2 weeks  SPT changed on Sunday and it blocked on Wednesday and required another change  Claus Been calling today SPT blocked  again yesterday and needs a change today  Patient's aide irrigates SPT several times a week  Per Claus Been wife unable to assist patient and patient himself requires a lot of assistance  Claus Been calling asking for recommendations  Please advise

## 2019-01-23 NOTE — SOCIAL WORK
[address] [place] [address] will pick the patient up and bring him home via stretcher at between 7pm - 8 pm tonight

## 2019-01-23 NOTE — ED NOTES
Pat from [facility] called   Transport home for patient will   between [address] , [facility]  01/23/19 1215

## 2019-01-23 NOTE — DISCHARGE INSTRUCTIONS
Catheter-associated Urinary Tract Infection   WHAT YOU NEED TO KNOW:   A catheter-associated urinary tract infection (CAUTI) is an infection caused by an indwelling urinary catheter  An indwelling urinary catheter is a thin, flexible tube that is inserted into the bladder  It is left in place to drain urine  The infection may travel along the catheter and into the bladder or kidneys  DISCHARGE INSTRUCTIONS:   Return to the emergency department if:   · You have severe pain in your lower back or abdomen  · You have blood in your urine  · You stop urinating, or you urinate much less than usual   Contact your healthcare provider if:   · You have a fever  · Your symptoms do not improve or get worse  · You have questions or concerns about your condition or care  Medicines: You may need any of the following:  · Antibiotics  help treat an infection caused by bacteria  · Antifungals  help treat an infection caused by fungus  · Acetaminophen  decreases pain and fever  It is available without a doctor's order  Ask how much to take and how often to take it  Follow directions  Read the labels of all other medicines you are using to see if they also contain acetaminophen, or ask your doctor or pharmacist  Acetaminophen can cause liver damage if not taken correctly  Do not use more than 4 grams (4,000 milligrams) total of acetaminophen in one day  · NSAIDs , such as ibuprofen, help decrease swelling, pain, and fever  This medicine is available with or without a doctor's order  NSAIDs can cause stomach bleeding or kidney problems in certain people  If you take blood thinner medicine, always ask your healthcare provider if NSAIDs are safe for you  Always read the medicine label and follow directions  · Take your medicine as directed  Contact your healthcare provider if you think your medicine is not helping or if you have side effects  Tell him of her if you are allergic to any medicine   Keep a list of the medicines, vitamins, and herbs you take  Include the amounts, and when and why you take them  Bring the list or the pill bottles to follow-up visits  Carry your medicine list with you in case of an emergency  Self-care:   · Drink fluids as directed  Fluids may help your kidneys and bladder get rid of the infection  · Keep the catheter area clean  Clean your skin around the catheter as directed  Shower once a day  Do not take baths or go in hot tubs until your infection is gone  · Do not have sex  until your healthcare provider says it is okay  Sex may delay healing or cause another UTI  Prevent another CAUTI:   · Wash your hands before and after you use the bathroom or touch the catheter  Wash your hands to prevent the spread of infection to your urinary tract  · Clean all parts of your catheter as directed  Keep your catheter tubing clean  Do not place the catheter on the ground  Do not allow the drainage spout to touch the toilet  Use an alcohol swab to clean the end of drainage spout as directed  · Keep the drainage bag below your waist   This may prevent urine from moving back into your bladder, which can cause an infection  · Empty the urine bag as directed  This may prevent urine from flowing back into your bladder  · Women should wipe front to back  after a bowel movement  This may prevent germs from getting into the urinary tract  · Keep the catheter secured to your leg as directed  Use tape or a special catheter torres to prevent your catheter from being pulled  This may also prevent kinks that could cause the urine to move back into the bladder  Follow up with your healthcare provider as directed:  Write down your questions so you remember to ask them during your visits  © 2017 [address] [employer] Information is for End User's use only and may not be sold, redistributed or otherwise used for commercial purposes   All illustrations and images included in [employer] are the copyrighted property of [employer]  or Riky Evans  The above information is an  only  It is not intended as medical advice for individual conditions or treatments  Talk to your doctor, nurse or pharmacist before following any medical regimen to see if it is safe and effective for you  Skin Yeast Infection   WHAT YOU NEED TO KNOW:   Yeast is normally present on the skin  Infection happens when you have too much yeast, or when it gets into a cut on your skin  Certain types of mold and fungus can cause a yeast infection  A skin yeast infection can appear anywhere on your skin or nail beds  Skin yeast infections are usually found on warm, moist parts of the body  Examples include between skin folds or under the breasts  DISCHARGE INSTRUCTIONS:   Return to the emergency department if:   · You have signs of infection, such as pus, warmth or red streaks coming from the wound, or a fever  Contact your healthcare provider if:   · Your symptoms worsen or do not get better within 7 to 10 days  · You have new or returning signs of a skin yeast infection after treatment  · You have questions or concerns about your condition or care  Medicines:   · Antifungal medicine  may be given as a cream, ointment, or pill  · Take your medicine as directed  Contact your healthcare provider if you think your medicine is not helping or if you have side effects  Tell him or her if you are allergic to any medicine  Keep a list of the medicines, vitamins, and herbs you take  Include the amounts, and when and why you take them  Bring the list or the pill bottles to follow-up visits  Carry your medicine list with you in case of an emergency  Care for the skin near the infection:  You may only have discolored patches of skin, or areas that are dry and flaking  Care for these skin problems as directed by your healthcare provider   If you have painful skin or an open sore, you will need to protect the skin and prevent damage  You will also need to keep the skin dry as much as possible  Ask your healthcare provider how to care for your skin while the infection clears  The following are general guidelines for caring for painful or open skin:  · Keep the skin clean  Ask your healthcare provider if you should wash with mild soap and water  Do not use soap that contains alcohol  Alcohol can dry and irritate the skin and make symptoms worse  Your baby's healthcare provider may tell you to use diaper cream or ointment when you change his diaper  This will protect the skin and prevent moisture from collecting  · Keep the skin dry  Pat the area dry with a towel  Do not rub, because this may irritate the skin  If you have a skin yeast infection between skin folds, lift the top part gently and hold it while you dry between your skin folds  Always dry your feet completely after you swim or bathe, including between your toes  Dry your skin if you are sweating from exercise or exposure to heat  Use a clean towel each time to prevent spreading or continuing the infection  · Keep the skin protected  Ask your healthcare provider if you should cover the area with a bandage or leave it open  Check your skin each day to make sure you do not have new or worsening problems  You may need to have someone check the skin if you cannot see the area easily    Prevent another skin yeast infection:   · Do not share clothing or towels    · Wear shower shoes if you need to use a public shower    · Dry your feet completely after you bathe, and apply antifungal powder or cream as directed    · Put on socks before you get dressed so you do not spread fungus from your feet    · Wear light clothing that allows air to get to your skin    · Manage your weight to prevent skin folds where yeast can collect    · Manage diabetes    · Change your baby's diaper often, and keep the area clean and dry as much as possible    · Use a diaper cream or ointment that contains zinc oxide or dimethicone on your baby's diaper area as directed  Follow up with your healthcare provider as directed:  Write down your questions so you remember to ask them during your visits  © 2017 [address] [employer] Information is for End User's use only and may not be sold, redistributed or otherwise used for commercial purposes  All illustrations and images included in [employer]® are the copyrighted property of [employer]  or Riky Evans  The above information is an  only  It is not intended as medical advice for individual conditions or treatments  Talk to your doctor, nurse or pharmacist before following any medical regimen to see if it is safe and effective for you

## 2019-01-23 NOTE — ED PROVIDER NOTES
History  Chief Complaint   Patient presents with    Abdominal Pain     Lower abdominal pain starting 2-3 days ago, denies n/v/d       History provided by:  Patient and medical records  Abdominal Pain   Pain location: "bladder"  Pain quality: burning and cramping    Pain radiates to:  Does not radiate  Pain severity:  Severe  Onset quality:  Gradual  Duration:  3 days  Timing:  Constant  Progression:  Worsening  Chronicity:  Recurrent (pt reports bladder pain x 10 years  for past 2-3 days burning spasming feels like prior UTIs  Had SPT 18F change by [facility] AT [facility] about 2-3 weeks ago)  Context comment:  Chronic SPT  homebound/wheelchair bound, dependent for ADLs  Relieved by:  Nothing  Worsened by:  Nothing  Ineffective treatments: pt on flomax chronically  denies recent abx  Associated symptoms: dysuria (burning x2-3 days despite catheter)    Associated symptoms: no anorexia, no chest pain, no chills, no constipation, no cough, no diarrhea, no fatigue, no fever, no hematuria, no nausea, no shortness of breath, no sore throat and no vomiting    Associated symptoms comment:  Urgency, burning  Occasionally leaks from penis usually just at nighttime  Majority of urine passes via SPT  Normal volume of output  No hematuria  Risk factors: being elderly and obesity        Prior to Admission Medications   Prescriptions Last Dose Informant Patient Reported? Taking?    amLODIPine (NORVASC) 10 mg tablet  Care Giver Yes Yes   Sig: Take 10 mg by mouth   aspirin 81 mg chewable tablet  Care Giver No Yes   Sig: Chew 1 tablet (81 mg total) daily   atorvastatin (LIPITOR) 40 mg tablet   Yes Yes   Sig: Take 40 mg by mouth daily   docusate sodium (COLACE) 100 mg capsule  Care Giver No Yes   Sig: Take 1 capsule by mouth 2 (two) times a day   Patient taking differently: Take 100 mg by mouth 2 (two) times a day as needed     ergocalciferol (ERGOCALCIFEROL) 07025 units capsule   Yes Yes   Sig: Take 50,000 Units by mouth once a week   ferrous sulfate 325 (65 Fe) mg tablet  Care Giver Yes Yes   Sig: Take 325 mg by mouth daily with breakfast Take one hour before meal      insulin aspart (NovoLOG) 100 units/mL injection  Care Giver Yes Yes   Si unit for every 5 gm carbohydrates per endocrine instructions   insulin glargine (LANTUS) 100 units/mL subcutaneous injection  Care Giver No Yes   Sig: Inject 40 Units under the skin daily at bedtime   nystatin (MYCOSTATIN) powder  Care Giver Yes Yes   Sig: Apply topically 4 (four) times a day   tamsulosin (FLOMAX) 0 4 mg  Care Giver No Yes   Sig: Take 1 capsule (0 4 mg total) by mouth daily with dinner   tiotropium (SPIRIVA) 18 mcg inhalation capsule   Yes Yes   Sig: Place 18 mcg into inhaler and inhale daily      Facility-Administered Medications: None       Past Medical History:   Diagnosis Date    [facility]est[facility]     COPD (chronic obstructive pulmonary disease) [facility]     Diabetes mellitus ([facility]     ESBL (extended spectrum beta-lactamase) producing bacteria infection     History of BPH     Hyperlipidemia     Hypertension     PE (pulmonary thromboembolism) [facility]     Poor hygiene     [facility]per[facility]     Pyelonephritis     Spinal stenosis of lumbar region     Stroke ([facility]     Systolic and diastolic CHF, chronic ([facility]     UTI due to extended-spectrum beta lactamase (ESBL) producing Escherichia coli        Past Surgical History:   Procedure Laterality Date    APPENDECTOMY      BACK SURGERY      CARDIAC SURGERY      cabg    HIP SURGERY Right     plate and pin       Family History   Problem Relation Age of Onset    Coronary artery disease Mother     Cancer Father      I have reviewed and agree with the history as documented      Social History   Substance Use Topics    Smoking status: Former Smoker     Packs/day: 2 00     Years: 32 00     Quit date: 1984    Smokeless tobacco: Never Used    Alcohol use No        Review of Systems   Constitutional: Negative for chills, fatigue and fever  HENT: Negative for congestion, ear pain and sore throat  Respiratory: Negative for cough and shortness of breath  Cardiovascular: Negative for chest pain and palpitations  Gastrointestinal: Positive for abdominal pain  Negative for anorexia, constipation, diarrhea, nausea and vomiting  Genitourinary: Positive for difficulty urinating (dependent on SPT chronically) and dysuria (burning x2-3 days despite catheter)  Negative for decreased urine volume, discharge, flank pain, frequency, hematuria, penile pain, penile swelling, scrotal swelling, testicular pain and urgency  Musculoskeletal: Negative for arthralgias, back pain and neck pain  Skin: Positive for rash (scrotal and thigh erythema/tinea)  Negative for wound  Allergic/Immunologic: Negative for immunocompromised state  Neurological: Negative for dizziness, numbness and headaches  Physical Exam  Physical Exam   Constitutional: He is oriented to person, place, and time  He appears well-developed and well-nourished  No distress  Morbidly obese, alert oriented pleasant cooperative   HENT:   Head: Normocephalic and atraumatic  Mouth/Throat: Oropharynx is clear and moist    Eyes: Pupils are equal, round, and reactive to light  Neck: Neck supple  Cardiovascular: Normal rate, regular rhythm, normal heart sounds and intact distal pulses  Pulmonary/Chest: Effort normal and breath sounds normal    Abdominal: Soft  Bowel sounds are normal  There is no tenderness  Genitourinary: Right testis shows no swelling and no tenderness  Left testis shows no swelling and no tenderness  Genitourinary Comments: Notable intertrigo, moisture with leftover debris/powder beween skin folds bilateral groin thighs and scrotum, though nontender      Suprapubic catheter in place and functional  No open wounds however surrounding skin is moist beneath large pannus   Neurological: He is alert and oriented to person, place, and time  Skin: Skin is warm and dry  Capillary refill takes less than 2 seconds  He is not diaphoretic  Nursing note and vitals reviewed        Vital Signs  ED Triage Vitals   Temperature Pulse Respirations Blood Pressure SpO2   01/23/19 1047 01/23/19 1051 01/23/19 1047 01/23/19 1050 01/23/19 1101   97 9 °F (36 6 °C) 84 20 (!) 190/83 96 %      Temp Source Heart Rate Source Patient Position - Orthostatic VS BP Location FiO2 (%)   01/23/19 1047 01/23/19 1047 01/23/19 1047 01/23/19 1047 --   Temporal Monitor Sitting Right arm       Pain Score       01/23/19 1047       9           Vitals:    01/23/19 1230 01/23/19 1300 01/23/19 1430 01/23/19 1600   BP: (!) 180/76 (!) 176/73 (!) 178/79 (!) 192/91   Pulse: 67 60 64 67   Patient Position - Orthostatic VS: Lying Sitting         Visual Acuity      ED Medications  Medications   insulin lispro (HumaLOG) 100 units/mL subcutaneous injection 12 Units (12 Units Subcutaneous Given 1/23/19 1320)   acetaminophen (TYLENOL) tablet 650 mg (650 mg Oral Given 1/23/19 1319)   ciprofloxacin (CIPRO) tablet 500 mg (500 mg Oral Given 1/23/19 1319)       Diagnostic Studies  Results Reviewed     Procedure Component Value Units Date/Time    Urine culture [[id number]]  (Abnormal) Resulted:  01/24/19 1240    Lab Status:  Preliminary result Specimen:  Urine from Urine, Suprapubic catheter Updated:  01/24/19 1308     Urine Culture >100,000 cfu/ml Non lactose fermenting gram negative amarilis (A)      >100,000 cfu/ml Proteus species (A)    CBC and differential [[id number]]  (Abnormal) Collected:  01/23/19 1115    Lab Status:  Final result Specimen:  Blood from Arm, Left Updated:  01/23/19 1419     WBC 9 97 Thousand/uL      RBC 5 51 Million/uL      Hemoglobin 16 0 g/dL      Hematocrit 49 5 (H) %      MCV 90 fL      MCH 29 0 pg      MCHC 32 3 g/dL      RDW 13 3 %      MPV 12 7 fL      Platelets 449 Thousands/uL      nRBC 0 /100 WBCs      Neutrophils Relative 67 %      Immat GRANS % 0 % Lymphocytes Relative 18 %      Monocytes Relative 8 %      Eosinophils Relative 6 %      Basophils Relative 1 %      Neutrophils Absolute 6 59 Thousands/µL      Immature Grans Absolute 0 03 Thousand/uL      Lymphocytes Absolute 1 83 Thousands/µL      Monocytes Absolute 0 84 Thousand/µL      Eosinophils Absolute 0 58 Thousand/µL      Basophils Absolute 0 10 Thousands/µL     Kamas draw [[id number]] Collected:  01/23/19 1115    Lab Status:  Final result Specimen:  Blood Updated:  01/23/19 1301    Narrative: The following orders were created for panel order Kamas draw  Procedure                               Abnormality         Status                     ---------                               -----------         ------                     Eulas Serum Top on TUQI[[id number]]                           Final result               Gold top on FABW[[id number]]                                 Final result               Green / Yellow tube on EQXJ[[id number]]                      Final result               Green / Black tube on KEUM[[id number]]                       Final result               Lavender Top 3 ml on DOCS[[id number]]                        Final result                 Please view results for these tests on the individual orders      Urine Microscopic [[id number]]  (Abnormal) Resulted:  01/23/19 1259    Lab Status:  Final result Specimen:  Urine from Urine, Suprapubic catheter Updated:  01/23/19 1259     RBC, UA 2-4 (A) /hpf      WBC, UA 20-30 (A) /hpf      Epithelial Cells Occasional /hpf      Bacteria, UA Innumerable (A) /hpf     UA w Reflex to Microscopic w Reflex to Culture [[id number]]  (Abnormal) Resulted:  01/23/19 1257    Lab Status:  Final result Specimen:  Urine from Urine, Suprapubic catheter Updated:  01/23/19 1257     Color, UA Yellow     Clarity, UA Clear     Specific Gravity, UA 1 015     pH, UA 7 5     Leukocytes, UA Small (A)     Nitrite, UA Positive (A)     Protein,  (2+) (A) mg/dl Glucose, UA >=1000 (1%) (A) mg/dl      Ketones, UA Negative mg/dl      Urobilinogen, UA 0 2 E U /dl      Bilirubin, UA Negative     Blood, UA Small (A)    Basic metabolic panel [[id number]]  (Abnormal) Collected:  01/23/19 1115    Lab Status:  Final result Specimen:  Blood from Arm, Left Updated:  01/23/19 1243     Sodium 138 mmol/L      Potassium 4 8 mmol/L      Chloride 99 (L) mmol/L      CO2 28 mmol/L      ANION GAP 11 mmol/L      BUN 24 mg/dL      Creatinine 1 15 mg/dL      Glucose 425 (H) mg/dL      Calcium 8 9 mg/dL      eGFR 61 ml/min/1 73sq m     Narrative:         National Kidney Disease Education Program recommendations are as follows:  GFR calculation is accurate only with a steady state creatinine  Chronic Kidney disease less than 60 ml/min/1 73 sq  meters  Kidney failure less than 15 ml/min/1 73 sq  meters  US kidney and bladder   Final Result by Unruly Lo MD (01/23 1244)      Mild rim of hypoechogenicity cuffing around the patient's suprapubic catheter on its course through the abdominal wall  This could suggest surrounding edema or proteinaceous fluid  Correlate for evidence of infection or skin changes at the exit site  No hydronephrosis or other acute renal findings        Workstation performed: [id number]                    Procedures  Procedures       Phone Contacts  ED Phone Contact    ED Course  ED Course as of Jan 24 1347 Wed Jan 23, 2019   1258 Sodium: 138   1258 Potassium: 4 8   1258 Chloride: (!) 99   1258 CO2: 28   1258 Anion Gap: 11   1258 BUN: 24   1258 Creatinine: 1 15   1258 Glucose, Random: (!) 425   1258 eGFR: 61   1258 Color, UA: Yellow   1258 Leukocytes, UA: (!) Small   1258 Nitrite, UA: (!) Positive   1258 POCT URINE PROTEIN: (!) 100 (2+)   1258 Glucose, UA: (!) >=1000 (1%)   1258 Ketones, UA: Negative   1259 Blood, UA: (!) Small   1259 Prior urine culture October shows providencia growth, susceptible to cephalosporins (excluding keflex) and fluoroquinolones  Resistant to macrobid keflex and bactrim  Pt was treated with cipro that time       1325 Pt eating his crackers as requested  Will provide home dose 12 units novolog as he did not have any insulin today and his BS is 425     1336 CBC delayed in lab       1400 Called to lab again CBC still delayed due to other patient significant findings and repeating on same machine  Pt also states he has no way to get home so he will need an ambulance to take him home his wife does not drive and he is wheelchair/bedbound  1427 WBC: 9 97   1428 Hemoglobin: 16 0   1428 HCT: (!) 49 5   1428 Platelet Count: 294   1436 Unit clerk attempting to arrange EMS transport back to home  I will provide medical necessity if/when arrangements made    21  Unit clerk called slets/apts/medstat all state booked until tomorrow morning cannot aid in transport  26 I spoke with RANJIT raza she will assist in finding transport options for pt to return to private residence  26 Family arrived took patient home                                MDM  Number of Diagnoses or Management Options  Hyperglycemia due to type 2 diabetes mellitus ([facility] ): new and does not require workup  Intertrigo: new and requires workup  Urinary tract infection associated with cystostomy catheter, initial encounter [facility]): new and requires workup  Diagnosis management comments: 66 yr male acute uti in setting of chronic suprapubic catheter  Last treated for infection October, not frequently infected, though likely has some chronic colonization  No recent nitrite positive urines so in acute setting with suggestive sx I am inclined to treat for acute infection  Will start cipro 10 days for cauti as he has had some resistance in past  SPT is functional, bladder is collapsed on imaging without suggestion of any obstruction  Intertrigo- start nystatin  Hyperglycemia- compliance with home insulin regimen  Pt "normal" BS not less than 250 usually per pt  Amount and/or Complexity of Data Reviewed  Clinical lab tests: ordered and reviewed  Tests in the radiology section of CPT®: ordered and reviewed      CritCare Time    Disposition  Final diagnoses:   Urinary tract infection associated with cystostomy catheter, initial encounter ([facility] )   Intertrigo   Hyperglycemia due to type 2 diabetes mellitus ([facility] )     Time reflects when diagnosis was documented in both MDM as applicable and the Disposition within this note     Time User Action Codes Description Comment    1/23/2019  2:42 PM Dory Gal Add [T83 510A,  N39 0] Urinary tract infection associated with cystostomy catheter, initial encounter ([facility] )     1/23/2019  2:42 PM Dory Gal Add [L30 4] Intertrigo     1/23/2019  2:42 PM Dory Gal Add [E11 65] Hyperglycemia due to type 2 diabetes mellitus [facility])       ED Disposition     ED Disposition Condition Comment    Discharge  Heber Cull discharge to home with continued home health nursing and aides    Condition at discharge: Good        Follow-up Information     Follow up With Specialties Details Why Contact Info Additional Information    Lupe Khalil MD Internal Medicine Schedule an appointment as soon as possible for a visit Seen in ER need followup for illness [address] [facility] [id number]  [address]  For [facility] Schedule an appointment as soon as possible for a visit in 2 weeks Seen in ER need followup for illness [address] [phone]  [address] [place] [address] For [facility] [place], [address] [place] [address], [phone]          Discharge Medication List as of 1/23/2019  2:50 PM      START taking these medications    Details   acetaminophen (TYLENOL) 325 mg tablet Take 2 tablets (650 mg total) by mouth every 6 (six) hours as needed (pain), Starting Wed 1/23/2019, Normal      ciprofloxacin (CIPRO) 500 mg tablet Take 1 tablet (500 mg total) by mouth 2 (two) times a day for 10 days, Starting Wed 1/23/2019, Until Sat 2/2/2019, Normal      nystatin (MYCOSTATIN) cream Apply to affected area 2 times daily, Normal      phenazopyridine (PYRIDIUM) 200 mg tablet Take 1 tablet (200 mg total) by mouth 3 (three) times a day, Starting Wed 1/23/2019, Normal         CONTINUE these medications which have NOT CHANGED    Details   amLODIPine (NORVASC) 10 mg tablet Take 10 mg by mouth, Starting Mon 11/28/2016, Historical Med      aspirin 81 mg chewable tablet Chew 1 tablet (81 mg total) daily, Starting Tue 6/12/2018, Normal      atorvastatin (LIPITOR) 40 mg tablet Take 40 mg by mouth daily, Historical Med      docusate sodium (COLACE) 100 mg capsule Take 1 capsule by mouth 2 (two) times a day, Starting Sat 7/1/2017, Print      ergocalciferol (ERGOCALCIFEROL) 29689 units capsule Take 50,000 Units by mouth once a week, Historical Med      ferrous sulfate 325 (65 Fe) mg tablet Take 325 mg by mouth daily with breakfast Take one hour before meal   , Historical Med      insulin aspart (NovoLOG) 100 units/mL injection 1 unit for every 5 gm carbohydrates per endocrine instructions, Historical Med      insulin glargine (LANTUS) 100 units/mL subcutaneous injection Inject 40 Units under the skin daily at bedtime, Starting Sun 1/14/2018, Normal      nystatin (MYCOSTATIN) powder Apply topically 4 (four) times a day, Historical Med      tamsulosin (FLOMAX) 0 4 mg Take 1 capsule (0 4 mg total) by mouth daily with dinner, Starting Tue 2/13/2018, No Print      tiotropium (SPIRIVA) 18 mcg inhalation capsule Place 18 mcg into inhaler and inhale daily, Historical Med           No discharge procedures on file      ED Provider  Electronically Signed by           Arlene Izquierdo PA-C  01/24/19 1153

## 2019-01-30 NOTE — PROGRESS NOTES
Call to patient to check in, still taking the cipro, finished pyridium  Still feeling a bit of pain  Discussed second organism growth would require additional abx to fully treat  Discussed with pt and his aide zelda on the phone both deny penicillin allergy has tolerated in past and OK to go ahead with augmentin  Would like RX sent to [facility] for mail order in order to save money  I spoke with pharmacy and pcp staff within the [place] and faxed signed Jose Francisco Fulling for them to verify to submit within [address] for mail order tomorrow

## 2019-04-09 PROBLEM — B35.6 FUNGAL INFECTION OF THE GROIN: Status: ACTIVE | Noted: 2018-01-05
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
## 2019-04-10 PROBLEM — T83.511A URINARY TRACT INFECTION ASSOCIATED WITH CATHETERIZATION OF URINARY TRACT (HCC): Status: ACTIVE | Noted: 2019-01-01

## 2019-04-10 PROBLEM — N39.0 URINARY TRACT INFECTION ASSOCIATED WITH CATHETERIZATION OF URINARY TRACT (HCC): Status: ACTIVE | Noted: 2019-01-01

## 2019-04-11 PROBLEM — A49.02 MRSA INFECTION: Status: ACTIVE | Noted: 2019-01-01

## 2019-04-12 PROBLEM — Z22.322 MRSA COLONIZATION: Status: ACTIVE | Noted: 2019-01-01

## 2019-04-15 PROBLEM — R19.7 DIARRHEA: Status: ACTIVE | Noted: 2019-01-01

## 2019-04-15 PROBLEM — R91.1 PULMONARY NODULE: Status: ACTIVE | Noted: 2019-01-01

## 2019-04-15 PROBLEM — M43.10 ANTEROLISTHESIS: Status: ACTIVE | Noted: 2019-01-01

## 2019-04-15 PROBLEM — I49.3 VENTRICULAR ECTOPY: Status: ACTIVE | Noted: 2019-01-01

## 2019-04-27 PROBLEM — N40.0 BENIGN PROSTATIC HYPERPLASIA WITHOUT LOWER URINARY TRACT SYMPTOMS: Status: ACTIVE | Noted: 2018-02-12

## 2019-04-29 PROBLEM — J96.01 ACUTE RESPIRATORY FAILURE WITH HYPOXIA (HCC): Status: ACTIVE | Noted: 2019-01-01

## 2019-06-12 PROBLEM — A41.9 SEPSIS (HCC): Status: RESOLVED | Noted: 2018-06-07 | Resolved: 2019-01-01

## 2019-06-12 PROBLEM — E87.1 HYPONATREMIA: Status: RESOLVED | Noted: 2018-06-07 | Resolved: 2019-01-01

## 2019-06-12 PROBLEM — R19.7 DIARRHEA: Status: RESOLVED | Noted: 2019-01-01 | Resolved: 2019-01-01

## 2019-06-12 PROBLEM — D72.829 LEUKOCYTOSIS: Status: ACTIVE | Noted: 2019-01-01
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
## 2019-06-12 PROBLEM — K59.00 CONSTIPATION: Status: RESOLVED | Noted: 2017-06-30 | Resolved: 2019-01-01

## 2019-06-12 PROBLEM — E87.2 LACTIC ACIDOSIS: Status: RESOLVED | Noted: 2018-06-07 | Resolved: 2019-01-01

## 2019-06-12 PROBLEM — R82.90 ABNORMAL URINALYSIS: Status: ACTIVE | Noted: 2019-01-01

## 2019-06-12 PROBLEM — E87.6 HYPOKALEMIA: Status: RESOLVED | Noted: 2018-01-06 | Resolved: 2019-01-01

## 2019-06-12 PROBLEM — R79.89 LOW SERUM PREALBUMIN: Status: RESOLVED | Noted: 2018-01-08 | Resolved: 2019-01-01

## 2019-06-14 PROBLEM — D72.829 LEUKOCYTOSIS: Status: RESOLVED | Noted: 2019-01-01 | Resolved: 2019-01-01

## 2019-06-15 PROBLEM — J96.02 ACUTE RESPIRATORY FAILURE WITH HYPOXIA AND HYPERCAPNIA (HCC): Status: ACTIVE | Noted: 2019-01-01

## 2019-06-17 LAB
BACTERIA BLD CULT: NORMAL
BACTERIA BLD CULT: NORMAL

## 2023-06-04 NOTE — ASSESSMENT & PLAN NOTE
-initiate an IV insulin drip/infusion  -monitor the blood glucose levels every 2 hours  -check a hemoglobin A1c level 04-Jun-2023 03:37